# Patient Record
Sex: MALE | Race: WHITE | NOT HISPANIC OR LATINO | Employment: OTHER | URBAN - METROPOLITAN AREA
[De-identification: names, ages, dates, MRNs, and addresses within clinical notes are randomized per-mention and may not be internally consistent; named-entity substitution may affect disease eponyms.]

---

## 2022-09-07 ENCOUNTER — TRANSFERRED RECORDS (OUTPATIENT)
Dept: HEALTH INFORMATION MANAGEMENT | Facility: CLINIC | Age: 77
End: 2022-09-07

## 2022-09-08 ENCOUNTER — TRANSFERRED RECORDS (OUTPATIENT)
Dept: HEALTH INFORMATION MANAGEMENT | Facility: CLINIC | Age: 77
End: 2022-09-08

## 2022-10-24 ENCOUNTER — TRANSFERRED RECORDS (OUTPATIENT)
Dept: HEALTH INFORMATION MANAGEMENT | Facility: CLINIC | Age: 77
End: 2022-10-24

## 2022-12-05 ENCOUNTER — TRANSFERRED RECORDS (OUTPATIENT)
Dept: HEALTH INFORMATION MANAGEMENT | Facility: CLINIC | Age: 77
End: 2022-12-05

## 2022-12-17 ENCOUNTER — TRANSFERRED RECORDS (OUTPATIENT)
Dept: HEALTH INFORMATION MANAGEMENT | Facility: CLINIC | Age: 77
End: 2022-12-17

## 2022-12-20 ENCOUNTER — TRANSFERRED RECORDS (OUTPATIENT)
Dept: HEALTH INFORMATION MANAGEMENT | Facility: CLINIC | Age: 77
End: 2022-12-20

## 2022-12-21 ENCOUNTER — TRANSFERRED RECORDS (OUTPATIENT)
Dept: HEALTH INFORMATION MANAGEMENT | Facility: CLINIC | Age: 77
End: 2022-12-21

## 2023-01-10 ENCOUNTER — TRANSFERRED RECORDS (OUTPATIENT)
Dept: HEALTH INFORMATION MANAGEMENT | Facility: CLINIC | Age: 78
End: 2023-01-10
Payer: MEDICARE

## 2023-01-23 ENCOUNTER — TRANSFERRED RECORDS (OUTPATIENT)
Dept: HEALTH INFORMATION MANAGEMENT | Facility: CLINIC | Age: 78
End: 2023-01-23
Payer: MEDICARE

## 2023-01-23 ENCOUNTER — MEDICAL CORRESPONDENCE (OUTPATIENT)
Dept: HEALTH INFORMATION MANAGEMENT | Facility: CLINIC | Age: 78
End: 2023-01-23
Payer: MEDICARE

## 2023-01-23 ENCOUNTER — TELEPHONE (OUTPATIENT)
Dept: GASTROENTEROLOGY | Facility: CLINIC | Age: 78
End: 2023-01-23
Payer: MEDICARE

## 2023-01-23 ENCOUNTER — TRANSCRIBE ORDERS (OUTPATIENT)
Dept: OTHER | Age: 78
End: 2023-01-23

## 2023-01-23 DIAGNOSIS — R16.0 LIVER MASS, LEFT LOBE: Primary | ICD-10-CM

## 2023-01-23 NOTE — TELEPHONE ENCOUNTER
Health Call Center    Phone Message    May a detailed message be left on voicemail: yes     Reason for Call: Other: Felisa from [facility] was reaching out alejandro she stated that she faxed over an urgent referral for the patient last Monday and the patient's wife reached out to them and explained that there was not a referral in the system. Felisa was extremely upset because she clarified she sent it to the correct fax number and they do not understand why we have not received it. I did advise re-faxing the referral to make sure it goes to the correct fax number, but she said it was unacceptable for us to have the patient wait 48 hours for it to be uploaded and possibly longer to wait for an appointment. They are wondering if there is anything the clinic can do to help make the patient an appointment right away. Please call patient or Felisa to discuss what can be done, thank you!     Action Taken: Message routed to:  [facility] ([facility]    Travel Screening: Not Applicable

## 2023-01-24 ENCOUNTER — PRE VISIT (OUTPATIENT)
Dept: GASTROENTEROLOGY | Facility: CLINIC | Age: 78
End: 2023-01-24
Payer: MEDICARE

## 2023-01-24 ENCOUNTER — OFFICE VISIT (OUTPATIENT)
Dept: GASTROENTEROLOGY | Facility: CLINIC | Age: 78
End: 2023-01-24
Attending: STUDENT IN AN ORGANIZED HEALTH CARE EDUCATION/TRAINING PROGRAM
Payer: MEDICARE

## 2023-01-24 ENCOUNTER — PRE VISIT (OUTPATIENT)
Dept: SURGERY | Facility: CLINIC | Age: 78
End: 2023-01-24

## 2023-01-24 ENCOUNTER — LAB (OUTPATIENT)
Dept: LAB | Facility: CLINIC | Age: 78
End: 2023-01-24
Payer: MEDICARE

## 2023-01-24 ENCOUNTER — PATIENT OUTREACH (OUTPATIENT)
Dept: ONCOLOGY | Facility: CLINIC | Age: 78
End: 2023-01-24

## 2023-01-24 ENCOUNTER — TRANSCRIBE ORDERS (OUTPATIENT)
Dept: OTHER | Age: 78
End: 2023-01-24

## 2023-01-24 VITALS
WEIGHT: 188 LBS | SYSTOLIC BLOOD PRESSURE: 130 MMHG | DIASTOLIC BLOOD PRESSURE: 65 MMHG | OXYGEN SATURATION: 98 % | HEART RATE: 79 BPM

## 2023-01-24 DIAGNOSIS — C78.7 SECONDARY MALIGNANT NEOPLASM OF LIVER AND INTRAHEPATIC BILE DUCT (H): ICD-10-CM

## 2023-01-24 DIAGNOSIS — K70.31 ALCOHOLIC CIRRHOSIS OF LIVER WITH ASCITES (H): ICD-10-CM

## 2023-01-24 DIAGNOSIS — K70.31 ALCOHOLIC CIRRHOSIS OF LIVER WITH ASCITES (H): Primary | ICD-10-CM

## 2023-01-24 DIAGNOSIS — R16.0 LIVER MASS: Primary | ICD-10-CM

## 2023-01-24 LAB
AFP SERPL-MCNC: 3.2 NG/ML
ALBUMIN SERPL BCG-MCNC: 3.7 G/DL (ref 3.5–5.2)
ALP SERPL-CCNC: 138 U/L (ref 40–129)
ALT SERPL W P-5'-P-CCNC: 27 U/L (ref 10–50)
ANION GAP SERPL CALCULATED.3IONS-SCNC: 6 MMOL/L (ref 7–15)
AST SERPL W P-5'-P-CCNC: 37 U/L (ref 10–50)
BILIRUB SERPL-MCNC: 2.5 MG/DL
BUN SERPL-MCNC: 19.4 MG/DL (ref 8–23)
CALCIUM SERPL-MCNC: 9.4 MG/DL (ref 8.8–10.2)
CHLORIDE SERPL-SCNC: 104 MMOL/L (ref 98–107)
CREAT SERPL-MCNC: 0.72 MG/DL (ref 0.67–1.17)
DEPRECATED HCO3 PLAS-SCNC: 28 MMOL/L (ref 22–29)
ERYTHROCYTE [DISTWIDTH] IN BLOOD BY AUTOMATED COUNT: 18.5 % (ref 10–15)
FERRITIN SERPL-MCNC: 444 NG/ML (ref 31–409)
GFR SERPL CREATININE-BSD FRML MDRD: >90 ML/MIN/1.73M2
GLUCOSE SERPL-MCNC: 105 MG/DL (ref 70–99)
HCT VFR BLD AUTO: 28.5 % (ref 40–53)
HGB BLD-MCNC: 9.5 G/DL (ref 13.3–17.7)
INR PPP: 1.47 (ref 0.85–1.15)
MCH RBC QN AUTO: 33 PG (ref 26.5–33)
MCHC RBC AUTO-ENTMCNC: 33.3 G/DL (ref 31.5–36.5)
MCV RBC AUTO: 99 FL (ref 78–100)
PLATELET # BLD AUTO: 66 10E3/UL (ref 150–450)
POTASSIUM SERPL-SCNC: 4.3 MMOL/L (ref 3.4–5.3)
PROT SERPL-MCNC: 6.3 G/DL (ref 6.4–8.3)
RBC # BLD AUTO: 2.88 10E6/UL (ref 4.4–5.9)
SODIUM SERPL-SCNC: 138 MMOL/L (ref 136–145)
WBC # BLD AUTO: 3.1 10E3/UL (ref 4–11)

## 2023-01-24 PROCEDURE — 99205 OFFICE O/P NEW HI 60 MIN: CPT | Performed by: STUDENT IN AN ORGANIZED HEALTH CARE EDUCATION/TRAINING PROGRAM

## 2023-01-24 PROCEDURE — 82105 ALPHA-FETOPROTEIN SERUM: CPT | Performed by: STUDENT IN AN ORGANIZED HEALTH CARE EDUCATION/TRAINING PROGRAM

## 2023-01-24 PROCEDURE — 80053 COMPREHEN METABOLIC PANEL: CPT | Performed by: PATHOLOGY

## 2023-01-24 PROCEDURE — 99000 SPECIMEN HANDLING OFFICE-LAB: CPT | Performed by: PATHOLOGY

## 2023-01-24 PROCEDURE — 36415 COLL VENOUS BLD VENIPUNCTURE: CPT | Performed by: PATHOLOGY

## 2023-01-24 PROCEDURE — 85610 PROTHROMBIN TIME: CPT | Performed by: PATHOLOGY

## 2023-01-24 PROCEDURE — 85027 COMPLETE CBC AUTOMATED: CPT | Performed by: PATHOLOGY

## 2023-01-24 PROCEDURE — 86301 IMMUNOASSAY TUMOR CA 19-9: CPT | Mod: 90 | Performed by: PATHOLOGY

## 2023-01-24 PROCEDURE — G0463 HOSPITAL OUTPT CLINIC VISIT: HCPCS | Performed by: STUDENT IN AN ORGANIZED HEALTH CARE EDUCATION/TRAINING PROGRAM

## 2023-01-24 PROCEDURE — 82728 ASSAY OF FERRITIN: CPT | Performed by: STUDENT IN AN ORGANIZED HEALTH CARE EDUCATION/TRAINING PROGRAM

## 2023-01-24 RX ORDER — SPIRONOLACTONE 50 MG/1
50 TABLET, FILM COATED ORAL DAILY
COMMUNITY
Start: 2022-11-18 | End: 2024-08-30

## 2023-01-24 RX ORDER — KETOCONAZOLE 20 MG/G
CREAM TOPICAL DAILY PRN
COMMUNITY
Start: 2023-01-11

## 2023-01-24 RX ORDER — FERROUS SULFATE 325(65) MG
325 TABLET ORAL
Status: ON HOLD | COMMUNITY
Start: 2022-12-16 | End: 2024-08-30

## 2023-01-24 RX ORDER — LOSARTAN POTASSIUM 25 MG/1
1 TABLET ORAL DAILY
COMMUNITY
Start: 2022-08-15 | End: 2024-08-30

## 2023-01-24 RX ORDER — MULTIPLE VITAMINS W/ MINERALS TAB 9MG-400MCG
1 TAB ORAL DAILY
COMMUNITY
Start: 2022-12-16

## 2023-01-24 RX ORDER — FLUTICASONE PROPIONATE 50 MCG
2 SPRAY, SUSPENSION (ML) NASAL DAILY
COMMUNITY
Start: 2021-12-17

## 2023-01-24 RX ORDER — CIPROFLOXACIN 500 MG/1
1 TABLET, FILM COATED ORAL DAILY
COMMUNITY
Start: 2022-12-20 | End: 2023-08-16

## 2023-01-24 RX ORDER — TORSEMIDE 10 MG/1
10 TABLET ORAL DAILY
COMMUNITY
Start: 2023-01-09

## 2023-01-24 RX ORDER — FOLIC ACID 1 MG/1
1 TABLET ORAL DAILY
COMMUNITY
Start: 2022-03-13 | End: 2023-03-14

## 2023-01-24 RX ORDER — BUPROPION HYDROCHLORIDE 300 MG/1
300 TABLET ORAL EVERY MORNING
COMMUNITY
Start: 2022-12-14

## 2023-01-24 NOTE — LETTER
1/24/2023         RE: Oskar Wilks  [address] [place] [address]        Dear Colleague,    Thank you for referring your patient, Oskar Wilks, to the [facility] [place]. Please see a copy of my visit note below.    [facility] New Patient Visit    Date of Visit: January 24, 2023    Reason for referral: Liver mass    Subjective: Mr. Wilks is a 77-year-old man with a history of alcohol-related liver disease who presents for evaluation of a newly found liver lesion.    He has a history of cirrhosis, has been sober in the past, and drink heavily and in July 2022.  Started having issues with abdominal distention, was diagnosed with SBP September 2022.  He had been on torsemide just prior to this, since this admission was started on low-dose torsemide and spironolactone.  He is on Cipro for SBP prophylaxis.  His last paracentesis was attempted 2022, 4.7 L was removed.    December 12 bilirubin was 3.6 and INR was 1.8.  September 9 bilirubin was 5.6.  He had a paracentesis 9/8 that was consistent with portal hypertension, paracentesis also showed SBP    Fell 12/5 on the ice walking his puppy. Broke his hip and has surgery for this. Also broke two ribs. In rehab, fell again and had a L1 compression fracture. Getting OT/PT at home. About to graduate from OT because he is doing so well.  Uses walker for appointments.  Able to go up stairs without issues.  Independent with ADLs.    Was on narcotics related to surgery, had delirium related to this.  Otherwise no HE.     ROS: 14 point ROS negative except for positives noted in HPI.    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 fib not on anticoagulation  Psoriasis  Basal cell carcinoma  Nonrheumatic mild aortic stenosis  Cerebral infarction due to occlusion of middle cerebral artery    PSHx:  Right acetabulum fracture 2/2 GLF s/p ORIF on 12/9/22  BRAIN  HEMATOMA EVACUTATION   CRANIAL LESION RESECTION From trauma   MENISCECTOMY   MOUTH SURGERY      FamHx:  No family history of liver disease, liver cancer    SocHx:  Social History     Socioeconomic History     Marital status:      Spouse name: Not on file     Number of children: Not on file     Years of education: Not on file     Highest education level: Not on file   Occupational History     Not on file   Tobacco Use     Smoking status: Not on file     Smokeless tobacco: Not on file   Substance and Sexual Activity     Alcohol use: Not on file     Drug use: Not on file     Sexual activity: Not on file   Other Topics Concern     Not on file   Social History Narrative     Not on file     Social Determinants of Health     Financial Resource Strain: Not on file   Food Insecurity: Not on file   Transportation Needs: Not on file   Physical Activity: Not on file   Stress: Not on file   Social Connections: Not on file   Intimate Partner Violence: Not on file   Housing Stability: Not on file       Medications:  Current Outpatient Medications   Medication     buPROPion (WELLBUTRIN XL) 300 MG 24 hr tablet     cholecalciferol 25 MCG (1000 UT) TABS     ciprofloxacin (CIPRO) 500 MG tablet     fluticasone (FLONASE) 50 MCG/ACT nasal spray     folic acid (FOLVITE) 1 MG tablet     ketoconazole (NIZORAL) 2 % external cream     losartan (COZAAR) 25 MG tablet     multivitamin w/minerals (THERA-VIT-M) tablet     omeprazole (PRILOSEC) 20 MG DR capsule     spironolactone (ALDACTONE) 50 MG tablet     torsemide (DEMADEX) 10 MG tablet     ferrous sulfate (FEROSUL) 325 (65 Fe) MG tablet     No current facility-administered medications for this visit.     No OTCs, herbals    Allergies:  Allergies   Allergen Reactions     Penicillins      PN: LW Reaction: Itching, Pruritis       Objective:  /65 (BP Location: Right arm, Patient Position: Sitting, Cuff Size: Adult Regular)   Pulse 79   Wt 85.3 kg (188 lb)   SpO2 98%    Constitutional: pleasant [unfilled] in NAD  Eyes: non icteric  Respiratory: Normal respiratory excursion   MSK: normal range of motion of visualized extremities  Abd: Non distended  Skin: No jaundice  Psychiatric: normal mood and orientation    Labs: Reviewed in EHR     Imaging:    MRI Liver/MRCP 1/10/2023    FINDINGS:   Cirrhotic configuration of the liver. Sequelae of portal hypertension include small volume ascites, recanalized umbilical vein, periesophageal varices and splenomegaly measuring up to 21.0 cm which is similar compared to October 2022. Diffuse hepatic iron deposition. The hepatic and portal veins are patent.     Reticular areas of T2 signal hyperintensity and delayed phase enhancement indicate fibrosis. Within hepatic segment 4A is a 4.6 x 2.7 cm ovoid lesion. This is mostly intrinsically T1 hyperintense. There is arterial phase hyperenhancement (precontrast signal intensity units 496 with arterial phase units of 713). This remains T1 hyperintense relative to background liver parenchyma on the portal venous and delayed phases. No evidence of pseudocapsule. Therefore, LI-RADS 4. There is diffusion restriction. No intralesional iron or lipid. Several small liver cysts.     Cholelithiasis better seen on prior CT. Gallbladder wall thickening and/or pericholecystic fluid is nonspecific in the setting of portal hypertension. The gallbladder does not appear substantially distended. No substantial bile duct dilation. Cystic lesions within the pancreas were better demonstrated on prior dedicated MRCP; T2 images currently degraded by motion. No evidence of focal splenic lesion. Adrenals and kidneys appear unremarkable. No lymph node enlargement or aggressive osseous lesion.     Mild bilateral gynecomastia. Right hemidiaphragm elevation. Compression deformity of L1 vertebral body.     IMPRESSION:   1. Cirrhotic liver with diffuse hepatic iron deposition. Sequelae of portal hypertension include small volume  ascites, recanalized umbilical vein, periesophageal varices and splenomegaly.   2. A 4.6 x 2.7 cm LI-RADS 4 lesion within the left hepatic lobe.   3. Multiple pancreatic cystic lesions better assessed by recent prior dedicated MRCP, with follow-up guidelines for that examination.   4. Elevated right hemidiaphragm.   5. Compression deformity of L1 vertebral body redemonstrated, suboptimally assessed by this study.      CT AP 12/17/2022      IMPRESSION:   1. Mild L1 superior endplate compression fracture with minimal retropulsion is new from 9/7/2022.   2. Cirrhosis with sequelae of portal hypertension including stable splenomegaly, paraesophageal varices, extensive portosystemic collaterals, and trace ascites.   3. Stable 9 mm cyst in the pancreatic tail which was evaluated on MRCP 10/24/2022.    MRCP without contrast 10/2022    IMPRESSION:     1. Multiple subcentimeter cystic structures throughout the pancreas without pancreatic ductal dilatation or definite nodularity. 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 Recommend follow-up in one year with pancreatic protocol MRI/MRCP without and with contrast.   2. Markedly cirrhotic appearing liver. There is a 3.2 cm nodular area with T1 hyperintensity and mild T2 hyperintensity (segment 2/3). This is indeterminate without intravenous contrast. Recommend further evaluation with abdominal MRI without and with contrast.   3. Cholelithiasis.   4. Stigmata of portal hypertension.       Endoscopy:    EGD 1/23/2023    Findings:        The examined esophagus was normal.        The Z-line was regular and was found 43 cm from the        incisors.        Mild portal hypertensive gastropathy was found in the        cardia, in the gastric fundus and in the gastric body.        Localized mild inflammation characterized by        congestion (edema), erosions and erythema was found        in the gastric antrum and in the prepyloric region of         the stomach. Biopsies were taken with a cold forceps        for Helicobacter pylori testing. Verification of        patient identification for the specimen was done.        Estimated blood loss was minimal.        The exam of the stomach was otherwise normal.        The examined duodenum was normal.     Impression:            - Normal esophagus.                          - Z-line regular, 43 cm from the                           incisors.                          - Portal hypertensive gastropathy.                          - Gastritis. Biopsied.                          - Normal examined duodenum.       Independently reviewed labs and imaging.     Assessment/Plan: Mr. Wilks is a 77-year-old man with a history of alcohol-related liver disease who presents for evaluation of a newly found liver lesion.    On review of his MRI, and he has about a 4 cm LR 4 lesion with hyperenhancement and diffusion restriction but no clear washout or pseudocapsule.  MRI from October, commented on a vague ill-defined 3 cm area in segment 2-3.  Discussed we will review his imaging at tumor conference this week.    Discussed we will review his imaging and tumor conference this week.  Potential outcomes include biopsy versus reimaging versus treatment.  Briefly discussed treatment for presumed liver cancer, focusing on Y 90 treatment.  His lesion is too large to be successfully ablated.  He is not a surgical candidate given his thrombocytopenia.  His liver disease currently is well compensated but he is a child Naqvi B cirrhotic.  MELD is 18.  He has a good functional status, despite having a recent hip fracture.    CP B(8) assuming no HE, + Ascites    MELD Na 18    Orders Placed This Encounter   Procedures     CT Chest w/o Contrast     CBC with platelets     Comprehensive metabolic panel (BMP + Alb, Alk Phos, ALT, AST, Total. Bili, TP)     INR     AFP tumor marker     Cancer antigen 19-9     Ferritin     RTC 3-4 months.    Laura Neves MD  MS  Hepatology/Liver Transplant  [facility]    Approximately 30 minutes was spent for the visit with 30 minutes of non face-to-face time were spent in review of the patient's medical record on the day of the visit. This included review of previous: clinic visits, hospital records, lab results, imaging studies, and documentation.  The findings from this review are summarized in the above note.          Again, thank you for allowing me to participate in the care of your patient.      Sincerely,    Laura Neves MD

## 2023-01-24 NOTE — TELEPHONE ENCOUNTER
Imaging Received  January 24, 2023 4:07 PM ABT   Action: Images from  received and resolved to PACS.     RECORDS STATUS - ALL OTHER DIAGNOSIS      RECORDS RECEIVED FROM: [employer]   DATE RECEIVED:    NOTES STATUS DETAILS   OFFICE NOTE from referring provider [facility] Dr. Colton Ruvalcaba   OFFICE NOTE from medical oncologist [facility] 09/29/22: Dr. Bird Patel   OFFICE NOTE from other specialist     DISCHARGE SUMMARY from hospital [facility] 09/07/22, 08/13/22: [facility]   DISCHARGE REPORT from the ER [facility] 12/17/22: [facility]   OPERATIVE REPORT [facility] 01/23/23: EGD   MEDICATION LIST [facility]    LABS     PATHOLOGY REPORTS     ANYTHING RELATED TO DIAGNOSIS - Most recent 01/19/23   IMAGING (NEED IMAGES & REPORT)     CT SCANS PACS 12/17/22,: CT Abd Pel  12/05/22: CT Pel    HP:  09/07/22: CT Abd Pel   MRI PACS 01/10/23: MR Abd  10/24/22: MR MRCP   XRAYS PACS 12/21/22, 12/05/22: XR Pelvis   ULTRASOUND PACS 09/07/22: US Abd    HP:  06/02/22-04/27/21: US Abd   PET

## 2023-01-24 NOTE — TELEPHONE ENCOUNTER
DIAGNOSIS: new 4 cm liver mass. Pt has a history of cirrhosis    Appt Date: 01.24.2023   NOTES STATUS DETAILS   OFFICE NOTE from referring provider Care Everywhere / Received 01.12.2023, 01.23.2023  Kinney, Timothy P, MD [facility]   OFFICE NOTES from other specialists Care Everywhere 01.09.2023 Chika Bateman, YOSELYN BELL P    09.06.2022 Viet López MD   HP    DISCHARGE SUMMARY from hospital     MEDICATION LIST Care Everywhere    LIVER BIOSPY (IF APPLICABLE)      PATHOLOGY REPORTS      IMAGING     ENDOSCOPY (IF AVAILABLE)     COLONOSCOPY (IF AVAILABLE)     ULTRASOUND LIVER Care Everywhere 09.08.2022US Paracentesis    09.07.2022 US Abd RUQ   CT OF ABDOMEN Care Everywhere 12.05.2022 CT Abd Pelvis   MRI OF LIVER Care Everywhere 01.10.2023 MR Abd W/WO IV Cont      10.24.2022 MR MRCP    FIBROSCAN, US ELASTOGRAPHY, FIBROSIS SCAN, MR ELASTOGRAPHY     LABS     HEPATIC PANEL (LIVER PANEL) Care Everywhere 09.10.2022   BASIC METABOLIC PANEL Care Everywhere 12.14.2022   COMPLETE METABOLIC PANEL Care Everywhere 01.19.2023   COMPLETE BLOOD COUNT (CBC) Care Everywhere 01.19.2023   INTERNATIONAL NORMALIZED RATIO (INR) Care Everywhere 01.09.2023   HEPATITIS C ANTIBODY     HEPATITIS C VIRAL LOAD/PCR     HEPATITIS C GENOTYPE     HEPATITIS B SURFACE ANTIGEN     HEPATITIS B SURFACE ANTIBODY     HEPATITIS B DNA QUANT LEVEL     HEPATITIS B CORE ANTIBODY

## 2023-01-24 NOTE — PROGRESS NOTES
New Patient Oncology Nurse Navigator Note     Referring provider: Dr Ruvalcaba, [facility]    Referring Clinic/Organization: [facility] / [facility]      Referred to: Surgical Oncology -  Hepatobiliary / GI Cancers    Requested provider (if applicable): Dr. Guy or Augie    Referral Received: 01/24/23       Evaluation for : liver mass, possible HCC     Clinical History (per Nurse review of records provided):      1/23/2023 EGD     Impression:    - Normal esophagus.                          - Z-line regular, 43 cm from the                           incisors.                          - Portal hypertensive gastropathy.                          - Gastritis. Biopsied.                          - Normal examined duodenum.       1/10/23 MR Abd    IMPRESSION:  1. Cirrhotic liver with diffuse hepatic iron deposition. Sequelae of portal hypertension include small volume ascites, recanalized umbilical vein, periesophageal varices and splenomegaly.  2. A 4.6 x 2.7 cm LI-RADS 4 lesion within the left hepatic lobe.  3. Multiple pancreatic cystic lesions better assessed by recent prior dedicated MRCP, with follow-up guidelines for that examination.  4. Elevated right hemidiaphragm.  5. Compression deformity of L1 vertebral body redemonstrated, suboptimally assessed by this study.       12/17/2022 CT Abd/Pelvis    IMPRESSION:   1. Mild L1 superior endplate compression fracture with minimal retropulsion is new from 9/7/2022.   2. Cirrhosis with sequelae of portal hypertension including stable splenomegaly, paraesophageal varices, extensive portosystemic collaterals, and trace ascites.   3. Stable 9 mm cyst in the pancreatic tail which was evaluated on MRCP 10/24/2022.      1/24/2023 N GI visit with Dr Neves, plan to get CT chest 1/26 and discuss at Tumor Board for next steps. May focus on Y90 as lesion is too large for ablation and platelets too low for surgery option.        Clinical Assessment /  Barriers to Care (Per Nurse):    Will proceed to offer next available Surg Onc consult in the coming weeks as discussion continues about care options.       Records Location: [facility] Everywhere     Records Needed:     Outside imaging    Additional testing needed prior to consult:     HAN Drummond, RN, BSN, OCN  Oncology New Patient Nurse Navigator   [facility] [place] [facility]  [phone]

## 2023-01-24 NOTE — NURSING NOTE
Chief Complaint   Patient presents with     Consult     New liver mass by MRI     Blood pressure 130/65, pulse 79, weight 85.3 kg (188 lb), SpO2 98 %.    Kain Boss on 1/24/2023 at 7:58 AM

## 2023-01-25 LAB — CANCER AG19-9 SERPL IA-ACNC: 41 U/ML

## 2023-01-26 ENCOUNTER — ANCILLARY PROCEDURE (OUTPATIENT)
Dept: CT IMAGING | Facility: CLINIC | Age: 78
End: 2023-01-26
Attending: STUDENT IN AN ORGANIZED HEALTH CARE EDUCATION/TRAINING PROGRAM
Payer: MEDICARE

## 2023-01-26 DIAGNOSIS — C78.7 SECONDARY MALIGNANT NEOPLASM OF LIVER AND INTRAHEPATIC BILE DUCT (H): ICD-10-CM

## 2023-01-26 DIAGNOSIS — K70.31 ALCOHOLIC CIRRHOSIS OF LIVER WITH ASCITES (H): ICD-10-CM

## 2023-01-26 PROCEDURE — G1010 CDSM STANSON: HCPCS

## 2023-01-26 PROCEDURE — 71250 CT THORAX DX C-: CPT | Mod: MG

## 2023-01-27 ENCOUNTER — TELEPHONE (OUTPATIENT)
Dept: GASTROENTEROLOGY | Facility: CLINIC | Age: 78
End: 2023-01-27
Payer: MEDICARE

## 2023-01-27 DIAGNOSIS — R16.0 LIVER MASS: ICD-10-CM

## 2023-01-27 DIAGNOSIS — R16.0 LIVER MASS: Primary | ICD-10-CM

## 2023-01-27 NOTE — TELEPHONE ENCOUNTER
Referral and path order entered.    Apolonia COLON LPN  Hepatology Clinic    ---- Message from Laura Neves MD sent at 1/27/2023  2:07 PM CST -----  Regarding: Referral for liver bx of mass  Hey -    This is a patient I presented in tumor conference today - recommended a bx of a liver mass. I talked to the patient, they are aware of the referral. FYI wife is very anxious about getting this done urgently    [facility] - can you place an order for a IR liver bx of a mass?     Laura

## 2023-01-27 NOTE — CONSULTS
Outpatient IR Biopsy Referral    Patient is a 78 y/o male with a PMH of ETOH abuse, cirrhosis, ascites, A fib not on anticoagulation, BCC, HTN, new liver lesion. IR has been asked to biopsy a left liver mass. Discussed at Liver Tumor Board 1/27/23.         CT 1/26/23  UPPER ABDOMEN: Cirrhotic morphology of the liver. Previously described focal liver lesion better seen on recent MRI. Cholelithiasis without evidence of acute cholecystitis. Splenomegaly. Trace upper abdominal ascites    [facility] 1/10/23   IMPRESSION:  1. Cirrhotic liver with diffuse hepatic iron deposition. Sequelae of portal hypertension include small volume ascites, recanalized umbilical vein, periesophageal varices and splenomegaly.  2. A 4.6 x 2.7 cm LI-RADS 4 lesion within the left hepatic lobe.  3. Multiple pancreatic cystic lesions better assessed by recent prior dedicated MRCP, with follow-up guidelines for that examination.  4. Elevated right hemidiaphragm.  5. Compression deformity of L1 vertebral body redemonstrated, suboptimally assessed by this study.       .     Case and imaging CT 1/26/23 and MRI 1/10/23 was reviewed with Dr. Cage from IR and CT with US in the room biopsy of Left liver lobe is approved. Series 2 Image 63.    Procedure order, surgical pathology order placed.    If requesting team would like samples sent for anything else please enter them prior to scheduled procedure.    Primary team Dr. Neves made aware of IR recommendations via epic messaging.    NARESH Hall CNP  Interventional Radiology   IR on-call pager: [phone]

## 2023-01-30 NOTE — TELEPHONE ENCOUNTER
Patient was contacted by hepatology and scheduled for clinic appt as necessary.  Closing encounter

## 2023-01-31 ENCOUNTER — TELEPHONE (OUTPATIENT)
Dept: ONCOLOGY | Facility: CLINIC | Age: 78
End: 2023-01-31
Payer: MEDICARE

## 2023-01-31 ENCOUNTER — TELEPHONE (OUTPATIENT)
Dept: INTERVENTIONAL RADIOLOGY/VASCULAR | Facility: CLINIC | Age: 78
End: 2023-01-31
Payer: MEDICARE

## 2023-01-31 NOTE — TELEPHONE ENCOUNTER
Wife, Ericka, returned call. Instructions for patient's liver biopsy reviewed and acknowledged. No questions or concerns.    Lashay Coy RN  Interventional Radiology  [phone]

## 2023-01-31 NOTE — TELEPHONE ENCOUNTER
The patient wife called and said they are going to wait to schedule because the pt doctor said he might not be a surgery candidate. They  Will callback when they ready to schedule.

## 2023-01-31 NOTE — TELEPHONE ENCOUNTER
Detailed Voicemail message left with request for patient to contact Interventional Radiology Department and acknowledge understanding of MYCHART instructions that were sent in anticipation of procedure scheduled 2/2/23.      IR contact number provided in [facility].    Tia ANG  Interventional Radiology RN   [phone]

## 2023-01-31 NOTE — TELEPHONE ENCOUNTER
INTERVENTIONAL RADIOLOGY INSTRUCTIONS     You are scheduled for an upcoming procedure in the   Interventional Radiology Department at [facility].     Date: Thursday February 2      Procedure: Liver biopsy     Address: [facility]                 [address] [place] [address]         There is  parking for patients with limited mobility located at front entrance of [facility].    [facility] Patient/Visitor Parking Ramp   is located on the corner of [address]:   [address] [place] [address].                                                                               [place] [phone]                                                                                                         Check into the [facility] Waiting room at: 10:00 am    Do not eat any food after: 02:00 am  You may drink clear liquids until 08:00 am then nothing to drink until after your procedure.  Clear liquids are: water, apple juice, black coffee (no cream, sugar or milk), Gatorade, jello     Two visitors may accompany you to your procedure.    You will need to have someone available to drive you home.    We recommend that you have someone stay with you 1-2 hours after you get home.    Please take all of your medications as prescribed, unless you are   contacted by a nurse from our department to hold them.    If you have any questions, please call the Interventional Radiology team at [phone].       Thank you!    Tia ANG  Interventional Radiology Intake Nurse Coordinator  [phone]

## 2023-02-02 ENCOUNTER — APPOINTMENT (OUTPATIENT)
Dept: MEDSURG UNIT | Facility: CLINIC | Age: 78
End: 2023-02-02
Attending: STUDENT IN AN ORGANIZED HEALTH CARE EDUCATION/TRAINING PROGRAM
Payer: MEDICARE

## 2023-02-02 ENCOUNTER — HOSPITAL ENCOUNTER (OUTPATIENT)
Facility: CLINIC | Age: 78
Discharge: HOME | End: 2023-02-02
Attending: STUDENT IN AN ORGANIZED HEALTH CARE EDUCATION/TRAINING PROGRAM | Admitting: STUDENT IN AN ORGANIZED HEALTH CARE EDUCATION/TRAINING PROGRAM
Payer: MEDICARE

## 2023-02-02 ENCOUNTER — APPOINTMENT (OUTPATIENT)
Dept: INTERVENTIONAL RADIOLOGY/VASCULAR | Facility: CLINIC | Age: 78
End: 2023-02-02
Attending: STUDENT IN AN ORGANIZED HEALTH CARE EDUCATION/TRAINING PROGRAM
Payer: MEDICARE

## 2023-02-02 VITALS
HEART RATE: 73 BPM | TEMPERATURE: 98.2 F | BODY MASS INDEX: 25.75 KG/M2 | HEIGHT: 72 IN | DIASTOLIC BLOOD PRESSURE: 54 MMHG | WEIGHT: 190.1 LBS | RESPIRATION RATE: 16 BRPM | SYSTOLIC BLOOD PRESSURE: 120 MMHG | OXYGEN SATURATION: 100 %

## 2023-02-02 DIAGNOSIS — R16.0 LIVER MASS: ICD-10-CM

## 2023-02-02 LAB
ERYTHROCYTE [DISTWIDTH] IN BLOOD BY AUTOMATED COUNT: 18.8 % (ref 10–15)
HCT VFR BLD AUTO: 27.1 % (ref 40–53)
HGB BLD-MCNC: 8.6 G/DL (ref 13.3–17.7)
INR PPP: 1.52 (ref 0.85–1.15)
MCH RBC QN AUTO: 31.9 PG (ref 26.5–33)
MCHC RBC AUTO-ENTMCNC: 31.7 G/DL (ref 31.5–36.5)
MCV RBC AUTO: 100 FL (ref 78–100)
PLATELET # BLD AUTO: 53 10E3/UL (ref 150–450)
RBC # BLD AUTO: 2.7 10E6/UL (ref 4.4–5.9)
WBC # BLD AUTO: 2.1 10E3/UL (ref 4–11)

## 2023-02-02 PROCEDURE — 272N000505 HC NEEDLE CR5

## 2023-02-02 PROCEDURE — 88307 TISSUE EXAM BY PATHOLOGIST: CPT | Mod: 26 | Performed by: STUDENT IN AN ORGANIZED HEALTH CARE EDUCATION/TRAINING PROGRAM

## 2023-02-02 PROCEDURE — 250N000009 HC RX 250

## 2023-02-02 PROCEDURE — 85610 PROTHROMBIN TIME: CPT

## 2023-02-02 PROCEDURE — 88307 TISSUE EXAM BY PATHOLOGIST: CPT | Mod: TC | Performed by: STUDENT IN AN ORGANIZED HEALTH CARE EDUCATION/TRAINING PROGRAM

## 2023-02-02 PROCEDURE — 99152 MOD SED SAME PHYS/QHP 5/>YRS: CPT

## 2023-02-02 PROCEDURE — 272N000653 HC COIL/EMBOLIC DEVICE CR8

## 2023-02-02 PROCEDURE — 47000 NEEDLE BIOPSY OF LIVER PERQ: CPT | Performed by: RADIOLOGY

## 2023-02-02 PROCEDURE — 36415 COLL VENOUS BLD VENIPUNCTURE: CPT

## 2023-02-02 PROCEDURE — 85014 HEMATOCRIT: CPT | Performed by: NURSE PRACTITIONER

## 2023-02-02 PROCEDURE — 999N000133 HC STATISTIC PP CARE STAGE 2

## 2023-02-02 PROCEDURE — 258N000003 HC RX IP 258 OP 636: Performed by: NURSE PRACTITIONER

## 2023-02-02 PROCEDURE — 250N000011 HC RX IP 250 OP 636

## 2023-02-02 PROCEDURE — 76942 ECHO GUIDE FOR BIOPSY: CPT | Mod: 26 | Performed by: RADIOLOGY

## 2023-02-02 PROCEDURE — 99152 MOD SED SAME PHYS/QHP 5/>YRS: CPT | Performed by: RADIOLOGY

## 2023-02-02 PROCEDURE — 36415 COLL VENOUS BLD VENIPUNCTURE: CPT | Performed by: NURSE PRACTITIONER

## 2023-02-02 RX ORDER — NALOXONE HYDROCHLORIDE 0.4 MG/ML
0.2 INJECTION, SOLUTION INTRAMUSCULAR; INTRAVENOUS; SUBCUTANEOUS
Status: DISCONTINUED | OUTPATIENT
Start: 2023-02-02 | End: 2023-02-02 | Stop reason: HOSPADM

## 2023-02-02 RX ORDER — LIDOCAINE 40 MG/G
CREAM TOPICAL
Status: DISCONTINUED | OUTPATIENT
Start: 2023-02-02 | End: 2023-02-02 | Stop reason: HOSPADM

## 2023-02-02 RX ORDER — SODIUM CHLORIDE 9 MG/ML
INJECTION, SOLUTION INTRAVENOUS CONTINUOUS
Status: DISCONTINUED | OUTPATIENT
Start: 2023-02-02 | End: 2023-02-02 | Stop reason: HOSPADM

## 2023-02-02 RX ORDER — FLUMAZENIL 0.1 MG/ML
0.2 INJECTION, SOLUTION INTRAVENOUS
Status: DISCONTINUED | OUTPATIENT
Start: 2023-02-02 | End: 2023-02-02 | Stop reason: HOSPADM

## 2023-02-02 RX ORDER — FENTANYL CITRATE 50 UG/ML
25-50 INJECTION, SOLUTION INTRAMUSCULAR; INTRAVENOUS EVERY 5 MIN PRN
Status: DISCONTINUED | OUTPATIENT
Start: 2023-02-02 | End: 2023-02-02 | Stop reason: HOSPADM

## 2023-02-02 RX ORDER — NALOXONE HYDROCHLORIDE 0.4 MG/ML
0.4 INJECTION, SOLUTION INTRAMUSCULAR; INTRAVENOUS; SUBCUTANEOUS
Status: DISCONTINUED | OUTPATIENT
Start: 2023-02-02 | End: 2023-02-02 | Stop reason: HOSPADM

## 2023-02-02 RX ORDER — METOPROLOL TARTRATE 25 MG/1
25 TABLET, FILM COATED ORAL DAILY
Status: ON HOLD | COMMUNITY
End: 2024-08-30

## 2023-02-02 RX ADMIN — MIDAZOLAM HYDROCHLORIDE 1 MG: 1 INJECTION, SOLUTION INTRAMUSCULAR; INTRAVENOUS at 12:57

## 2023-02-02 RX ADMIN — FENTANYL CITRATE 50 MCG: 50 INJECTION, SOLUTION INTRAMUSCULAR; INTRAVENOUS at 12:57

## 2023-02-02 RX ADMIN — LIDOCAINE HYDROCHLORIDE 4 ML: 10 INJECTION, SOLUTION EPIDURAL; INFILTRATION; INTRACAUDAL; PERINEURAL at 13:04

## 2023-02-02 RX ADMIN — SODIUM CHLORIDE: 9 INJECTION, SOLUTION INTRAVENOUS at 10:28

## 2023-02-02 ASSESSMENT — ACTIVITIES OF DAILY LIVING (ADL)
ADLS_ACUITY_SCORE: 35

## 2023-02-02 NOTE — PROCEDURES
[facility]    Procedure: IR Procedure Note    Date/Time: 2/2/2023 1:12 PM  Performed by: Sergio Cazares MD  Authorized by: Sergio Cazares MD       UNIVERSAL PROTOCOL   Site Marked: NA  Prior Images Obtained and Reviewed:  Yes  Required items: Required blood products, implants, devices and special equipment available    Patient identity confirmed:  Verbally with patient, arm band, provided demographic data and hospital-assigned identification number  Patient was reevaluated immediately before administering moderate or deep sedation or anesthesia  Confirmation Checklist:  Patient's identity using two indicators, relevant allergies, procedure was appropriate and matched the consent or emergent situation and correct equipment/implants were available  Time out: Immediately prior to the procedure a time out was called    Universal Protocol: the Joint Commission Universal Protocol was followed    Preparation: Patient was prepped and draped in usual sterile fashion       ANESTHESIA    Anesthesia: Local infiltration  Local Anesthetic:  Lidocaine 1% without epinephrine      SEDATION  Patient Sedated: Yes    Vital signs: Vital signs monitored during sedation    See dictated procedure note for full details.  Findings: Biopsy of the left hepatic lobe lesion, 2 cores obtained through a 18 gauge biopsy needle.    Specimens: none    Complications: None    Condition: Stable      PROCEDURE    Patient Tolerance:  Patient tolerated the procedure well with no immediate complications  Length of time physician/provider present for 1:1 monitoring during sedation: 15

## 2023-02-02 NOTE — PROGRESS NOTES
Pt has tolerated PO. Pt ambulated with steady gait in [place] using his walker, pt denies dizziness/lightheadedness. Pt voided x1, clear/yellow. Discharge instructions reviewed with pt, pt verbalizes understanding. PIV discontinued.   1540 Pt transported to [facility] services via wheelchair. Pt's spouse present and reports she will provide ride home.

## 2023-02-02 NOTE — PRE-PROCEDURE
GENERAL PRE-PROCEDURE:   Procedure:  IR Liver Biopsy  Date/Time:  2/2/2023 12:48 PM    Written consent obtained?: Yes    Risks and benefits: Risks, benefits and alternatives were discussed    Consent given by:  Patient  Patient states understanding of procedure being performed: Yes    Patient's understanding of procedure matches consent: Yes    Procedure consent matches procedure scheduled: Yes    Expected level of sedation:  Moderate  Appropriately NPO:  Yes  ASA Class:  3  Mallampati  :  Grade 2- soft palate, base of uvula, tonsillar pillars, and portion of posterior pharyngeal wall visible  Lungs:  Lungs clear with good breath sounds bilaterally  Heart:  Normal heart sounds and rate  History & Physical reviewed:  History and physical reviewed and no updates needed  Statement of review:  I have reviewed the lab findings, diagnostic data, medications, and the plan for sedation

## 2023-02-02 NOTE — PROGRESS NOTES
Pt back from IR s/p liver biopsy.  VSS.  Pt alert and oriented x4.  Pt denies any pain.  Mid upper abd drsg F/D/I.

## 2023-02-02 NOTE — IR NOTE
Patient Name: Oskar Wilks  Medical Record Number: [id number]  Today's Date: 2/2/2023    Procedure: Liver Biopsy  Proceduralist: Dr. Howard and Dr. Cazares   Pathology present: NA     Procedure Start: 1256  Procedure end: 1311  Sedation medications administered: Midazolam 1 mg, Fentanyl 50 mcg       Report given to: 2A RN  : JESSICA    Other Notes: Pt arrived to IR room CT-2 from . Consent reviewed. Pt denies any questions or concerns regarding procedure. Pt positioned supine and monitored per protocol. Pt tolerated procedure without any noted complications. Pt transferred back to .  2 cores collected and sent to Surgical Pathology. Gel foam cores placed to biopsy site.  Procedure site CDI; IV 3000 dressing placed. Bedrest for 2 hours.

## 2023-02-02 NOTE — DISCHARGE INSTRUCTIONS
[facility]    Interventional Radiology  Patient Instructions Following Biopsy    AFTER YOU GO HOME  If you were given sedation DO NOT drive or operate machinery at home or at work for at least 24 hours  DO relax and take it easy for 48 hours, no strenuous activity for 24 hours  DO drink plenty of fluids  DO resume your regular diet, unless otherwise instructed by your Primary Physician  Keep the dressing dry and in place for 24 hours.  DO NOT SMOKE FOR AT LEAST 24 HOURS, if you have been given any medications that were to help you relax or sedate you during your procedure  DO NOT drink alcoholic beverages the day of your procedure  DO NOT do any strenuous exercise or lifting (> 10 lbs) for at least 7 days following your procedure  DO NOT take a shower for at least 12 hours following your procedure. No bath/pool for 5 days.  Remove dressing after shower the next day. Replace with Band aid for 2 days.  Never leave a wet dressing in place.  DO NOT make any important or legal decisions for 24 hours following your procedure  There should be minimum drainage from the biopsy site    CALL THE PHYSICIAN IF:  You start bleeding from the procedure site.  If you do start to bleed from that site, lie down flat and hold pressure on the site for a minimum of 10 minutes.  Your physician will tell you if you need to return to the hospital  You develop nausea or vomiting  You have excessive swelling, redness, or tenderness at the site  You have drainage that looks like it is infected.  You experience severe pain  You develop hives or a rash or unexplained itching  You develop shortness of breath  You develop a temperature of 101 degrees F or greater  You develop chest pain or cough up blood, lightheadedness or fainting      [facility] INTERVENTIONAL RADIOLOGY DEPARTMENT  Procedure Physician: Dr Cazares                                       Date of procedure: February 2, 2023  Telephone Numbers: [phone]       Monday-Friday 7:30 am to 4:00 pm  [phone] After 4:00 pm Monday-Friday, Weekends & Holidays.   Ask for the Interventional Radiologist on call.  Someone is on call 24 hrs/day  [facility] toll free number: [phone] Monday-Friday 8:00 am to 4:30 pm  [facility] Emergency Dept: [phone]

## 2023-02-02 NOTE — PROGRESS NOTES
Pt arrives to 2a, with spouse, for liver biopsy. H&P is up to date. Consent needs to be signed. CBC in process.

## 2023-02-03 LAB
PATH REPORT.COMMENTS IMP SPEC: ABNORMAL
PATH REPORT.COMMENTS IMP SPEC: YES
PATH REPORT.FINAL DX SPEC: ABNORMAL
PATH REPORT.GROSS SPEC: ABNORMAL
PATH REPORT.MICROSCOPIC SPEC OTHER STN: ABNORMAL
PATH REPORT.RELEVANT HX SPEC: ABNORMAL
PHOTO IMAGE: ABNORMAL

## 2023-02-06 DIAGNOSIS — R16.0 LIVER MASS: Primary | ICD-10-CM

## 2023-02-06 DIAGNOSIS — C22.0 HEPATOCELLULAR CARCINOMA (H): ICD-10-CM

## 2023-02-09 ENCOUNTER — TELEPHONE (OUTPATIENT)
Dept: RADIOLOGY | Facility: CLINIC | Age: 78
End: 2023-02-09
Payer: MEDICARE

## 2023-02-09 ENCOUNTER — TELEPHONE (OUTPATIENT)
Dept: INTERVENTIONAL RADIOLOGY/VASCULAR | Facility: CLINIC | Age: 78
End: 2023-02-09
Payer: MEDICARE

## 2023-02-09 PROBLEM — R79.1 ELEVATED INR: Status: ACTIVE | Noted: 2022-04-03

## 2023-02-09 PROBLEM — S32.010A CLOSED COMPRESSION FRACTURE OF BODY OF L1 VERTEBRA (H): Status: ACTIVE | Noted: 2022-12-22

## 2023-02-09 PROBLEM — K70.30 ESOPHAGEAL VARICES IN ALCOHOLIC CIRRHOSIS (H): Status: ACTIVE | Noted: 2022-12-16

## 2023-02-09 PROBLEM — E53.8 FOLATE DEFICIENCY: Status: ACTIVE | Noted: 2022-04-03

## 2023-02-09 PROBLEM — Z86.79 HISTORY OF ATRIAL FIBRILLATION: Status: ACTIVE | Noted: 2022-08-13

## 2023-02-09 PROBLEM — S22.41XA CLOSED FRACTURE OF MULTIPLE RIBS OF RIGHT SIDE: Status: ACTIVE | Noted: 2022-12-05

## 2023-02-09 PROBLEM — K31.89 PORTAL HYPERTENSIVE GASTROPATHY (H): Status: ACTIVE | Noted: 2022-12-16

## 2023-02-09 PROBLEM — E88.09 HYPOALBUMINEMIA: Status: ACTIVE | Noted: 2022-04-03

## 2023-02-09 PROBLEM — S32.401A: Status: ACTIVE | Noted: 2022-12-05

## 2023-02-09 PROBLEM — M80.00XA AGE-RELATED OSTEOPOROSIS WITH CURRENT PATHOLOGICAL FRACTURE: Status: ACTIVE | Noted: 2022-12-12

## 2023-02-09 PROBLEM — Z85.828 HISTORY OF BASAL CELL CARCINOMA: Status: ACTIVE | Noted: 2021-10-14

## 2023-02-09 PROBLEM — I85.10 ESOPHAGEAL VARICES IN ALCOHOLIC CIRRHOSIS (H): Status: ACTIVE | Noted: 2022-12-16

## 2023-02-09 PROBLEM — K76.6 PORTAL HYPERTENSIVE GASTROPATHY (H): Status: ACTIVE | Noted: 2022-12-16

## 2023-02-09 NOTE — TELEPHONE ENCOUNTER
Referring providers name, location, phone number and fax: Laura Neves MD in  HEPATOLOGY    Reason for visit: Liver mass  Hepatocellular carcinoma   CP B cirhrosis, MELD 15, unifocal HCC, referring to discuss IR treatment for HCC    Does patient have a referral: Yes    Referral to specific provider? NA    Medical records or notes if possible: [facility], please call pt's wife to schedule Pat [phone].  Thanks

## 2023-02-09 NOTE — TELEPHONE ENCOUNTER
Called and spoke to Ericka and Parag regarding IR referral. Message sent to clinic coordinators to assist with scheduling.     Dorina NASSAR RN, BSN   Interventional Radiology [facility]   [phone]

## 2023-02-09 NOTE — TELEPHONE ENCOUNTER
Called and spoke with Parag and his spouse regarding the referral to IR. Offered clinic visit this Friday 2/10 which they accepted. Writer will message clinic coordinators to assist with scheduling appointment. Provided Parag and his wife with my direct office line should they have any questions.     Dorina NASSAR RN, BSN   Interventional Radiology [facility]   [phone]

## 2023-02-10 ENCOUNTER — TELEPHONE (OUTPATIENT)
Dept: RADIOLOGY | Facility: CLINIC | Age: 78
End: 2023-02-10

## 2023-02-10 ENCOUNTER — LAB (OUTPATIENT)
Dept: LAB | Facility: CLINIC | Age: 78
End: 2023-02-10
Payer: MEDICARE

## 2023-02-10 ENCOUNTER — ONCOLOGY VISIT (OUTPATIENT)
Dept: RADIOLOGY | Facility: CLINIC | Age: 78
End: 2023-02-10
Attending: RADIOLOGY
Payer: MEDICARE

## 2023-02-10 VITALS
TEMPERATURE: 98.7 F | DIASTOLIC BLOOD PRESSURE: 74 MMHG | HEART RATE: 85 BPM | WEIGHT: 200.5 LBS | RESPIRATION RATE: 16 BRPM | HEIGHT: 72 IN | OXYGEN SATURATION: 100 % | BODY MASS INDEX: 27.16 KG/M2 | SYSTOLIC BLOOD PRESSURE: 131 MMHG

## 2023-02-10 DIAGNOSIS — R16.0 LIVER MASS: ICD-10-CM

## 2023-02-10 DIAGNOSIS — C22.0 HEPATOCELLULAR CARCINOMA (H): ICD-10-CM

## 2023-02-10 LAB
ALBUMIN SERPL BCG-MCNC: 3.6 G/DL (ref 3.5–5.2)
ALP SERPL-CCNC: 139 U/L (ref 40–129)
ALT SERPL W P-5'-P-CCNC: 21 U/L (ref 10–50)
AST SERPL W P-5'-P-CCNC: 35 U/L (ref 10–50)
BILIRUB DIRECT SERPL-MCNC: 0.66 MG/DL (ref 0–0.3)
BILIRUB SERPL-MCNC: 1.6 MG/DL
PROT SERPL-MCNC: 6.1 G/DL (ref 6.4–8.3)

## 2023-02-10 PROCEDURE — G0463 HOSPITAL OUTPT CLINIC VISIT: HCPCS

## 2023-02-10 PROCEDURE — 99205 OFFICE O/P NEW HI 60 MIN: CPT | Mod: GC | Performed by: RADIOLOGY

## 2023-02-10 PROCEDURE — 80076 HEPATIC FUNCTION PANEL: CPT | Performed by: PATHOLOGY

## 2023-02-10 PROCEDURE — 36415 COLL VENOUS BLD VENIPUNCTURE: CPT | Performed by: PATHOLOGY

## 2023-02-10 ASSESSMENT — PAIN SCALES - GENERAL: PAINLEVEL: NO PAIN (0)

## 2023-02-10 NOTE — PROGRESS NOTES
Interventional Radiology Clinic Visit    Date of this visit: 2/10/2023    Oskar Wilks is referred by Dr. Laura Jean for treatment recommendations. The patient requires evaluation for diagnosis of hepatocellular carcinoma (HCC).    Primary Physician: Viet López         History Of Present Illness:    Oskar Wilks is a 77 year old male who presents with diagnosis of unresectable biopsy-proven hepatocellular carcinoma on a background of alcoholic cirrhosis.    History from hepatology notes is as follows: Known history of cirrhosis.  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  Diagnosed with SBP in September 2022 and is now on ciprofloxacin for SBP prophylaxis. Was recently found to have a new 4.6 cm liver lesion in the left hepatic lobe that was a LIRADS 4 based on MRI features.  He underwent percutaneous biopsy of this lesion on 2/2/2023 that showed moderately differentiated hepatocellular carcinoma.  He has been referred for consideration of liver directed therapy.    Had fall on ice in early December while walking his dog and sustained right hip fracture and rib fractures, requiring ORIF of his hip, then fell again in rehab and sustained an L1 compression fracture in December.  Had some delirium related to the postoperative narcotics.    Today the patient states he is now doing much better after recovering from his recent fall.  Has some residual back pain from the lumbar compression fracture but can lie flat for long periods of time. Denies nausea vomiting or diarrhea. No significant constipation. No SOB, CP, or abdominal pain.  Bilateral lower extremity edema controlled with compression socks.    Denies history of hepatic encephalopathy or GI bleeding. Paracentesis history: once in September of '22, no ascites since.    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  It was a short relapse during a 7 - 8 year history of sobriety.    Review of Systems:    Negative ROS except stated above.    [facility] Medical[facility]  History:    [facility]  Alcoholic cirrhosis  Spontaneous bacterial peritonitis  Hypotension  Atrial fibrillation s/p ablation, not on AC.  Right hip fracture (s/p pinning) and rib fractures from fall December 2022  L1 compression fracture  Stroke  Cerebral hematoma evacuation    Current Medications:    Current Outpatient Medications   Medication Sig Dispense Refill     buPROPion (WELLBUTRIN XL) 300 MG 24 hr tablet Take 300 mg by mouth       cholecalciferol 25 MCG (1000 UT) TABS Take 1 tablet by mouth daily       ciprofloxacin (CIPRO) 500 MG tablet Take 1 tablet by mouth daily       folic acid (FOLVITE) 1 MG tablet Take 1 tablet by mouth daily       losartan (COZAAR) 25 MG tablet Take 1 tablet by mouth daily       metoprolol tartrate (LOPRESSOR) 25 MG tablet Take 25 mg by mouth 2 times daily       omeprazole (PRILOSEC) 20 MG DR capsule TAKE 1 CAPSULE(20 MG) BY MOUTH DAILY 1 HOUR BEFORE A MEAL       spironolactone (ALDACTONE) 50 MG tablet Take 50 mg by mouth       torsemide (DEMADEX) 10 MG tablet Take 10 mg by mouth daily       traMADol (ULTRAM) 25 mg TABS half-tab Take 25 mg by mouth every 6 hours as needed for moderate to severe pain       Acetaminophen (TYLENOL PO) Take 650 mg by mouth (Patient not taking: Reported on 2/10/2023)       ferrous sulfate (FEROSUL) 325 (65 Fe) MG tablet Take 325 mg by mouth (Patient not taking: Reported on 1/24/2023)       fluticasone (FLONASE) 50 MCG/ACT nasal spray Spray 2 sprays into both nostrils daily (Patient not taking: Reported on 2/10/2023)       ketoconazole (NIZORAL) 2 % external cream APPLY TOPICALLY TO RASH TWICE DAILY AS NEEDED (Patient not taking: Reported on 2/10/2023)       multivitamin w/minerals (THERA-VIT-M) tablet Take 1 tablet by mouth         Allergies:    Penicillins and Adhesive tape    Family History:    No family history on file.    Social History:    Recovering from his fall, now at goal with physical therapy and will be  "completing PT. Now fully functional at home, able to last about 30 minutes on treadmill.  Bathes and dresses himself.    Social History     Socioeconomic History     Marital status:    Tobacco Use     Smoking status: Former     Years: 8.00     Types: Cigarettes     Quit date: 1985     Years since quittin.1     Smokeless tobacco: Never   Vaping Use     Vaping Use: Never used   Substance and Sexual Activity     Alcohol use: Not Currently     Comment: sober since 2022     Drug use: Never       Physical Exam:    /74 (BP Location: Right arm, Patient Position: Chair, Cuff Size: Adult Large)   Pulse 85   Temp 98.7  F (37.1  C) (Oral)   Resp 16   Ht 1.829 m (6' 0.01\")   Wt 90.9 kg (200 lb 8 oz)   SpO2 100%   BMI 27.19 kg/m      CONSTITUTIONAL: healthy, alert and no distress. Present in the exam room with wife and daughter.  PSYCHIATRIC: mentation appears normal and affect normal.  NEURO: Normal movements and speech.  EYES: No jaundice or pallor.  SKIN: No jaundice.   RESP: No audible cough or wheeze. Unlabored respirations.      Laboratory Studies:    Lab Results   Component Value Date    HGB 8.6 2023     Lab Results   Component Value Date    PLT 53 2023     Lab Results   Component Value Date    WBC 2.1 2023       Lab Results   Component Value Date    INR 1.52 2023       Lab Results   Component Value Date    PROTTOTAL 6.3 2023      Lab Results   Component Value Date    ALBUMIN 3.7 2023     Lab Results   Component Value Date    BILITOTAL 2.5 2023     Lab Results   Component Value Date    ALKPHOS 138 2023     Lab Results   Component Value Date    AST 37 2023     Lab Results   Component Value Date    ALT 27 2023       Lab Results   Component Value Date    CR 0.72 2023     Lab Results   Component Value Date    BUN 19.4 2023       AFP = 3.2 on 2023   CA 19-9 = 41 on 2023    Imaging:     I personally reviewed and " interpreted:    MRI from 1/10/23: Lobulated enhancing mass in segment 2/3 measuring 4.9 cm in greatest axial plane on 1/10, previously 4.5 cm in same orientation on 10/22. This is a biopsy proven HCC although contains extracellular fat signal. This is nearby the confluence of the left portal vein and large recanalized umbilical vein at the falciform, too central for safe percutaneous ablation.    ASSESSMENT:      Oskar Wilks is a 77 year old male with growing segment 2/3 HCC (biopsy proven) and is candidate for locoregional therapy.    Child-Naqvi score: B8  ECOG performance status: 1    We discussed that I would recommend transarterial chemoembolization for this lesion.  Given his elevated bilirubin at 2.5, radioembolization is not recommended at this time, though ideally it would be preferred due to better treatment response and since his bilirubin has been decreasing we will check his LFTs again.  Given the size of the lesion and central location in the left hepatic lobe, percutaneous ablation is also not recommended.    I showed the patient the imaging and discussed the findings. I discussed with them that the goal of the procedure is disease control with a survival benefit rather than a cure given the nature of the disease, but that sometimes lesions will be cured in this manner.      I explained that the chemoembolization is performed under conscious sedation. We discussed what will happen before, during and after the procedure; what to expect in the post procedure recovery period; and what the follow-up will be. We discussed post-embolization side effects which consists of varying degrees of pain, nausea/vomiting, malaise, fever, and elevated white blood cell counts for up to 2 weeks following the procedure. We discussed the risks of the procedure which include, but are not limited to, vascular injury causing bleeding or occlusion of blood vessels, groin hematoma, infection, liver failure, and non-target  embolization. I explained that sometimes more than one treatment session is needed to gain control of the tumors, particularly with larger tumors or multifocal tumors. The patient also understands that new tumors may develop elsewhere given the nature of the disease. Many patients go home the next day, but occasionally circumstances are such that a longer admission may be required. All of the patient's questions were answered and the patient agreed to proceed.     In case he becomes a candidate for radioembolization in the future, we also explained the radioembolization procedure  - that it is a two step procedure on two different days that involves an angiogram and nuclear medicine study on each day, and that it requires insurance pre-approval. I explained that the first procedure involves mapping the arteries to the liver and embolizing any sidebranches that place the patient at risk of non-target radiation injury, then checking for shunting to the lungs. The second procedure involves delivering the radiation beads intra-arterially and imaging the liver afterwards to assess the pattern of radiation distribution.     I explained that both procedures are performed under conscious sedation. We discussed what will happen before, during and after the procedure; what to expect in the post procedure recovery period; and what the follow-up will be. We discussed post-radioembolization side effects which consists of varying degrees of pain, nausea, and lethargy. We discussed the risks of the procedure which include, but are not limited to, vascular injury causing bleeding or occlusion of blood vessels, groin hematoma, infection, liver failure, non-target radiation injury to structures such as gallbladder/bowel/pancreas/lung.  We also discussed that the treatment sometimes need more than one treatment session to gain control of the tumor, particularly for large or multifocal tumors, that may involve other modalities  including ablation.  I also explained that new tumors may develop elsewhere given the nature of the disease. Most patients go home the same day, but occasionally circumstances are such that a longer admission may be required.     PLAN:    - We will schedule the patient for the chemoembolization procedure.  - We will check the LFTs and if they have significantly improved, we will treat with radioembolization instead.    UPDATE:    Patient had new labs drawn today after the clinic visit:  Total bilirubin 1.6  Total protein 6.1  Albumin 3.6  ALT 21  AST 35    He is therefore a candidate for radioembolization and we will proceed with this instead and will discuss with the patient and his family.      Signed,  Colton Moreira DO, MS                PGY-6 Interventional Radiology   [facility]     Co-signed,  Kera Pillai M.D.    Interventional Radiology  Department of Radiology  [facility]      CC  Patient Care Team:  Viet López MD as PCP - General (Family Medicine)  YOLY LEDEZMA, Dr Kera Pillai, was present with the fellow during the clinic visit, including the history and exam. I discussed the case with the fellow and agree with the findings as documented in the assessment and plan.       60 minutes spent on the date of the encounter doing chart review, history and exam, imaging review, documentation and further activities per the note.

## 2023-02-10 NOTE — TELEPHONE ENCOUNTER
Called and left a VM for Oskar regarding his lab work from today and to discuss treatment plan. Provided patient with direct office line.     Dorina NASSAR RN, BSN   Interventional Radiology [facility]   [phone]

## 2023-02-10 NOTE — NURSING NOTE
"Oncology Rooming Note    February 10, 2023 10:59 AM   Oskar Wilks is a 77 year old male who presents for:    Chief Complaint   Patient presents with     Oncology Clinic Visit     [facility]     Initial Vitals: /74 (BP Location: Right arm, Patient Position: Chair, Cuff Size: Adult Large)   Pulse 85   Temp 98.7  F (37.1  C) (Oral)   Resp 16   Ht 1.829 m (6' 0.01\")   Wt 90.9 kg (200 lb 8 oz)   SpO2 100%   BMI 27.19 kg/m   Estimated body mass index is 27.19 kg/m  as calculated from the following:    Height as of this encounter: 1.829 m (6' 0.01\").    Weight as of this encounter: 90.9 kg (200 lb 8 oz). Body surface area is 2.15 meters squared.  No Pain (0) Comment: Data Unavailable   No LMP for male patient.  Allergies reviewed: Yes  Medications reviewed: Yes    Medications: Medication refills not needed today.  Pharmacy name entered into [employer]: [id number] [place] - [id number] AT [facility] [address]            "

## 2023-02-10 NOTE — LETTER
2/10/2023         RE: Oskar Wikls  [address] [place] [address]        Dear Colleague,    Thank you for referring your patient, Oskar Wilks, to the [facility]. Please see a copy of my visit note below.          Interventional Radiology Clinic Visit    Date of this visit: 2/10/2023    Oskar Wilks is referred by Dr. Laura Jean for treatment recommendations. The patient requires evaluation for diagnosis of hepatocellular carcinoma (HCC).    Primary Physician: Viet López         History Of Present Illness:    Oskar Wilks is a 77 year old male who presents with diagnosis of unresectable biopsy-proven hepatocellular carcinoma on a background of alcoholic cirrhosis.    History from hepatology notes is as follows: Known history of cirrhosis.  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  Diagnosed with SBP in September 2022 and is now on ciprofloxacin for SBP prophylaxis. Was recently found to have a new 4.6 cm liver lesion in the left hepatic lobe that was a LIRADS 4 based on MRI features.  He underwent percutaneous biopsy of this lesion on 2/2/2023 that showed moderately differentiated hepatocellular carcinoma.  He has been referred for consideration of liver directed therapy.    Had fall on ice in early December while walking his dog and sustained right hip fracture and rib fractures, requiring ORIF of his hip, then fell again in rehab and sustained an L1 compression fracture in December.  Had some delirium related to the postoperative narcotics.    Today the patient states he is now doing much better after recovering from his recent fall.  Has some residual back pain from the lumbar compression fracture but can lie flat for long periods of time. Denies nausea vomiting or diarrhea. No significant constipation. No SOB, CP, or abdominal pain.  Bilateral lower extremity edema controlled with compression socks.    Denies history of hepatic encephalopathy or GI bleeding.  Paracentesis history: once in September of '22, no ascites since.    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  It was a short relapse during a 7 - 8 year history of sobriety.    Review of Systems:    Negative ROS except stated above.    Past Medical[facility] History:    [facility]  [facility]atocellular carcinoma  Alcoholic cirrhosis  Spontaneous bacterial peritonitis  Hypotension  Atrial fibrillation s/p ablation, not on AC.  Right hip fracture (s/p pinning) and rib fractures from fall December 2022  L1 compression fracture  Stroke  Cerebral hematoma evacuation    Current Medications:    Current Outpatient Medications   Medication Sig Dispense Refill     buPROPion (WELLBUTRIN XL) 300 MG 24 hr tablet Take 300 mg by mouth       cholecalciferol 25 MCG (1000 UT) TABS Take 1 tablet by mouth daily       ciprofloxacin (CIPRO) 500 MG tablet Take 1 tablet by mouth daily       folic acid (FOLVITE) 1 MG tablet Take 1 tablet by mouth daily       losartan (COZAAR) 25 MG tablet Take 1 tablet by mouth daily       metoprolol tartrate (LOPRESSOR) 25 MG tablet Take 25 mg by mouth 2 times daily       omeprazole (PRILOSEC) 20 MG DR capsule TAKE 1 CAPSULE(20 MG) BY MOUTH DAILY 1 HOUR BEFORE A MEAL       spironolactone (ALDACTONE) 50 MG tablet Take 50 mg by mouth       torsemide (DEMADEX) 10 MG tablet Take 10 mg by mouth daily       traMADol (ULTRAM) 25 mg TABS half-tab Take 25 mg by mouth every 6 hours as needed for moderate to severe pain       Acetaminophen (TYLENOL PO) Take 650 mg by mouth (Patient not taking: Reported on 2/10/2023)       ferrous sulfate (FEROSUL) 325 (65 Fe) MG tablet Take 325 mg by mouth (Patient not taking: Reported on 1/24/2023)       fluticasone (FLONASE) 50 MCG/ACT nasal spray Spray 2 sprays into both nostrils daily (Patient not taking: Reported on 2/10/2023)       ketoconazole (NIZORAL) 2 % external cream APPLY TOPICALLY TO RASH TWICE DAILY AS NEEDED (Patient not taking: Reported on 2/10/2023)       multivitamin  "w/minerals (THERA-VIT-M) tablet Take 1 tablet by mouth         Allergies:    Penicillins and Adhesive tape    Family History:    No family history on file.    Social History:    Recovering from his fall, now at goal with physical therapy and will be completing PT. Now fully functional at home, able to last about 30 minutes on treadmill.  Bathes and dresses himself.    Social History     Socioeconomic History     Marital status:    Tobacco Use     Smoking status: Former     Years: 8.00     Types: Cigarettes     Quit date: 1985     Years since quittin.1     Smokeless tobacco: Never   Vaping Use     Vaping Use: Never used   Substance and Sexual Activity     Alcohol use: Not Currently     Comment: sober since 2022     Drug use: Never       Physical Exam:    /74 (BP Location: Right arm, Patient Position: Chair, Cuff Size: Adult Large)   Pulse 85   Temp 98.7  F (37.1  C) (Oral)   Resp 16   Ht 1.829 m (6' 0.01\")   Wt 90.9 kg (200 lb 8 oz)   SpO2 100%   BMI 27.19 kg/m      CONSTITUTIONAL: healthy, alert and no distress. Present in the exam room with wife and daughter.  PSYCHIATRIC: mentation appears normal and affect normal.  NEURO: Normal movements and speech.  EYES: No jaundice or pallor.  SKIN: No jaundice.   RESP: No audible cough or wheeze. Unlabored respirations.      Laboratory Studies:    Lab Results   Component Value Date    HGB 8.6 2023     Lab Results   Component Value Date    PLT 53 2023     Lab Results   Component Value Date    WBC 2.1 2023       Lab Results   Component Value Date    INR 1.52 2023       Lab Results   Component Value Date    PROTTOTAL 6.3 2023      Lab Results   Component Value Date    ALBUMIN 3.7 2023     Lab Results   Component Value Date    BILITOTAL 2.5 2023     Lab Results   Component Value Date    ALKPHOS 138 2023     Lab Results   Component Value Date    AST 37 2023     Lab Results   Component Value " Date    ALT 27 01/24/2023       Lab Results   Component Value Date    CR 0.72 01/24/2023     Lab Results   Component Value Date    BUN 19.4 01/24/2023       AFP = 3.2 on 2/2/2023   CA 19-9 = 41 on 2/2/2023    Imaging:     I personally reviewed and interpreted:    MRI from 1/10/23: Lobulated enhancing mass in segment 2/3 measuring 4.9 cm in greatest axial plane on 1/10, previously 4.5 cm in same orientation on 10/22. This is a biopsy proven HCC although contains extracellular fat signal. This is nearby the confluence of the left portal vein and large recanalized umbilical vein at the falciform, too central for safe percutaneous ablation.    ASSESSMENT:      Oskar Wilks is a 77 year old male with growing segment 2/3 HCC (biopsy proven) and is candidate for locoregional therapy.    Child-Naqvi score: B8  ECOG performance status: 1    We discussed that I would recommend transarterial chemoembolization for this lesion.  Given his elevated bilirubin at 2.5, radioembolization is not recommended at this time, though ideally it would be preferred due to better treatment response and since his bilirubin has been decreasing we will check his LFTs again.  Given the size of the lesion and central location in the left hepatic lobe, percutaneous ablation is also not recommended.    I showed the patient the imaging and discussed the findings. I discussed with them that the goal of the procedure is disease control with a survival benefit rather than a cure given the nature of the disease, but that sometimes lesions will be cured in this manner.      I explained that the chemoembolization is performed under conscious sedation. We discussed what will happen before, during and after the procedure; what to expect in the post procedure recovery period; and what the follow-up will be. We discussed post-embolization side effects which consists of varying degrees of pain, nausea/vomiting, malaise, fever, and elevated white blood cell  counts for up to 2 weeks following the procedure. We discussed the risks of the procedure which include, but are not limited to, vascular injury causing bleeding or occlusion of blood vessels, groin hematoma, infection, liver failure, and non-target embolization. I explained that sometimes more than one treatment session is needed to gain control of the tumors, particularly with larger tumors or multifocal tumors. The patient also understands that new tumors may develop elsewhere given the nature of the disease. Many patients go home the next day, but occasionally circumstances are such that a longer admission may be required. All of the patient's questions were answered and the patient agreed to proceed.     In case he becomes a candidate for radioembolization in the future, we also explained the radioembolization procedure  - that it is a two step procedure on two different days that involves an angiogram and nuclear medicine study on each day, and that it requires insurance pre-approval. I explained that the first procedure involves mapping the arteries to the liver and embolizing any sidebranches that place the patient at risk of non-target radiation injury, then checking for shunting to the lungs. The second procedure involves delivering the radiation beads intra-arterially and imaging the liver afterwards to assess the pattern of radiation distribution.     I explained that both procedures are performed under conscious sedation. We discussed what will happen before, during and after the procedure; what to expect in the post procedure recovery period; and what the follow-up will be. We discussed post-radioembolization side effects which consists of varying degrees of pain, nausea, and lethargy. We discussed the risks of the procedure which include, but are not limited to, vascular injury causing bleeding or occlusion of blood vessels, groin hematoma, infection, liver failure, non-target radiation injury to  structures such as gallbladder/bowel/pancreas/lung.  We also discussed that the treatment sometimes need more than one treatment session to gain control of the tumor, particularly for large or multifocal tumors, that may involve other modalities including ablation.  I also explained that new tumors may develop elsewhere given the nature of the disease. Most patients go home the same day, but occasionally circumstances are such that a longer admission may be required.     PLAN:    - We will schedule the patient for the chemoembolization procedure.  - We will check the LFTs and if they have significantly improved, we will treat with radioembolization instead.    UPDATE:    Patient had new labs drawn today after the clinic visit:  Total bilirubin 1.6  Total protein 6.1  Albumin 3.6  ALT 21  AST 35    He is therefore a candidate for radioembolization and we will proceed with this instead and will discuss with the patient and his family.      Signed,  Colton Moreira DO, MS                PGY-6 Interventional Radiology   [facility]     Co-signed,  Kera Pillai M.D.    Interventional Radiology  Department of Radiology  [facility]      CC  Patient Care Team:  Viet López MD as PCP - General (Family Medicine)  YOLY LEDEZMA, Dr Kera Pillai, was present with the fellow during the clinic visit, including the history and exam. I discussed the case with the fellow and agree with the findings as documented in the assessment and plan.       60 minutes spent on the date of the encounter doing chart review, history and exam, imaging review, documentation and further activities per the note.

## 2023-02-13 ENCOUNTER — TELEPHONE (OUTPATIENT)
Dept: INTERVENTIONAL RADIOLOGY/VASCULAR | Facility: CLINIC | Age: 78
End: 2023-02-13
Payer: MEDICARE

## 2023-02-13 NOTE — TELEPHONE ENCOUNTER
[facility] Call Center    Phone Message    May a detailed message be left on voicemail: no     Reason for Call: Other: Pt's wife Pat states Pt Oskar was seen on 2/10/23 and was told someone would be calling to schedule Y90 mapping and therapy.      Writer does not see orders in Epic.     Please call Pat to discuss/schedule. Thank you.     Action Taken: Message routed to:  [facility] ([facility] IR    Travel Screening: Not Applicable

## 2023-02-14 NOTE — TELEPHONE ENCOUNTER
Called and left a VM for Ericka and Parag regarding scheduling of his y90 procedure. Provided them with my direct office line.     Dorina NASSAR RN, BSN   Interventional Radiology [facility]   [phone]

## 2023-03-13 RX ORDER — HEPARIN SODIUM 200 [USP'U]/100ML
1 INJECTION, SOLUTION INTRAVENOUS CONTINUOUS PRN
Status: CANCELLED | OUTPATIENT
Start: 2023-03-13

## 2023-03-14 ENCOUNTER — APPOINTMENT (OUTPATIENT)
Dept: MEDSURG UNIT | Facility: CLINIC | Age: 78
End: 2023-03-14
Attending: RADIOLOGY
Payer: MEDICARE

## 2023-03-14 ENCOUNTER — HOSPITAL ENCOUNTER (OUTPATIENT)
Dept: NUCLEAR MEDICINE | Facility: CLINIC | Age: 78
Setting detail: NUCLEAR MEDICINE
Discharge: HOME | End: 2023-03-14
Attending: RADIOLOGY | Admitting: RADIOLOGY
Payer: MEDICARE

## 2023-03-14 ENCOUNTER — APPOINTMENT (OUTPATIENT)
Dept: INTERVENTIONAL RADIOLOGY/VASCULAR | Facility: CLINIC | Age: 78
End: 2023-03-14
Attending: RADIOLOGY
Payer: MEDICARE

## 2023-03-14 ENCOUNTER — HOSPITAL ENCOUNTER (OUTPATIENT)
Facility: CLINIC | Age: 78
Discharge: HOME | End: 2023-03-14
Attending: RADIOLOGY | Admitting: RADIOLOGY
Payer: MEDICARE

## 2023-03-14 VITALS
OXYGEN SATURATION: 99 % | HEIGHT: 72 IN | HEART RATE: 68 BPM | WEIGHT: 192 LBS | BODY MASS INDEX: 26.01 KG/M2 | RESPIRATION RATE: 18 BRPM | SYSTOLIC BLOOD PRESSURE: 121 MMHG | DIASTOLIC BLOOD PRESSURE: 74 MMHG | TEMPERATURE: 97.6 F

## 2023-03-14 DIAGNOSIS — R16.0 LIVER MASS: ICD-10-CM

## 2023-03-14 DIAGNOSIS — C22.0 HEPATOCELLULAR CARCINOMA (H): ICD-10-CM

## 2023-03-14 LAB
ALBUMIN SERPL BCG-MCNC: 3.5 G/DL (ref 3.5–5.2)
ALP SERPL-CCNC: 109 U/L (ref 40–129)
ALT SERPL W P-5'-P-CCNC: 23 U/L (ref 10–50)
AST SERPL W P-5'-P-CCNC: 36 U/L (ref 10–50)
BILIRUB DIRECT SERPL-MCNC: 0.59 MG/DL (ref 0–0.3)
BILIRUB SERPL-MCNC: 1.7 MG/DL
DACRYOCYTES BLD QL SMEAR: SLIGHT
FRAGMENTS BLD QL SMEAR: SLIGHT
INR PPP: 1.49 (ref 0.85–1.15)
PLAT MORPH BLD: ABNORMAL
PLATELET # BLD AUTO: 66 10E3/UL (ref 150–450)
POLYCHROMASIA BLD QL SMEAR: SLIGHT
PROT SERPL-MCNC: 5.6 G/DL (ref 6.4–8.3)
RBC MORPH BLD: ABNORMAL

## 2023-03-14 PROCEDURE — 250N000011 HC RX IP 250 OP 636: Performed by: STUDENT IN AN ORGANIZED HEALTH CARE EDUCATION/TRAINING PROGRAM

## 2023-03-14 PROCEDURE — G1010 CDSM STANSON: HCPCS

## 2023-03-14 PROCEDURE — C1769 GUIDE WIRE: HCPCS

## 2023-03-14 PROCEDURE — 78830 RP LOCLZJ TUM SPECT W/CT 1: CPT | Mod: 26

## 2023-03-14 PROCEDURE — 36247 INS CATH ABD/L-EXT ART 3RD: CPT | Mod: GC | Performed by: RADIOLOGY

## 2023-03-14 PROCEDURE — 255N000002 HC RX 255 OP 636: Performed by: RADIOLOGY

## 2023-03-14 PROCEDURE — 272N000143 HC KIT CR3

## 2023-03-14 PROCEDURE — 74175 CTA ABDOMEN W/CONTRAST: CPT

## 2023-03-14 PROCEDURE — 85610 PROTHROMBIN TIME: CPT | Performed by: RADIOLOGY

## 2023-03-14 PROCEDURE — 999N000133 HC STATISTIC PP CARE STAGE 2

## 2023-03-14 PROCEDURE — 250N000009 HC RX 250: Performed by: STUDENT IN AN ORGANIZED HEALTH CARE EDUCATION/TRAINING PROGRAM

## 2023-03-14 PROCEDURE — 343N000001 HC RX 343: Performed by: RADIOLOGY

## 2023-03-14 PROCEDURE — 75726 ARTERY X-RAYS ABDOMEN: CPT | Mod: 26 | Performed by: RADIOLOGY

## 2023-03-14 PROCEDURE — 75774 ARTERY X-RAY EACH VESSEL: CPT | Mod: 26 | Performed by: RADIOLOGY

## 2023-03-14 PROCEDURE — A9540 TC99M MAA: HCPCS | Performed by: RADIOLOGY

## 2023-03-14 PROCEDURE — 80076 HEPATIC FUNCTION PANEL: CPT | Performed by: STUDENT IN AN ORGANIZED HEALTH CARE EDUCATION/TRAINING PROGRAM

## 2023-03-14 PROCEDURE — 77300 RADIATION THERAPY DOSE PLAN: CPT | Mod: 26 | Performed by: RADIOLOGY

## 2023-03-14 PROCEDURE — 36247 INS CATH ABD/L-EXT ART 3RD: CPT

## 2023-03-14 PROCEDURE — 258N000003 HC RX IP 258 OP 636: Performed by: RADIOLOGY

## 2023-03-14 PROCEDURE — C1887 CATHETER, GUIDING: HCPCS

## 2023-03-14 PROCEDURE — 77263 THER RADIOLOGY TX PLNG CPLX: CPT | Performed by: RADIOLOGY

## 2023-03-14 PROCEDURE — 85049 AUTOMATED PLATELET COUNT: CPT | Performed by: RADIOLOGY

## 2023-03-14 PROCEDURE — 999N000142 HC STATISTIC PROCEDURE PREP ONLY

## 2023-03-14 PROCEDURE — 272N000566 HC SHEATH CR3

## 2023-03-14 PROCEDURE — 272N000504 HC NEEDLE CR4

## 2023-03-14 PROCEDURE — 75726 ARTERY X-RAYS ABDOMEN: CPT

## 2023-03-14 PROCEDURE — 76937 US GUIDE VASCULAR ACCESS: CPT | Mod: 26 | Performed by: RADIOLOGY

## 2023-03-14 PROCEDURE — 75774 ARTERY X-RAY EACH VESSEL: CPT

## 2023-03-14 PROCEDURE — 76380 CAT SCAN FOLLOW-UP STUDY: CPT | Mod: 26 | Performed by: RADIOLOGY

## 2023-03-14 PROCEDURE — 36415 COLL VENOUS BLD VENIPUNCTURE: CPT | Performed by: STUDENT IN AN ORGANIZED HEALTH CARE EDUCATION/TRAINING PROGRAM

## 2023-03-14 RX ORDER — ONDANSETRON 2 MG/ML
4 INJECTION INTRAMUSCULAR; INTRAVENOUS EVERY 6 HOURS PRN
Status: CANCELLED | OUTPATIENT
Start: 2023-03-14

## 2023-03-14 RX ORDER — NALOXONE HYDROCHLORIDE 0.4 MG/ML
0.4 INJECTION, SOLUTION INTRAMUSCULAR; INTRAVENOUS; SUBCUTANEOUS
Status: DISCONTINUED | OUTPATIENT
Start: 2023-03-14 | End: 2023-03-14 | Stop reason: HOSPADM

## 2023-03-14 RX ORDER — PROCHLORPERAZINE MALEATE 5 MG
5 TABLET ORAL EVERY 6 HOURS PRN
Status: CANCELLED | OUTPATIENT
Start: 2023-03-14

## 2023-03-14 RX ORDER — SODIUM CHLORIDE 9 MG/ML
INJECTION, SOLUTION INTRAVENOUS CONTINUOUS
Status: CANCELLED | OUTPATIENT
Start: 2023-03-14

## 2023-03-14 RX ORDER — NALOXONE HYDROCHLORIDE 0.4 MG/ML
0.2 INJECTION, SOLUTION INTRAMUSCULAR; INTRAVENOUS; SUBCUTANEOUS
Status: DISCONTINUED | OUTPATIENT
Start: 2023-03-14 | End: 2023-03-14 | Stop reason: HOSPADM

## 2023-03-14 RX ORDER — DOCUSATE SODIUM 100 MG/1
100 CAPSULE, LIQUID FILLED ORAL 2 TIMES DAILY
Status: CANCELLED | OUTPATIENT
Start: 2023-03-14

## 2023-03-14 RX ORDER — IODIXANOL 320 MG/ML
100 INJECTION, SOLUTION INTRAVASCULAR ONCE
Status: COMPLETED | OUTPATIENT
Start: 2023-03-14 | End: 2023-03-14

## 2023-03-14 RX ORDER — FENTANYL CITRATE 50 UG/ML
25-50 INJECTION, SOLUTION INTRAMUSCULAR; INTRAVENOUS EVERY 5 MIN PRN
Status: DISCONTINUED | OUTPATIENT
Start: 2023-03-14 | End: 2023-03-14 | Stop reason: HOSPADM

## 2023-03-14 RX ORDER — LIDOCAINE 40 MG/G
CREAM TOPICAL
Status: DISCONTINUED | OUTPATIENT
Start: 2023-03-14 | End: 2023-03-14 | Stop reason: HOSPADM

## 2023-03-14 RX ORDER — FLUMAZENIL 0.1 MG/ML
0.2 INJECTION, SOLUTION INTRAVENOUS
Status: DISCONTINUED | OUTPATIENT
Start: 2023-03-14 | End: 2023-03-14 | Stop reason: HOSPADM

## 2023-03-14 RX ORDER — HEPARIN SODIUM 200 [USP'U]/100ML
1 INJECTION, SOLUTION INTRAVENOUS CONTINUOUS PRN
Status: DISCONTINUED | OUTPATIENT
Start: 2023-03-14 | End: 2023-03-14 | Stop reason: HOSPADM

## 2023-03-14 RX ORDER — ONDANSETRON 4 MG/1
4 TABLET, ORALLY DISINTEGRATING ORAL EVERY 6 HOURS PRN
Status: CANCELLED | OUTPATIENT
Start: 2023-03-14

## 2023-03-14 RX ORDER — SODIUM CHLORIDE 9 MG/ML
INJECTION, SOLUTION INTRAVENOUS CONTINUOUS
Status: DISCONTINUED | OUTPATIENT
Start: 2023-03-14 | End: 2023-03-14 | Stop reason: HOSPADM

## 2023-03-14 RX ORDER — PROCHLORPERAZINE 25 MG
12.5 SUPPOSITORY, RECTAL RECTAL EVERY 12 HOURS PRN
Status: CANCELLED | OUTPATIENT
Start: 2023-03-14

## 2023-03-14 RX ADMIN — MIDAZOLAM HYDROCHLORIDE 0.5 MG: 1 INJECTION, SOLUTION INTRAMUSCULAR; INTRAVENOUS at 13:55

## 2023-03-14 RX ADMIN — MIDAZOLAM HYDROCHLORIDE 1 MG: 1 INJECTION, SOLUTION INTRAMUSCULAR; INTRAVENOUS at 13:12

## 2023-03-14 RX ADMIN — FENTANYL CITRATE 50 MCG: 50 INJECTION INTRAMUSCULAR; INTRAVENOUS at 13:14

## 2023-03-14 RX ADMIN — LIDOCAINE HYDROCHLORIDE 5 ML: 10 INJECTION, SOLUTION EPIDURAL; INFILTRATION; INTRACAUDAL; PERINEURAL at 13:58

## 2023-03-14 RX ADMIN — HEPARIN SODIUM 1 BAG: 200 INJECTION, SOLUTION INTRAVENOUS at 13:57

## 2023-03-14 RX ADMIN — FENTANYL CITRATE 50 MCG: 50 INJECTION INTRAMUSCULAR; INTRAVENOUS at 14:34

## 2023-03-14 RX ADMIN — MIDAZOLAM HYDROCHLORIDE 1 MG: 1 INJECTION, SOLUTION INTRAMUSCULAR; INTRAVENOUS at 13:19

## 2023-03-14 RX ADMIN — SODIUM CHLORIDE: 9 INJECTION, SOLUTION INTRAVENOUS at 10:37

## 2023-03-14 RX ADMIN — IODIXANOL 142 ML: 320 INJECTION, SOLUTION INTRAVASCULAR at 14:49

## 2023-03-14 RX ADMIN — FENTANYL CITRATE 50 MCG: 50 INJECTION INTRAMUSCULAR; INTRAVENOUS at 13:29

## 2023-03-14 RX ADMIN — FENTANYL CITRATE 50 MCG: 50 INJECTION INTRAMUSCULAR; INTRAVENOUS at 14:21

## 2023-03-14 RX ADMIN — MIDAZOLAM HYDROCHLORIDE 0.5 MG: 1 INJECTION, SOLUTION INTRAMUSCULAR; INTRAVENOUS at 14:12

## 2023-03-14 RX ADMIN — KIT FOR THE PREPARATION OF TECHNETIUM TC 99M ALBUMIN AGGREGATED 4 MILLICURIE: 2.5 INJECTION, POWDER, FOR SOLUTION INTRAVENOUS at 13:32

## 2023-03-14 ASSESSMENT — ACTIVITIES OF DAILY LIVING (ADL)
ADLS_ACUITY_SCORE: 35

## 2023-03-14 NOTE — PRE-PROCEDURE
GENERAL PRE-PROCEDURE:   Procedure:  Y90 mapping  Date/Time:  3/14/2023 10:07 AM    Written consent obtained?: Yes    Risks and benefits: Risks, benefits and alternatives were discussed    Consent given by:  Patient  Patient states understanding of procedure being performed: Yes    Patient's understanding of procedure matches consent: Yes    Procedure consent matches procedure scheduled: Yes    Expected level of sedation:  Moderate  Appropriately NPO:  Yes  ASA Class:  3  Mallampati  :  Grade 2- soft palate, base of uvula, tonsillar pillars, and portion of posterior pharyngeal wall visible  Lungs:  Lungs clear with good breath sounds bilaterally  Heart:  Normal heart sounds and rate  History & Physical reviewed:  History and physical reviewed and no updates needed  Statement of review:  I have reviewed the lab findings, diagnostic data, medications, and the plan for sedation

## 2023-03-14 NOTE — IR NOTE
Patient Name: Oskar Wilks  Medical Record Number: [id number]  Today's Date: 3/14/2023    Procedure: Y-90 mapping  Proceduralist: Harsh Pillai and Alma Rosa     Procedure Start: 1320  Procedure end: 1435  Sedation medications administered: versed 3 Mg., fentanyl 200 Mcg.     Report given to: 2A RN    Other Notes: Pt arrived to IR room 1 from . Consent reviewed. Pt denies any questions or concerns regarding procedure. Pt positioned supine  and monitored per protocol. Pt tolerated procedure without any noted complications. Pt transferred back to .  ProGlide closure device to RFA puncture, 2 hours bedrest until 1735.

## 2023-03-14 NOTE — DISCHARGE INSTRUCTIONS
[facility]   Interventional Radiology  Discharge Instructions Post Angiography for Insertion of   Radioactive Microspheres to the Liver    AFTER YOU GO HOME          Relax and take it easy for 24 hours.       Drink plenty of fluids.       Resume your regular diet, unless otherwise instructed by your Primary Physician.       DO NOT smoke for at least 24 hours, if you were given any sedation.       DO NOT drink alcoholic beverages the day of your procedure.       DO NOT drive or operate machinery at home or at work for 24 hours.          DO NOT make any important or legal decisions for 24 hours following your procedure.       DO NOT take a shower for at least 12 hours following your procedure. No tub bath, hot tubs, or swimming for 5 days        Care of groin site  It is normal to have a small bruise or lump at the site.  For the first 2 days, when you cough, sneeze or move your bowels, hold your hand over the puncture site and press gently.  Do NOT lift more than 10 pounds or do any strenuous exercise for at least 3 to 5 days.  Do not use lotion or powder near the puncture site for 3 days.  If you start bleeding from the site in your groin: lie down flat and press firmly on the site. Call your doctor as soon as you can.   Call 911 right away if you have: Heavy bleeding, bleeding that does not stop.        CALL THE PHYSICIAN IF:       - You start bleeding from the procedure site. A small lump or bruise is common at the puncture site. Your physician will tell you if you need to return to the hospital.      - You develop numbness, coolness or a change in color of the leg that was punctured.      - You experience increased pain or redness at the puncture site.      - You develop hives or a rash or unexplained itching.      - You develop a temperature of 101 degrees F or greater.    Additional Information:         Follow the Discharge Instructions for the Liver Brachytherapy; Contrast Instructions and  Instructions for the [employer]arm[employer].     Closure Device:  Perclose            [facility] INTERVENTIONAL RADIOLOGY DEPARTMENT         Procedure Physician: Harsh Pillai and Alma Rosa                             Date of Procedure:March 14, 2023       Telephone Numbers:   [phone]      Monday-Friday 7:30 am to 4:00 pm                                        [phone]     After 4:00 pm Monday-Friday, Weekend and Holidays. Ask for the Interventional Radiologist on call. Someone is on call 24 hrs/day.

## 2023-03-14 NOTE — PROGRESS NOTES
Returned from IR s/p Y-90 mapping.  VSS.  Denies pain.  Right groin site covered with a gauze and Tegaderm, CDI.  Resting in bed.  Wife at bedside.  Dr. Pillai updated family and patient at bedside.

## 2023-03-14 NOTE — PROCEDURES
[facility]    Procedure: Y90 Mapping    Date/Time: 3/14/2023 2:40 PM  Performed by: Raymundo St  Authorized by: Raymundo St Fellow Physician: Alma Rosa  Other(s) attending procedure: Rosas      UNIVERSAL PROTOCOL   Site Marked: NA  Prior Images Obtained and Reviewed:  Yes  Required items: Required blood products, implants, devices and special equipment available    Patient identity confirmed:  Verbally with patient, arm band, provided demographic data and hospital-assigned identification number  Patient was reevaluated immediately before administering moderate or deep sedation or anesthesia  Confirmation Checklist:  Patient's identity using two indicators, relevant allergies, procedure was appropriate and matched the consent or emergent situation and correct equipment/implants were available  Time out: Immediately prior to the procedure a time out was called    Universal Protocol: the Joint Commission Universal Protocol was followed    Preparation: Patient was prepped and draped in usual sterile fashion       ANESTHESIA    Anesthesia: Local infiltration  Local Anesthetic:  Lidocaine 1% without epinephrine      SEDATION  Patient Sedated: Yes    Sedation Type:  Moderate (conscious) sedation  Sedation:  Fentanyl and midazolam  Vital signs: Vital signs monitored during sedation    See dictated procedure note for full details.  Findings: HCC in segment 2/3 primarily. Additional new satellite nodularity in the left hepatic lobe noted.     Specimens: none    Complications: None    Condition: Stable    Plan: Bedrest 3 hours. Follow up for delivery 3/16.      PROCEDURE  Describe Procedure: Successful Y90 mapping.   Patient Tolerance:  Patient tolerated the procedure well with no immediate complications  Length of time physician/provider present for 1:1 monitoring during sedation: 60

## 2023-03-14 NOTE — PROGRESS NOTES
Patient Name: Oskar Wilks  Medical Record Number: [id number]  Today's Date: 3/14/2023    Procedure: Y90 mapping.  Proceduralist: MD Tom.  Pathology present: n/a    Procedure Start: ***  Procedure end: ***  Sedation medications administered: Fentanyl:*** Versed:***     Report given to: ***  : n/a    Other Notes: Pt arrived to IR room 1 from . Consent reviewed. Pt denies any questions or concerns regarding procedure. Pt positioned supine and monitored per protocol. Pt tolerated procedure without any noted complications. Pt transferred back to .    Mary Jo Fuchs RN

## 2023-03-14 NOTE — PROGRESS NOTES
Pt tolerated recovery without complication. Nuclear med study completed. Discharge instructions reviewed, copy given to pt. Pt tolerated ambulation and oral intake. Voided. Right groin remains c/d/i. No pain. PIV dc'd. Pt discharged home accompanied by wife.

## 2023-03-16 ENCOUNTER — HOSPITAL ENCOUNTER (OUTPATIENT)
Facility: CLINIC | Age: 78
Discharge: HOME | End: 2023-03-16
Attending: RADIOLOGY | Admitting: RADIOLOGY
Payer: MEDICARE

## 2023-03-16 ENCOUNTER — HOSPITAL ENCOUNTER (OUTPATIENT)
Dept: NUCLEAR MEDICINE | Facility: CLINIC | Age: 78
Setting detail: NUCLEAR MEDICINE
Discharge: HOME | End: 2023-03-16
Attending: RADIOLOGY | Admitting: RADIOLOGY
Payer: MEDICARE

## 2023-03-16 ENCOUNTER — APPOINTMENT (OUTPATIENT)
Dept: INTERVENTIONAL RADIOLOGY/VASCULAR | Facility: CLINIC | Age: 78
End: 2023-03-16
Attending: RADIOLOGY
Payer: MEDICARE

## 2023-03-16 ENCOUNTER — APPOINTMENT (OUTPATIENT)
Dept: MEDSURG UNIT | Facility: CLINIC | Age: 78
End: 2023-03-16
Attending: RADIOLOGY
Payer: MEDICARE

## 2023-03-16 VITALS
HEART RATE: 73 BPM | OXYGEN SATURATION: 98 % | TEMPERATURE: 97.7 F | RESPIRATION RATE: 11 BRPM | SYSTOLIC BLOOD PRESSURE: 115 MMHG | DIASTOLIC BLOOD PRESSURE: 60 MMHG

## 2023-03-16 DIAGNOSIS — C22.0 HEPATOCELLULAR CARCINOMA (H): ICD-10-CM

## 2023-03-16 DIAGNOSIS — R16.0 LIVER MASS: ICD-10-CM

## 2023-03-16 PROCEDURE — 75726 ARTERY X-RAYS ABDOMEN: CPT | Mod: XU

## 2023-03-16 PROCEDURE — 99152 MOD SED SAME PHYS/QHP 5/>YRS: CPT

## 2023-03-16 PROCEDURE — 272N000504 HC NEEDLE CR4

## 2023-03-16 PROCEDURE — C1887 CATHETER, GUIDING: HCPCS

## 2023-03-16 PROCEDURE — G1010 CDSM STANSON: HCPCS | Mod: GC | Performed by: RADIOLOGY

## 2023-03-16 PROCEDURE — C9113 INJ PANTOPRAZOLE SODIUM, VIA: HCPCS | Performed by: RADIOLOGY

## 2023-03-16 PROCEDURE — 76937 US GUIDE VASCULAR ACCESS: CPT | Mod: 26 | Performed by: RADIOLOGY

## 2023-03-16 PROCEDURE — 250N000009 HC RX 250: Performed by: STUDENT IN AN ORGANIZED HEALTH CARE EDUCATION/TRAINING PROGRAM

## 2023-03-16 PROCEDURE — 999N000134 HC STATISTIC PP CARE STAGE 3

## 2023-03-16 PROCEDURE — 36247 INS CATH ABD/L-EXT ART 3RD: CPT

## 2023-03-16 PROCEDURE — 37243 VASC EMBOLIZE/OCCLUDE ORGAN: CPT | Mod: GC | Performed by: RADIOLOGY

## 2023-03-16 PROCEDURE — 250N000011 HC RX IP 250 OP 636: Performed by: RADIOLOGY

## 2023-03-16 PROCEDURE — 76937 US GUIDE VASCULAR ACCESS: CPT

## 2023-03-16 PROCEDURE — 278N000001 HC RX 278: Performed by: RADIOLOGY

## 2023-03-16 PROCEDURE — C1769 GUIDE WIRE: HCPCS

## 2023-03-16 PROCEDURE — 272N000566 HC SHEATH CR3

## 2023-03-16 PROCEDURE — C1760 CLOSURE DEV, VASC: HCPCS

## 2023-03-16 PROCEDURE — C2616 BRACHYTX, NON-STR,YTTRIUM-90: HCPCS | Performed by: RADIOLOGY

## 2023-03-16 PROCEDURE — 78800 RP LOCLZJ TUM 1 AREA 1 D IMG: CPT | Mod: 26 | Performed by: RADIOLOGY

## 2023-03-16 PROCEDURE — 272N000143 HC KIT CR3

## 2023-03-16 PROCEDURE — 250N000011 HC RX IP 250 OP 636: Performed by: STUDENT IN AN ORGANIZED HEALTH CARE EDUCATION/TRAINING PROGRAM

## 2023-03-16 PROCEDURE — G1010 CDSM STANSON: HCPCS

## 2023-03-16 PROCEDURE — 258N000003 HC RX IP 258 OP 636: Performed by: RADIOLOGY

## 2023-03-16 PROCEDURE — 36247 INS CATH ABD/L-EXT ART 3RD: CPT | Mod: GC | Performed by: RADIOLOGY

## 2023-03-16 PROCEDURE — 78800 RP LOCLZJ TUM 1 AREA 1 D IMG: CPT | Mod: MG

## 2023-03-16 PROCEDURE — 255N000002 HC RX 255 OP 636: Performed by: RADIOLOGY

## 2023-03-16 PROCEDURE — 999N000142 HC STATISTIC PROCEDURE PREP ONLY

## 2023-03-16 PROCEDURE — 37243 VASC EMBOLIZE/OCCLUDE ORGAN: CPT

## 2023-03-16 PROCEDURE — 79445 NUCLEAR RX INTRA-ARTERIAL: CPT | Mod: 26 | Performed by: RADIOLOGY

## 2023-03-16 RX ORDER — NALOXONE HYDROCHLORIDE 0.4 MG/ML
0.4 INJECTION, SOLUTION INTRAMUSCULAR; INTRAVENOUS; SUBCUTANEOUS
Status: DISCONTINUED | OUTPATIENT
Start: 2023-03-16 | End: 2023-03-16 | Stop reason: HOSPADM

## 2023-03-16 RX ORDER — ONDANSETRON 4 MG/1
4-8 TABLET, FILM COATED ORAL EVERY 6 HOURS PRN
Qty: 40 TABLET | Refills: 0 | Status: ON HOLD | OUTPATIENT
Start: 2023-03-16 | End: 2024-08-30

## 2023-03-16 RX ORDER — FENTANYL CITRATE 50 UG/ML
25-50 INJECTION, SOLUTION INTRAMUSCULAR; INTRAVENOUS EVERY 5 MIN PRN
Status: DISCONTINUED | OUTPATIENT
Start: 2023-03-16 | End: 2023-03-16 | Stop reason: HOSPADM

## 2023-03-16 RX ORDER — NALOXONE HYDROCHLORIDE 0.4 MG/ML
0.2 INJECTION, SOLUTION INTRAMUSCULAR; INTRAVENOUS; SUBCUTANEOUS
Status: DISCONTINUED | OUTPATIENT
Start: 2023-03-16 | End: 2023-03-16 | Stop reason: HOSPADM

## 2023-03-16 RX ORDER — SODIUM CHLORIDE 9 MG/ML
INJECTION, SOLUTION INTRAVENOUS CONTINUOUS
Status: DISCONTINUED | OUTPATIENT
Start: 2023-03-16 | End: 2023-03-16 | Stop reason: HOSPADM

## 2023-03-16 RX ORDER — IODIXANOL 320 MG/ML
100 INJECTION, SOLUTION INTRAVASCULAR ONCE
Status: COMPLETED | OUTPATIENT
Start: 2023-03-16 | End: 2023-03-16

## 2023-03-16 RX ORDER — HYDROMORPHONE HYDROCHLORIDE 2 MG/1
2 TABLET ORAL EVERY 4 HOURS PRN
Status: DISCONTINUED | OUTPATIENT
Start: 2023-03-16 | End: 2023-03-16 | Stop reason: HOSPADM

## 2023-03-16 RX ORDER — HYDROMORPHONE HYDROCHLORIDE 4 MG/1
4 TABLET ORAL EVERY 4 HOURS PRN
Status: DISCONTINUED | OUTPATIENT
Start: 2023-03-16 | End: 2023-03-16 | Stop reason: HOSPADM

## 2023-03-16 RX ORDER — FLUMAZENIL 0.1 MG/ML
0.2 INJECTION, SOLUTION INTRAVENOUS
Status: DISCONTINUED | OUTPATIENT
Start: 2023-03-16 | End: 2023-03-16 | Stop reason: HOSPADM

## 2023-03-16 RX ORDER — METHYLPREDNISOLONE 4 MG
TABLET, DOSE PACK ORAL
Qty: 21 TABLET | Refills: 0 | Status: ON HOLD | OUTPATIENT
Start: 2023-03-16 | End: 2024-08-30

## 2023-03-16 RX ORDER — ACETAMINOPHEN 325 MG/1
650 TABLET ORAL EVERY 4 HOURS PRN
Status: DISCONTINUED | OUTPATIENT
Start: 2023-03-16 | End: 2023-03-16 | Stop reason: HOSPADM

## 2023-03-16 RX ORDER — HEPARIN SODIUM 200 [USP'U]/100ML
1 INJECTION, SOLUTION INTRAVENOUS CONTINUOUS PRN
Status: DISCONTINUED | OUTPATIENT
Start: 2023-03-16 | End: 2023-03-16 | Stop reason: HOSPADM

## 2023-03-16 RX ORDER — ONDANSETRON 2 MG/ML
4 INJECTION INTRAMUSCULAR; INTRAVENOUS ONCE
Status: COMPLETED | OUTPATIENT
Start: 2023-03-16 | End: 2023-03-16

## 2023-03-16 RX ORDER — OXYCODONE HYDROCHLORIDE 5 MG/1
5-10 TABLET ORAL EVERY 6 HOURS PRN
Qty: 6 TABLET | Refills: 0 | Status: ON HOLD | OUTPATIENT
Start: 2023-03-16 | End: 2024-08-30

## 2023-03-16 RX ORDER — LIDOCAINE 40 MG/G
CREAM TOPICAL
Status: DISCONTINUED | OUTPATIENT
Start: 2023-03-16 | End: 2023-03-16 | Stop reason: HOSPADM

## 2023-03-16 RX ORDER — CLINDAMYCIN PHOSPHATE 900 MG/50ML
900 INJECTION, SOLUTION INTRAVENOUS
Status: COMPLETED | OUTPATIENT
Start: 2023-03-16 | End: 2023-03-16

## 2023-03-16 RX ADMIN — FENTANYL CITRATE 50 MCG: 50 INJECTION, SOLUTION INTRAMUSCULAR; INTRAVENOUS at 09:04

## 2023-03-16 RX ADMIN — SODIUM CHLORIDE: 9 INJECTION, SOLUTION INTRAVENOUS at 07:11

## 2023-03-16 RX ADMIN — LIDOCAINE HYDROCHLORIDE 5 ML: 10 INJECTION, SOLUTION EPIDURAL; INFILTRATION; INTRACAUDAL; PERINEURAL at 08:50

## 2023-03-16 RX ADMIN — ONDANSETRON 4 MG: 2 INJECTION INTRAMUSCULAR; INTRAVENOUS at 08:28

## 2023-03-16 RX ADMIN — Medication 17.4 MILLICURIE: at 10:18

## 2023-03-16 RX ADMIN — MIDAZOLAM 1 MG: 1 INJECTION INTRAMUSCULAR; INTRAVENOUS at 08:50

## 2023-03-16 RX ADMIN — MIDAZOLAM 1 MG: 1 INJECTION INTRAMUSCULAR; INTRAVENOUS at 09:04

## 2023-03-16 RX ADMIN — FENTANYL CITRATE 50 MCG: 50 INJECTION, SOLUTION INTRAMUSCULAR; INTRAVENOUS at 08:50

## 2023-03-16 RX ADMIN — CLINDAMYCIN PHOSPHATE 900 MG: 900 INJECTION, SOLUTION INTRAVENOUS at 07:30

## 2023-03-16 RX ADMIN — IODIXANOL 15 ML: 320 INJECTION, SOLUTION INTRAVASCULAR at 09:38

## 2023-03-16 RX ADMIN — HYDROCORTISONE SODIUM SUCCINATE 100 MG: 100 INJECTION, POWDER, FOR SOLUTION INTRAMUSCULAR; INTRAVENOUS at 07:08

## 2023-03-16 RX ADMIN — PANTOPRAZOLE SODIUM 40 MG: 40 INJECTION, POWDER, LYOPHILIZED, FOR SOLUTION INTRAVENOUS at 07:22

## 2023-03-16 ASSESSMENT — ACTIVITIES OF DAILY LIVING (ADL)
ADLS_ACUITY_SCORE: 35

## 2023-03-16 NOTE — PRE-PROCEDURE
GENERAL PRE-PROCEDURE:   Procedure:  Y90 delivery  Date/Time:  3/16/2023 7:56 AM    Written consent obtained?: Yes    Risks and benefits: Risks, benefits and alternatives were discussed    Consent given by:  Patient  Patient states understanding of procedure being performed: Yes    Patient's understanding of procedure matches consent: Yes    Procedure consent matches procedure scheduled: Yes    Expected level of sedation:  Moderate  Appropriately NPO:  Yes  ASA Class:  3  Mallampati  :  Grade 2- soft palate, base of uvula, tonsillar pillars, and portion of posterior pharyngeal wall visible  Lungs:  Lungs clear with good breath sounds bilaterally  Heart:  Normal heart sounds and rate  History & Physical reviewed:  History and physical reviewed and no updates needed  Statement of review:  I have reviewed the lab findings, diagnostic data, medications, and the plan for sedation

## 2023-03-16 NOTE — PROCEDURES
[facility]    Procedure: IR Procedure Note    Date/Time: 3/16/2023 9:39 AM  Performed by: Zac Forte MD  Authorized by: Zac Forte MD   IR Fellow Physician: Dr. St  Radiology Resident Physician: Dr. Forte        UNIVERSAL PROTOCOL   Site Marked: NA  Prior Images Obtained and Reviewed:  Yes  Required items: Required blood products, implants, devices and special equipment available    Patient identity confirmed:  Verbally with patient, arm band, provided demographic data and hospital-assigned identification number  Patient was reevaluated immediately before administering moderate or deep sedation or anesthesia  Confirmation Checklist:  Patient's identity using two indicators, relevant allergies, procedure was appropriate and matched the consent or emergent situation and correct equipment/implants were available  Time out: Immediately prior to the procedure a time out was called    Universal Protocol: the Joint Commission Universal Protocol was followed    Preparation: Patient was prepped and draped in usual sterile fashion       ANESTHESIA    Anesthesia: Local infiltration  Local Anesthetic:  Lidocaine 1% without epinephrine      SEDATION  Patient Sedated: Yes    Sedation Type:  Moderate (conscious) sedation  Sedation:  Fentanyl and midazolam  Vital signs: Vital signs monitored during sedation    See dictated procedure note for full details.  Findings: Avidly enhancing left lobe lesion    Specimens: none    Complications: None    Condition: Stable    Plan: Bedrest X2 hours      PROCEDURE  Describe Procedure: Successful left hepatic lesion chemoembolization.   Patient Tolerance:  Patient tolerated the procedure well with no immediate complications  Length of time physician/provider present for 1:1 monitoring during sedation: 30

## 2023-03-16 NOTE — PROGRESS NOTES
S/p Y90 Delivery. Pt alert and oriented. Right groin site intact- bruised from Mapping. Denies pain/nausea. Bedrest x 2 hours. Eating and drinking. Wife at bedside helping.

## 2023-03-16 NOTE — PROGRESS NOTES
discharge criteria met. Ambulated, voided and ate without difficulty. Reviewed discharge instructions and new medications. Pt and wife verbalized understanding of discharge instructions and signed papers. PIV removed. Pushed to discharge pharm/front door in wheelchair.

## 2023-03-16 NOTE — DISCHARGE INSTRUCTIONS
[facility]   Interventional Radiology  Discharge Instructions Post Angiography for Insertion of   Radioactive Microspheres to the Liver    AFTER YOU GO HOME          Relax and take it easy for 24 hours.       Drink plenty of fluids.       Resume your regular diet, unless otherwise instructed by your Primary Physician.       DO NOT smoke for at least 24 hours, if you were given any sedation.       DO NOT drink alcoholic beverages the day of your procedure.       DO NOT drive or operate machinery at home or at work for 24 hours.          DO NOT make any important or legal decisions for 24 hours following your procedure.       DO NOT take a shower for at least 12 hours following your procedure. No tub bath, hot tubs, or swimming for 5 days        Care of groin site  It is normal to have a small bruise or lump at the site.  For the first 2 days, when you cough, sneeze or move your bowels, hold your hand over the puncture site and press gently.  Do NOT lift more than 10 pounds or do any strenuous exercise for at least 3 to 5 days.  Do not use lotion or powder near the puncture site for 3 days.  If you start bleeding from the site in your groin: lie down flat and press firmly on the site. Call your doctor as soon as you can.   Call 911 right away if you have: Heavy bleeding, bleeding that does not stop.      CALL THE PHYSICIAN IF:       - You start bleeding from the procedure site. A small lump or bruise is common at the puncture site. Your physician will tell you if you need to return to the hospital.      - You develop numbness, coolness or a change in color of the leg that was punctured.      - You experience increased pain or redness at the puncture site.      - You develop hives or a rash or unexplained itching.      - You develop a temperature of 101 degrees F or greater.    Additional Information:         Follow the Discharge Instructions for the Liver Brachytherapy; Contrast Instructions and  Instructions for the Radiopharmaceuticals.     Closure Device: Yes            [facility] INTERVENTIONAL RADIOLOGY DEPARTMENT         Procedure Physician:  Date of Procedure:March 16, 2023       Telephone Numbers:   [phone]      Monday-Friday 7:30 am to 4:00 pm                                        [phone]     After 4:00 pm Monday-Friday, Weekend and Holidays. Ask for the Interventional Radiologist on call. Someone is on call 24 hrs/day.

## 2023-03-16 NOTE — PROGRESS NOTES
2A prep for Y 90 Delivery. Pt alert and oriented. Appropriately NPO. VSS. Denies pain. Right groin from Mapping, bruised. Left 20G PIV placed and infusing. Gave pre meds. Groin clipped. Pedal pulses marked.

## 2023-03-20 ENCOUNTER — TELEPHONE (OUTPATIENT)
Dept: RADIOLOGY | Facility: CLINIC | Age: 78
End: 2023-03-20
Payer: MEDICARE

## 2023-03-20 DIAGNOSIS — C22.0 HEPATOCELLULAR CARCINOMA (H): Primary | ICD-10-CM

## 2023-03-20 NOTE — TELEPHONE ENCOUNTER
Called and spoke to Oskar and his wife Ericka regarding his radioembolization procedure. Ericka reports Osakr has had some nausea, abdominal pain and fatigue. Pain has been controlled with the use of Ibuprofen and oxycodone. Oskar does reports a few episode of nausea that were controlled with Zofran. Groin access site remains clean dry and intact, Oskar does report some bruising at the access site, but denies swelling. Discussed plan for follow up, including MRI, lab work and one month follow up with Dr. Pillai.     Dorina NASSAR RN, BSN   Interventional Radiology [facility]   [phone]

## 2023-04-17 ENCOUNTER — LAB (OUTPATIENT)
Dept: LAB | Facility: CLINIC | Age: 78
End: 2023-04-17
Attending: RADIOLOGY
Payer: MEDICARE

## 2023-04-17 ENCOUNTER — HOSPITAL ENCOUNTER (OUTPATIENT)
Dept: MRI IMAGING | Facility: CLINIC | Age: 78
Discharge: HOME | End: 2023-04-17
Attending: RADIOLOGY
Payer: MEDICARE

## 2023-04-17 DIAGNOSIS — C22.0 HEPATOCELLULAR CARCINOMA (H): ICD-10-CM

## 2023-04-17 LAB
AFP SERPL-MCNC: 4.4 NG/ML
ALBUMIN SERPL BCG-MCNC: 3.8 G/DL (ref 3.5–5.2)
ALP SERPL-CCNC: 113 U/L (ref 40–129)
ALT SERPL W P-5'-P-CCNC: 32 U/L (ref 10–50)
ANION GAP SERPL CALCULATED.3IONS-SCNC: 8 MMOL/L (ref 7–15)
AST SERPL W P-5'-P-CCNC: 41 U/L (ref 10–50)
BILIRUB DIRECT SERPL-MCNC: 0.53 MG/DL (ref 0–0.3)
BILIRUB SERPL-MCNC: 1.9 MG/DL
BUN SERPL-MCNC: 18.4 MG/DL (ref 8–23)
CALCIUM SERPL-MCNC: 9.1 MG/DL (ref 8.8–10.2)
CHLORIDE SERPL-SCNC: 106 MMOL/L (ref 98–107)
CREAT SERPL-MCNC: 0.8 MG/DL (ref 0.67–1.17)
DEPRECATED HCO3 PLAS-SCNC: 26 MMOL/L (ref 22–29)
ERYTHROCYTE [DISTWIDTH] IN BLOOD BY AUTOMATED COUNT: 21.1 % (ref 10–15)
GFR SERPL CREATININE-BSD FRML MDRD: >90 ML/MIN/1.73M2
GLUCOSE SERPL-MCNC: 87 MG/DL (ref 70–99)
HCT VFR BLD AUTO: 28.7 % (ref 40–53)
HGB BLD-MCNC: 9.4 G/DL (ref 13.3–17.7)
INR PPP: 1.55 (ref 0.85–1.15)
MCH RBC QN AUTO: 31.1 PG (ref 26.5–33)
MCHC RBC AUTO-ENTMCNC: 32.8 G/DL (ref 31.5–36.5)
MCV RBC AUTO: 95 FL (ref 78–100)
PLATELET # BLD AUTO: 68 10E3/UL (ref 150–450)
POTASSIUM SERPL-SCNC: 4.3 MMOL/L (ref 3.4–5.3)
PROT SERPL-MCNC: 5.7 G/DL (ref 6.4–8.3)
RBC # BLD AUTO: 3.02 10E6/UL (ref 4.4–5.9)
SODIUM SERPL-SCNC: 140 MMOL/L (ref 136–145)
WBC # BLD AUTO: 2.8 10E3/UL (ref 4–11)

## 2023-04-17 PROCEDURE — 82105 ALPHA-FETOPROTEIN SERUM: CPT

## 2023-04-17 PROCEDURE — 80053 COMPREHEN METABOLIC PANEL: CPT

## 2023-04-17 PROCEDURE — 85027 COMPLETE CBC AUTOMATED: CPT

## 2023-04-17 PROCEDURE — 82310 ASSAY OF CALCIUM: CPT

## 2023-04-17 PROCEDURE — 255N000002 HC RX 255 OP 636: Performed by: RADIOLOGY

## 2023-04-17 PROCEDURE — 36415 COLL VENOUS BLD VENIPUNCTURE: CPT

## 2023-04-17 PROCEDURE — 82248 BILIRUBIN DIRECT: CPT

## 2023-04-17 PROCEDURE — 85610 PROTHROMBIN TIME: CPT

## 2023-04-17 PROCEDURE — G1010 CDSM STANSON: HCPCS

## 2023-04-17 PROCEDURE — A9585 GADOBUTROL INJECTION: HCPCS | Performed by: RADIOLOGY

## 2023-04-17 RX ORDER — GADOBUTROL 604.72 MG/ML
9 INJECTION INTRAVENOUS ONCE
Status: COMPLETED | OUTPATIENT
Start: 2023-04-17 | End: 2023-04-17

## 2023-04-17 RX ADMIN — GADOBUTROL 9 ML: 604.72 INJECTION INTRAVENOUS at 14:17

## 2023-04-20 NOTE — PROGRESS NOTES
Interventional Radiology Clinic Visit    Date of this visit: 4/21/2023    Oskar Wilks returns for follow up post radioembolization of hepatocellular carcinoma (HCC).    Primary Physician: Viet López        History Of Present Illness:    Oskar Wilks is a 77 year old male who presents with unresectable biopsy-proven hepatocellular carcinoma on a background of alcoholic cirrhosis. Was originally found to have a new 4.6 cm liver lesion in the left hepatic lobe that was a LIRADS 4 based on MRI features. Underwent percutaneous biopsy on 2/2/2023 that showed moderately differentiated hepatocellular carcinoma.  On 3/16/2023 we performed radioembolization of the mass.  He presents for his first post-treatment follow-up today.    Today he attended the visit with his wife.  He says his recovery went well, had some nausea and fatigue for a few days, back to baseline now. No fevers. No abdominal pain. No jaundice.    Review of Systems:    As above    Past Medical/Surgical History:    Past Medical History:   Diagnosis Date     Cancer (H)     liver     Cirrhosis of liver (H)      Hip fracture (H)      Past Surgical History:   Procedure Laterality Date     IR LIVER BIOPSY PERCUTANEOUS  02/02/2023     IR SIRT (SELECTIVE INTERNAL RADIO THERAPY)  4/14/2023     IR VISCERAL ANGIOGRAM  3/14/2023     IR VISCERAL EMBOLIZATION  3/16/2023     ORIF HIP FRACTURE         Current Medications:    Current Outpatient Medications   Medication Sig Dispense Refill     Acetaminophen (TYLENOL PO) Take 650 mg by mouth (Patient not taking: Reported on 2/10/2023)       buPROPion (WELLBUTRIN XL) 300 MG 24 hr tablet Take 300 mg by mouth every morning       cholecalciferol 25 MCG (1000 UT) TABS Take 1 tablet by mouth daily       ciprofloxacin (CIPRO) 500 MG tablet Take 1 tablet by mouth daily       ferrous sulfate (FEROSUL) 325 (65 Fe) MG tablet Take 325 mg by mouth       fluticasone (FLONASE) 50 MCG/ACT nasal spray Spray 2 sprays into  both nostrils daily       ketoconazole (NIZORAL) 2 % external cream APPLY TOPICALLY TO RASH TWICE DAILY AS NEEDED (Patient not taking: Reported on 2/10/2023)       losartan (COZAAR) 25 MG tablet Take 1 tablet by mouth daily       methylPREDNISolone (MEDROL DOSEPAK) 4 MG tablet therapy pack Take as directed on package. 21 tablet 0     metoprolol tartrate (LOPRESSOR) 25 MG tablet Take 25 mg by mouth daily       multivitamin w/minerals (THERA-VIT-M) tablet Take 1 tablet by mouth daily       omeprazole (PRILOSEC) 20 MG DR capsule TAKE 1 CAPSULE(20 MG) BY MOUTH DAILY 1 HOUR BEFORE A MEAL       ondansetron (ZOFRAN) 4 MG tablet Take 1-2 tablets (4-8 mg) by mouth every 6 hours as needed for nausea (vomiting) 40 tablet 0     oxyCODONE (ROXICODONE) 5 MG tablet Take 1-2 tablets (5-10 mg) by mouth every 6 hours as needed for moderate to severe pain 6 tablet 0     spironolactone (ALDACTONE) 50 MG tablet Take 50 mg by mouth daily       torsemide (DEMADEX) 10 MG tablet Take 10 mg by mouth daily         Allergies:    Penicillins and Adhesive tape    Family History:    No family history on file.    Social History:    Social History     Socioeconomic History     Marital status:    Tobacco Use     Smoking status: Former     Years: 8.00     Types: Cigarettes     Quit date: 1985     Years since quittin.3     Smokeless tobacco: Never   Vaping Use     Vaping status: Never Used   Substance and Sexual Activity     Alcohol use: Not Currently     Comment: sober since 2022     Drug use: Never       Physical Exam:    CONSTITUTIONAL: healthy, alert and no distress.  PSYCHIATRIC: mentation appears normal and affect normal.  NEURO: Normal movements and speech.  EYES: No jaundice or pallor.  SKIN: No jaundice.   RESP: No audible cough or wheeze.      Laboratory Studies:    Lab Results   Component Value Date    HGB 9.4 2023     Lab Results   Component Value Date    PLT 68 2023     Lab Results   Component Value Date     WBC 2.8 04/17/2023       Lab Results   Component Value Date    INR 1.55 04/17/2023     Lab Results   Component Value Date    PROTTOTAL 5.7 04/17/2023      Lab Results   Component Value Date    ALBUMIN 3.8 04/17/2023     Lab Results   Component Value Date    BILITOTAL 1.9 04/17/2023     Lab Results   Component Value Date    ALKPHOS 113 04/17/2023     Lab Results   Component Value Date    AST 41 04/17/2023     Lab Results   Component Value Date    ALT 32 04/17/2023       Lab Results   Component Value Date    CR 0.80 04/17/2023     Lab Results   Component Value Date    BUN 18.4 04/17/2023       AFP 4.4 on 4/17/23      Imaging:     I personally reviewed and interpreted the MRI Abdomen 4/17/2023.  It shows no change in size or enhancement pattern of tumor in left hepatic lobe.  No new liver lesions.    ASSESSMENT/PLAN:      Oskar Wilks is a 77 year old male with alcoholic cirrhosis and a solitary hepatocellular carcinoma lesion in the left hepatic lobe and is now 1 month post radioembolization of the mass.  Follow-up imaging shows no significant change, which is not uncommon at this early stage, and the imaging changes can evolve very slowly over the many months.  I discussed these findings with Oskar and his wife. We discussed that his liver function is stable so if the tumor response is inadequate he should be amenable to re-treatment in the future with further radioembolization, or chemoembolization which we had previously discussed. We will continue to follow with imaging. I will see him in 2 months with another MRI and clinic visit, which will be 3 months from the treatment.  They expressed understanding and agreed with this plan.            It was a pleasure seeing the patient.     Signed,    Kera Pillai M.D.    Interventional Radiology  Department of Radiology  [facility]      CC  Patient Care Team:  Viet López MD as PCP - General (Family Medicine)  Laura Neves  MD as Assigned Gastroenterology Provider  SELF, REFERRED           30 minutes spent by me on the date of the encounter doing chart review, history and exam, imaging review, documentation and further activities per the note      Video-Visit Details     Type of service:  Video Visit     Video Start and End Time:8:04 - 8:21am    Originating Location (pt. Location): Home     Distant Location (provider location):  [facility] [place]      Platform used for Video Visit: [employer]

## 2023-04-21 ENCOUNTER — VIRTUAL VISIT (OUTPATIENT)
Dept: RADIOLOGY | Facility: CLINIC | Age: 78
End: 2023-04-21
Attending: RADIOLOGY
Payer: MEDICARE

## 2023-04-21 DIAGNOSIS — C22.0 HEPATOCELLULAR CARCINOMA (H): Primary | ICD-10-CM

## 2023-04-21 PROCEDURE — 99214 OFFICE O/P EST MOD 30 MIN: CPT | Mod: VID | Performed by: RADIOLOGY

## 2023-04-21 NOTE — NURSING NOTE
Is the patient currently in the state of [place]? YES    Visit mode:VIDEO    If the visit is dropped, the patient can be reconnected by: VIDEO VISIT: Text to cell phone: [phone]    Will anyone else be joining the visit? Yes, spouse      How would you like to obtain your AVS? MyChart    Are changes needed to the allergy or medication list? NO  Patient verified medications and allergies are correct via eCheck-in. Patient confirms no changes at this time and/or since last reviewed by clinic staff.    Reason for visit: Oncology Video Visit Return (HCC, f/u)  Oskar Wilks 77 year old male presents today for f/u on HCC and review labs/MRI results.  Sherley Schmidt, Virtual Facilitator

## 2023-04-21 NOTE — LETTER
4/21/2023         RE: Oskar Wilks  [address] [place] [address]        Dear Colleague,    Thank you for referring your patient, Oskar Wilks, to the [facility]. Please see a copy of my visit note below.          Interventional Radiology Clinic Visit    Date of this visit: 4/21/2023    Oskar Wilks returns for follow up post radioembolization of hepatocellular carcinoma (HCC).    Primary Physician: Viet López        History Of Present Illness:    Oskar Wilks is a 77 year old male who presents with unresectable biopsy-proven hepatocellular carcinoma on a background of alcoholic cirrhosis. Was originally found to have a new 4.6 cm liver lesion in the left hepatic lobe that was a LIRADS 4 based on MRI features. Underwent percutaneous biopsy on 2/2/2023 that showed moderately differentiated hepatocellular carcinoma.  On 3/16/2023 we performed radioembolization of the mass.  He presents for his first post-treatment follow-up today.    Today he attended the visit with his wife.  He says his recovery went well, had some nausea and fatigue for a few days, back to baseline now. No fevers. No abdominal pain. No jaundice.    Review of Systems:    As above    Past Medical/Surgical History:    Past Medical History:   Diagnosis Date    Cancer (H)     liver    Cirrhosis of liver (H)     Hip fracture (H)      Past Surgical History:   Procedure Laterality Date    IR LIVER BIOPSY PERCUTANEOUS  02/02/2023    IR SIRT (SELECTIVE INTERNAL RADIO THERAPY)  4/14/2023    IR VISCERAL ANGIOGRAM  3/14/2023    IR VISCERAL EMBOLIZATION  3/16/2023    ORIF HIP FRACTURE         Current Medications:    Current Outpatient Medications   Medication Sig Dispense Refill    Acetaminophen (TYLENOL PO) Take 650 mg by mouth (Patient not taking: Reported on 2/10/2023)      buPROPion (WELLBUTRIN XL) 300 MG 24 hr tablet Take 300 mg by mouth every morning      cholecalciferol 25 MCG (1000 UT)  TABS Take 1 tablet by mouth daily      ciprofloxacin (CIPRO) 500 MG tablet Take 1 tablet by mouth daily      ferrous sulfate (FEROSUL) 325 (65 Fe) MG tablet Take 325 mg by mouth      fluticasone (FLONASE) 50 MCG/ACT nasal spray Spray 2 sprays into both nostrils daily      ketoconazole (NIZORAL) 2 % external cream APPLY TOPICALLY TO RASH TWICE DAILY AS NEEDED (Patient not taking: Reported on 2/10/2023)      losartan (COZAAR) 25 MG tablet Take 1 tablet by mouth daily      methylPREDNISolone (MEDROL DOSEPAK) 4 MG tablet therapy pack Take as directed on package. 21 tablet 0    metoprolol tartrate (LOPRESSOR) 25 MG tablet Take 25 mg by mouth daily      multivitamin w/minerals (THERA-VIT-M) tablet Take 1 tablet by mouth daily      omeprazole (PRILOSEC) 20 MG DR capsule TAKE 1 CAPSULE(20 MG) BY MOUTH DAILY 1 HOUR BEFORE A MEAL      ondansetron (ZOFRAN) 4 MG tablet Take 1-2 tablets (4-8 mg) by mouth every 6 hours as needed for nausea (vomiting) 40 tablet 0    oxyCODONE (ROXICODONE) 5 MG tablet Take 1-2 tablets (5-10 mg) by mouth every 6 hours as needed for moderate to severe pain 6 tablet 0    spironolactone (ALDACTONE) 50 MG tablet Take 50 mg by mouth daily      torsemide (DEMADEX) 10 MG tablet Take 10 mg by mouth daily         Allergies:    Penicillins and Adhesive tape    Family History:    No family history on file.    Social History:    Social History     Socioeconomic History    Marital status:    Tobacco Use    Smoking status: Former     Years: 8.00     Types: Cigarettes     Quit date: 1985     Years since quittin.3    Smokeless tobacco: Never   Vaping Use    Vaping status: Never Used   Substance and Sexual Activity    Alcohol use: Not Currently     Comment: sober since 2022    Drug use: Never       Physical Exam:    CONSTITUTIONAL: healthy, alert and no distress.  PSYCHIATRIC: mentation appears normal and affect normal.  NEURO: Normal movements and speech.  EYES: No jaundice or pallor.  SKIN: No  jaundice.   RESP: No audible cough or wheeze.      Laboratory Studies:    Lab Results   Component Value Date    HGB 9.4 04/17/2023     Lab Results   Component Value Date    PLT 68 04/17/2023     Lab Results   Component Value Date    WBC 2.8 04/17/2023       Lab Results   Component Value Date    INR 1.55 04/17/2023     Lab Results   Component Value Date    PROTTOTAL 5.7 04/17/2023      Lab Results   Component Value Date    ALBUMIN 3.8 04/17/2023     Lab Results   Component Value Date    BILITOTAL 1.9 04/17/2023     Lab Results   Component Value Date    ALKPHOS 113 04/17/2023     Lab Results   Component Value Date    AST 41 04/17/2023     Lab Results   Component Value Date    ALT 32 04/17/2023       Lab Results   Component Value Date    CR 0.80 04/17/2023     Lab Results   Component Value Date    BUN 18.4 04/17/2023       AFP 4.4 on 4/17/23      Imaging:     I personally reviewed and interpreted the MRI Abdomen 4/17/2023.  It shows no change in size or enhancement pattern of tumor in left hepatic lobe.  No new liver lesions.    ASSESSMENT/PLAN:      Oskar Wilks is a 77 year old male with alcoholic cirrhosis and a solitary hepatocellular carcinoma lesion in the left hepatic lobe and is now 1 month post radioembolization of the mass.  Follow-up imaging shows no significant change, which is not uncommon at this early stage, and the imaging changes can evolve very slowly over the many months.  I discussed these findings with Oskar and his wife. We discussed that his liver function is stable so if the tumor response is inadequate he should be amenable to re-treatment in the future with further radioembolization, or chemoembolization which we had previously discussed. We will continue to follow with imaging. I will see him in 2 months with another MRI and clinic visit, which will be 3 months from the treatment.  They expressed understanding and agreed with this plan.      It was a pleasure seeing the patient.      Kera Roth M.D.    Interventional Radiology  Department of Radiology  [facility]      CC  Patient Care Team:  Viet López MD as PCP - General (Family Medicine)  Laura eNves MD as Assigned Gastroenterology Provider       30 minutes spent by me on the date of the encounter doing chart review, history and exam, imaging review, documentation and further activities per the note      Video-Visit Details     Type of service:  Video Visit     Video Start and End Time:8:04 - 8:21am  Originating Location (pt. Location): Home   Distant Location (provider location):  [facility] [place]    Platform used for Video Visit: [employer]

## 2023-04-25 ENCOUNTER — OFFICE VISIT (OUTPATIENT)
Dept: GASTROENTEROLOGY | Facility: CLINIC | Age: 78
End: 2023-04-25
Attending: STUDENT IN AN ORGANIZED HEALTH CARE EDUCATION/TRAINING PROGRAM
Payer: MEDICARE

## 2023-04-25 VITALS
SYSTOLIC BLOOD PRESSURE: 128 MMHG | WEIGHT: 200.8 LBS | BODY MASS INDEX: 27.2 KG/M2 | DIASTOLIC BLOOD PRESSURE: 53 MMHG | HEIGHT: 72 IN | HEART RATE: 75 BPM

## 2023-04-25 DIAGNOSIS — R16.0 LIVER MASS: ICD-10-CM

## 2023-04-25 DIAGNOSIS — K21.00 GASTROESOPHAGEAL REFLUX DISEASE WITH ESOPHAGITIS WITHOUT HEMORRHAGE: ICD-10-CM

## 2023-04-25 DIAGNOSIS — C22.0 HEPATOCELLULAR CARCINOMA (H): Primary | ICD-10-CM

## 2023-04-25 DIAGNOSIS — D49.0 IPMN (INTRADUCTAL PAPILLARY MUCINOUS NEOPLASM): ICD-10-CM

## 2023-04-25 PROCEDURE — 99214 OFFICE O/P EST MOD 30 MIN: CPT | Performed by: STUDENT IN AN ORGANIZED HEALTH CARE EDUCATION/TRAINING PROGRAM

## 2023-04-25 PROCEDURE — G0463 HOSPITAL OUTPT CLINIC VISIT: HCPCS | Performed by: STUDENT IN AN ORGANIZED HEALTH CARE EDUCATION/TRAINING PROGRAM

## 2023-04-25 RX ORDER — METOPROLOL SUCCINATE 25 MG/1
1 TABLET, EXTENDED RELEASE ORAL
COMMUNITY
Start: 2023-02-12

## 2023-04-25 RX ORDER — TRAMADOL HYDROCHLORIDE 50 MG/1
TABLET ORAL
Status: ON HOLD | COMMUNITY
Start: 2023-03-27 | End: 2024-08-30

## 2023-04-25 RX ORDER — OMEPRAZOLE 40 MG/1
40 CAPSULE, DELAYED RELEASE ORAL DAILY
Qty: 90 CAPSULE | Refills: 3 | Status: SHIPPED | OUTPATIENT
Start: 2023-04-25 | End: 2024-04-19

## 2023-04-25 RX ORDER — AMMONIUM LACTATE 12 G/100G
CREAM TOPICAL DAILY PRN
COMMUNITY
Start: 2023-03-29

## 2023-04-25 RX ORDER — FOLIC ACID 1 MG/1
1 TABLET ORAL DAILY
COMMUNITY

## 2023-04-25 ASSESSMENT — PAIN SCALES - GENERAL: PAINLEVEL: NO PAIN (0)

## 2023-04-25 NOTE — PROGRESS NOTES
[facility] New Patient Visit    Date of Visit: 4/25/2023    Reason for referral: Alcohol related liver disease, HCC    Subjective: Mr. Wilks is a 77-year-old man with a history of alcohol-related liver disease who presents for evaluation of a newly found liver lesion.    He has a history of cirrhosis, has been sober in the past, and drink heavily and in July 2022.  Started having issues with abdominal distention, was diagnosed with SBP September 2022.  He had been on torsemide just prior to this, since this admission was started on low-dose torsemide and spironolactone.  He is on Cipro for SBP prophylaxis.  His last paracentesis was attempted 2022, 4.7 L was removed.    December 12 bilirubin was 3.6 and INR was 1.8.  September 9 bilirubin was 5.6.  He had a paracentesis 9/8 that was consistent with portal hypertension, paracentesis also showed SBP    Fell 12/5 on the ice walking his puppy. Broke his hip and has surgery for this. Also broke two ribs. In rehab, fell again and had a L1 compression fracture. Getting OT/PT at home. About to graduate from OT because he is doing so well.  Uses walker for appointments.  Able to go up stairs without issues.  Independent with ADLs.    Was on narcotics related to surgery, had delirium related to this.  Otherwise no HE.     Interval Events:   - Liver BX of 4.6 cm LR 4 lesion came back with HCC. 3/16 underwent TARE of the mass. Follow up imaging has not shown a change. At the time of the mapping there was concern for an additional new nodularity in the left hepatic lobe. MRI 4/17 showed his Y90 treated lesion - unchanged. IR discussed with the patient and his family that they will get repeat imaging in 2 months and would retreat as needed at that time. MRI did show show potential satellite nodularity seen on Y90 mapping  - Abd US 4/20 without ascites, taking spironolactone 50 mg daily and torsemide 10 mg daily.  Doing well with this.  - Otherwise  feeling well, good functional status    ROS: 14 point ROS negative except for positives noted in HPI.    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 fib not on anticoagulation  Psoriasis  Basal cell carcinoma  Nonrheumatic mild aortic stenosis  Cerebral infarction due to occlusion of middle cerebral artery    PSHx:  Right acetabulum fracture 2/2 GLF s/p ORIF on 22  BRAIN HEMATOMA EVACUTATION   CRANIAL LESION RESECTION From trauma   MENISCECTOMY   MOUTH SURGERY      FamHx:  No family history of liver disease, liver cancer    SocHx:  Social History     Socioeconomic History     Marital status:      Spouse name: Not on file     Number of children: Not on file     Years of education: Not on file     Highest education level: Not on file   Occupational History     Not on file   Tobacco Use     Smoking status: Former     Years: 8.00     Types: Cigarettes     Quit date: 1985     Years since quittin.3     Smokeless tobacco: Never   Vaping Use     Vaping status: Never Used   Substance and Sexual Activity     Alcohol use: Not Currently     Comment: sober since 2022     Drug use: Never     Sexual activity: Not on file   Other Topics Concern     Not on file   Social History Narrative     Not on file     Social Determinants of Health     Financial Resource Strain: Not on file   Food Insecurity: Not on file   Transportation Needs: Not on file   Physical Activity: Not on file   Stress: Not on file   Social Connections: Not on file   Intimate Partner Violence: Not on file   Housing Stability: Not on file       Medications:  Current Outpatient Medications   Medication     ammonium lactate (AMLACTIN) 12 % external cream     buPROPion (WELLBUTRIN XL) 300 MG 24 hr tablet     cholecalciferol 25 MCG (1000 UT) TABS     ciprofloxacin (CIPRO) 500 MG tablet     fluticasone (FLONASE) 50 MCG/ACT nasal spray     folic acid (FOLVITE) 1 MG tablet     ketoconazole (NIZORAL) 2 %  external cream     losartan (COZAAR) 25 MG tablet     metoprolol succinate ER (TOPROL XL) 25 MG 24 hr tablet     multivitamin w/minerals (THERA-VIT-M) tablet     omeprazole (PRILOSEC) 20 MG DR capsule     omeprazole (PRILOSEC) 40 MG DR capsule     spironolactone (ALDACTONE) 50 MG tablet     torsemide (DEMADEX) 10 MG tablet     traMADol (ULTRAM) 50 MG tablet     Acetaminophen (TYLENOL PO)     ferrous sulfate (FEROSUL) 325 (65 Fe) MG tablet     methylPREDNISolone (MEDROL DOSEPAK) 4 MG tablet therapy pack     metoprolol tartrate (LOPRESSOR) 25 MG tablet     ondansetron (ZOFRAN) 4 MG tablet     oxyCODONE (ROXICODONE) 5 MG tablet     No current facility-administered medications for this visit.     No OTCs, herbals    Allergies:  Allergies   Allergen Reactions     Penicillins      PN: LW Reaction: Itching, Pruritis     Adhesive Tape Rash       Objective:  /53   Pulse 75   Ht 1.829 m (6')   Wt 91.1 kg (200 lb 12.8 oz)   BMI 27.23 kg/m    Constitutional: pleasant man in NAD  Eyes: non icteric  Respiratory: Normal respiratory excursion   MSK: normal range of motion of visualized extremities  Abd: Non distended  Skin: No jaundice  Psychiatric: normal mood and orientation    Labs: Reviewed in EHR     Imaging: Reviewed in EHR    Endoscopy:    EGD 1/23/2023    Findings:        The examined esophagus was normal.        The Z-line was regular and was found 43 cm from the        incisors.        Mild portal hypertensive gastropathy was found in the        cardia, in the gastric fundus and in the gastric body.        Localized mild inflammation characterized by        congestion (edema), erosions and erythema was found        in the gastric antrum and in the prepyloric region of        the stomach. Biopsies were taken with a cold forceps        for Helicobacter pylori testing. Verification of        patient identification for the specimen was done.        Estimated blood loss was minimal.        The exam of the stomach was  otherwise normal.        The examined duodenum was normal.     Impression:            - Normal esophagus.                          - Z-line regular, 43 cm from the                           incisors.                          - Portal hypertensive gastropathy.                          - Gastritis. Biopsied.                          - Normal examined duodenum.       Independently reviewed labs and imaging.     Assessment/Plan: Mr. Wilks is a 77-year-old man with a history of alcohol-related liver disease who presents for follow-up of unifocal HCC.  He was treated with Y90 March 2023.    MELD-Na score: 14 at 4/17/2023 12:29 PM  MELD score: 14 at 4/17/2023 12:29 PM  Calculated from:  Serum Creatinine: 0.80 mg/dL (Using min of 1 mg/dL) at 4/17/2023 12:29 PM  Serum Sodium: 140 mmol/L (Using max of 137 mmol/L) at 4/17/2023 12:29 PM  Total Bilirubin: 1.9 mg/dL at 4/17/2023 12:29 PM  INR(ratio): 1.55 at 4/17/2023 12:29 PM  Age: 77 years    CP A 5-6    He has done well after treatment, meld has improved and he is not having issues with ascites.  Also doing well with his sobriety.  Given he has no ascites on MRI and ultrasound, recommend stopping ciprofloxacin given the risk of this in an elderly patient and also the risk of status.  Would continue his diuretics at the current dose, can consider decreasing the future, but is largely for lower extremity edema.    Ordered repeat MRI due June 2023.  Discussed need for repeat MRIs every 3 months to monitor for HCC recurrence.    Having ongoing issues with anemia, ferritin elevated in the past, told him he is at risk for iron deficiency anemia from his portal hypertensive gastropathy, we will recheck his ferritin in the future and if his low to consider restarting iron.  Discussed surveillance colonoscopy, he reportedly has a remote history of polyps, last colonoscopy 2010 was normal.  Could consider repeat colonoscopy 5 years and also for polyp surveillance.  He will think more  about this.    Having more issues with acid reflux, can increase his omeprazole to 40 mg daily.      Found to have less than 1 cm pancreatic cyst October 2022, will obtain a MRCP with his MRI liver that he gets in the fall to follow this up.    Orders Placed This Encounter   Procedures     MR Liver wo & w Contrast     Iron and iron binding capacity     Ferritin     RTC 3 months with labs    Laura Neves MD MS  Hepatology/Liver Transplant  [facility]

## 2023-04-25 NOTE — NURSING NOTE
Chief Complaint   Patient presents with     RECHECK     Follow up with HCC     /53   Pulse 75   Ht 1.829 m (6')   Wt 91.1 kg (200 lb 12.8 oz)   BMI 27.23 kg/m    Elsie Miles CMA on 4/25/2023 at 10:36 AM

## 2023-04-25 NOTE — LETTER
4/25/2023         RE: Oskar Wilks  [address] [place] [address]        Dear Colleague,    Thank you for referring your patient, Oskar Wilks, to the [facility] [place]. Please see a copy of my visit note below.    [facility] New Patient Visit    Date of Visit: 4/25/2023    Reason for referral: Alcohol related liver disease, HCC    Subjective: Mr. Wilks is a 77-year-old man with a history of alcohol-related liver disease who presents for evaluation of a newly found liver lesion.    He has a history of cirrhosis, has been sober in the past, and drink heavily and in July 2022.  Started having issues with abdominal distention, was diagnosed with SBP September 2022.  He had been on torsemide just prior to this, since this admission was started on low-dose torsemide and spironolactone.  He is on Cipro for SBP prophylaxis.  His last paracentesis was attempted 2022, 4.7 L was removed.    December 12 bilirubin was 3.6 and INR was 1.8.  September 9 bilirubin was 5.6.  He had a paracentesis 9/8 that was consistent with portal hypertension, paracentesis also showed SBP    Fell 12/5 on the ice walking his puppy. Broke his hip and has surgery for this. Also broke two ribs. In rehab, fell again and had a L1 compression fracture. Getting OT/PT at home. About to graduate from OT because he is doing so well.  Uses walker for appointments.  Able to go up stairs without issues.  Independent with ADLs.    Was on narcotics related to surgery, had delirium related to this.  Otherwise no HE.     Interval Events:   - Liver BX of 4.6 cm LR 4 lesion came back with HCC. 3/16 underwent TARE of the mass. Follow up imaging has not shown a change. At the time of the mapping there was concern for an additional new nodularity in the left hepatic lobe. MRI 4/17 showed his Y90 treated lesion - unchanged. IR discussed with the patient and his family that they will get  repeat imaging in 2 months and would retreat as needed at that time. MRI did show show potential satellite nodularity seen on Y90 mapping  - Abd US  without ascites, taking spironolactone 50 mg daily and torsemide 10 mg daily.  Doing well with this.  - Otherwise feeling well, good functional status    ROS: 14 point ROS negative except for positives noted in HPI.    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 fib not on anticoagulation  Psoriasis  Basal cell carcinoma  Nonrheumatic mild aortic stenosis  Cerebral infarction due to occlusion of middle cerebral artery    PSHx:  Right acetabulum fracture / GLF s/p ORIF on 22  BRAIN HEMATOMA EVACUTATION   CRANIAL LESION RESECTION From trauma   MENISCECTOMY   MOUTH SURGERY      FamHx:  No family history of liver disease, liver cancer    SocHx:  Social History     Socioeconomic History    Marital status:      Spouse name: Not on file    Number of children: Not on file    Years of education: Not on file    Highest education level: Not on file   Occupational History    Not on file   Tobacco Use    Smoking status: Former     Years: 8.00     Types: Cigarettes     Quit date: 1985     Years since quittin.3    Smokeless tobacco: Never   Vaping Use    Vaping status: Never Used   Substance and Sexual Activity    Alcohol use: Not Currently     Comment: sober since 2022    Drug use: Never    Sexual activity: Not on file   Other Topics Concern    Not on file   Social History Narrative    Not on file     Social Determinants of Health     Financial Resource Strain: Not on file   Food Insecurity: Not on file   Transportation Needs: Not on file   Physical Activity: Not on file   Stress: Not on file   Social Connections: Not on file   Intimate Partner Violence: Not on file   Housing Stability: Not on file       Medications:  Current Outpatient Medications   Medication    ammonium lactate (AMLACTIN) 12 % external cream     buPROPion (WELLBUTRIN XL) 300 MG 24 hr tablet    cholecalciferol 25 MCG (1000 UT) TABS    ciprofloxacin (CIPRO) 500 MG tablet    fluticasone (FLONASE) 50 MCG/ACT nasal spray    folic acid (FOLVITE) 1 MG tablet    ketoconazole (NIZORAL) 2 % external cream    losartan (COZAAR) 25 MG tablet    metoprolol succinate ER (TOPROL XL) 25 MG 24 hr tablet    multivitamin w/minerals (THERA-VIT-M) tablet    omeprazole (PRILOSEC) 20 MG DR capsule    omeprazole (PRILOSEC) 40 MG DR capsule    spironolactone (ALDACTONE) 50 MG tablet    torsemide (DEMADEX) 10 MG tablet    traMADol (ULTRAM) 50 MG tablet    Acetaminophen (TYLENOL PO)    ferrous sulfate (FEROSUL) 325 (65 Fe) MG tablet    methylPREDNISolone (MEDROL DOSEPAK) 4 MG tablet therapy pack    metoprolol tartrate (LOPRESSOR) 25 MG tablet    ondansetron (ZOFRAN) 4 MG tablet    oxyCODONE (ROXICODONE) 5 MG tablet     No current facility-administered medications for this visit.     No OTCs, herbals    Allergies:  Allergies   Allergen Reactions    Penicillins      PN: LW Reaction: Itching, Pruritis    Adhesive Tape Rash       Objective:  /53   Pulse 75   Ht 1.829 m (6')   Wt 91.1 kg (200 lb 12.8 oz)   BMI 27.23 kg/m    Constitutional: pleasant man in NAD  Eyes: non icteric  Respiratory: Normal respiratory excursion   MSK: normal range of motion of visualized extremities  Abd: Non distended  Skin: No jaundice  Psychiatric: normal mood and orientation    Labs: Reviewed in EHR     Imaging: Reviewed in EHR    Endoscopy:    EGD 1/23/2023    Findings:        The examined esophagus was normal.        The Z-line was regular and was found 43 cm from the        incisors.        Mild portal hypertensive gastropathy was found in the        cardia, in the gastric fundus and in the gastric body.        Localized mild inflammation characterized by        congestion (edema), erosions and erythema was found        in the gastric antrum and in the prepyloric region of        the stomach.  Biopsies were taken with a cold forceps        for Helicobacter pylori testing. Verification of        patient identification for the specimen was done.        Estimated blood loss was minimal.        The exam of the stomach was otherwise normal.        The examined duodenum was normal.     Impression:            - Normal esophagus.                          - Z-line regular, 43 cm from the                           incisors.                          - Portal hypertensive gastropathy.                          - Gastritis. Biopsied.                          - Normal examined duodenum.       Independently reviewed labs and imaging.     Assessment/Plan: Mr. Wilks is a 77-year-old man with a history of alcohol-related liver disease who presents for follow-up of unifocal HCC.  He was treated with Y90 March 2023.    MELD-Na score: 14 at 4/17/2023 12:29 PM  MELD score: 14 at 4/17/2023 12:29 PM  Calculated from:  Serum Creatinine: 0.80 mg/dL (Using min of 1 mg/dL) at 4/17/2023 12:29 PM  Serum Sodium: 140 mmol/L (Using max of 137 mmol/L) at 4/17/2023 12:29 PM  Total Bilirubin: 1.9 mg/dL at 4/17/2023 12:29 PM  INR(ratio): 1.55 at 4/17/2023 12:29 PM  Age: 77 years    CP A 5-6    He has done well after treatment, meld has improved and he is not having issues with ascites.  Also doing well with his sobriety.  Given he has no ascites on MRI and ultrasound, recommend stopping ciprofloxacin given the risk of this in an elderly patient and also the risk of status.  Would continue his diuretics at the current dose, can consider decreasing the future, but is largely for lower extremity edema.    Ordered repeat MRI due June 2023.  Discussed need for repeat MRIs every 3 months to monitor for HCC recurrence.    Having ongoing issues with anemia, ferritin elevated in the past, told him he is at risk for iron deficiency anemia from his portal hypertensive gastropathy, we will recheck his ferritin in the future and if his low to consider  restarting iron.  Discussed surveillance colonoscopy, he reportedly has a remote history of polyps, last colonoscopy 2010 was normal.  Could consider repeat colonoscopy 5 years and also for polyp surveillance.  He will think more about this.    Having more issues with acid reflux, can increase his omeprazole to 40 mg daily.      Found to have less than 1 cm pancreatic cyst October 2022, will obtain a MRCP with his MRI liver that he gets in the fall to follow this up.    Orders Placed This Encounter   Procedures    MR Liver wo & w Contrast    Iron and iron binding capacity    Ferritin     RTC 3 months with labs    Laura Neves MD MS  Hepatology/Liver Transplant  [facility]

## 2023-06-23 ENCOUNTER — LAB (OUTPATIENT)
Dept: LAB | Facility: CLINIC | Age: 78
End: 2023-06-23
Attending: RADIOLOGY
Payer: MEDICARE

## 2023-06-23 ENCOUNTER — HOSPITAL ENCOUNTER (OUTPATIENT)
Dept: MRI IMAGING | Facility: CLINIC | Age: 78
Discharge: HOME | End: 2023-06-23
Attending: RADIOLOGY
Payer: MEDICARE

## 2023-06-23 DIAGNOSIS — C22.0 HEPATOCELLULAR CARCINOMA (H): ICD-10-CM

## 2023-06-23 LAB
AFP SERPL-MCNC: 3.9 NG/ML
ALBUMIN SERPL BCG-MCNC: 3.7 G/DL (ref 3.5–5.2)
ALP SERPL-CCNC: 110 U/L (ref 40–129)
ALT SERPL W P-5'-P-CCNC: 29 U/L (ref 0–70)
ANION GAP SERPL CALCULATED.3IONS-SCNC: 9 MMOL/L (ref 7–15)
AST SERPL W P-5'-P-CCNC: 31 U/L (ref 0–45)
BILIRUB DIRECT SERPL-MCNC: 0.61 MG/DL (ref 0–0.3)
BILIRUB SERPL-MCNC: 2.1 MG/DL
BUN SERPL-MCNC: 16.9 MG/DL (ref 8–23)
CALCIUM SERPL-MCNC: 9.1 MG/DL (ref 8.8–10.2)
CHLORIDE SERPL-SCNC: 104 MMOL/L (ref 98–107)
CREAT SERPL-MCNC: 0.79 MG/DL (ref 0.67–1.17)
DEPRECATED HCO3 PLAS-SCNC: 25 MMOL/L (ref 22–29)
ERYTHROCYTE [DISTWIDTH] IN BLOOD BY AUTOMATED COUNT: 20.8 % (ref 10–15)
GFR SERPL CREATININE-BSD FRML MDRD: >90 ML/MIN/1.73M2
GLUCOSE SERPL-MCNC: 99 MG/DL (ref 70–99)
HCT VFR BLD AUTO: 30.1 % (ref 40–53)
HGB BLD-MCNC: 10.1 G/DL (ref 13.3–17.7)
INR PPP: 1.5 (ref 0.85–1.15)
MCH RBC QN AUTO: 31.8 PG (ref 26.5–33)
MCHC RBC AUTO-ENTMCNC: 33.6 G/DL (ref 31.5–36.5)
MCV RBC AUTO: 95 FL (ref 78–100)
PLATELET # BLD AUTO: 95 10E3/UL (ref 150–450)
POTASSIUM SERPL-SCNC: 5 MMOL/L (ref 3.4–5.3)
PROT SERPL-MCNC: 6.1 G/DL (ref 6.4–8.3)
RBC # BLD AUTO: 3.18 10E6/UL (ref 4.4–5.9)
SODIUM SERPL-SCNC: 138 MMOL/L (ref 136–145)
WBC # BLD AUTO: 3.9 10E3/UL (ref 4–11)

## 2023-06-23 PROCEDURE — 85027 COMPLETE CBC AUTOMATED: CPT

## 2023-06-23 PROCEDURE — 85610 PROTHROMBIN TIME: CPT

## 2023-06-23 PROCEDURE — G1010 CDSM STANSON: HCPCS

## 2023-06-23 PROCEDURE — A9585 GADOBUTROL INJECTION: HCPCS | Performed by: RADIOLOGY

## 2023-06-23 PROCEDURE — 255N000002 HC RX 255 OP 636: Performed by: RADIOLOGY

## 2023-06-23 PROCEDURE — 36415 COLL VENOUS BLD VENIPUNCTURE: CPT

## 2023-06-23 PROCEDURE — 82248 BILIRUBIN DIRECT: CPT

## 2023-06-23 PROCEDURE — 82105 ALPHA-FETOPROTEIN SERUM: CPT

## 2023-06-23 PROCEDURE — 74183 MRI ABD W/O CNTR FLWD CNTR: CPT | Mod: MG

## 2023-06-23 RX ORDER — GADOBUTROL 604.72 MG/ML
10 INJECTION INTRAVENOUS ONCE
Status: COMPLETED | OUTPATIENT
Start: 2023-06-23 | End: 2023-06-23

## 2023-06-23 RX ADMIN — GADOBUTROL 10 ML: 604.72 INJECTION INTRAVENOUS at 10:16

## 2023-06-29 NOTE — PROGRESS NOTES
Interventional Radiology Clinic Visit    Date of this visit: 6/30/2023    Oskar Wilks returns for follow up post radioembolization of hepatocellular carcinoma (HCC).    Primary Physician: Viet López        History Of Present Illness:    Oskar Wilks is a 77 year old male who presents with unresectable biopsy-proven hepatocellular carcinoma on a background of alcoholic cirrhosis. Was originally found to have a new 4.6 cm liver lesion in the left hepatic lobe that was a LIRADS 4 based on MRI features. Underwent percutaneous biopsy on 2/2/2023 that showed moderately differentiated hepatocellular carcinoma.  On 3/16/2023 we performed radioembolization of the mass. When I saw him for his initial post radioembolization follow-up on 4/21/2023 he had recovered well.  Imaging showed minimal change in the lesion which is not uncommon in the early stages after treatment and he presents for further follow-up today.    Today says he has been feeling well, without any concerns. No abdominal pain or swelling. No jaundice.    Review of Systems:    As above    Past Medical/Surgical History:    Past Medical History:   Diagnosis Date     Cancer (H)     liver     Cirrhosis of liver (H)      Hip fracture (H)      Past Surgical History:   Procedure Laterality Date     IR LIVER BIOPSY PERCUTANEOUS  02/02/2023     IR SIRT (SELECTIVE INTERNAL RADIO THERAPY)  4/14/2023     IR VISCERAL ANGIOGRAM  3/14/2023     IR VISCERAL EMBOLIZATION  3/16/2023     ORIF HIP FRACTURE         Current Medications:    Current Outpatient Medications   Medication Sig Dispense Refill     Acetaminophen (TYLENOL PO) Take 650 mg by mouth (Patient not taking: Reported on 2/10/2023)       ammonium lactate (AMLACTIN) 12 % external cream APPLY TOPICALLY TO THE AFFECTED AREA DAILY       buPROPion (WELLBUTRIN XL) 300 MG 24 hr tablet Take 300 mg by mouth every morning       cholecalciferol 25 MCG (1000 UT) TABS Take 1 tablet by mouth daily        ciprofloxacin (CIPRO) 500 MG tablet Take 1 tablet by mouth daily       ferrous sulfate (FEROSUL) 325 (65 Fe) MG tablet Take 325 mg by mouth       fluticasone (FLONASE) 50 MCG/ACT nasal spray Spray 2 sprays into both nostrils daily       folic acid (FOLVITE) 1 MG tablet Take 1 mg by mouth daily       ketoconazole (NIZORAL) 2 % external cream        losartan (COZAAR) 25 MG tablet Take 1 tablet by mouth daily       methylPREDNISolone (MEDROL DOSEPAK) 4 MG tablet therapy pack Take as directed on package. 21 tablet 0     metoprolol succinate ER (TOPROL XL) 25 MG 24 hr tablet Take 1 tablet by mouth daily at 2 pm       metoprolol tartrate (LOPRESSOR) 25 MG tablet Take 25 mg by mouth daily       multivitamin w/minerals (THERA-VIT-M) tablet Take 1 tablet by mouth daily       omeprazole (PRILOSEC) 20 MG DR capsule TAKE 1 CAPSULE(20 MG) BY MOUTH DAILY 1 HOUR BEFORE A MEAL       omeprazole (PRILOSEC) 40 MG DR capsule Take 1 capsule (40 mg) by mouth daily for 360 days 90 capsule 3     ondansetron (ZOFRAN) 4 MG tablet Take 1-2 tablets (4-8 mg) by mouth every 6 hours as needed for nausea (vomiting) 40 tablet 0     oxyCODONE (ROXICODONE) 5 MG tablet Take 1-2 tablets (5-10 mg) by mouth every 6 hours as needed for moderate to severe pain 6 tablet 0     spironolactone (ALDACTONE) 50 MG tablet Take 50 mg by mouth daily       torsemide (DEMADEX) 10 MG tablet Take 10 mg by mouth daily       traMADol (ULTRAM) 50 MG tablet TAKE 1/2 TO 1 TABLET BY MOUTH EVERY 6 HOURS AS NEEDED FOR PAIN. 1/2 TABLET FOR PAIN RATED 4 TO 7 AND 1 TABLET FOR PAIN RATED 8 TO 10         Allergies:    Penicillins and Adhesive tape    Family History:    No family history on file.    Social History:    Social History     Socioeconomic History     Marital status:    Tobacco Use     Smoking status: Former     Years: 8.00     Types: Cigarettes     Quit date: 1985     Years since quittin.5     Smokeless tobacco: Never   Vaping Use     Vaping Use: Never  used   Substance and Sexual Activity     Alcohol use: Not Currently     Comment: sober since july 2022     Drug use: Never       Physical Exam:    CONSTITUTIONAL: healthy, alert and no distress.  PSYCHIATRIC: mentation appears normal and affect normal.  NEURO: Normal movements and speech.  EYES: No jaundice or pallor.  SKIN: No jaundice.   RESP: No audible cough or wheeze.      Laboratory Studies:    Lab Results   Component Value Date    HGB 10.1 06/23/2023     Lab Results   Component Value Date    PLT 95 06/23/2023     Lab Results   Component Value Date    WBC 3.9 06/23/2023       Lab Results   Component Value Date    INR 1.50 06/23/2023       Lab Results   Component Value Date    PROTTOTAL 6.1 06/23/2023      Lab Results   Component Value Date    ALBUMIN 3.7 06/23/2023     Lab Results   Component Value Date    BILITOTAL 2.1 06/23/2023     Lab Results   Component Value Date    ALKPHOS 110 06/23/2023     Lab Results   Component Value Date    AST 31 06/23/2023     Lab Results   Component Value Date    ALT 29 06/23/2023       Lab Results   Component Value Date    CR 0.79 06/23/2023     Lab Results   Component Value Date    BUN 16.9 06/23/2023       AFP 3.9 on 6/23/23    Imaging:     I personally reviewed and interpreted the MRI from 6/28/2023.  It shows the lesion is now significantly decreased in size.  The tumor is best seen on the T2 and TrueFISP sequences where it can be distinguished from background posttreatment enhancement/changes.  The tumor now measures 2.5 x 2.2 cm, previously 4.9 x 3.3 cm.  No new liver lesions are seen.    ASSESSMENT/PLAN:      Oskar Wilks is a 77 year old male with alcoholic cirrhosis and a solitary hepatocellular carcinoma in segment 2/3 -he is now 3 months post radioembolization of the lesion and the lesion has significantly decreased in size.  We will continue to follow with another MRI in 3 months with new staging CT chest and labs.  I will see him in clinic at that time.  He  expressed understanding and agreed with this plan.        It was a pleasure seeing the patient.     Signed,    Kera Pillai M.D.    Interventional Radiology  Department of Radiology  [facility]      CC  Patient Care Team:  Viet López MD as PCP - General (Family Medicine)  Laura Neves MD as Assigned Gastroenterology Provider  SELF, REFERRED         20 minutes spent by me on the date of the encounter doing chart review, history and exam, imaging review, documentation and further activities per the note.      Video-Visit Details     Type of service:  Video Visit     Video Start and End Time:9:08 - 9:16am    Originating Location (pt. Location): Home     Distant Location (provider location):  [facility] [place]      Platform used for Video Visit: Imnish

## 2023-06-30 ENCOUNTER — VIRTUAL VISIT (OUTPATIENT)
Dept: RADIOLOGY | Facility: CLINIC | Age: 78
End: 2023-06-30
Attending: RADIOLOGY
Payer: MEDICARE

## 2023-06-30 VITALS — BODY MASS INDEX: 25.73 KG/M2 | HEIGHT: 72 IN | WEIGHT: 190 LBS

## 2023-06-30 DIAGNOSIS — C22.0 HEPATOCELLULAR CARCINOMA (H): Primary | ICD-10-CM

## 2023-06-30 PROCEDURE — 99213 OFFICE O/P EST LOW 20 MIN: CPT | Mod: 95 | Performed by: RADIOLOGY

## 2023-06-30 NOTE — NURSING NOTE
Is the patient currently in the state of [place]? YES    Visit mode:VIDEO    If the visit is dropped, the patient can be reconnected by: VIDEO VISIT: Send to e-mail at: [email or web address]    Will anyone else be joining the visit? YES: How would they like to receive their invitation? Text to cell phone: [phone]      How would you like to obtain your AVS? MyChart    Are changes needed to the allergy or medication list? YES: Please add 25mg Vitamin D3 - one daily    Desire CASPER    Reason for visit: RECHECK (Video Visit)

## 2023-06-30 NOTE — LETTER
6/30/2023         RE: Oskar Wilks  [address] [place] [address]        Dear Colleague,    Thank you for referring your patient, Oskar Wilks, to the [facility]. Please see a copy of my visit note below.          Interventional Radiology Clinic Visit    Date of this visit: 6/30/2023    Oskar Wilks returns for follow up post radioembolization of hepatocellular carcinoma (HCC).    Primary Physician: Viet López        History Of Present Illness:    Oskar Wilks is a 77 year old male who presents with unresectable biopsy-proven hepatocellular carcinoma on a background of alcoholic cirrhosis. Was originally found to have a new 4.6 cm liver lesion in the left hepatic lobe that was a LIRADS 4 based on MRI features. Underwent percutaneous biopsy on 2/2/2023 that showed moderately differentiated hepatocellular carcinoma.  On 3/16/2023 we performed radioembolization of the mass. When I saw him for his initial post radioembolization follow-up on 4/21/2023 he had recovered well.  Imaging showed minimal change in the lesion which is not uncommon in the early stages after treatment and he presents for further follow-up today.    Today says he has been feeling well, without any concerns. No abdominal pain or swelling. No jaundice.    Review of Systems:    As above    Past Medical/Surgical History:    Past Medical History:   Diagnosis Date    Cancer (H)     liver    Cirrhosis of liver (H)     Hip fracture (H)      Past Surgical History:   Procedure Laterality Date    IR LIVER BIOPSY PERCUTANEOUS  02/02/2023    IR SIRT (SELECTIVE INTERNAL RADIO THERAPY)  4/14/2023    IR VISCERAL ANGIOGRAM  3/14/2023    IR VISCERAL EMBOLIZATION  3/16/2023    ORIF HIP FRACTURE         Current Medications:    Current Outpatient Medications   Medication Sig Dispense Refill    Acetaminophen (TYLENOL PO) Take 650 mg by mouth (Patient not taking: Reported on 2/10/2023)      ammonium  lactate (AMLACTIN) 12 % external cream APPLY TOPICALLY TO THE AFFECTED AREA DAILY      buPROPion (WELLBUTRIN XL) 300 MG 24 hr tablet Take 300 mg by mouth every morning      cholecalciferol 25 MCG (1000 UT) TABS Take 1 tablet by mouth daily      ciprofloxacin (CIPRO) 500 MG tablet Take 1 tablet by mouth daily      ferrous sulfate (FEROSUL) 325 (65 Fe) MG tablet Take 325 mg by mouth      fluticasone (FLONASE) 50 MCG/ACT nasal spray Spray 2 sprays into both nostrils daily      folic acid (FOLVITE) 1 MG tablet Take 1 mg by mouth daily      ketoconazole (NIZORAL) 2 % external cream       losartan (COZAAR) 25 MG tablet Take 1 tablet by mouth daily      methylPREDNISolone (MEDROL DOSEPAK) 4 MG tablet therapy pack Take as directed on package. 21 tablet 0    metoprolol succinate ER (TOPROL XL) 25 MG 24 hr tablet Take 1 tablet by mouth daily at 2 pm      metoprolol tartrate (LOPRESSOR) 25 MG tablet Take 25 mg by mouth daily      multivitamin w/minerals (THERA-VIT-M) tablet Take 1 tablet by mouth daily      omeprazole (PRILOSEC) 20 MG DR capsule TAKE 1 CAPSULE(20 MG) BY MOUTH DAILY 1 HOUR BEFORE A MEAL      omeprazole (PRILOSEC) 40 MG DR capsule Take 1 capsule (40 mg) by mouth daily for 360 days 90 capsule 3    ondansetron (ZOFRAN) 4 MG tablet Take 1-2 tablets (4-8 mg) by mouth every 6 hours as needed for nausea (vomiting) 40 tablet 0    oxyCODONE (ROXICODONE) 5 MG tablet Take 1-2 tablets (5-10 mg) by mouth every 6 hours as needed for moderate to severe pain 6 tablet 0    spironolactone (ALDACTONE) 50 MG tablet Take 50 mg by mouth daily      torsemide (DEMADEX) 10 MG tablet Take 10 mg by mouth daily      traMADol (ULTRAM) 50 MG tablet TAKE 1/2 TO 1 TABLET BY MOUTH EVERY 6 HOURS AS NEEDED FOR PAIN. 1/2 TABLET FOR PAIN RATED 4 TO 7 AND 1 TABLET FOR PAIN RATED 8 TO 10         Allergies:    Penicillins and Adhesive tape    Family History:    No family history on file.    Social History:    Social History     Socioeconomic History     Marital status:    Tobacco Use    Smoking status: Former     Years: 8.00     Types: Cigarettes     Quit date: 1985     Years since quittin.5    Smokeless tobacco: Never   Vaping Use    Vaping Use: Never used   Substance and Sexual Activity    Alcohol use: Not Currently     Comment: sober since 2022    Drug use: Never       Physical Exam:    CONSTITUTIONAL: healthy, alert and no distress.  PSYCHIATRIC: mentation appears normal and affect normal.  NEURO: Normal movements and speech.  EYES: No jaundice or pallor.  SKIN: No jaundice.   RESP: No audible cough or wheeze.      Laboratory Studies:    Lab Results   Component Value Date    HGB 10.1 2023     Lab Results   Component Value Date    PLT 95 2023     Lab Results   Component Value Date    WBC 3.9 2023       Lab Results   Component Value Date    INR 1.50 2023       Lab Results   Component Value Date    PROTTOTAL 6.1 2023      Lab Results   Component Value Date    ALBUMIN 3.7 2023     Lab Results   Component Value Date    BILITOTAL 2.1 2023     Lab Results   Component Value Date    ALKPHOS 110 2023     Lab Results   Component Value Date    AST 31 2023     Lab Results   Component Value Date    ALT 29 2023       Lab Results   Component Value Date    CR 0.79 2023     Lab Results   Component Value Date    BUN 16.9 2023       AFP 3.9 on 23    Imaging:     I personally reviewed and interpreted the MRI from 2023.  It shows the lesion is now significantly decreased in size.  The tumor is best seen on the T2 and TrueFISP sequences where it can be distinguished from background posttreatment enhancement/changes.  The tumor now measures 2.5 x 2.2 cm, previously 4.9 x 3.3 cm.  No new liver lesions are seen.    ASSESSMENT/PLAN:      Oskar Wilks is a 77 year old male with alcoholic cirrhosis and a solitary hepatocellular carcinoma in segment 2/3 -he is now 3 months post  radioembolization of the lesion and the lesion has significantly decreased in size.  We will continue to follow with another MRI in 3 months with new staging CT chest and labs.  I will see him in clinic at that time.  He expressed understanding and agreed with this plan.        It was a pleasure seeing the patient.     Signed,    Kera Pillai M.D.    Interventional Radiology  Department of Radiology  [facility]      CC  Patient Care Team:  Viet López MD as PCP - General (Family Medicine)  Laura Neves MD as Assigned Gastroenterology Provider  SELF, REFERRED         20 minutes spent by me on the date of the encounter doing chart review, history and exam, imaging review, documentation and further activities per the note.      Video-Visit Details     Type of service:  Video Visit     Video Start and End Time:9:08 - 9:16am    Originating Location (pt. Location): Home     Distant Location (provider location):  [facility] [place]      Platform used for Video Visit: [employer]

## 2023-07-02 ENCOUNTER — HEALTH MAINTENANCE LETTER (OUTPATIENT)
Age: 78
End: 2023-07-02

## 2023-07-31 DIAGNOSIS — C22.0 HEPATOCELLULAR CARCINOMA (H): Primary | ICD-10-CM

## 2023-08-16 ENCOUNTER — LAB (OUTPATIENT)
Dept: LAB | Facility: CLINIC | Age: 78
End: 2023-08-16
Attending: STUDENT IN AN ORGANIZED HEALTH CARE EDUCATION/TRAINING PROGRAM
Payer: MEDICARE

## 2023-08-16 ENCOUNTER — OFFICE VISIT (OUTPATIENT)
Dept: GASTROENTEROLOGY | Facility: CLINIC | Age: 78
End: 2023-08-16
Attending: STUDENT IN AN ORGANIZED HEALTH CARE EDUCATION/TRAINING PROGRAM
Payer: MEDICARE

## 2023-08-16 VITALS
OXYGEN SATURATION: 98 % | HEART RATE: 66 BPM | BODY MASS INDEX: 27.59 KG/M2 | SYSTOLIC BLOOD PRESSURE: 126 MMHG | DIASTOLIC BLOOD PRESSURE: 67 MMHG | WEIGHT: 203.4 LBS

## 2023-08-16 DIAGNOSIS — D64.9 ANEMIA, UNSPECIFIED TYPE: ICD-10-CM

## 2023-08-16 DIAGNOSIS — D50.9 IRON DEFICIENCY ANEMIA, UNSPECIFIED IRON DEFICIENCY ANEMIA TYPE: Primary | ICD-10-CM

## 2023-08-16 DIAGNOSIS — C22.0 HEPATOCELLULAR CARCINOMA (H): ICD-10-CM

## 2023-08-16 DIAGNOSIS — D50.9 IRON DEFICIENCY ANEMIA, UNSPECIFIED IRON DEFICIENCY ANEMIA TYPE: ICD-10-CM

## 2023-08-16 LAB
AFP SERPL-MCNC: 3 NG/ML
ALBUMIN SERPL BCG-MCNC: 3.7 G/DL (ref 3.5–5.2)
ALP SERPL-CCNC: 91 U/L (ref 40–129)
ALT SERPL W P-5'-P-CCNC: 22 U/L (ref 0–70)
ANION GAP SERPL CALCULATED.3IONS-SCNC: 6 MMOL/L (ref 7–15)
AST SERPL W P-5'-P-CCNC: 32 U/L (ref 0–45)
BILIRUB DIRECT SERPL-MCNC: 0.62 MG/DL (ref 0–0.3)
BILIRUB SERPL-MCNC: 2 MG/DL
BUN SERPL-MCNC: 22.6 MG/DL (ref 8–23)
CALCIUM SERPL-MCNC: 9 MG/DL (ref 8.8–10.2)
CHLORIDE SERPL-SCNC: 105 MMOL/L (ref 98–107)
CREAT SERPL-MCNC: 0.87 MG/DL (ref 0.67–1.17)
DEPRECATED HCO3 PLAS-SCNC: 26 MMOL/L (ref 22–29)
ERYTHROCYTE [DISTWIDTH] IN BLOOD BY AUTOMATED COUNT: 20.9 % (ref 10–15)
FERRITIN SERPL-MCNC: 363 NG/ML (ref 31–409)
GFR SERPL CREATININE-BSD FRML MDRD: 89 ML/MIN/1.73M2
GLUCOSE SERPL-MCNC: 104 MG/DL (ref 70–99)
HCT VFR BLD AUTO: 26.7 % (ref 40–53)
HGB BLD-MCNC: 8.8 G/DL (ref 13.3–17.7)
INR PPP: 1.8 (ref 0.85–1.15)
MCH RBC QN AUTO: 31.1 PG (ref 26.5–33)
MCHC RBC AUTO-ENTMCNC: 33 G/DL (ref 31.5–36.5)
MCV RBC AUTO: 94 FL (ref 78–100)
PLATELET # BLD AUTO: 67 10E3/UL (ref 150–450)
POTASSIUM SERPL-SCNC: 4.5 MMOL/L (ref 3.4–5.3)
PROT SERPL-MCNC: 5.6 G/DL (ref 6.4–8.3)
RBC # BLD AUTO: 2.83 10E6/UL (ref 4.4–5.9)
SODIUM SERPL-SCNC: 137 MMOL/L (ref 136–145)
WBC # BLD AUTO: 3.2 10E3/UL (ref 4–11)

## 2023-08-16 PROCEDURE — 99214 OFFICE O/P EST MOD 30 MIN: CPT | Performed by: STUDENT IN AN ORGANIZED HEALTH CARE EDUCATION/TRAINING PROGRAM

## 2023-08-16 PROCEDURE — 80053 COMPREHEN METABOLIC PANEL: CPT | Performed by: PATHOLOGY

## 2023-08-16 PROCEDURE — 82105 ALPHA-FETOPROTEIN SERUM: CPT | Performed by: STUDENT IN AN ORGANIZED HEALTH CARE EDUCATION/TRAINING PROGRAM

## 2023-08-16 PROCEDURE — 85610 PROTHROMBIN TIME: CPT | Performed by: PATHOLOGY

## 2023-08-16 PROCEDURE — 82607 VITAMIN B-12: CPT | Performed by: STUDENT IN AN ORGANIZED HEALTH CARE EDUCATION/TRAINING PROGRAM

## 2023-08-16 PROCEDURE — 82728 ASSAY OF FERRITIN: CPT | Performed by: PATHOLOGY

## 2023-08-16 PROCEDURE — 99000 SPECIMEN HANDLING OFFICE-LAB: CPT | Performed by: PATHOLOGY

## 2023-08-16 PROCEDURE — G0463 HOSPITAL OUTPT CLINIC VISIT: HCPCS | Performed by: STUDENT IN AN ORGANIZED HEALTH CARE EDUCATION/TRAINING PROGRAM

## 2023-08-16 PROCEDURE — 85027 COMPLETE CBC AUTOMATED: CPT | Performed by: PATHOLOGY

## 2023-08-16 PROCEDURE — 36415 COLL VENOUS BLD VENIPUNCTURE: CPT | Performed by: PATHOLOGY

## 2023-08-16 PROCEDURE — 82248 BILIRUBIN DIRECT: CPT | Performed by: PATHOLOGY

## 2023-08-16 ASSESSMENT — PAIN SCALES - GENERAL: PAINLEVEL: NO PAIN (0)

## 2023-08-16 NOTE — LETTER
8/16/2023         RE: Oskar iWlks  [address] [place] [address]        Dear Colleague,    Thank you for referring your patient, Oskar Wilks, to the [facility] [place]. Please see a copy of my visit note below.    [facility] Return Patient Visit    Date of Visit: 8/16/2023    Reason for referral: Alcohol related liver disease, HCC    Subjective: Mr. Wilks is a 77-year-old man with a history of alcohol-related liver disease who presents for follow up of bx proven HCC.     Initial History:     He has a history of cirrhosis, has been sober in the past, and drink heavily and in July 2022.  Started having issues with abdominal distention, was diagnosed with SBP September 2022.  He had been on torsemide just prior to this, since this admission was started on low-dose torsemide and spironolactone.  He is on Cipro for SBP prophylaxis.  His last paracentesis was 2022, 4.7 L was removed.    December 12 bilirubin was 3.6 and INR was 1.8.  September 9 bilirubin was 5.6.  He had a paracentesis 9/8 that was consistent with portal hypertension, paracentesis also showed SBP    Fell 12/5 on the ice walking his puppy. Broke his hip and has surgery for this. Also broke two ribs. In rehab, fell again and had a L1 compression fracture. Getting OT/PT at home. About to graduate from OT because he is doing so well.  Uses walker for appointments.  Able to go up stairs without issues.  Independent with ADLs.    Was on narcotics related to surgery, had delirium related to this.  Otherwise no HE.     Found to have a 4.6 cm liver lesion, read as an LR 4 lesion.  Underwent a biopsy of this that came back consistent with HCC    3/16 underwent TARE of the mass. Follow up imaging has not shown a change. At the time of the mapping there was concern for an additional new nodularity in the left hepatic lobe.     MRI 4/17 showed his Y90 treated lesion - unchanged but  follow-up MRI in  showed reduction in size of the HCC, now 3.2 cm.  It did have some enhancing features.  Followed up with IR who are pleased with the reduction size and recommended to follow-up MRI in the fall.    Interval Events:   -He is doing well.  Taking low-dose diuretics (spironolactone 50 mg daily and torsemide 10 mg daily.).  Stopped his diuretics after his last visit due to improvement in ascites to only trace seen on MRI.  Golfed 9 holes yesterday.  Has good energy.  No episodes with confusion.  No bleeding that he sees.  Hemoglobin is slightly lower.  Not taking iron pills.  Doing well with sobriety.  Having issues with sleeping.     ROS: 14 point ROS negative except for positives noted in HPI.    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 fib not on anticoagulation  Psoriasis  Basal cell carcinoma  Nonrheumatic mild aortic stenosis    PSHx:  Right acetabulum fracture 2/ GLF s/p ORIF on 22  BRAIN HEMATOMA EVACUTATION   CRANIAL LESION RESECTION From trauma   MENISCECTOMY   MOUTH SURGERY      FamHx:  No family history of liver disease, liver cancer    SocHx:  Social History     Socioeconomic History     Marital status:      Spouse name: Not on file     Number of children: Not on file     Years of education: Not on file     Highest education level: Not on file   Occupational History     Not on file   Tobacco Use     Smoking status: Former     Years: 8.00     Types: Cigarettes     Quit date: 1985     Years since quittin.6     Smokeless tobacco: Never   Vaping Use     Vaping Use: Never used   Substance and Sexual Activity     Alcohol use: Not Currently     Comment: sober since 2022     Drug use: Never     Sexual activity: Not on file   Other Topics Concern     Not on file   Social History Narrative     Not on file     Social Determinants of Health     Financial Resource Strain: Not on file   Food Insecurity: Not on file   Transportation  Needs: Not on file   Physical Activity: Not on file   Stress: Not on file   Social Connections: Not on file   Intimate Partner Violence: Not on file   Housing Stability: Not on file       Medications:  Current Outpatient Medications   Medication     ammonium lactate (AMLACTIN) 12 % external cream     buPROPion (WELLBUTRIN XL) 300 MG 24 hr tablet     cholecalciferol 25 MCG (1000 UT) TABS     fluticasone (FLONASE) 50 MCG/ACT nasal spray     folic acid (FOLVITE) 1 MG tablet     ketoconazole (NIZORAL) 2 % external cream     metoprolol succinate ER (TOPROL XL) 25 MG 24 hr tablet     multivitamin w/minerals (THERA-VIT-M) tablet     omeprazole (PRILOSEC) 40 MG DR capsule     spironolactone (ALDACTONE) 50 MG tablet     torsemide (DEMADEX) 10 MG tablet     ferrous sulfate (FEROSUL) 325 (65 Fe) MG tablet     losartan (COZAAR) 25 MG tablet     methylPREDNISolone (MEDROL DOSEPAK) 4 MG tablet therapy pack     metoprolol tartrate (LOPRESSOR) 25 MG tablet     omeprazole (PRILOSEC) 20 MG DR capsule     ondansetron (ZOFRAN) 4 MG tablet     oxyCODONE (ROXICODONE) 5 MG tablet     traMADol (ULTRAM) 50 MG tablet     No current facility-administered medications for this visit.     No OTCs, herbals    Allergies:  Allergies   Allergen Reactions     Penicillins      PN: LW Reaction: Itching, Pruritis     Adhesive Tape Rash       Objective:  /67   Pulse 66   Wt 92.3 kg (203 lb 6.4 oz)   SpO2 98%   BMI 27.59 kg/m    Constitutional: pleasant man in NAD  Eyes: non icteric  Respiratory: Normal respiratory excursion   MSK: normal range of motion of visualized extremities  Abd: Non distended  Ext: 1+ edema  Skin: No jaundice  Psychiatric: normal mood and orientation    Labs: Reviewed in EHR     Imaging: Reviewed in EHR    Endoscopy:    EGD 1/23/2023    Findings:        The examined esophagus was normal.        The Z-line was regular and was found 43 cm from the        incisors.        Mild portal hypertensive gastropathy was found in the         cardia, in the gastric fundus and in the gastric body.        Localized mild inflammation characterized by        congestion (edema), erosions and erythema was found        in the gastric antrum and in the prepyloric region of        the stomach. Biopsies were taken with a cold forceps        for Helicobacter pylori testing. Verification of        patient identification for the specimen was done.        Estimated blood loss was minimal.        The exam of the stomach was otherwise normal.        The examined duodenum was normal.     Impression:            - Normal esophagus.                          - Z-line regular, 43 cm from the                           incisors.                          - Portal hypertensive gastropathy.                          - Gastritis. Biopsied.                          - Normal examined duodenum.       Independently reviewed labs and imaging.     Assessment/Plan: Mr. Wilks is a 77-year-old man with a history of alcohol-related liver disease who presents for follow-up of unifocal HCC.  He was treated with Y90 March 2023, follow-up MRIs have shown improvement in his treated HCC.    MELD 3.0: 15 at 8/16/2023  2:06 PM  MELD-Na: 16 at 8/16/2023  2:06 PM  Calculated from:  Serum Creatinine: 0.87 mg/dL (Using min of 1 mg/dL) at 8/16/2023  2:06 PM  Serum Sodium: 137 mmol/L at 8/16/2023  2:06 PM  Total Bilirubin: 2.0 mg/dL at 8/16/2023  2:06 PM  Serum Albumin: 3.7 g/dL (Using max of 3.5 g/dL) at 8/16/2023  2:06 PM  INR(ratio): 1.80 at 8/16/2023  2:06 PM  Age at listing (hypothetical): 77 years  Sex: Male at 8/16/2023  2:06 PM      He has done well after treatment, MELD is stable, no issues with ascites.      Has his follow-up MRI scheduled for end of September.  We will add on a CT chest to be done at that time.    Having ongoing issues with anemia, ferritin elevated in the past, today is normal.  He has seen hematology before who felt it was less likely had a primary bone marrow  process.  We will add on B12 testing.  Discussed colonoscopy, he is not sure about if he wants to get a repeat    Found to have less than 1 cm pancreatic cyst October 2022, will follow this up on his upcoming MRI    Orders Placed This Encounter   Procedures     CT Chest w/o Contrast     Ferritin     Vitamin B12     RTC 4 months with labs    Laura Neves MD MS  Hepatology/Liver Transplant  [facility]      Again, thank you for allowing me to participate in the care of your patient.        Sincerely,        Laura Neves MD

## 2023-08-16 NOTE — PROGRESS NOTES
[facility] Liver Clinic Return Patient Visit    Date of Visit: 8/16/2023    Reason for referral: Alcohol related liver disease, HCC    Subjective: Mr. Wilks is a 77-year-old man with a history of alcohol-related liver disease who presents for follow up of bx proven HCC.     Initial History:     He has a history of cirrhosis, has been sober in the past, and drink heavily and in July 2022.  Started having issues with abdominal distention, was diagnosed with SBP September 2022.  He had been on torsemide just prior to this, since this admission was started on low-dose torsemide and spironolactone.  He is on Cipro for SBP prophylaxis.  His last paracentesis was 2022, 4.7 L was removed.    December 12 bilirubin was 3.6 and INR was 1.8.  September 9 bilirubin was 5.6.  He had a paracentesis 9/8 that was consistent with portal hypertension, paracentesis also showed SBP    Fell 12/5 on the ice walking his puppy. Broke his hip and has surgery for this. Also broke two ribs. In rehab, fell again and had a L1 compression fracture. Getting OT/PT at home. About to graduate from OT because he is doing so well.  Uses walker for appointments.  Able to go up stairs without issues.  Independent with ADLs.    Was on narcotics related to surgery, had delirium related to this.  Otherwise no HE.     Found to have a 4.6 cm liver lesion, read as an LR 4 lesion.  Underwent a biopsy of this that came back consistent with HCC    3/16 underwent TARE of the mass. Follow up imaging has not shown a change. At the time of the mapping there was concern for an additional new nodularity in the left hepatic lobe.     MRI 4/17 showed his Y90 treated lesion - unchanged but follow-up MRI in June showed reduction in size of the HCC, now 3.2 cm.  It did have some enhancing features.  Followed up with IR who are pleased with the reduction size and recommended to follow-up MRI in the fall.    Interval Events:   -He is doing well.  Taking  low-dose diuretics (spironolactone 50 mg daily and torsemide 10 mg daily.).  Stopped his diuretics after his last visit due to improvement in ascites to only trace seen on MRI.  Golfed 9 holes yesterday.  Has good energy.  No episodes with confusion.  No bleeding that he sees.  Hemoglobin is slightly lower.  Not taking iron pills.  Doing well with sobriety.  Having issues with sleeping.     ROS: 14 point ROS negative except for positives noted in HPI.    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 fib not on anticoagulation  Psoriasis  Basal cell carcinoma  Nonrheumatic mild aortic stenosis    PSHx:  Right acetabulum fracture  GLF s/p ORIF on 22  BRAIN HEMATOMA EVACUTATION   CRANIAL LESION RESECTION From trauma   MENISCECTOMY   MOUTH SURGERY      FamHx:  No family history of liver disease, liver cancer    SocHx:  Social History     Socioeconomic History    Marital status:      Spouse name: Not on file    Number of children: Not on file    Years of education: Not on file    Highest education level: Not on file   Occupational History    Not on file   Tobacco Use    Smoking status: Former     Years: 8.00     Types: Cigarettes     Quit date: 1985     Years since quittin.6    Smokeless tobacco: Never   Vaping Use    Vaping Use: Never used   Substance and Sexual Activity    Alcohol use: Not Currently     Comment: sober since 2022    Drug use: Never    Sexual activity: Not on file   Other Topics Concern    Not on file   Social History Narrative    Not on file     Social Determinants of Health     Financial Resource Strain: Not on file   Food Insecurity: Not on file   Transportation Needs: Not on file   Physical Activity: Not on file   Stress: Not on file   Social Connections: Not on file   Intimate Partner Violence: Not on file   Housing Stability: Not on file       Medications:  Current Outpatient Medications   Medication    ammonium lactate (AMLACTIN) 12  % external cream    buPROPion (WELLBUTRIN XL) 300 MG 24 hr tablet    cholecalciferol 25 MCG (1000 UT) TABS    fluticasone (FLONASE) 50 MCG/ACT nasal spray    folic acid (FOLVITE) 1 MG tablet    ketoconazole (NIZORAL) 2 % external cream    metoprolol succinate ER (TOPROL XL) 25 MG 24 hr tablet    multivitamin w/minerals (THERA-VIT-M) tablet    omeprazole (PRILOSEC) 40 MG DR capsule    spironolactone (ALDACTONE) 50 MG tablet    torsemide (DEMADEX) 10 MG tablet    ferrous sulfate (FEROSUL) 325 (65 Fe) MG tablet    losartan (COZAAR) 25 MG tablet    methylPREDNISolone (MEDROL DOSEPAK) 4 MG tablet therapy pack    metoprolol tartrate (LOPRESSOR) 25 MG tablet    omeprazole (PRILOSEC) 20 MG DR capsule    ondansetron (ZOFRAN) 4 MG tablet    oxyCODONE (ROXICODONE) 5 MG tablet    traMADol (ULTRAM) 50 MG tablet     No current facility-administered medications for this visit.     No OTCs, herbals    Allergies:  Allergies   Allergen Reactions    Penicillins      PN: LW Reaction: Itching, Pruritis    Adhesive Tape Rash       Objective:  /67   Pulse 66   Wt 92.3 kg (203 lb 6.4 oz)   SpO2 98%   BMI 27.59 kg/m    Constitutional: pleasant man in NAD  Eyes: non icteric  Respiratory: Normal respiratory excursion   MSK: normal range of motion of visualized extremities  Abd: Non distended  Ext: 1+ edema  Skin: No jaundice  Psychiatric: normal mood and orientation    Labs: Reviewed in EHR     Imaging: Reviewed in EHR    Endoscopy:    EGD 1/23/2023    Findings:        The examined esophagus was normal.        The Z-line was regular and was found 43 cm from the        incisors.        Mild portal hypertensive gastropathy was found in the        cardia, in the gastric fundus and in the gastric body.        Localized mild inflammation characterized by        congestion (edema), erosions and erythema was found        in the gastric antrum and in the prepyloric region of        the stomach. Biopsies were taken with a cold forceps         for Helicobacter pylori testing. Verification of        patient identification for the specimen was done.        Estimated blood loss was minimal.        The exam of the stomach was otherwise normal.        The examined duodenum was normal.     Impression:            - Normal esophagus.                          - Z-line regular, 43 cm from the                           incisors.                          - Portal hypertensive gastropathy.                          - Gastritis. Biopsied.                          - Normal examined duodenum.       Independently reviewed labs and imaging.     Assessment/Plan: Mr. Wilks is a 77-year-old man with a history of alcohol-related liver disease who presents for follow-up of unifocal HCC.  He was treated with Y90 March 2023, follow-up MRIs have shown improvement in his treated HCC.    MELD 3.0: 15 at 8/16/2023  2:06 PM  MELD-Na: 16 at 8/16/2023  2:06 PM  Calculated from:  Serum Creatinine: 0.87 mg/dL (Using min of 1 mg/dL) at 8/16/2023  2:06 PM  Serum Sodium: 137 mmol/L at 8/16/2023  2:06 PM  Total Bilirubin: 2.0 mg/dL at 8/16/2023  2:06 PM  Serum Albumin: 3.7 g/dL (Using max of 3.5 g/dL) at 8/16/2023  2:06 PM  INR(ratio): 1.80 at 8/16/2023  2:06 PM  Age at listing (hypothetical): 77 years  Sex: Male at 8/16/2023  2:06 PM      He has done well after treatment, MELD is stable, no issues with ascites.      Has his follow-up MRI scheduled for end of September.  We will add on a CT chest to be done at that time.    Having ongoing issues with anemia, ferritin elevated in the past, today is normal.  He has seen hematology before who felt it was less likely had a primary bone marrow process.  We will add on B12 testing.  Discussed colonoscopy, he is not sure about if he wants to get a repeat    Found to have less than 1 cm pancreatic cyst October 2022, will follow this up on his upcoming MRI    Orders Placed This Encounter   Procedures    CT Chest w/o Contrast    Ferritin    Vitamin  B12     RTC 4 months with labs    Laura Neves MD MS  Hepatology/Liver Transplant  [facility]

## 2023-08-17 LAB — VIT B12 SERPL-MCNC: 667 PG/ML (ref 232–1245)

## 2023-08-21 ENCOUNTER — HOSPITAL ENCOUNTER (OUTPATIENT)
Dept: CT IMAGING | Facility: CLINIC | Age: 78
Discharge: HOME | End: 2023-08-21
Attending: STUDENT IN AN ORGANIZED HEALTH CARE EDUCATION/TRAINING PROGRAM | Admitting: STUDENT IN AN ORGANIZED HEALTH CARE EDUCATION/TRAINING PROGRAM
Payer: MEDICARE

## 2023-08-21 DIAGNOSIS — C22.0 HEPATOCELLULAR CARCINOMA (H): ICD-10-CM

## 2023-08-21 PROCEDURE — G1010 CDSM STANSON: HCPCS

## 2023-08-21 PROCEDURE — 71250 CT THORAX DX C-: CPT | Mod: MG

## 2023-08-29 ENCOUNTER — TELEPHONE (OUTPATIENT)
Dept: GASTROENTEROLOGY | Facility: CLINIC | Age: 78
End: 2023-08-29
Payer: MEDICARE

## 2023-08-29 ENCOUNTER — HOSPITAL ENCOUNTER (OUTPATIENT)
Facility: CLINIC | Age: 78
End: 2023-08-29
Attending: SURGERY | Admitting: SURGERY
Payer: MEDICARE

## 2023-08-29 NOTE — TELEPHONE ENCOUNTER
"Endoscopy Scheduling Screen    Have you had a positive Covid test in the last 14 days?  No    Are you active on MyChart?   Yes    What insurance is in the chart?  Other:  Medicare    Ordering/Referring Provider:   YOLY LEDEZMA        (If ordering provider performs procedure, schedule with ordering provider unless otherwise instructed. )    BMI: Estimated body mass index is 27.59 kg/m  as calculated from the following:    Height as of 6/30/23: 1.829 m (6').    Weight as of 8/16/23: 92.3 kg (203 lb 6.4 oz).     Sedation Ordered  MAC/deep sedation.   BMI<= 45 45 < BMI <= 48 48 < BMI < = 50  BMI > 50   No Restrictions No [facility]  No [facility]  [facility] with exceptions Hospital Only OR Only       Do you have a history of malignant hyperthermia or adverse reaction to anesthesia?  No    (Females) Are you currently pregnant?   No     Have you been diagnosed or told you have pulmonary hypertension?   No    Do you have an LVAD?  No    Have you been told you have moderate to severe sleep apnea?  No    Have you been told you have COPD, asthma, or any other lung disease?  No    Do you have any heart conditions?  No     Have you ever had or are you awaiting a heart or lung transplant?   No    Have you had a stroke or transient ischemic attack (TIA aka \"mini stroke\" in the last 6 months?   No    Have you been diagnosed with or been told you have cirrhosis of the liver?   Frandy (RN Review required for scheduling unless scheduling in Hospital.)    Are you currently on dialysis?   No    Do you need assistance transferring?   No    BMI: Estimated body mass index is 27.59 kg/m  as calculated from the following:    Height as of 6/30/23: 1.829 m (6').    Weight as of 8/16/23: 92.3 kg (203 lb 6.4 oz).     Is patients BMI > 40 and scheduling location [facility]?  No    Do you take the medication Phentermine, Ozempic or Wegovy?  No    Do you take the medication Naltrexone?  No    Do you take blood thinners?  No      Prep   Are you currently on " dialysis or do you have chronic kidney disease?  No    Do you have a diagnosis of diabetes?  No    Do you have a diagnosis of cystic fibrosis (CF)?  No    On a regular basis do you go 3 -5 days between bowel movements?  No    BMI > 40?  No    Preferred Pharmacy:    [id number] - [place] - [address] AT [facility] [address]  [address] [place] [address]  Phone: [phone] Fax: [phone]      Final Scheduling Details   Colonoscopy prep sent?  MiraLAX (No Mag Citrate)    Procedure scheduled  Colonoscopy    Surgeon:       Date of procedure:  10/17     Schedule PAC:   Yes (Schedule PAC evaluation.)--patient will schedule pre-op    Location  [facility]    Sedation   MAC/Deep Sedation    Patient Reminders:   You will receive a call from a Nurse to review instructions and health history.  This assessment must be completed prior to your procedure.  Failure to complete the Nurse assessment may result in the procedure being cancelled.      On the day of your procedure, please designate an adult(s) who can drive you home stay with you for the next 24 hours. The medicines used in the exam will make you sleepy. You will not be able to drive.      You cannot take public transportation, ride share services, or non-medical taxi service without a responsible caregiver.  Medical transport services are allowed with the requirement that a responsible caregiver will receive you at your destination.  We require that drivers and caregivers are confirmed prior to your procedure.

## 2023-09-06 ENCOUNTER — TELEPHONE (OUTPATIENT)
Dept: GASTROENTEROLOGY | Facility: CLINIC | Age: 78
End: 2023-09-06
Payer: MEDICARE

## 2023-09-20 ENCOUNTER — LAB (OUTPATIENT)
Dept: LAB | Facility: CLINIC | Age: 78
End: 2023-09-20
Attending: RADIOLOGY
Payer: MEDICARE

## 2023-09-20 ENCOUNTER — HOSPITAL ENCOUNTER (OUTPATIENT)
Dept: MRI IMAGING | Facility: CLINIC | Age: 78
Discharge: HOME | End: 2023-09-20
Attending: RADIOLOGY
Payer: MEDICARE

## 2023-09-20 DIAGNOSIS — C22.0 HEPATOCELLULAR CARCINOMA (H): ICD-10-CM

## 2023-09-20 LAB
AFP SERPL-MCNC: 3.1 NG/ML
ALBUMIN SERPL BCG-MCNC: 3.7 G/DL (ref 3.5–5.2)
ALP SERPL-CCNC: 94 U/L (ref 40–129)
ALT SERPL W P-5'-P-CCNC: 25 U/L (ref 0–70)
ANION GAP SERPL CALCULATED.3IONS-SCNC: 9 MMOL/L (ref 7–15)
AST SERPL W P-5'-P-CCNC: 29 U/L (ref 0–45)
BILIRUB DIRECT SERPL-MCNC: 0.57 MG/DL (ref 0–0.3)
BILIRUB SERPL-MCNC: 1.8 MG/DL
BUN SERPL-MCNC: 21 MG/DL (ref 8–23)
CALCIUM SERPL-MCNC: 9 MG/DL (ref 8.8–10.2)
CHLORIDE SERPL-SCNC: 104 MMOL/L (ref 98–107)
CREAT SERPL-MCNC: 0.81 MG/DL (ref 0.67–1.17)
DEPRECATED HCO3 PLAS-SCNC: 24 MMOL/L (ref 22–29)
EGFRCR SERPLBLD CKD-EPI 2021: >90 ML/MIN/1.73M2
ERYTHROCYTE [DISTWIDTH] IN BLOOD BY AUTOMATED COUNT: 21.2 % (ref 10–15)
GLUCOSE SERPL-MCNC: 79 MG/DL (ref 70–99)
HCT VFR BLD AUTO: 28.5 % (ref 40–53)
HGB BLD-MCNC: 9.3 G/DL (ref 13.3–17.7)
INR PPP: 1.57 (ref 0.85–1.15)
MCH RBC QN AUTO: 30.8 PG (ref 26.5–33)
MCHC RBC AUTO-ENTMCNC: 32.6 G/DL (ref 31.5–36.5)
MCV RBC AUTO: 94 FL (ref 78–100)
PLATELET # BLD AUTO: 71 10E3/UL (ref 150–450)
POTASSIUM SERPL-SCNC: 4.3 MMOL/L (ref 3.4–5.3)
PROT SERPL-MCNC: 6.1 G/DL (ref 6.4–8.3)
RBC # BLD AUTO: 3.02 10E6/UL (ref 4.4–5.9)
SODIUM SERPL-SCNC: 137 MMOL/L (ref 136–145)
WBC # BLD AUTO: 3.3 10E3/UL (ref 4–11)

## 2023-09-20 PROCEDURE — 85610 PROTHROMBIN TIME: CPT

## 2023-09-20 PROCEDURE — 80053 COMPREHEN METABOLIC PANEL: CPT

## 2023-09-20 PROCEDURE — 82248 BILIRUBIN DIRECT: CPT

## 2023-09-20 PROCEDURE — 74183 MRI ABD W/O CNTR FLWD CNTR: CPT | Mod: MG

## 2023-09-20 PROCEDURE — 36415 COLL VENOUS BLD VENIPUNCTURE: CPT

## 2023-09-20 PROCEDURE — A9585 GADOBUTROL INJECTION: HCPCS | Performed by: RADIOLOGY

## 2023-09-20 PROCEDURE — 255N000002 HC RX 255 OP 636: Performed by: RADIOLOGY

## 2023-09-20 PROCEDURE — 85014 HEMATOCRIT: CPT

## 2023-09-20 PROCEDURE — 82105 ALPHA-FETOPROTEIN SERUM: CPT

## 2023-09-20 RX ORDER — GADOBUTROL 604.72 MG/ML
9 INJECTION INTRAVENOUS ONCE
Status: COMPLETED | OUTPATIENT
Start: 2023-09-20 | End: 2023-09-20

## 2023-09-20 RX ADMIN — GADOBUTROL 9 ML: 604.72 INJECTION INTRAVENOUS at 11:02

## 2023-09-20 NOTE — PROGRESS NOTES
Interventional Radiology Clinic Visit    Date of this visit: 9/22/2023    Oskar Wilks returns for follow up post radioembolization of hepatocellular carcinoma (HCC).    Primary Physician: Viet López        History Of Present Illness:    Oskar Wilks is a 77 year old male who presents with unresectable biopsy-proven hepatocellular carcinoma on a background of alcoholic cirrhosis. Was originally found to have a new 4.6 cm liver lesion in the left hepatic lobe that was a LIRADS 4 based on MRI features. Underwent percutaneous biopsy on 2/2/2023 that showed moderately differentiated hepatocellular carcinoma.  On 3/16/2023 we performed radioembolization of the mass. He made a good recovery and serial imaging had shown slow but progressive decrease in size and enhancement of the tumor.  He presents for further follow-up today.    Today feels well. No concerns. Regaining strength. Walking on treadmill daily for about an hour.    Review of Systems:    As above    Past Medical/Surgical History:    Past Medical History:   Diagnosis Date    Cancer (H)     liver    Cirrhosis of liver (H)     Hip fracture (H)      Past Surgical History:   Procedure Laterality Date    IR LIVER BIOPSY PERCUTANEOUS  02/02/2023    IR SIRT (SELECTIVE INTERNAL RADIO THERAPY)  4/14/2023    IR VISCERAL ANGIOGRAM  3/14/2023    IR VISCERAL EMBOLIZATION  3/16/2023    ORIF HIP FRACTURE         Current Medications:    Current Outpatient Medications   Medication Sig Dispense Refill    ammonium lactate (AMLACTIN) 12 % external cream APPLY TOPICALLY TO THE AFFECTED AREA DAILY      buPROPion (WELLBUTRIN XL) 300 MG 24 hr tablet Take 300 mg by mouth every morning      cholecalciferol 25 MCG (1000 UT) TABS Take 1 tablet by mouth daily      ferrous sulfate (FEROSUL) 325 (65 Fe) MG tablet Take 325 mg by mouth      fluticasone (FLONASE) 50 MCG/ACT nasal spray Spray 2 sprays into both nostrils daily      folic acid (FOLVITE) 1 MG tablet Take 1 mg by  mouth daily      ketoconazole (NIZORAL) 2 % external cream       losartan (COZAAR) 25 MG tablet Take 1 tablet by mouth daily      methylPREDNISolone (MEDROL DOSEPAK) 4 MG tablet therapy pack Take as directed on package. 21 tablet 0    metoprolol succinate ER (TOPROL XL) 25 MG 24 hr tablet Take 1 tablet by mouth daily at 2 pm      metoprolol tartrate (LOPRESSOR) 25 MG tablet Take 25 mg by mouth daily      multivitamin w/minerals (THERA-VIT-M) tablet Take 1 tablet by mouth daily      omeprazole (PRILOSEC) 20 MG DR capsule TAKE 1 CAPSULE(20 MG) BY MOUTH DAILY 1 HOUR BEFORE A MEAL      omeprazole (PRILOSEC) 40 MG DR capsule Take 1 capsule (40 mg) by mouth daily for 360 days 90 capsule 3    ondansetron (ZOFRAN) 4 MG tablet Take 1-2 tablets (4-8 mg) by mouth every 6 hours as needed for nausea (vomiting) 40 tablet 0    oxyCODONE (ROXICODONE) 5 MG tablet Take 1-2 tablets (5-10 mg) by mouth every 6 hours as needed for moderate to severe pain 6 tablet 0    spironolactone (ALDACTONE) 50 MG tablet Take 50 mg by mouth daily      torsemide (DEMADEX) 10 MG tablet Take 10 mg by mouth daily      traMADol (ULTRAM) 50 MG tablet TAKE 1/2 TO 1 TABLET BY MOUTH EVERY 6 HOURS AS NEEDED FOR PAIN. 1/2 TABLET FOR PAIN RATED 4 TO 7 AND 1 TABLET FOR PAIN RATED 8 TO 10         Allergies:    Penicillins and Adhesive tape    Family History:    No family history on file.    Social History:    Social History     Socioeconomic History    Marital status:    Tobacco Use    Smoking status: Former     Years: 8.00     Types: Cigarettes     Quit date: 1985     Years since quittin.7    Smokeless tobacco: Never   Vaping Use    Vaping Use: Never used   Substance and Sexual Activity    Alcohol use: Not Currently     Comment: sober since 2022    Drug use: Never       Physical Exam:    CONSTITUTIONAL: healthy, alert and no distress. Looks very healthy today.  PSYCHIATRIC: mentation appears normal and affect normal.  NEURO: Normal movements  and speech.  EYES: No jaundice or pallor.  SKIN: No jaundice.   RESP: No audible cough or wheeze.    Laboratory Studies:    Lab Results   Component Value Date    HGB 9.3 09/20/2023     Lab Results   Component Value Date    PLT 71 09/20/2023     Lab Results   Component Value Date    WBC 3.3 09/20/2023       Lab Results   Component Value Date    INR 1.57 09/20/2023       Lab Results   Component Value Date    PROTTOTAL 6.1 09/20/2023      Lab Results   Component Value Date    ALBUMIN 3.7 09/20/2023     Lab Results   Component Value Date    BILITOTAL 1.8 09/20/2023     Lab Results   Component Value Date    ALKPHOS 94 09/20/2023     Lab Results   Component Value Date    AST 29 09/20/2023     Lab Results   Component Value Date    ALT 25 09/20/2023       Lab Results   Component Value Date    CR 0.81 09/20/2023     Lab Results   Component Value Date    BUN 21.0 09/20/2023     AFP:  9/20/2023: 3.1  8/16/2023: 3.0  6/23/2023: 3.9  4/17/2023: 4.4  1/24/2023: 3.2    Imaging:     I personally reviewed and interpreted the MRI abdomen from 9/20/2023.  It shows the treated lesion in the left hepatic lobe no longer demonstrates enhancement.  The non-viable remnant is faintly seen.  No new liver lesions are identified.    ASSESSMENT/PLAN:      Oskar Wilks is a 77 year old male with a solitary hepatocellular carcinoma lesion in the left hepatic lobe who is now 6 months post radioembolization and is doing well clinically.  Most recent MRI shows the lesion appears well treated and no longer enhances; the non-viable remnant is difficult to visualize now.  No new lesions are seen. Findings are compatible with a very good treatment response. We discussed his imaging. They had questions regarding portal hypertension which we discussed as well.    We discussed my recommendation that he continue to be followed by hepatology from here. If he developes a new tumor or local recurrence, he can be referred back to me for consideration of  further liver directed therapy. He agreed with this plan.          It was a pleasure seeing the patient.     Kera Roth M.D.    Interventional Radiology  Department of Radiology  [facility]  Patient Care Team:  Viet López MD as PCP - General (Family Medicine)  Laura Neves MD as Assigned Gastroenterology Provider  KERA MANDUJANO           25 minutes spent by me on the date of the encounter doing chart review, history and exam, imaging review, documentation and further activities per the note.      Video-Visit Details     Type of service:  Video Visit     Video Start and End Time: 10:05 - 10:20 AM    Originating Location (pt. Location): Home     Distant Location (provider location):  [facility] VASCULAR [facility]      Platform used for Video Visit: [employer]

## 2023-09-22 ENCOUNTER — VIRTUAL VISIT (OUTPATIENT)
Dept: RADIOLOGY | Facility: CLINIC | Age: 78
End: 2023-09-22
Attending: RADIOLOGY
Payer: MEDICARE

## 2023-09-22 DIAGNOSIS — C22.0 HEPATOCELLULAR CARCINOMA (H): Primary | ICD-10-CM

## 2023-09-22 DIAGNOSIS — D49.0 IPMN (INTRADUCTAL PAPILLARY MUCINOUS NEOPLASM): ICD-10-CM

## 2023-09-22 PROCEDURE — 99213 OFFICE O/P EST LOW 20 MIN: CPT | Mod: 95 | Performed by: RADIOLOGY

## 2023-09-22 NOTE — LETTER
9/22/2023         RE: Oskar Wilks  [address] [place] [address]        Dear Colleague,    Thank you for referring your patient, Oskar Wilks, to the [facility]. Please see a copy of my visit note below.        Interventional Radiology Clinic Visit    Date of this visit: 9/22/2023    Oskar Wilks returns for follow up post radioembolization of hepatocellular carcinoma (HCC).    Primary Physician: Viet López        History Of Present Illness:    Oskar Wilks is a 77 year old male who presents with unresectable biopsy-proven hepatocellular carcinoma on a background of alcoholic cirrhosis. Was originally found to have a new 4.6 cm liver lesion in the left hepatic lobe that was a LIRADS 4 based on MRI features. Underwent percutaneous biopsy on 2/2/2023 that showed moderately differentiated hepatocellular carcinoma.  On 3/16/2023 we performed radioembolization of the mass. He made a good recovery and serial imaging had shown slow but progressive decrease in size and enhancement of the tumor.  He presents for further follow-up today.    Today feels well. No concerns. Regaining strength. Walking on treadmill daily for about an hour.    Review of Systems:    As above    Past Medical/Surgical History:    Past Medical History:   Diagnosis Date    Cancer (H)     liver    Cirrhosis of liver (H)     Hip fracture (H)      Past Surgical History:   Procedure Laterality Date    IR LIVER BIOPSY PERCUTANEOUS  02/02/2023    IR SIRT (SELECTIVE INTERNAL RADIO THERAPY)  4/14/2023    IR VISCERAL ANGIOGRAM  3/14/2023    IR VISCERAL EMBOLIZATION  3/16/2023    ORIF HIP FRACTURE         Current Medications:    Current Outpatient Medications   Medication Sig Dispense Refill    ammonium lactate (AMLACTIN) 12 % external cream APPLY TOPICALLY TO THE AFFECTED AREA DAILY      buPROPion (WELLBUTRIN XL) 300 MG 24 hr tablet Take 300 mg by mouth every morning      cholecalciferol 25  MCG (1000 UT) TABS Take 1 tablet by mouth daily      ferrous sulfate (FEROSUL) 325 (65 Fe) MG tablet Take 325 mg by mouth      fluticasone (FLONASE) 50 MCG/ACT nasal spray Spray 2 sprays into both nostrils daily      folic acid (FOLVITE) 1 MG tablet Take 1 mg by mouth daily      ketoconazole (NIZORAL) 2 % external cream       losartan (COZAAR) 25 MG tablet Take 1 tablet by mouth daily      methylPREDNISolone (MEDROL DOSEPAK) 4 MG tablet therapy pack Take as directed on package. 21 tablet 0    metoprolol succinate ER (TOPROL XL) 25 MG 24 hr tablet Take 1 tablet by mouth daily at 2 pm      metoprolol tartrate (LOPRESSOR) 25 MG tablet Take 25 mg by mouth daily      multivitamin w/minerals (THERA-VIT-M) tablet Take 1 tablet by mouth daily      omeprazole (PRILOSEC) 20 MG DR capsule TAKE 1 CAPSULE(20 MG) BY MOUTH DAILY 1 HOUR BEFORE A MEAL      omeprazole (PRILOSEC) 40 MG DR capsule Take 1 capsule (40 mg) by mouth daily for 360 days 90 capsule 3    ondansetron (ZOFRAN) 4 MG tablet Take 1-2 tablets (4-8 mg) by mouth every 6 hours as needed for nausea (vomiting) 40 tablet 0    oxyCODONE (ROXICODONE) 5 MG tablet Take 1-2 tablets (5-10 mg) by mouth every 6 hours as needed for moderate to severe pain 6 tablet 0    spironolactone (ALDACTONE) 50 MG tablet Take 50 mg by mouth daily      torsemide (DEMADEX) 10 MG tablet Take 10 mg by mouth daily      traMADol (ULTRAM) 50 MG tablet TAKE 1/2 TO 1 TABLET BY MOUTH EVERY 6 HOURS AS NEEDED FOR PAIN. 1/2 TABLET FOR PAIN RATED 4 TO 7 AND 1 TABLET FOR PAIN RATED 8 TO 10         Allergies:    Penicillins and Adhesive tape    Family History:    No family history on file.    Social History:    Social History     Socioeconomic History    Marital status:    Tobacco Use    Smoking status: Former     Years: 8.00     Types: Cigarettes     Quit date: 1985     Years since quittin.7    Smokeless tobacco: Never   Vaping Use    Vaping Use: Never used   Substance and Sexual Activity     Alcohol use: Not Currently     Comment: sober since july 2022    Drug use: Never       Physical Exam:    CONSTITUTIONAL: healthy, alert and no distress. Looks very healthy today.  PSYCHIATRIC: mentation appears normal and affect normal.  NEURO: Normal movements and speech.  EYES: No jaundice or pallor.  SKIN: No jaundice.   RESP: No audible cough or wheeze.    Laboratory Studies:    Lab Results   Component Value Date    HGB 9.3 09/20/2023     Lab Results   Component Value Date    PLT 71 09/20/2023     Lab Results   Component Value Date    WBC 3.3 09/20/2023       Lab Results   Component Value Date    INR 1.57 09/20/2023       Lab Results   Component Value Date    PROTTOTAL 6.1 09/20/2023      Lab Results   Component Value Date    ALBUMIN 3.7 09/20/2023     Lab Results   Component Value Date    BILITOTAL 1.8 09/20/2023     Lab Results   Component Value Date    ALKPHOS 94 09/20/2023     Lab Results   Component Value Date    AST 29 09/20/2023     Lab Results   Component Value Date    ALT 25 09/20/2023       Lab Results   Component Value Date    CR 0.81 09/20/2023     Lab Results   Component Value Date    BUN 21.0 09/20/2023     AFP:  9/20/2023: 3.1  8/16/2023: 3.0  6/23/2023: 3.9  4/17/2023: 4.4  1/24/2023: 3.2    Imaging:     I personally reviewed and interpreted the MRI abdomen from 9/20/2023.  It shows the treated lesion in the left hepatic lobe no longer demonstrates enhancement.  The non-viable remnant is faintly seen.  No new liver lesions are identified.    ASSESSMENT/PLAN:      Oskar Wilks is a 77 year old male with a solitary hepatocellular carcinoma lesion in the left hepatic lobe who is now 6 months post radioembolization and is doing well clinically.  Most recent MRI shows the lesion appears well treated and no longer enhances; the non-viable remnant is difficult to visualize now.  No new lesions are seen. Findings are compatible with a very good treatment response. We discussed his imaging. They had  questions regarding portal hypertension which we discussed as well.    We discussed my recommendation that he continue to be followed by hepatology from here. If he developes a new tumor or local recurrence, he can be referred back to me for consideration of further liver directed therapy. He agreed with this plan.          It was a pleasure seeing the patient.     Kera Roth M.D.    Interventional Radiology  Department of Radiology  [facility]  Patient Care Team:  Viet López MD as PCP - General (Family Medicine)  Laura Neves MD as Assigned Gastroenterology Provider  KERA MANDUJANO           25 minutes spent by me on the date of the encounter doing chart review, history and exam, imaging review, documentation and further activities per the note.      Video-Visit Details     Type of service:  Video Visit     Video Start and End Time: 10:05 - 10:20 AM    Originating Location (pt. Location): Home     Distant Location (provider location):  [facility] [place]      Platform used for Video Visit: [employer]

## 2023-09-22 NOTE — NURSING NOTE
Is the patient currently in the state of [place]? YES    Visit mode:VIDEO    If the visit is dropped, the patient can be reconnected by: VIDEO VISIT: Text to cell phone:   Telephone Information:   Mobile [phone]       Will anyone else be joining the visit? NO  (If patient encounters technical issues they should call [phone])    How would you like to obtain your AVS? MyChart    Are changes needed to the allergy or medication list? No    Reason for visit: RECHECK (Follow up - mri and lab review)    Jethro CURRAN

## 2023-09-22 NOTE — PATIENT INSTRUCTIONS
You were seen today in the IR Clinic by Dr Kera Pillai for follow up regarding HCC.    Plan:    - We will reach out to Dr Neves and her team and recommend a repeat MRI prior to her visit with you in December. Unless there are concerns on this MRI, we will have you return to our clinic as needed.     Please call or send a MyChart with any questions or concerns.    Karin MCWILLIAMS LPN/ Dorina OLSEN, RNCC  [phone]

## 2023-09-25 ENCOUNTER — TELEPHONE (OUTPATIENT)
Dept: GASTROENTEROLOGY | Facility: CLINIC | Age: 78
End: 2023-09-25
Payer: MEDICARE

## 2023-12-01 ENCOUNTER — HOSPITAL ENCOUNTER (OUTPATIENT)
Dept: MRI IMAGING | Facility: CLINIC | Age: 78
Discharge: HOME | End: 2023-12-01
Attending: STUDENT IN AN ORGANIZED HEALTH CARE EDUCATION/TRAINING PROGRAM | Admitting: STUDENT IN AN ORGANIZED HEALTH CARE EDUCATION/TRAINING PROGRAM
Payer: MEDICARE

## 2023-12-01 DIAGNOSIS — D49.0 IPMN (INTRADUCTAL PAPILLARY MUCINOUS NEOPLASM): ICD-10-CM

## 2023-12-01 DIAGNOSIS — C22.0 HEPATOCELLULAR CARCINOMA (H): ICD-10-CM

## 2023-12-01 DIAGNOSIS — C22.0 HEPATOCELLULAR CARCINOMA (H): Primary | ICD-10-CM

## 2023-12-01 PROCEDURE — G1010 CDSM STANSON: HCPCS

## 2023-12-01 PROCEDURE — A9585 GADOBUTROL INJECTION: HCPCS | Performed by: STUDENT IN AN ORGANIZED HEALTH CARE EDUCATION/TRAINING PROGRAM

## 2023-12-01 PROCEDURE — 74183 MRI ABD W/O CNTR FLWD CNTR: CPT | Mod: MG

## 2023-12-01 PROCEDURE — 255N000002 HC RX 255 OP 636: Performed by: STUDENT IN AN ORGANIZED HEALTH CARE EDUCATION/TRAINING PROGRAM

## 2023-12-01 RX ORDER — GADOBUTROL 604.72 MG/ML
9 INJECTION INTRAVENOUS ONCE
Status: COMPLETED | OUTPATIENT
Start: 2023-12-01 | End: 2023-12-01

## 2023-12-01 RX ADMIN — GADOBUTROL 9 ML: 604.72 INJECTION INTRAVENOUS at 09:05

## 2023-12-05 ENCOUNTER — TELEPHONE (OUTPATIENT)
Dept: GASTROENTEROLOGY | Facility: CLINIC | Age: 78
End: 2023-12-05

## 2023-12-05 ENCOUNTER — OFFICE VISIT (OUTPATIENT)
Dept: GASTROENTEROLOGY | Facility: CLINIC | Age: 78
End: 2023-12-05
Attending: STUDENT IN AN ORGANIZED HEALTH CARE EDUCATION/TRAINING PROGRAM
Payer: MEDICARE

## 2023-12-05 VITALS
SYSTOLIC BLOOD PRESSURE: 125 MMHG | HEIGHT: 71 IN | HEART RATE: 66 BPM | DIASTOLIC BLOOD PRESSURE: 69 MMHG | BODY MASS INDEX: 27.72 KG/M2 | WEIGHT: 198 LBS

## 2023-12-05 DIAGNOSIS — D64.9 ANEMIA, UNSPECIFIED TYPE: Primary | ICD-10-CM

## 2023-12-05 DIAGNOSIS — K70.31 ALCOHOLIC CIRRHOSIS OF LIVER WITH ASCITES (H): ICD-10-CM

## 2023-12-05 PROCEDURE — G0463 HOSPITAL OUTPT CLINIC VISIT: HCPCS | Performed by: STUDENT IN AN ORGANIZED HEALTH CARE EDUCATION/TRAINING PROGRAM

## 2023-12-05 PROCEDURE — 99214 OFFICE O/P EST MOD 30 MIN: CPT | Performed by: STUDENT IN AN ORGANIZED HEALTH CARE EDUCATION/TRAINING PROGRAM

## 2023-12-05 ASSESSMENT — PAIN SCALES - GENERAL: PAINLEVEL: NO PAIN (0)

## 2023-12-05 NOTE — NURSING NOTE
"Chief Complaint   Patient presents with    Consult     Establish care with cirrhosis     /69   Pulse 66   Ht 1.803 m (5' 11\")   Wt 89.8 kg (198 lb)   BMI 27.62 kg/m    Elsie Hemphill, Lead CMA  12/5/2023 10:04 AM    "

## 2023-12-05 NOTE — PROGRESS NOTES
[facility] Liver Clinic Return Patient Visit    Date of Visit: December 5, 2023    Reason for referral: Alcohol related liver disease, HCC    Subjective: Mr. Wilks is a 78-year-old man with a history of alcohol-related liver disease who presents for follow up of bx proven HCC.     Initial History:     He has a history of cirrhosis, has been sober in the past, and drink heavily and in July 2022.  Started having issues with abdominal distention, was diagnosed with SBP September 2022.  He had been on torsemide just prior to this, since this admission was started on low-dose torsemide and spironolactone.  He is on Cipro for SBP prophylaxis.  His last paracentesis was 2022, 4.7 L was removed.    December 12 bilirubin was 3.6 and INR was 1.8.  September 9 bilirubin was 5.6.  He had a paracentesis 9/8 that was consistent with portal hypertension, paracentesis also showed SBP    Fell 12/5 on the ice walking his puppy. Broke his hip and has surgery for this. Also broke two ribs. In rehab, fell again and had a L1 compression fracture. Getting OT/PT at home. About to graduate from OT because he is doing so well.  Uses walker for appointments.  Able to go up stairs without issues.  Independent with ADLs.    Was on narcotics related to surgery, had delirium related to this.  Otherwise no HE.     Found to have a 4.6 cm liver lesion, read as an LR 4 lesion.  Underwent a biopsy of this that came back consistent with HCC    3/16 underwent TARE of the mass. Follow up imaging has not shown a change. At the time of the mapping there was concern for an additional new nodularity in the left hepatic lobe.     MRI 4/17 showed his Y90 treated lesion - unchanged but follow-up MRI in June showed reduction in size of the HCC, now 3.2 cm.  It did have some enhancing features.  Followed up with IR who are pleased with the reduction size and recommended to follow-up MRI in the fall.    Interval Events:   -He is doing well.  Taking  low-dose diuretics (spironolactone 50 mg daily and torsemide 10 mg daily.).  Will miss diuretics rarely and the swelling is ok with this. Will mainly be leg swelling no abdominal swelling.  Doing well with sobriety.    -MRI liver 2023 with cirrhosis, treated HCC non viable, pancreatic cysts stable. CT Chest 2023 without evidence of mets    ROS: 14 point ROS negative except for positives noted in HPI.    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 fib not on anticoagulation  Psoriasis  Basal cell carcinoma  Nonrheumatic mild aortic stenosis    PSHx:  Right acetabulum fracture  GLF s/p ORIF on 22  BRAIN HEMATOMA EVACUTATION   CRANIAL LESION RESECTION From trauma   MENISCECTOMY   MOUTH SURGERY      FamHx:  No family history of liver disease, liver cancer    SocHx:  Social History     Socioeconomic History    Marital status:      Spouse name: Not on file    Number of children: Not on file    Years of education: Not on file    Highest education level: Not on file   Occupational History    Not on file   Tobacco Use    Smoking status: Former     Years: 8     Types: Cigarettes     Quit date: 1985     Years since quittin.9    Smokeless tobacco: Never   Vaping Use    Vaping Use: Never used   Substance and Sexual Activity    Alcohol use: Not Currently     Comment: sober since 2022    Drug use: Never    Sexual activity: Not on file   Other Topics Concern    Not on file   Social History Narrative    Not on file     Social Determinants of Health     Financial Resource Strain: Not on file   Food Insecurity: Not on file   Transportation Needs: Not on file   Physical Activity: Not on file   Stress: Not on file   Social Connections: Not on file   Interpersonal Safety: Not on file   Housing Stability: Not on file       Medications:  Current Outpatient Medications   Medication    ammonium lactate (AMLACTIN) 12 % external cream    buPROPion (WELLBUTRIN XL) 300 MG 24  hr tablet    cholecalciferol 25 MCG (1000 UT) TABS    ferrous sulfate (FEROSUL) 325 (65 Fe) MG tablet    fluticasone (FLONASE) 50 MCG/ACT nasal spray    folic acid (FOLVITE) 1 MG tablet    ketoconazole (NIZORAL) 2 % external cream    losartan (COZAAR) 25 MG tablet    methylPREDNISolone (MEDROL DOSEPAK) 4 MG tablet therapy pack    metoprolol succinate ER (TOPROL XL) 25 MG 24 hr tablet    metoprolol tartrate (LOPRESSOR) 25 MG tablet    multivitamin w/minerals (THERA-VIT-M) tablet    omeprazole (PRILOSEC) 20 MG DR capsule    omeprazole (PRILOSEC) 40 MG DR capsule    ondansetron (ZOFRAN) 4 MG tablet    oxyCODONE (ROXICODONE) 5 MG tablet    spironolactone (ALDACTONE) 50 MG tablet    torsemide (DEMADEX) 10 MG tablet    traMADol (ULTRAM) 50 MG tablet     No current facility-administered medications for this visit.     No OTCs, herbals    Allergies:  Allergies   Allergen Reactions    Penicillins      PN: LW Reaction: Itching, Pruritis    Adhesive Tape Rash       Objective:  There were no vitals taken for this visit.  Constitutional: pleasant man in NAD  Eyes: non icteric  Respiratory: Normal respiratory excursion   MSK: normal range of motion of visualized extremities  Abd: Non distended  Ext: 1+ edema  Skin: No jaundice  Psychiatric: normal mood and orientation    Labs: Reviewed in EHR     Imaging: Reviewed in EHR    Endoscopy:    EGD 1/23/2023    Findings:        The examined esophagus was normal.        The Z-line was regular and was found 43 cm from the        incisors.        Mild portal hypertensive gastropathy was found in the        cardia, in the gastric fundus and in the gastric body.        Localized mild inflammation characterized by        congestion (edema), erosions and erythema was found        in the gastric antrum and in the prepyloric region of        the stomach. Biopsies were taken with a cold forceps        for Helicobacter pylori testing. Verification of        patient identification for the specimen  was done.        Estimated blood loss was minimal.        The exam of the stomach was otherwise normal.        The examined duodenum was normal.     Impression:            - Normal esophagus.                          - Z-line regular, 43 cm from the                           incisors.                          - Portal hypertensive gastropathy.                          - Gastritis. Biopsied.                          - Normal examined duodenum.       Independently reviewed labs and imaging.     Assessment/Plan: Mr. Wilks is a 78-year-old man with a history of alcohol-related liver disease who presents for follow-up of unifocal HCC.  He was treated with Y90 March 2023, follow-up MRIs have shown tumor is non viable.     MELD 3.0: 13 at 9/20/2023 11:14 AM  MELD-Na: 14 at 9/20/2023 11:14 AM  Calculated from:  Serum Creatinine: 0.81 mg/dL (Using min of 1 mg/dL) at 9/20/2023 10:20 AM  Serum Sodium: 137 mmol/L at 9/20/2023 10:20 AM  Total Bilirubin: 1.8 mg/dL at 9/20/2023 10:20 AM  Serum Albumin: 3.7 g/dL (Using max of 3.5 g/dL) at 9/20/2023 10:20 AM  INR(ratio): 1.57 at 9/20/2023 11:14 AM  Age at listing (hypothetical): 77 years  Sex: Male at 9/20/2023 11:14 AM      Repeat MRI 4 months ~ 3/2024 - going to [facility] January and February    He has done well after treatment, MELD is stable, no issues with ascites.      Having ongoing issues with anemia, ferritin elevated in the past, today is normal.  He has seen hematology before who felt it was less likely had a primary bone marrow process.  We will add on B12 testing.  Discussed colonoscopy will reschedule. He canceled as he did not feel well after his COVID vaccine. Will also order EGD    Found to have less than 1 cm pancreatic cyst October 2022, will follow this up on his upcoming MRI    No orders of the defined types were placed in this encounter.    RTC 4 months with labs    Laura Neves MD MS  Hepatology/Liver Transplant  [facility]

## 2023-12-05 NOTE — TELEPHONE ENCOUNTER
Labs ordered by Dr. Neves faxed to  in [place].    Apolonia COLON LPN  Hepatology Clinic     ----- Message -----  From: Laura Neves MD  Sent: 12/5/2023  10:54 AM CST  To: [facility] Hepatology Adult [facility] -   Can you send orders for meld labs and AFP to health [facility]?    Thank you

## 2023-12-05 NOTE — LETTER
12/5/2023         RE: Oskar Wilks  [address] [place] [address]        Dear Colleague,    Thank you for referring your patient, Oskar Wilks, to the [facility]PATOLOG[facility] [place]. Please see a copy of my visit note below.    [facility] Return Patient Visit    Date of Visit: December 5, 2023    Reason for referral: Alcohol related liver disease, HCC    Subjective: Mr. Wilks is a 78-year-old man with a history of alcohol-related liver disease who presents for follow up of bx proven HCC.     Initial History:     He has a history of cirrhosis, has been sober in the past, and drink heavily and in July 2022.  Started having issues with abdominal distention, was diagnosed with SBP September 2022.  He had been on torsemide just prior to this, since this admission was started on low-dose torsemide and spironolactone.  He is on Cipro for SBP prophylaxis.  His last paracentesis was 2022, 4.7 L was removed.    December 12 bilirubin was 3.6 and INR was 1.8.  September 9 bilirubin was 5.6.  He had a paracentesis 9/8 that was consistent with portal hypertension, paracentesis also showed SBP    Fell 12/5 on the ice walking his puppy. Broke his hip and has surgery for this. Also broke two ribs. In rehab, fell again and had a L1 compression fracture. Getting OT/PT at home. About to graduate from OT because he is doing so well.  Uses walker for appointments.  Able to go up stairs without issues.  Independent with ADLs.    Was on narcotics related to surgery, had delirium related to this.  Otherwise no HE.     Found to have a 4.6 cm liver lesion, read as an LR 4 lesion.  Underwent a biopsy of this that came back consistent with HCC    3/16 underwent TARE of the mass. Follow up imaging has not shown a change. At the time of the mapping there was concern for an additional new nodularity in the left hepatic lobe.     MRI 4/17 showed his Y90 treated lesion -  unchanged but follow-up MRI in  showed reduction in size of the HCC, now 3.2 cm.  It did have some enhancing features.  Followed up with IR who are pleased with the reduction size and recommended to follow-up MRI in the fall.    Interval Events:   -He is doing well.  Taking low-dose diuretics (spironolactone 50 mg daily and torsemide 10 mg daily.).  Will miss diuretics rarely and the swelling is ok with this. Will mainly be leg swelling no abdominal swelling.  Doing well with sobriety.    -MRI liver 2023 with cirrhosis, treated HCC non viable, pancreatic cysts stable. CT Chest 2023 without evidence of mets    ROS: 14 point ROS negative except for positives noted in HPI.    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 fib not on anticoagulation  Psoriasis  Basal cell carcinoma  Nonrheumatic mild aortic stenosis    PSHx:  Right acetabulum fracture 2/ GLF s/p ORIF on 22  BRAIN HEMATOMA EVACUTATION   CRANIAL LESION RESECTION From trauma   MENISCECTOMY   MOUTH SURGERY      FamHx:  No family history of liver disease, liver cancer    SocHx:  Social History     Socioeconomic History     Marital status:      Spouse name: Not on file     Number of children: Not on file     Years of education: Not on file     Highest education level: Not on file   Occupational History     Not on file   Tobacco Use     Smoking status: Former     Years: 8     Types: Cigarettes     Quit date: 1985     Years since quittin.9     Smokeless tobacco: Never   Vaping Use     Vaping Use: Never used   Substance and Sexual Activity     Alcohol use: Not Currently     Comment: sober since 2022     Drug use: Never     Sexual activity: Not on file   Other Topics Concern     Not on file   Social History Narrative     Not on file     Social Determinants of Health     Financial Resource Strain: Not on file   Food Insecurity: Not on file   Transportation Needs: Not on file   Physical  Activity: Not on file   Stress: Not on file   Social Connections: Not on file   Interpersonal Safety: Not on file   Housing Stability: Not on file       Medications:  Current Outpatient Medications   Medication     ammonium lactate (AMLACTIN) 12 % external cream     buPROPion (WELLBUTRIN XL) 300 MG 24 hr tablet     cholecalciferol 25 MCG (1000 UT) TABS     ferrous sulfate (FEROSUL) 325 (65 Fe) MG tablet     fluticasone (FLONASE) 50 MCG/ACT nasal spray     folic acid (FOLVITE) 1 MG tablet     ketoconazole (NIZORAL) 2 % external cream     losartan (COZAAR) 25 MG tablet     methylPREDNISolone (MEDROL DOSEPAK) 4 MG tablet therapy pack     metoprolol succinate ER (TOPROL XL) 25 MG 24 hr tablet     metoprolol tartrate (LOPRESSOR) 25 MG tablet     multivitamin w/minerals (THERA-VIT-M) tablet     omeprazole (PRILOSEC) 20 MG DR capsule     omeprazole (PRILOSEC) 40 MG DR capsule     ondansetron (ZOFRAN) 4 MG tablet     oxyCODONE (ROXICODONE) 5 MG tablet     spironolactone (ALDACTONE) 50 MG tablet     torsemide (DEMADEX) 10 MG tablet     traMADol (ULTRAM) 50 MG tablet     No current facility-administered medications for this visit.     No OTCs, herbals    Allergies:  Allergies   Allergen Reactions     Penicillins      PN: LW Reaction: Itching, Pruritis     Adhesive Tape Rash       Objective:  There were no vitals taken for this visit.  Constitutional: pleasant man in NAD  Eyes: non icteric  Respiratory: Normal respiratory excursion   MSK: normal range of motion of visualized extremities  Abd: Non distended  Ext: 1+ edema  Skin: No jaundice  Psychiatric: normal mood and orientation    Labs: Reviewed in EHR     Imaging: Reviewed in EHR    Endoscopy:    EGD 1/23/2023    Findings:        The examined esophagus was normal.        The Z-line was regular and was found 43 cm from the        incisors.        Mild portal hypertensive gastropathy was found in the        cardia, in the gastric fundus and in the gastric body.         Localized mild inflammation characterized by        congestion (edema), erosions and erythema was found        in the gastric antrum and in the prepyloric region of        the stomach. Biopsies were taken with a cold forceps        for Helicobacter pylori testing. Verification of        patient identification for the specimen was done.        Estimated blood loss was minimal.        The exam of the stomach was otherwise normal.        The examined duodenum was normal.     Impression:            - Normal esophagus.                          - Z-line regular, 43 cm from the                           incisors.                          - Portal hypertensive gastropathy.                          - Gastritis. Biopsied.                          - Normal examined duodenum.       Independently reviewed labs and imaging.     Assessment/Plan: Mr. Wilks is a 78-year-old man with a history of alcohol-related liver disease who presents for follow-up of unifocal HCC.  He was treated with Y90 March 2023, follow-up MRIs have shown tumor is non viable.     MELD 3.0: 13 at 9/20/2023 11:14 AM  MELD-Na: 14 at 9/20/2023 11:14 AM  Calculated from:  Serum Creatinine: 0.81 mg/dL (Using min of 1 mg/dL) at 9/20/2023 10:20 AM  Serum Sodium: 137 mmol/L at 9/20/2023 10:20 AM  Total Bilirubin: 1.8 mg/dL at 9/20/2023 10:20 AM  Serum Albumin: 3.7 g/dL (Using max of 3.5 g/dL) at 9/20/2023 10:20 AM  INR(ratio): 1.57 at 9/20/2023 11:14 AM  Age at listing (hypothetical): 77 years  Sex: Male at 9/20/2023 11:14 AM      Repeat MRI 4 months ~ 3/2024 - going to [facility] January and February    He has done well after treatment, MELD is stable, no issues with ascites.      Having ongoing issues with anemia, ferritin elevated in the past, today is normal.  He has seen hematology before who felt it was less likely had a primary bone marrow process.  We will add on B12 testing.  Discussed colonoscopy will reschedule. He canceled as he did not feel well after his  COVID vaccine. Will also order EGD    Found to have less than 1 cm pancreatic cyst October 2022, will follow this up on his upcoming MRI    No orders of the defined types were placed in this encounter.    RTC 4 months with labs    Laura Neves MD MS  Hepatology/Liver Transplant  [facility]      Again, thank you for allowing me to participate in the care of your patient.        Sincerely,        Laura Neves MD

## 2023-12-05 NOTE — PATIENT INSTRUCTIONS
It was a pleasure taking care of you today.  I've included a brief summary of our discussion and care plan from today's visit below.  Please review this information with your primary care provider.  ______________________________________________________________________     My recommendations are summarized as follows:     - stop taking your spironolactone, let me know if the swelling get worse  - reschedule MRI to March 2024     Return to Hepatology (Liver) Clinic in 6 months to review your progress.    ______________________________________________________________________     How do I schedule labs, imaging studies, or procedures that were ordered in clinic today?      Labs: To schedule lab appointment at the [facility], use my chart or call [phone]. If you have a [place] lab closer to home where you are regularly seen you can give them a call.     Procedures: If a colonoscopy or upper endoscopy was ordered today, our endoscopy team will call you to schedule this. If you have not heard from our endoscopy team within a week, please call [phone] to schedule.      Imaging Studies: If you were scheduled for a CT scan, X-ray, MRI, ultrasound, or other imaging study, please call [phone] to have this scheduled.      Referral: If a referral to another specialty was ordered, expect a phone call or follow instructions above. If you have not heard from anyone regarding your referral in a week, please call our clinic to check the status.      Who do I call with any questions after my visit?  Please be in touch if there are any further questions that arise following today's visit.  There are multiple ways to contact your hepatology (liver) care team.       During business hours, you may reach a Hepatology nurse at [phone]    To schedule or reschedule an appointment, please call [phone]     You can always send a secure message through [employer].  [employer] messages are answered by your  nurse or doctor typically within 24 hours.  Please allow extra time on weekends and holidays.       How will I get the results of any tests ordered?    You will receive all of your results.  If you have signed up for [employer], any tests ordered at your visit will be available to you after your provider reviews them.  If you are not signed up for MyChart, you will receive a letter with the results. Typically this takes 1-2 weeks.  If there are urgent results that require a change in your care plan, your provider or nurse will call you to discuss the next steps.       What is [employer]?  [employer] is a secure way for you to access all of your healthcare records from the [facility].  It is a web based computer program, so you can sign on to it from any location.  It also allows you to send secure messages to your care team.  I recommend signing up for [employer] access if you have not already done so and are comfortable with using a computer.       How to I schedule a follow-up visit?  If you did not schedule a follow-up visit today, please call [phone] to schedule a follow-up office visit.       Sincerely,     Laura Neves MD  Hepatology  Division of Gastroenterology, Hepatology & Nutrition  [facility]

## 2023-12-11 ENCOUNTER — TELEPHONE (OUTPATIENT)
Dept: GASTROENTEROLOGY | Facility: CLINIC | Age: 78
End: 2023-12-11
Payer: MEDICARE

## 2023-12-19 ENCOUNTER — TELEPHONE (OUTPATIENT)
Dept: GASTROENTEROLOGY | Facility: CLINIC | Age: 78
End: 2023-12-19
Payer: MEDICARE

## 2023-12-19 NOTE — TELEPHONE ENCOUNTER
"Endoscopy Scheduling Screen    Have you had a positive Covid test in the last 14 days?  No    Are you active on MyChart?   Yes    What insurance is in the chart?  Other:  MEDICARE    Ordering/Referring Provider:   YOLY LEDEZMA        (If ordering provider performs procedure, schedule with ordering provider unless otherwise instructed. )    BMI: Estimated body mass index is 27.62 kg/m  as calculated from the following:    Height as of 12/5/23: 1.803 m (5' 11\").    Weight as of 12/5/23: 89.8 kg (198 lb).     Sedation Ordered  MAC/deep sedation.   BMI<= 45 45 < BMI <= 48 48 < BMI < = 50  BMI > 50   No Restrictions No [facility] ASC  No [facility]  [facility] with exceptions Hospital Only OR Only       Are you taking any prescription medications for pain 3 or more times per week?   NO - No RN review required.    Do you have a history of malignant hyperthermia or adverse reaction to anesthesia?  No     Have you been diagnosed or told you have pulmonary hypertension?   No    Do you have an LVAD?  No    Have you been told you have moderate to severe sleep apnea?  No    Have you been told you have COPD, asthma, or any other lung disease?  No    Do you have any heart conditions?  No     Have you ever had an organ transplant?   No    Have you ever had or are you awaiting a heart or lung transplant?   No    Have you had a stroke or transient ischemic attack (TIA aka \"mini stroke\" in the last 6 months?   No    Have you been diagnosed with or been told you have cirrhosis of the liver?   Yes (RN Review required for scheduling unless scheduling in Hospital.)    Are you currently on dialysis?   No    Do you need assistance transferring?   No    BMI: Estimated body mass index is 27.62 kg/m  as calculated from the following:    Height as of 12/5/23: 1.803 m (5' 11\").    Weight as of 12/5/23: 89.8 kg (198 lb).     Is patients BMI > 40 and scheduling location [facility]?  No    Do you take an injectable medication for weight loss or diabetes (excluding " insulin)?  No    Do you take the medication Naltrexone?  No    Do you take blood thinners?  No       Prep   Are you currently on dialysis or do you have chronic kidney disease?  No    Do you have a diagnosis of diabetes?  No    Do you have a diagnosis of cystic fibrosis (CF)?  No    On a regular basis do you go 3 -5 days between bowel movements?  No    BMI > 40?  No    Preferred Pharmacy:    [id number] - [place], [place] - [address] AT [facility] [address] [address] [facility] [address]  [address] [place] [address]  Phone: [phone] Fax: [phone]      Final Scheduling Details   Colonoscopy prep sent?  Standard MiraLAX    Procedure scheduled  Colonoscopy / Upper endoscopy (EGD)    Surgeon:  ERYN     Date of procedure:  04/25/2024     Pre-OP / PAC:   No - Not required for this site.    Location  [facility] - Per exclusion criteria.    Sedation   MAC/Deep Sedation - Per order.      Patient Reminders:   You will receive a call from a Nurse to review instructions and health history.  This assessment must be completed prior to your procedure.  Failure to complete the Nurse assessment may result in the procedure being cancelled.      On the day of your procedure, please designate an adult(s) who can drive you home stay with you for the next 24 hours. The medicines used in the exam will make you sleepy. You will not be able to drive.      You cannot take public transportation, ride share services, or non-medical taxi service without a responsible caregiver.  Medical transport services are allowed with the requirement that a responsible caregiver will receive you at your destination.  We require that drivers and caregivers are confirmed prior to your procedure.

## 2024-03-19 ENCOUNTER — LAB (OUTPATIENT)
Dept: LAB | Facility: CLINIC | Age: 79
End: 2024-03-19
Payer: MEDICARE

## 2024-03-19 ENCOUNTER — HOSPITAL ENCOUNTER (OUTPATIENT)
Dept: MRI IMAGING | Facility: CLINIC | Age: 79
Discharge: HOME | End: 2024-03-19
Attending: STUDENT IN AN ORGANIZED HEALTH CARE EDUCATION/TRAINING PROGRAM | Admitting: STUDENT IN AN ORGANIZED HEALTH CARE EDUCATION/TRAINING PROGRAM
Payer: MEDICARE

## 2024-03-19 DIAGNOSIS — K70.31 ALCOHOLIC CIRRHOSIS OF LIVER WITH ASCITES (H): ICD-10-CM

## 2024-03-19 DIAGNOSIS — C22.0 HEPATOCELLULAR CARCINOMA (H): ICD-10-CM

## 2024-03-19 DIAGNOSIS — D64.9 ANEMIA, UNSPECIFIED TYPE: ICD-10-CM

## 2024-03-19 DIAGNOSIS — R16.0 LIVER MASS: ICD-10-CM

## 2024-03-19 LAB
AFP SERPL-MCNC: 4.1 NG/ML
ALBUMIN SERPL BCG-MCNC: 3.9 G/DL (ref 3.5–5.2)
ALP SERPL-CCNC: 110 U/L (ref 40–150)
ALT SERPL W P-5'-P-CCNC: 27 U/L (ref 0–70)
ANION GAP SERPL CALCULATED.3IONS-SCNC: 9 MMOL/L (ref 7–15)
AST SERPL W P-5'-P-CCNC: 40 U/L (ref 0–45)
BILIRUB SERPL-MCNC: 2.2 MG/DL
BUN SERPL-MCNC: 13.6 MG/DL (ref 8–23)
CALCIUM SERPL-MCNC: 9.1 MG/DL (ref 8.8–10.2)
CHLORIDE SERPL-SCNC: 104 MMOL/L (ref 98–107)
CREAT SERPL-MCNC: 0.77 MG/DL (ref 0.67–1.17)
DEPRECATED HCO3 PLAS-SCNC: 25 MMOL/L (ref 22–29)
EGFRCR SERPLBLD CKD-EPI 2021: >90 ML/MIN/1.73M2
ERYTHROCYTE [DISTWIDTH] IN BLOOD BY AUTOMATED COUNT: 19.2 % (ref 10–15)
FERRITIN SERPL-MCNC: 286 NG/ML (ref 31–409)
GLUCOSE SERPL-MCNC: 108 MG/DL (ref 70–99)
HCT VFR BLD AUTO: 33.9 % (ref 40–53)
HGB BLD-MCNC: 11.3 G/DL (ref 13.3–17.7)
INR PPP: 1.52 (ref 0.85–1.15)
IRON BINDING CAPACITY (ROCHE): 192 UG/DL (ref 240–430)
IRON SATN MFR SERPL: 88 % (ref 15–46)
IRON SERPL-MCNC: 169 UG/DL (ref 61–157)
MCH RBC QN AUTO: 30.2 PG (ref 26.5–33)
MCHC RBC AUTO-ENTMCNC: 33.3 G/DL (ref 31.5–36.5)
MCV RBC AUTO: 91 FL (ref 78–100)
PLATELET # BLD AUTO: 95 10E3/UL (ref 150–450)
POTASSIUM SERPL-SCNC: 4.8 MMOL/L (ref 3.4–5.3)
PROT SERPL-MCNC: 6.3 G/DL (ref 6.4–8.3)
RBC # BLD AUTO: 3.74 10E6/UL (ref 4.4–5.9)
SODIUM SERPL-SCNC: 138 MMOL/L (ref 135–145)
WBC # BLD AUTO: 5.4 10E3/UL (ref 4–11)

## 2024-03-19 PROCEDURE — 83540 ASSAY OF IRON: CPT

## 2024-03-19 PROCEDURE — 74183 MRI ABD W/O CNTR FLWD CNTR: CPT | Mod: MG

## 2024-03-19 PROCEDURE — 80053 COMPREHEN METABOLIC PANEL: CPT

## 2024-03-19 PROCEDURE — 85610 PROTHROMBIN TIME: CPT

## 2024-03-19 PROCEDURE — 83550 IRON BINDING TEST: CPT

## 2024-03-19 PROCEDURE — 255N000002 HC RX 255 OP 636: Performed by: STUDENT IN AN ORGANIZED HEALTH CARE EDUCATION/TRAINING PROGRAM

## 2024-03-19 PROCEDURE — A9585 GADOBUTROL INJECTION: HCPCS | Performed by: STUDENT IN AN ORGANIZED HEALTH CARE EDUCATION/TRAINING PROGRAM

## 2024-03-19 PROCEDURE — 82728 ASSAY OF FERRITIN: CPT

## 2024-03-19 PROCEDURE — 36415 COLL VENOUS BLD VENIPUNCTURE: CPT

## 2024-03-19 PROCEDURE — 82105 ALPHA-FETOPROTEIN SERUM: CPT

## 2024-03-19 PROCEDURE — 85027 COMPLETE CBC AUTOMATED: CPT

## 2024-03-19 RX ORDER — GADOBUTROL 604.72 MG/ML
9 INJECTION INTRAVENOUS ONCE
Status: COMPLETED | OUTPATIENT
Start: 2024-03-19 | End: 2024-03-19

## 2024-03-19 RX ADMIN — GADOBUTROL 9 ML: 604.72 INJECTION INTRAVENOUS at 14:38

## 2024-04-10 ENCOUNTER — TELEPHONE (OUTPATIENT)
Dept: GASTROENTEROLOGY | Facility: CLINIC | Age: 79
End: 2024-04-10
Payer: MEDICARE

## 2024-04-10 NOTE — TELEPHONE ENCOUNTER
Pre visit planning completed.      Procedure details:    Patient scheduled for Colonoscopy/Upper endoscopy (EGD) on 4.25.24.     Arrival time: 0845. Procedure time 1015    Facility location: [facility]; [address] [place] [address], 3rd Floor, [place] [address]. Check in location: Main entrance at registration desk.    Sedation type: MAC    Pre op exam needed? N/A    Indication for procedure:   Anemia, unspecified type [D64.9]  - Primary      Alcoholic cirrhosis of liver with ascites (H) [K70.31]            Chart review:     Electronic implanted devices? No    Recent diagnosis of diverticulitis within the last 6 weeks? No    Diabetic? No      Medication review:    Anticoagulants? No    NSAIDS? No NSAID medications per patient's medication list.  RN will verify with pre-assessment call.    Other medication HOLDING recommendations:  N/A      Prep for procedure:     Bowel prep recommendation: Standard Miralax  Due to: standard bowel prep.    Prep instructions sent via [employer]         Felisa Van RN  Endoscopy Procedure Pre Assessment RN  [phone] option 4

## 2024-04-11 NOTE — TELEPHONE ENCOUNTER
Pre assessment completed for upcoming procedure.   (Please see previous telephone encounter notes for complete details)    Patient  returned call.       Procedure details:    Arrival time and facility location reviewed.    Pre op exam needed? N/A    Designated  policy reviewed. Instructed to have someone stay 24  hours post procedure.       Medication review:    Medications reviewed. Please see supporting documentation below. Holding recommendations discussed (if applicable).       Prep for procedure:     Procedure prep instructions reviewed.        Any additional information needed:  N/A      Patient  verbalized understanding and had no questions or concerns at this time.      Sole Gale RN  Endoscopy Procedure Pre Assessment RN  [phone] option 4

## 2024-04-11 NOTE — TELEPHONE ENCOUNTER
Attempted to contact patient in order to complete pre assessment questions.     No answer. Left message to return call to [phone] option 4    Callback required communication sent via [email or web address].        Felisa Van RN  Endoscopy Procedure Pre Assessment RN

## 2024-04-24 ENCOUNTER — ANESTHESIA EVENT (OUTPATIENT)
Dept: GASTROENTEROLOGY | Facility: CLINIC | Age: 79
End: 2024-04-24
Payer: MEDICARE

## 2024-04-25 ENCOUNTER — ANESTHESIA (OUTPATIENT)
Dept: GASTROENTEROLOGY | Facility: CLINIC | Age: 79
End: 2024-04-25
Payer: MEDICARE

## 2024-04-25 ENCOUNTER — HOSPITAL ENCOUNTER (OUTPATIENT)
Facility: CLINIC | Age: 79
Discharge: HOME | End: 2024-04-25
Attending: INTERNAL MEDICINE | Admitting: INTERNAL MEDICINE
Payer: MEDICARE

## 2024-04-25 VITALS
OXYGEN SATURATION: 100 % | TEMPERATURE: 98.3 F | DIASTOLIC BLOOD PRESSURE: 61 MMHG | HEART RATE: 66 BPM | SYSTOLIC BLOOD PRESSURE: 115 MMHG

## 2024-04-25 LAB
COLONOSCOPY: NORMAL
UPPER GI ENDOSCOPY: NORMAL

## 2024-04-25 PROCEDURE — 43235 EGD DIAGNOSTIC BRUSH WASH: CPT | Performed by: INTERNAL MEDICINE

## 2024-04-25 PROCEDURE — 258N000003 HC RX IP 258 OP 636: Performed by: NURSE ANESTHETIST, CERTIFIED REGISTERED

## 2024-04-25 PROCEDURE — 88305 TISSUE EXAM BY PATHOLOGIST: CPT | Mod: TC | Performed by: INTERNAL MEDICINE

## 2024-04-25 PROCEDURE — 45382 COLONOSCOPY W/CONTROL BLEED: CPT | Mod: XU | Performed by: INTERNAL MEDICINE

## 2024-04-25 PROCEDURE — 99100 ANES PT EXTEME AGE<1 YR&>70: CPT | Performed by: NURSE ANESTHETIST, CERTIFIED REGISTERED

## 2024-04-25 PROCEDURE — 88305 TISSUE EXAM BY PATHOLOGIST: CPT | Mod: 26 | Performed by: PATHOLOGY

## 2024-04-25 PROCEDURE — 99100 ANES PT EXTEME AGE<1 YR&>70: CPT | Performed by: ANESTHESIOLOGY

## 2024-04-25 PROCEDURE — 370N000017 HC ANESTHESIA TECHNICAL FEE, PER MIN: Performed by: INTERNAL MEDICINE

## 2024-04-25 PROCEDURE — 45385 COLONOSCOPY W/LESION REMOVAL: CPT

## 2024-04-25 PROCEDURE — 258N000003 HC RX IP 258 OP 636

## 2024-04-25 PROCEDURE — 250N000011 HC RX IP 250 OP 636

## 2024-04-25 PROCEDURE — 250N000009 HC RX 250: Performed by: NURSE ANESTHETIST, CERTIFIED REGISTERED

## 2024-04-25 PROCEDURE — 45382 COLONOSCOPY W/CONTROL BLEED: CPT | Performed by: NURSE ANESTHETIST, CERTIFIED REGISTERED

## 2024-04-25 PROCEDURE — 250N000011 HC RX IP 250 OP 636: Performed by: NURSE ANESTHETIST, CERTIFIED REGISTERED

## 2024-04-25 PROCEDURE — 45382 COLONOSCOPY W/CONTROL BLEED: CPT | Performed by: ANESTHESIOLOGY

## 2024-04-25 RX ORDER — NALOXONE HYDROCHLORIDE 0.4 MG/ML
0.4 INJECTION, SOLUTION INTRAMUSCULAR; INTRAVENOUS; SUBCUTANEOUS
Status: DISCONTINUED | OUTPATIENT
Start: 2024-04-25 | End: 2024-04-25 | Stop reason: HOSPADM

## 2024-04-25 RX ORDER — LIDOCAINE 40 MG/G
CREAM TOPICAL
Status: DISCONTINUED | OUTPATIENT
Start: 2024-04-25 | End: 2024-04-25 | Stop reason: HOSPADM

## 2024-04-25 RX ORDER — ONDANSETRON 4 MG/1
4 TABLET, ORALLY DISINTEGRATING ORAL EVERY 6 HOURS PRN
Status: DISCONTINUED | OUTPATIENT
Start: 2024-04-25 | End: 2024-04-25 | Stop reason: HOSPADM

## 2024-04-25 RX ORDER — NALOXONE HYDROCHLORIDE 0.4 MG/ML
0.2 INJECTION, SOLUTION INTRAMUSCULAR; INTRAVENOUS; SUBCUTANEOUS
Status: DISCONTINUED | OUTPATIENT
Start: 2024-04-25 | End: 2024-04-25 | Stop reason: HOSPADM

## 2024-04-25 RX ORDER — FLUMAZENIL 0.1 MG/ML
0.2 INJECTION, SOLUTION INTRAVENOUS
Status: DISCONTINUED | OUTPATIENT
Start: 2024-04-25 | End: 2024-04-25 | Stop reason: HOSPADM

## 2024-04-25 RX ORDER — ONDANSETRON 2 MG/ML
4 INJECTION INTRAMUSCULAR; INTRAVENOUS
Status: DISCONTINUED | OUTPATIENT
Start: 2024-04-25 | End: 2024-04-25 | Stop reason: HOSPADM

## 2024-04-25 RX ORDER — SODIUM CHLORIDE, SODIUM LACTATE, POTASSIUM CHLORIDE, CALCIUM CHLORIDE 600; 310; 30; 20 MG/100ML; MG/100ML; MG/100ML; MG/100ML
INJECTION, SOLUTION INTRAVENOUS CONTINUOUS PRN
Status: DISCONTINUED | OUTPATIENT
Start: 2024-04-25 | End: 2024-04-25

## 2024-04-25 RX ORDER — PROPOFOL 10 MG/ML
INJECTION, EMULSION INTRAVENOUS CONTINUOUS PRN
Status: DISCONTINUED | OUTPATIENT
Start: 2024-04-25 | End: 2024-04-25

## 2024-04-25 RX ORDER — PROPOFOL 10 MG/ML
INJECTION, EMULSION INTRAVENOUS PRN
Status: DISCONTINUED | OUTPATIENT
Start: 2024-04-25 | End: 2024-04-25

## 2024-04-25 RX ORDER — ONDANSETRON 2 MG/ML
4 INJECTION INTRAMUSCULAR; INTRAVENOUS EVERY 6 HOURS PRN
Status: DISCONTINUED | OUTPATIENT
Start: 2024-04-25 | End: 2024-04-25 | Stop reason: HOSPADM

## 2024-04-25 RX ORDER — PROCHLORPERAZINE MALEATE 5 MG
5 TABLET ORAL EVERY 6 HOURS PRN
Status: DISCONTINUED | OUTPATIENT
Start: 2024-04-25 | End: 2024-04-25 | Stop reason: HOSPADM

## 2024-04-25 RX ORDER — LIDOCAINE HYDROCHLORIDE 20 MG/ML
INJECTION, SOLUTION INFILTRATION; PERINEURAL PRN
Status: DISCONTINUED | OUTPATIENT
Start: 2024-04-25 | End: 2024-04-25

## 2024-04-25 RX ADMIN — SODIUM CHLORIDE, POTASSIUM CHLORIDE, SODIUM LACTATE AND CALCIUM CHLORIDE: 600; 310; 30; 20 INJECTION, SOLUTION INTRAVENOUS at 10:38

## 2024-04-25 RX ADMIN — PROPOFOL 30 MG: 10 INJECTION, EMULSION INTRAVENOUS at 10:42

## 2024-04-25 RX ADMIN — PHENYLEPHRINE HYDROCHLORIDE 100 MCG: 10 INJECTION INTRAVENOUS at 11:15

## 2024-04-25 RX ADMIN — PHENYLEPHRINE HYDROCHLORIDE 100 MCG: 10 INJECTION INTRAVENOUS at 11:04

## 2024-04-25 RX ADMIN — PROPOFOL 20 MG: 10 INJECTION, EMULSION INTRAVENOUS at 10:44

## 2024-04-25 RX ADMIN — LIDOCAINE HYDROCHLORIDE 60 MG: 20 INJECTION, SOLUTION INFILTRATION; PERINEURAL at 10:38

## 2024-04-25 RX ADMIN — TOPICAL ANESTHETIC 1 SPRAY: 200 SPRAY DENTAL; PERIODONTAL at 10:33

## 2024-04-25 RX ADMIN — PROPOFOL 150 MCG/KG/MIN: 10 INJECTION, EMULSION INTRAVENOUS at 10:39

## 2024-04-25 ASSESSMENT — ACTIVITIES OF DAILY LIVING (ADL)
ADLS_ACUITY_SCORE: 35
ADLS_ACUITY_SCORE: 33
ADLS_ACUITY_SCORE: 35

## 2024-04-25 NOTE — H&P
Oskar Wilks  [id number]  male  78 year old      Reason for procedure/surgery: anemia, cirrhosis    Patient Active Problem List   Diagnosis    Acetabulum fracture, right (H)    Age-related osteoporosis with current pathological fracture    Alcohol abuse, in remission    Alcoholic cirrhosis of liver without ascites (H)    Esophageal varices in alcoholic cirrhosis (H)    Asymptomatic bilateral carotid artery stenosis    Cerebral infarction due to occlusion of middle cerebral artery (H)    Closed compression fracture of body of L1 vertebra (H)    Closed fracture of multiple ribs of right side    Elevated INR    Essential hypertension    Folate deficiency    Portal hypertensive gastropathy (H)    History of atrial fibrillation    History of basal cell carcinoma    Hypoalbuminemia    Impotence of organic origin    HCC (hepatocellular carcinoma) (H)       Past Surgical History:    Past Surgical History:   Procedure Laterality Date    IR LIVER BIOPSY PERCUTANEOUS  2023    IR PARACENTESIS  2022    IR SIRT (SELECTIVE INTERNAL RADIO THERAPY)  2023    IR VISCERAL ANGIOGRAM  3/14/2023    IR VISCERAL EMBOLIZATION  3/16/2023    ORIF HIP FRACTURE         Past Medical History:   Past Medical History:   Diagnosis Date    Cancer (H)     liver    Cirrhosis of liver (H)     Hip fracture (H)        Social History:   Social History     Tobacco Use    Smoking status: Former     Current packs/day: 0.00     Types: Cigarettes     Start date: 1977     Quit date: 1985     Years since quittin.3    Smokeless tobacco: Never   Substance Use Topics    Alcohol use: Not Currently     Comment: sober since 2022       Family History: History reviewed. No pertinent family history.    Allergies:   Allergies   Allergen Reactions    Penicillins      PN: LW Reaction: Itching, Pruritis    Adhesive Tape Rash       Active Medications:   No current outpatient medications on file.       Systemic Review:   CONSTITUTIONAL:  NEGATIVE for fever, chills, change in weight  ENT/MOUTH: NEGATIVE for ear, mouth and throat problems  RESP: NEGATIVE for significant cough or SOB  CV: NEGATIVE for chest pain, palpitations or peripheral edema    Physical Examination:   Vital Signs: /60   Pulse 66   Temp 98.3  F (36.8  C)   SpO2 99%   GENERAL: healthy, alert and no distress  NECK: no adenopathy, no asymmetry, masses, or scars  RESP: lungs clear to auscultation - no rales, rhonchi or wheezes  CV: regular rate and rhythm, normal S1 S2, no S3 or S4, no murmur, click or rub, no peripheral edema and peripheral pulses strong  ABDOMEN: soft, nontender, no hepatosplenomegaly, no masses and bowel sounds normal  MS: no gross musculoskeletal defects noted, no edema    Plan: Appropriate to proceed as scheduled.      Moses Lozoya MD  4/25/2024    PCP:  Viet López

## 2024-04-25 NOTE — ANESTHESIA CARE TRANSFER NOTE
Patient: Oskar Wilks    Procedure: Procedure(s):  COLONOSCOPY, WITH HEMORRHAGE CONTROL - APC  Esophagoscopy, gastroscopy, duodenoscopy (EGD), combined       Diagnosis: Anemia, unspecified type [D64.9]  Alcoholic cirrhosis of liver with ascites (H) [K70.31]  Diagnosis Additional Information: No value filed.    Anesthesia Type:   MAC     Note:    Oropharynx: oropharynx clear of all foreign objects and spontaneously breathing  Level of Consciousness: awake  Oxygen Supplementation: room air    Independent Airway: airway patency satisfactory and stable  Dentition: dentition unchanged  Vital Signs Stable: post-procedure vital signs reviewed and stable  Report to RN Given: handoff report given  Patient transferred to: Phase II    Handoff Report: Identifed the Patient, Identified the Reponsible Provider, Reviewed the pertinent medical history, Discussed the surgical course, Reviewed Intra-OP anesthesia mangement and issues during anesthesia, Set expectations for post-procedure period and Allowed opportunity for questions and acknowledgement of understanding      Vitals:  Vitals Value Taken Time   /55 04/25/24 1206   Temp     Pulse     Resp     SpO2 100 % 04/25/24 1207   Vitals shown include unfiled device data.    Electronically Signed By: NARESH Ball CRNA  April 25, 2024  12:08 PM

## 2024-04-25 NOTE — ANESTHESIA POSTPROCEDURE EVALUATION
Patient: Oskar Wilks    Procedure: Procedure(s):  COLONOSCOPY, WITH HEMORRHAGE CONTROL - APC  Esophagoscopy, gastroscopy, duodenoscopy (EGD), combined       Anesthesia Type:  MAC    Note:  Disposition: Outpatient   Postop Pain Control: Uneventful            Sign Out: Well controlled pain   PONV: No   Neuro/Psych: Uneventful            Sign Out: Acceptable/Baseline neuro status   Airway/Respiratory: Uneventful            Sign Out: Acceptable/Baseline resp. status   CV/Hemodynamics: Uneventful            Sign Out: Acceptable CV status; No obvious hypovolemia; No obvious fluid overload   Other NRE: NONE   DID A NON-ROUTINE EVENT OCCUR? No           Last vitals:  Vitals Value Taken Time   /61 04/25/24 1235   Temp     Pulse     Resp     SpO2 100 % 04/25/24 1235       Electronically Signed By: ANGEL VILLARREAL MD  April 25, 2024  1:17 PM

## 2024-04-25 NOTE — ANESTHESIA PREPROCEDURE EVALUATION
Anesthesia Pre-Procedure Evaluation    Patient: Oskar Wilks   MRN: [id number] : 1945        Procedure : Procedure(s):  Colonoscopy  Esophagoscopy, gastroscopy, duodenoscopy (EGD), combined          Past Medical History:   Diagnosis Date    Cancer (H)     liver    Cirrhosis of liver (H)     Hip fracture (H)       Past Surgical History:   Procedure Laterality Date    IR LIVER BIOPSY PERCUTANEOUS  2023    IR PARACENTESIS  2022    IR SIRT (SELECTIVE INTERNAL RADIO THERAPY)  2023    IR VISCERAL ANGIOGRAM  3/14/2023    IR VISCERAL EMBOLIZATION  3/16/2023    ORIF HIP FRACTURE        Allergies   Allergen Reactions    Penicillins      PN: LW Reaction: Itching, Pruritis    Adhesive Tape Rash      Social History     Tobacco Use    Smoking status: Former     Current packs/day: 0.00     Types: Cigarettes     Start date: 1977     Quit date: 1985     Years since quittin.3    Smokeless tobacco: Never   Substance Use Topics    Alcohol use: Not Currently     Comment: sober since 2022      Wt Readings from Last 1 Encounters:   23 89.8 kg (198 lb)        Anesthesia Evaluation            ROS/MED HX  ENT/Pulmonary:       Neurologic:       Cardiovascular:     (+)  hypertension- -   -  - -                                      METS/Exercise Tolerance:     Hematologic:       Musculoskeletal:       GI/Hepatic:     (+)             liver disease,       Renal/Genitourinary:       Endo:       Psychiatric/Substance Use:       Infectious Disease:       Malignancy:       Other:            Physical Exam    Airway        Mallampati: II       Respiratory Devices and Support         Dental       (+) Modest Abnormalities - crowns, retainers, 1 or 2 missing teeth      Cardiovascular          Rhythm and rate: regular and normal     Pulmonary                   OUTSIDE LABS:  CBC:   Lab Results   Component Value Date    WBC 5.4 2024    WBC 3.3 (L) 2023    HGB 11.3 (L) 2024    HGB 9.3 (L)  "09/20/2023    HCT 33.9 (L) 03/19/2024    HCT 28.5 (L) 09/20/2023    PLT 95 (L) 03/19/2024    PLT 71 (L) 09/20/2023     BMP:   Lab Results   Component Value Date     03/19/2024     09/20/2023    POTASSIUM 4.8 03/19/2024    POTASSIUM 4.3 09/20/2023    CHLORIDE 104 03/19/2024    CHLORIDE 104 09/20/2023    CO2 25 03/19/2024    CO2 24 09/20/2023    BUN 13.6 03/19/2024    BUN 21.0 09/20/2023    CR 0.77 03/19/2024    CR 0.81 09/20/2023     (H) 03/19/2024    GLC 79 09/20/2023     COAGS:   Lab Results   Component Value Date    INR 1.52 (H) 03/19/2024     POC: No results found for: \"BGM\", \"HCG\", \"HCGS\"  HEPATIC:   Lab Results   Component Value Date    ALBUMIN 3.9 03/19/2024    PROTTOTAL 6.3 (L) 03/19/2024    ALT 27 03/19/2024    AST 40 03/19/2024    ALKPHOS 110 03/19/2024    BILITOTAL 2.2 (H) 03/19/2024     OTHER:   Lab Results   Component Value Date    ALIA 9.1 03/19/2024       Anesthesia Plan    ASA Status:  3       Anesthesia Type: MAC.              Consents    Anesthesia Plan(s) and associated risks, benefits, and realistic alternatives discussed. Questions answered and patient/representative(s) expressed understanding.     - Discussed:     - Discussed with:  Patient            Postoperative Care    Pain management: Multi-modal analgesia.        Comments:               ANGEL VILLARREAL MD    I have reviewed the pertinent notes and labs in the chart from the past 30 days and (re)examined the patient.  Any updates or changes from those notes are reflected in this note.                  "

## 2024-04-25 NOTE — OR NURSING
EGD with no interventions, colonoscopy with polypectomy X 3 and APC performed under MAC, patient tolerated well. Transferred to  and report given to recovery RN.

## 2024-04-25 NOTE — LETTER
April 26, 2024      Parag Barrosond  [address] [facility]  [place] [facility] [address]        Dear ,    The pathology results returned from the polyps removed at your recent colonoscopy.    The polyps were pre-cancerous but showed no active evidence of cancer.      As we discussed, due to your age, I do not recommend a follow-up colonoscopy for colon cancer screening.      Sincerely,               Moses Lozoya MD   [facility], [place], ENDOSCOPY  [address] [place] [id number]  Phone: [phone]

## 2024-04-26 LAB
PATH REPORT.COMMENTS IMP SPEC: NORMAL
PATH REPORT.COMMENTS IMP SPEC: NORMAL
PATH REPORT.FINAL DX SPEC: NORMAL
PATH REPORT.GROSS SPEC: NORMAL
PATH REPORT.MICROSCOPIC SPEC OTHER STN: NORMAL
PATH REPORT.RELEVANT HX SPEC: NORMAL
PHOTO IMAGE: NORMAL

## 2024-05-01 ENCOUNTER — OFFICE VISIT (OUTPATIENT)
Dept: GASTROENTEROLOGY | Facility: CLINIC | Age: 79
End: 2024-05-01
Attending: STUDENT IN AN ORGANIZED HEALTH CARE EDUCATION/TRAINING PROGRAM
Payer: MEDICARE

## 2024-05-01 VITALS
WEIGHT: 203.2 LBS | HEART RATE: 70 BPM | SYSTOLIC BLOOD PRESSURE: 135 MMHG | OXYGEN SATURATION: 100 % | TEMPERATURE: 98.5 F | BODY MASS INDEX: 28.34 KG/M2 | DIASTOLIC BLOOD PRESSURE: 71 MMHG

## 2024-05-01 DIAGNOSIS — C22.0 HEPATOCELLULAR CARCINOMA (H): Primary | ICD-10-CM

## 2024-05-01 DIAGNOSIS — K70.30 ALCOHOLIC CIRRHOSIS OF LIVER WITHOUT ASCITES (H): ICD-10-CM

## 2024-05-01 PROCEDURE — 99214 OFFICE O/P EST MOD 30 MIN: CPT | Performed by: STUDENT IN AN ORGANIZED HEALTH CARE EDUCATION/TRAINING PROGRAM

## 2024-05-01 PROCEDURE — G0463 HOSPITAL OUTPT CLINIC VISIT: HCPCS | Performed by: STUDENT IN AN ORGANIZED HEALTH CARE EDUCATION/TRAINING PROGRAM

## 2024-05-01 ASSESSMENT — PAIN SCALES - GENERAL: PAINLEVEL: NO PAIN (0)

## 2024-05-01 NOTE — NURSING NOTE
Chief Complaint   Patient presents with    RECHECK     5 mo f/u       /71   Pulse 70   Temp 98.5  F (36.9  C) (Oral)   Wt 92.2 kg (203 lb 3.2 oz)   SpO2 100%   BMI 28.34 kg/m      Rufino Maldonado on 5/1/2024 at 12:22 PM

## 2024-05-01 NOTE — PROGRESS NOTES
[facility] Return Patient Visit    Date of Visit: May 1, 2024    Reason for referral: Alcohol related liver disease, HCC    Subjective: Mr. Wilks is a 78-year-old man with a history of alcohol-related liver disease who presents for follow up of unifocal HCC.     Initial History:     He has a history of cirrhosis, has been sober in the past, and drink heavily and in July 2022.  Started having issues with abdominal distention, was diagnosed with SBP September 2022.  He had been on torsemide just prior to this, since this admission was started on low-dose torsemide and spironolactone.  He is on Cipro for SBP prophylaxis.  His last paracentesis was 2022, 4.7 L was removed.    December 12 bilirubin was 3.6 and INR was 1.8.  September 9 bilirubin was 5.6.  He had a paracentesis 9/8 that was consistent with portal hypertension, paracentesis also showed SBP    Fell 12/5/2022 on the ice walking his puppy. Broke his hip and has surgery for this. Also broke two ribs. In rehab, fell again and had a L1 compression fracture. Getting OT/PT at home. About to graduate from OT because he is doing so well.  Uses walker for appointments.  Able to go up stairs without issues.  Independent with ADLs.    Was on narcotics related to surgery, had delirium related to this.  Otherwise no HE.     Found to have a 4.6 cm liver lesion, read as an LR 4 lesion.  Underwent a biopsy of this that came back consistent with HCC    3/16/2023 underwent TARE of the mass. Follow up imaging has not shown a change. At the time of the mapping there was concern for an additional new nodularity in the left hepatic lobe.     MRI 4/17/2023 showed his Y90 treated lesion - unchanged but follow-up MRI in June showed reduction in size of the HCC, now 3.2 cm.  It did have some enhancing features.     Interval Events:   -Had a recent EGD for anemia showing PHG. Colonoscopy showing 3 1-2 mm polyps - tubular adenomas. Colon AVMs s/p APC. Also  showed congested mucosa from portal HTN colopathy.  -MRI liver 3/2024 showing mild iron deposition. Treated HCC non viable. Bile duct around treatment area with mild stricture. Showed trace ascites.      ROS: 14 point ROS negative except for positives noted in HPI.    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 fib not on anticoagulation  Psoriasis  Basal cell carcinoma  Nonrheumatic mild aortic stenosis    PSHx:  Right acetabulum fracture 2/ GLF s/p ORIF on 22  BRAIN HEMATOMA EVACUTATION   CRANIAL LESION RESECTION From trauma   MENISCECTOMY   MOUTH SURGERY      FamHx:  No family history of liver disease, liver cancer    SocHx:  Social History     Socioeconomic History    Marital status:      Spouse name: Not on file    Number of children: Not on file    Years of education: Not on file    Highest education level: Not on file   Occupational History    Not on file   Tobacco Use    Smoking status: Former     Current packs/day: 0.00     Types: Cigarettes     Start date: 1977     Quit date: 1985     Years since quittin.3    Smokeless tobacco: Never   Vaping Use    Vaping status: Never Used   Substance and Sexual Activity    Alcohol use: Not Currently     Comment: sober since 2022    Drug use: Never    Sexual activity: Not on file   Other Topics Concern    Not on file   Social History Narrative    Not on file     Social Determinants of Health     Financial Resource Strain: Not on file   Food Insecurity: Not on file   Transportation Needs: Not on file   Physical Activity: Not on file   Stress: Not on file   Social Connections: Not on file   Interpersonal Safety: Not on file   Housing Stability: Not on file       Medications:  Current Outpatient Medications   Medication Sig Dispense Refill    ammonium lactate (AMLACTIN) 12 % external cream APPLY TOPICALLY TO THE AFFECTED AREA DAILY      buPROPion (WELLBUTRIN XL) 300 MG 24 hr tablet Take 300 mg by mouth  every morning      cholecalciferol 25 MCG (1000 UT) TABS Take 1 tablet by mouth daily      fluticasone (FLONASE) 50 MCG/ACT nasal spray Spray 2 sprays into both nostrils daily      folic acid (FOLVITE) 1 MG tablet Take 1 mg by mouth daily      losartan (COZAAR) 25 MG tablet Take 1 tablet by mouth daily      metoprolol succinate ER (TOPROL XL) 25 MG 24 hr tablet Take 1 tablet by mouth daily at 2 pm      multivitamin w/minerals (THERA-VIT-M) tablet Take 1 tablet by mouth daily      omeprazole (PRILOSEC) 20 MG DR capsule TAKE 1 CAPSULE(20 MG) BY MOUTH DAILY 1 HOUR BEFORE A MEAL      spironolactone (ALDACTONE) 50 MG tablet Take 50 mg by mouth daily      torsemide (DEMADEX) 10 MG tablet Take 10 mg by mouth daily      ferrous sulfate (FEROSUL) 325 (65 Fe) MG tablet Take 325 mg by mouth      ketoconazole (NIZORAL) 2 % external cream       methylPREDNISolone (MEDROL DOSEPAK) 4 MG tablet therapy pack Take as directed on package. 21 tablet 0    metoprolol tartrate (LOPRESSOR) 25 MG tablet Take 25 mg by mouth daily      ondansetron (ZOFRAN) 4 MG tablet Take 1-2 tablets (4-8 mg) by mouth every 6 hours as needed for nausea (vomiting) 40 tablet 0    oxyCODONE (ROXICODONE) 5 MG tablet Take 1-2 tablets (5-10 mg) by mouth every 6 hours as needed for moderate to severe pain 6 tablet 0    traMADol (ULTRAM) 50 MG tablet TAKE 1/2 TO 1 TABLET BY MOUTH EVERY 6 HOURS AS NEEDED FOR PAIN. 1/2 TABLET FOR PAIN RATED 4 TO 7 AND 1 TABLET FOR PAIN RATED 8 TO 10       No current facility-administered medications for this visit.     No OTCs, herbals    Allergies:  Allergies   Allergen Reactions    Penicillins      PN: LW Reaction: Itching, Pruritis    Adhesive Tape Rash       Objective:  /71   Pulse 70   Temp 98.5  F (36.9  C) (Oral)   Wt 92.2 kg (203 lb 3.2 oz)   SpO2 100%   BMI 28.34 kg/m    Constitutional: pleasant man in NAD  Eyes: non icteric  Respiratory: Normal respiratory excursion   MSK: normal range of motion of visualized  extremities  Abd: Non distended  Ext: 1+ edema  Skin: No jaundice  Psychiatric: normal mood and orientation    Labs: Reviewed in EHR     Imaging: Reviewed in EHR    Endoscopy:    EGD 1/23/2023    Findings:        The examined esophagus was normal.        The Z-line was regular and was found 43 cm from the        incisors.        Mild portal hypertensive gastropathy was found in the        cardia, in the gastric fundus and in the gastric body.        Localized mild inflammation characterized by        congestion (edema), erosions and erythema was found        in the gastric antrum and in the prepyloric region of        the stomach. Biopsies were taken with a cold forceps        for Helicobacter pylori testing. Verification of        patient identification for the specimen was done.        Estimated blood loss was minimal.        The exam of the stomach was otherwise normal.        The examined duodenum was normal.     Impression:            - Normal esophagus.                          - Z-line regular, 43 cm from the                           incisors.                          - Portal hypertensive gastropathy.                          - Gastritis. Biopsied.                          - Normal examined duodenum.       Independently reviewed labs and imaging.     Assessment/Plan: Mr. Wilks is a 78-year-old man with a history of alcohol-related liver disease who presents for follow-up of unifocal HCC.  He was treated with Y90 March 2023, follow-up MRIs have shown tumor is non viable.     MELD 3.0: 13 at 3/19/2024 11:12 AM  MELD-Na: 14 at 3/19/2024 11:12 AM  Calculated from:  Serum Creatinine: 0.77 mg/dL (Using min of 1 mg/dL) at 3/19/2024 11:12 AM  Serum Sodium: 138 mmol/L (Using max of 137 mmol/L) at 3/19/2024 11:12 AM  Total Bilirubin: 2.2 mg/dL at 3/19/2024 11:12 AM  Serum Albumin: 3.9 g/dL (Using max of 3.5 g/dL) at 3/19/2024 11:12 AM  INR(ratio): 1.52 at 3/19/2024 11:12 AM  Age at listing (hypothetical): 78  years  Sex: Male at 3/19/2024 11:12 AM      He has done well after treatment, MELD is stable, no issues with ascites.      Repeat MRI ~ 6/2024 with CT chest around that time. Repeat labs every 3 months.  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  He had a recent EGD and colonoscopy which showed portal hypertensive gastropathy and colopathy and also AVMs.  Will monitor his ferritin to make sure he is not becoming iron overloaded.  His hemoglobin is actually better.    Orders Placed This Encounter   Procedures    MR Liver wo & w Contrast    CT Chest w/o Contrast    CBC with platelets    Comprehensive metabolic panel    INR    AFP tumor marker    Ferritin     RTC 6 months with labs    Laura Neves MD MS  Hepatology/Liver Transplant  [facility]

## 2024-05-01 NOTE — LETTER
5/1/2024         RE: Oskar Wilks  [address] [place] [address]        Dear Colleague,    Thank you for referring your patient, Oskar Wilks, to the [facility] [place]. Please see a copy of my visit note below.    [facility] Return Patient Visit    Date of Visit: May 1, 2024    Reason for referral: Alcohol related liver disease, HCC    Subjective: Mr. Wilks is a 78-year-old man with a history of alcohol-related liver disease who presents for follow up of unifocal HCC.     Initial History:     He has a history of cirrhosis, has been sober in the past, and drink heavily and in July 2022.  Started having issues with abdominal distention, was diagnosed with SBP September 2022.  He had been on torsemide just prior to this, since this admission was started on low-dose torsemide and spironolactone.  He is on Cipro for SBP prophylaxis.  His last paracentesis was 2022, 4.7 L was removed.    December 12 bilirubin was 3.6 and INR was 1.8.  September 9 bilirubin was 5.6.  He had a paracentesis 9/8 that was consistent with portal hypertension, paracentesis also showed SBP    Fell 12/5/2022 on the ice walking his puppy. Broke his hip and has surgery for this. Also broke two ribs. In rehab, fell again and had a L1 compression fracture. Getting OT/PT at home. About to graduate from OT because he is doing so well.  Uses walker for appointments.  Able to go up stairs without issues.  Independent with ADLs.    Was on narcotics related to surgery, had delirium related to this.  Otherwise no HE.     Found to have a 4.6 cm liver lesion, read as an LR 4 lesion.  Underwent a biopsy of this that came back consistent with HCC    3/16/2023 underwent TARE of the mass. Follow up imaging has not shown a change. At the time of the mapping there was concern for an additional new nodularity in the left hepatic lobe.     MRI 4/17/2023 showed his Y90 treated lesion -  unchanged but follow-up MRI in  showed reduction in size of the HCC, now 3.2 cm.  It did have some enhancing features.     Interval Events:   -Had a recent EGD for anemia showing PHG. Colonoscopy showing 3 1-2 mm polyps - tubular adenomas. Colon AVMs s/p APC. Also showed congested mucosa from portal HTN colopathy.  -MRI liver 3/2024 showing mild iron deposition. Treated HCC non viable. Bile duct around treatment area with mild stricture. Showed trace ascites.      ROS: 14 point ROS negative except for positives noted in HPI.    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 fib not on anticoagulation  Psoriasis  Basal cell carcinoma  Nonrheumatic mild aortic stenosis    PSHx:  Right acetabulum fracture 2/ GLF s/p ORIF on 22  BRAIN HEMATOMA EVACUTATION   CRANIAL LESION RESECTION From trauma   MENISCECTOMY   MOUTH SURGERY      FamHx:  No family history of liver disease, liver cancer    SocHx:  Social History     Socioeconomic History     Marital status:      Spouse name: Not on file     Number of children: Not on file     Years of education: Not on file     Highest education level: Not on file   Occupational History     Not on file   Tobacco Use     Smoking status: Former     Current packs/day: 0.00     Types: Cigarettes     Start date: 1977     Quit date: 1985     Years since quittin.3     Smokeless tobacco: Never   Vaping Use     Vaping status: Never Used   Substance and Sexual Activity     Alcohol use: Not Currently     Comment: sober since 2022     Drug use: Never     Sexual activity: Not on file   Other Topics Concern     Not on file   Social History Narrative     Not on file     Social Determinants of Health     Financial Resource Strain: Not on file   Food Insecurity: Not on file   Transportation Needs: Not on file   Physical Activity: Not on file   Stress: Not on file   Social Connections: Not on file   Interpersonal Safety: Not on file    Housing Stability: Not on file       Medications:  Current Outpatient Medications   Medication Sig Dispense Refill     ammonium lactate (AMLACTIN) 12 % external cream APPLY TOPICALLY TO THE AFFECTED AREA DAILY       buPROPion (WELLBUTRIN XL) 300 MG 24 hr tablet Take 300 mg by mouth every morning       cholecalciferol 25 MCG (1000 UT) TABS Take 1 tablet by mouth daily       fluticasone (FLONASE) 50 MCG/ACT nasal spray Spray 2 sprays into both nostrils daily       folic acid (FOLVITE) 1 MG tablet Take 1 mg by mouth daily       losartan (COZAAR) 25 MG tablet Take 1 tablet by mouth daily       metoprolol succinate ER (TOPROL XL) 25 MG 24 hr tablet Take 1 tablet by mouth daily at 2 pm       multivitamin w/minerals (THERA-VIT-M) tablet Take 1 tablet by mouth daily       omeprazole (PRILOSEC) 20 MG DR capsule TAKE 1 CAPSULE(20 MG) BY MOUTH DAILY 1 HOUR BEFORE A MEAL       spironolactone (ALDACTONE) 50 MG tablet Take 50 mg by mouth daily       torsemide (DEMADEX) 10 MG tablet Take 10 mg by mouth daily       ferrous sulfate (FEROSUL) 325 (65 Fe) MG tablet Take 325 mg by mouth       ketoconazole (NIZORAL) 2 % external cream        methylPREDNISolone (MEDROL DOSEPAK) 4 MG tablet therapy pack Take as directed on package. 21 tablet 0     metoprolol tartrate (LOPRESSOR) 25 MG tablet Take 25 mg by mouth daily       ondansetron (ZOFRAN) 4 MG tablet Take 1-2 tablets (4-8 mg) by mouth every 6 hours as needed for nausea (vomiting) 40 tablet 0     oxyCODONE (ROXICODONE) 5 MG tablet Take 1-2 tablets (5-10 mg) by mouth every 6 hours as needed for moderate to severe pain 6 tablet 0     traMADol (ULTRAM) 50 MG tablet TAKE 1/2 TO 1 TABLET BY MOUTH EVERY 6 HOURS AS NEEDED FOR PAIN. 1/2 TABLET FOR PAIN RATED 4 TO 7 AND 1 TABLET FOR PAIN RATED 8 TO 10       No current facility-administered medications for this visit.     No OTCs, herbals    Allergies:  Allergies   Allergen Reactions     Penicillins      PN: LW Reaction: Itching, Pruritis      Adhesive Tape Rash       Objective:  /71   Pulse 70   Temp 98.5  F (36.9  C) (Oral)   Wt 92.2 kg (203 lb 3.2 oz)   SpO2 100%   BMI 28.34 kg/m    Constitutional: pleasant man in NAD  Eyes: non icteric  Respiratory: Normal respiratory excursion   MSK: normal range of motion of visualized extremities  Abd: Non distended  Ext: 1+ edema  Skin: No jaundice  Psychiatric: normal mood and orientation    Labs: Reviewed in EHR     Imaging: Reviewed in EHR    Endoscopy:    EGD 1/23/2023    Findings:        The examined esophagus was normal.        The Z-line was regular and was found 43 cm from the        incisors.        Mild portal hypertensive gastropathy was found in the        cardia, in the gastric fundus and in the gastric body.        Localized mild inflammation characterized by        congestion (edema), erosions and erythema was found        in the gastric antrum and in the prepyloric region of        the stomach. Biopsies were taken with a cold forceps        for Helicobacter pylori testing. Verification of        patient identification for the specimen was done.        Estimated blood loss was minimal.        The exam of the stomach was otherwise normal.        The examined duodenum was normal.     Impression:            - Normal esophagus.                          - Z-line regular, 43 cm from the                           incisors.                          - Portal hypertensive gastropathy.                          - Gastritis. Biopsied.                          - Normal examined duodenum.       Independently reviewed labs and imaging.     Assessment/Plan: Mr. Wilks is a 78-year-old man with a history of alcohol-related liver disease who presents for follow-up of unifocal HCC.  He was treated with Y90 March 2023, follow-up MRIs have shown tumor is non viable.     MELD 3.0: 13 at 3/19/2024 11:12 AM  MELD-Na: 14 at 3/19/2024 11:12 AM  Calculated from:  Serum Creatinine: 0.77 mg/dL (Using min of 1 mg/dL)  at 3/19/2024 11:12 AM  Serum Sodium: 138 mmol/L (Using max of 137 mmol/L) at 3/19/2024 11:12 AM  Total Bilirubin: 2.2 mg/dL at 3/19/2024 11:12 AM  Serum Albumin: 3.9 g/dL (Using max of 3.5 g/dL) at 3/19/2024 11:12 AM  INR(ratio): 1.52 at 3/19/2024 11:12 AM  Age at listing (hypothetical): 78 years  Sex: Male at 3/19/2024 11:12 AM      He has done well after treatment, MELD is stable, no issues with ascites.      Repeat MRI ~ 6/2024 with CT chest around that time. Repeat labs every 3 months.  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  He had a recent EGD and colonoscopy which showed portal hypertensive gastropathy and colopathy and also AVMs.  Will monitor his ferritin to make sure he is not becoming iron overloaded.  His hemoglobin is actually better.    Orders Placed This Encounter   Procedures     MR Liver wo & w Contrast     CT Chest w/o Contrast     CBC with platelets     Comprehensive metabolic panel     INR     AFP tumor marker     Ferritin     RTC 6 months with labs    Laura Neves MD MS  Hepatology/Liver Transplant  [facility]      Again, thank you for allowing me to participate in the care of your patient.        Sincerely,        Laura Neves MD

## 2024-07-11 ENCOUNTER — TELEPHONE (OUTPATIENT)
Dept: GASTROENTEROLOGY | Facility: CLINIC | Age: 79
End: 2024-07-11
Payer: MEDICARE

## 2024-07-11 NOTE — TELEPHONE ENCOUNTER
Left Voicemail (1st Attempt) and Sent Mychart (1st Attempt) for the patient to call back and schedule the following:    Appointment type: Return Liver  Provider: Dr. Neves  Return date: November 1st, 2024  Specialty phone number: [phone]  Additional appointment(s) needed: Labs for f/up and CT/MRI next convenience  Additonal Notes: NA

## 2024-07-31 ENCOUNTER — HOSPITAL ENCOUNTER (OUTPATIENT)
Dept: CT IMAGING | Facility: CLINIC | Age: 79
Discharge: HOME | End: 2024-07-31
Attending: STUDENT IN AN ORGANIZED HEALTH CARE EDUCATION/TRAINING PROGRAM
Payer: MEDICARE

## 2024-07-31 ENCOUNTER — HOSPITAL ENCOUNTER (OUTPATIENT)
Dept: MRI IMAGING | Facility: CLINIC | Age: 79
Discharge: HOME | End: 2024-07-31
Attending: STUDENT IN AN ORGANIZED HEALTH CARE EDUCATION/TRAINING PROGRAM
Payer: MEDICARE

## 2024-07-31 ENCOUNTER — LAB (OUTPATIENT)
Dept: LAB | Facility: CLINIC | Age: 79
End: 2024-07-31
Attending: STUDENT IN AN ORGANIZED HEALTH CARE EDUCATION/TRAINING PROGRAM
Payer: MEDICARE

## 2024-07-31 DIAGNOSIS — C22.0 HEPATOCELLULAR CARCINOMA (H): ICD-10-CM

## 2024-07-31 LAB
ALBUMIN SERPL BCG-MCNC: 4 G/DL (ref 3.5–5.2)
ALP SERPL-CCNC: 111 U/L (ref 40–150)
ALT SERPL W P-5'-P-CCNC: 30 U/L (ref 0–70)
ANION GAP SERPL CALCULATED.3IONS-SCNC: 6 MMOL/L (ref 7–15)
AST SERPL W P-5'-P-CCNC: 37 U/L (ref 0–45)
BILIRUB SERPL-MCNC: 3.3 MG/DL
BUN SERPL-MCNC: 18.2 MG/DL (ref 8–23)
CALCIUM SERPL-MCNC: 9.2 MG/DL (ref 8.8–10.4)
CHLORIDE SERPL-SCNC: 103 MMOL/L (ref 98–107)
CREAT SERPL-MCNC: 0.76 MG/DL (ref 0.67–1.17)
EGFRCR SERPLBLD CKD-EPI 2021: >90 ML/MIN/1.73M2
ERYTHROCYTE [DISTWIDTH] IN BLOOD BY AUTOMATED COUNT: 19 % (ref 10–15)
GLUCOSE SERPL-MCNC: 99 MG/DL (ref 70–99)
HCO3 SERPL-SCNC: 30 MMOL/L (ref 22–29)
HCT VFR BLD AUTO: 32.7 % (ref 40–53)
HGB BLD-MCNC: 11.1 G/DL (ref 13.3–17.7)
INR PPP: 1.58 (ref 0.85–1.15)
MCH RBC QN AUTO: 30.9 PG (ref 26.5–33)
MCHC RBC AUTO-ENTMCNC: 33.9 G/DL (ref 31.5–36.5)
MCV RBC AUTO: 91 FL (ref 78–100)
PLATELET # BLD AUTO: 114 10E3/UL (ref 150–450)
POTASSIUM SERPL-SCNC: 4.5 MMOL/L (ref 3.4–5.3)
PROT SERPL-MCNC: 6.5 G/DL (ref 6.4–8.3)
RBC # BLD AUTO: 3.59 10E6/UL (ref 4.4–5.9)
SODIUM SERPL-SCNC: 139 MMOL/L (ref 135–145)
WBC # BLD AUTO: 7 10E3/UL (ref 4–11)

## 2024-07-31 PROCEDURE — 71250 CT THORAX DX C-: CPT | Mod: MG

## 2024-07-31 PROCEDURE — 82040 ASSAY OF SERUM ALBUMIN: CPT

## 2024-07-31 PROCEDURE — 82105 ALPHA-FETOPROTEIN SERUM: CPT

## 2024-07-31 PROCEDURE — 85610 PROTHROMBIN TIME: CPT

## 2024-07-31 PROCEDURE — 85018 HEMOGLOBIN: CPT

## 2024-07-31 PROCEDURE — 74183 MRI ABD W/O CNTR FLWD CNTR: CPT | Mod: MG

## 2024-07-31 PROCEDURE — 255N000002 HC RX 255 OP 636: Performed by: STUDENT IN AN ORGANIZED HEALTH CARE EDUCATION/TRAINING PROGRAM

## 2024-07-31 PROCEDURE — A9585 GADOBUTROL INJECTION: HCPCS | Performed by: STUDENT IN AN ORGANIZED HEALTH CARE EDUCATION/TRAINING PROGRAM

## 2024-07-31 PROCEDURE — 82728 ASSAY OF FERRITIN: CPT

## 2024-07-31 PROCEDURE — 36415 COLL VENOUS BLD VENIPUNCTURE: CPT

## 2024-07-31 RX ORDER — GADOBUTROL 604.72 MG/ML
9 INJECTION INTRAVENOUS ONCE
Status: COMPLETED | OUTPATIENT
Start: 2024-07-31 | End: 2024-07-31

## 2024-07-31 RX ADMIN — GADOBUTROL 9 ML: 604.72 INJECTION INTRAVENOUS at 16:21

## 2024-08-01 LAB
AFP SERPL-MCNC: 4.5 NG/ML
FERRITIN SERPL-MCNC: 267 NG/ML (ref 31–409)

## 2024-08-02 ENCOUNTER — DOCUMENTATION ONLY (OUTPATIENT)
Dept: TRANSPLANT | Facility: CLINIC | Age: 79
End: 2024-08-02
Payer: MEDICARE

## 2024-08-02 DIAGNOSIS — K70.30 ALCOHOLIC CIRRHOSIS OF LIVER WITHOUT ASCITES (H): Primary | ICD-10-CM

## 2024-08-02 NOTE — CONFIDENTIAL NOTE
Tumor Board Review:       8/2/2024      Imaging Discussed:     MR Abdomen      Interpretation:    New Segment 7 Lesion Arterial enhancement, Growth from 1 cm to 1.94 cm lesion since March, no washout.  Treated Lesions shows fibrosis with no enhancement  previous history of biopsy proven HCC      Recommendations:     Consult SBRT and monitor for tBili stablization for consideration of y-90 consideration.

## 2024-08-06 ENCOUNTER — LAB (OUTPATIENT)
Dept: LAB | Facility: CLINIC | Age: 79
End: 2024-08-06
Payer: MEDICARE

## 2024-08-06 DIAGNOSIS — K70.30 ALCOHOLIC CIRRHOSIS OF LIVER WITHOUT ASCITES (H): ICD-10-CM

## 2024-08-06 LAB
ALBUMIN SERPL BCG-MCNC: 3.8 G/DL (ref 3.5–5.2)
ALP SERPL-CCNC: 106 U/L (ref 40–150)
ALT SERPL W P-5'-P-CCNC: 34 U/L (ref 0–70)
AST SERPL W P-5'-P-CCNC: 46 U/L (ref 0–45)
BILIRUB DIRECT SERPL-MCNC: 0.69 MG/DL (ref 0–0.3)
BILIRUB SERPL-MCNC: 2.8 MG/DL
PROT SERPL-MCNC: 6.3 G/DL (ref 6.4–8.3)

## 2024-08-06 PROCEDURE — 36415 COLL VENOUS BLD VENIPUNCTURE: CPT

## 2024-08-06 PROCEDURE — 80076 HEPATIC FUNCTION PANEL: CPT

## 2024-08-09 ENCOUNTER — TELEPHONE (OUTPATIENT)
Dept: VASCULAR SURGERY | Facility: CLINIC | Age: 79
End: 2024-08-09
Payer: MEDICARE

## 2024-08-09 DIAGNOSIS — C22.0 HCC (HEPATOCELLULAR CARCINOMA) (H): Primary | ICD-10-CM

## 2024-08-09 NOTE — TELEPHONE ENCOUNTER
Called pt to fup on referral from Dr. Neves, to Dr Santos to discuss treatment.     Inquired on their availability for next week Friday 8/16 at 10am.     Both pt and wife agreed.   Video visit    Will also have them get labs drawn prior to this appt per Rosas's recommendation.     Pt will call [facility] and make an appt.     Laura NAYAK RN, BSN  Interventional Radiology/Vascular  Nurse Coordinator   Phone: [phone]

## 2024-08-12 ENCOUNTER — HOSPITAL ENCOUNTER (OUTPATIENT)
Facility: CLINIC | Age: 79
End: 2024-08-12
Admitting: RADIOLOGY
Payer: MEDICARE

## 2024-08-12 ENCOUNTER — TELEPHONE (OUTPATIENT)
Dept: VASCULAR SURGERY | Facility: CLINIC | Age: 79
End: 2024-08-12
Payer: MEDICARE

## 2024-08-12 DIAGNOSIS — C22.0 HCC (HEPATOCELLULAR CARCINOMA) (H): Primary | ICD-10-CM

## 2024-08-14 ENCOUNTER — LAB (OUTPATIENT)
Dept: LAB | Facility: CLINIC | Age: 79
End: 2024-08-14
Payer: MEDICARE

## 2024-08-14 DIAGNOSIS — C22.0 HCC (HEPATOCELLULAR CARCINOMA) (H): Primary | ICD-10-CM

## 2024-08-14 DIAGNOSIS — C22.0 HCC (HEPATOCELLULAR CARCINOMA) (H): ICD-10-CM

## 2024-08-14 LAB
ALBUMIN SERPL BCG-MCNC: 3.8 G/DL (ref 3.5–5.2)
ALP SERPL-CCNC: 102 U/L (ref 40–150)
ALT SERPL W P-5'-P-CCNC: 33 U/L (ref 0–70)
AST SERPL W P-5'-P-CCNC: 45 U/L (ref 0–45)
BILIRUB DIRECT SERPL-MCNC: 0.67 MG/DL (ref 0–0.3)
BILIRUB SERPL-MCNC: 2.5 MG/DL
PROT SERPL-MCNC: 6.5 G/DL (ref 6.4–8.3)

## 2024-08-14 PROCEDURE — 36415 COLL VENOUS BLD VENIPUNCTURE: CPT

## 2024-08-14 PROCEDURE — 80076 HEPATIC FUNCTION PANEL: CPT

## 2024-08-15 NOTE — PROGRESS NOTES
Interventional Radiology Clinic Visit    Date of this visit: 8/16/2024    Oskar Wilks returns for follow up for HCC.    Primary Physician: Viet López        History Of Present Illness:    Oskar Wilks is a 78 year old male who presents with unresectable biopsy-proven hepatocellular carcinoma (HCC) on a background of alcoholic cirrhosis. Was originally found to have a new 4.6 cm liver lesion in the left hepatic lobe that was a LIRADS 4 based on MRI features. Underwent percutaneous biopsy on 2/2/2023 that showed moderately differentiated HCC.  On 3/16/2023 we performed radioembolization of the mass. He made a good recovery and serial imaging has shown complete response.  The last time I saw him in clinic was 9/22/2023 and he was doing well.  However he recently developed a new lesion in the right hepatic lobe in segment 7 and after tumor board discussion he has been referred back for liver directed therapy.    Overall patient is doing well. Denies abdominal pain, nausea/vomiting, jaundice or skin itching. He did experience an URI including congestion and cough after an international trip in late July but has recovered since.       We have been following his bilirubin which increased from his baseline to 3.3 on 7/31/2024, and has been coming down since then.      Review of Systems:    As above    Past Medical/Surgical History:    Past Medical History:   Diagnosis Date    Cancer (H)     liver    Cirrhosis of liver (H)     Hip fracture (H)      Past Surgical History:   Procedure Laterality Date    ESOPHAGOSCOPY, GASTROSCOPY, DUODENOSCOPY (EGD), COMBINED N/A 4/25/2024    Procedure: Esophagoscopy, gastroscopy, duodenoscopy (EGD), combined;  Surgeon: Moses Bhatt MD;  Location: [facility]    IR LIVER BIOPSY PERCUTANEOUS  02/02/2023    IR PARACENTESIS  9/8/2022    IR SIRT (SELECTIVE INTERNAL RADIO THERAPY)  4/14/2023    IR VISCERAL ANGIOGRAM  3/14/2023    IR VISCERAL EMBOLIZATION  3/16/2023    ORIF  HIP FRACTURE         Current Medications:    Current Outpatient Medications   Medication Sig Dispense Refill    ammonium lactate (AMLACTIN) 12 % external cream APPLY TOPICALLY TO THE AFFECTED AREA DAILY      buPROPion (WELLBUTRIN XL) 300 MG 24 hr tablet Take 300 mg by mouth every morning      cholecalciferol 25 MCG (1000 UT) TABS Take 1 tablet by mouth daily      ferrous sulfate (FEROSUL) 325 (65 Fe) MG tablet Take 325 mg by mouth      fluticasone (FLONASE) 50 MCG/ACT nasal spray Spray 2 sprays into both nostrils daily      folic acid (FOLVITE) 1 MG tablet Take 1 mg by mouth daily      ketoconazole (NIZORAL) 2 % external cream       losartan (COZAAR) 25 MG tablet Take 1 tablet by mouth daily      methylPREDNISolone (MEDROL DOSEPAK) 4 MG tablet therapy pack Take as directed on package. 21 tablet 0    metoprolol succinate ER (TOPROL XL) 25 MG 24 hr tablet Take 1 tablet by mouth daily at 2 pm      metoprolol tartrate (LOPRESSOR) 25 MG tablet Take 25 mg by mouth daily      multivitamin w/minerals (THERA-VIT-M) tablet Take 1 tablet by mouth daily      omeprazole (PRILOSEC) 20 MG DR capsule TAKE 1 CAPSULE(20 MG) BY MOUTH DAILY 1 HOUR BEFORE A MEAL      ondansetron (ZOFRAN) 4 MG tablet Take 1-2 tablets (4-8 mg) by mouth every 6 hours as needed for nausea (vomiting) 40 tablet 0    oxyCODONE (ROXICODONE) 5 MG tablet Take 1-2 tablets (5-10 mg) by mouth every 6 hours as needed for moderate to severe pain 6 tablet 0    spironolactone (ALDACTONE) 50 MG tablet Take 50 mg by mouth daily      torsemide (DEMADEX) 10 MG tablet Take 10 mg by mouth daily      traMADol (ULTRAM) 50 MG tablet TAKE 1/2 TO 1 TABLET BY MOUTH EVERY 6 HOURS AS NEEDED FOR PAIN. 1/2 TABLET FOR PAIN RATED 4 TO 7 AND 1 TABLET FOR PAIN RATED 8 TO 10         Allergies:    Penicillins and Adhesive tape    Family History:    No family history on file.      Social History:    Wife Laura was present during the visit.    Social History     Socioeconomic History    Marital  status:    Tobacco Use    Smoking status: Former     Current packs/day: 0.00     Types: Cigarettes     Start date: 1977     Quit date: 1985     Years since quittin.6    Smokeless tobacco: Never   Vaping Use    Vaping status: Never Used   Substance and Sexual Activity    Alcohol use: Not Currently     Comment: sober since 2022    Drug use: Never       Physical Exam:    CONSTITUTIONAL: healthy, alert and no distress.  PSYCHIATRIC: mentation appears normal and affect normal.  NEURO: Normal movements and speech.  EYES: No jaundice or pallor.  SKIN: No jaundice.   RESP: No audible cough or wheeze.      Laboratory Studies:    Lab Results   Component Value Date    HGB 11.1 2024     Lab Results   Component Value Date     2024     Lab Results   Component Value Date    WBC 7.0 2024       Lab Results   Component Value Date    INR 1.58 2024       Lab Results   Component Value Date    PROTTOTAL 6.5 2024      Lab Results   Component Value Date    ALBUMIN 3.8 2024     Lab Results   Component Value Date    BILITOTAL 2.5 2024     Lab Results   Component Value Date    ALKPHOS 102 2024     Lab Results   Component Value Date    AST 45 2024     Lab Results   Component Value Date    ALT 33 2024       Lab Results   Component Value Date    CR 0.76 2024     Lab Results   Component Value Date    BUN 18.2 2024     AFP:  7/3/2024: 4.5  3/19/2024: 4.1  2023: 3.1  2023: 3.0  2023: 3.9  2023: 4.4  2023: 3.2      Imaging:     I personally reviewed and interpreted the his MRI from 2024.  There is a 2.0 cm arterial enhancing lesion posterosuperiorly in the subcapsular portion of in segment 7 that meets LIRADS 5 criteria based on growth.      ASSESSMENT/PLAN:      Oskar Wilks is a 78 year old male with cirrhosis s/p radioembolization of the left hepatic lobe HCC last year with complete imaging response, and now  presents with new LI-RADS 5 lesion in segment 7. Patient is doing well overall.  Initially his bilirubin at the time of discussing him at tumor board had increased from baseline to 3.3 and came down to 2.8 so we had planned to do chemoembolization, but now his bilirubin continues to decrease and is now 2.5 so it is worth considering selective radioembolization.    Today he describes that he had a cold/URI, possibly COVID, after returning from an international cruise in July, which may account for his sudden rise in bilirubin to 3.3 on 7/31/2024, which is now trending back towards his baseline of around 2.0.    We therefore discussed the following:  - My recommendation would be selective radioembolization due to the better and more durable response we typically see over chemoembolization, and he tolerated radioembolization well in the past with very good tumor response.  - We should plan on radioembolization mapping and we will recheck his bilirubin to see if it is continuing to return to his baseline.  - If it still remains too high, we will perform chemoembolization to minimize the risk of worsening of his liver function.  - If it has returned to baseline, and we can treat him selectively without too much normal surrounding parenchyma receiving radiation, we will proceed with selective radioembolization.    We discussed the chemoembolization procedure and briefly went over the radioembolization procedure again and the risks versus benefits.  They expressed understanding and agreed with the above plan.      It was a pleasure seeing the patient.     Signed,  Samara Leung MD, PGY 5    Co-signed,  Kera Pillai M.D.    Interventional Radiology  Department of Radiology  [facility]      CC  Patient Care Team:  Viet López MD as PCP - General (Family Medicine)  Laura Neves MD as Assigned Gastroenterology Provider  SELF, REFERRED      I, Dr Kera Pillai, was present with the fellow  during the clinic visit, including the history and exam. I discussed the case with the fellow and agree with the findings as documented in the assessment and plan.      45 minutes spent by me on the date of the encounter doing chart review, history and exam, imaging review, documentation and further activities per the note      Video-Visit Details     Type of service:  Video Visit     Video Start and End Time:  Joined the call at 8/16/2024, 10:42:49 am.  Left the call at 8/16/2024, 11:08:44 am.    Originating Location (pt. Location): Home     Distant Location (provider location): Offsite     Platform used for Video Visit: Natasha

## 2024-08-16 ENCOUNTER — TELEPHONE (OUTPATIENT)
Dept: VASCULAR SURGERY | Facility: CLINIC | Age: 79
End: 2024-08-16
Payer: MEDICARE

## 2024-08-16 ENCOUNTER — VIRTUAL VISIT (OUTPATIENT)
Dept: RADIOLOGY | Facility: CLINIC | Age: 79
End: 2024-08-16
Attending: RADIOLOGY
Payer: MEDICARE

## 2024-08-16 DIAGNOSIS — C22.0 HCC (HEPATOCELLULAR CARCINOMA) (H): Primary | ICD-10-CM

## 2024-08-16 DIAGNOSIS — C22.0 HEPATOCELLULAR CARCINOMA (H): Primary | ICD-10-CM

## 2024-08-16 PROCEDURE — 99215 OFFICE O/P EST HI 40 MIN: CPT | Mod: 95 | Performed by: RADIOLOGY

## 2024-08-16 NOTE — LETTER
8/16/2024      Oskar Wilks  [address]  [place] [address]      Dear Colleague,    Thank you for referring your patient, Oskar Wilks, to the [facility]. Please see a copy of my visit note below.          Interventional Radiology Clinic Visit    Date of this visit: 8/16/2024    Oskar iWlks returns for follow up for HCC.    Primary Physician: Viet López        History Of Present Illness:    Oskar Wilks is a 78 year old male who presents with unresectable biopsy-proven hepatocellular carcinoma (HCC) on a background of alcoholic cirrhosis. Was originally found to have a new 4.6 cm liver lesion in the left hepatic lobe that was a LIRADS 4 based on MRI features. Underwent percutaneous biopsy on 2/2/2023 that showed moderately differentiated HCC.  On 3/16/2023 we performed radioembolization of the mass. He made a good recovery and serial imaging has shown complete response.  The last time I saw him in clinic was 9/22/2023 and he was doing well.  However he recently developed a new lesion in the right hepatic lobe in segment 7 and after tumor board discussion he has been referred back for liver directed therapy.    Overall patient is doing well. Denies abdominal pain, nausea/vomiting, jaundice or skin itching. He did experience an URI including congestion and cough after an international trip in late July but has recovered since.       We have been following his bilirubin which increased from his baseline to 3.3 on 7/31/2024, and has been coming down since then.      Review of Systems:    As above    Past Medical/Surgical History:    Past Medical History:   Diagnosis Date     Cancer (H)     liver     Cirrhosis of liver (H)      Hip fracture (H)      Past Surgical History:   Procedure Laterality Date     ESOPHAGOSCOPY, GASTROSCOPY, DUODENOSCOPY (EGD), COMBINED N/A 4/25/2024    Procedure: Esophagoscopy, gastroscopy, duodenoscopy (EGD), combined;  Surgeon: Darell  Moses Sandoval MD;  Location:  GI     IR LIVER BIOPSY PERCUTANEOUS  02/02/2023     IR PARACENTESIS  9/8/2022     IR SIRT (SELECTIVE INTERNAL RADIO THERAPY)  4/14/2023     IR VISCERAL ANGIOGRAM  3/14/2023     IR VISCERAL EMBOLIZATION  3/16/2023     ORIF HIP FRACTURE         Current Medications:    Current Outpatient Medications   Medication Sig Dispense Refill     ammonium lactate (AMLACTIN) 12 % external cream APPLY TOPICALLY TO THE AFFECTED AREA DAILY       buPROPion (WELLBUTRIN XL) 300 MG 24 hr tablet Take 300 mg by mouth every morning       cholecalciferol 25 MCG (1000 UT) TABS Take 1 tablet by mouth daily       ferrous sulfate (FEROSUL) 325 (65 Fe) MG tablet Take 325 mg by mouth       fluticasone (FLONASE) 50 MCG/ACT nasal spray Spray 2 sprays into both nostrils daily       folic acid (FOLVITE) 1 MG tablet Take 1 mg by mouth daily       ketoconazole (NIZORAL) 2 % external cream        losartan (COZAAR) 25 MG tablet Take 1 tablet by mouth daily       methylPREDNISolone (MEDROL DOSEPAK) 4 MG tablet therapy pack Take as directed on package. 21 tablet 0     metoprolol succinate ER (TOPROL XL) 25 MG 24 hr tablet Take 1 tablet by mouth daily at 2 pm       metoprolol tartrate (LOPRESSOR) 25 MG tablet Take 25 mg by mouth daily       multivitamin w/minerals (THERA-VIT-M) tablet Take 1 tablet by mouth daily       omeprazole (PRILOSEC) 20 MG DR capsule TAKE 1 CAPSULE(20 MG) BY MOUTH DAILY 1 HOUR BEFORE A MEAL       ondansetron (ZOFRAN) 4 MG tablet Take 1-2 tablets (4-8 mg) by mouth every 6 hours as needed for nausea (vomiting) 40 tablet 0     oxyCODONE (ROXICODONE) 5 MG tablet Take 1-2 tablets (5-10 mg) by mouth every 6 hours as needed for moderate to severe pain 6 tablet 0     spironolactone (ALDACTONE) 50 MG tablet Take 50 mg by mouth daily       torsemide (DEMADEX) 10 MG tablet Take 10 mg by mouth daily       traMADol (ULTRAM) 50 MG tablet TAKE 1/2 TO 1 TABLET BY MOUTH EVERY 6 HOURS AS NEEDED FOR PAIN. 1/2 TABLET  FOR PAIN RATED 4 TO 7 AND 1 TABLET FOR PAIN RATED 8 TO 10         Allergies:    Penicillins and Adhesive tape    Family History:    No family history on file.      Social History:    Wife Laura was present during the visit.    Social History     Socioeconomic History     Marital status:    Tobacco Use     Smoking status: Former     Current packs/day: 0.00     Types: Cigarettes     Start date: 1977     Quit date: 1985     Years since quittin.6     Smokeless tobacco: Never   Vaping Use     Vaping status: Never Used   Substance and Sexual Activity     Alcohol use: Not Currently     Comment: sober since 2022     Drug use: Never       Physical Exam:    CONSTITUTIONAL: healthy, alert and no distress.  PSYCHIATRIC: mentation appears normal and affect normal.  NEURO: Normal movements and speech.  EYES: No jaundice or pallor.  SKIN: No jaundice.   RESP: No audible cough or wheeze.      Laboratory Studies:    Lab Results   Component Value Date    HGB 11.1 2024     Lab Results   Component Value Date     2024     Lab Results   Component Value Date    WBC 7.0 2024       Lab Results   Component Value Date    INR 1.58 2024       Lab Results   Component Value Date    PROTTOTAL 6.5 2024      Lab Results   Component Value Date    ALBUMIN 3.8 2024     Lab Results   Component Value Date    BILITOTAL 2.5 2024     Lab Results   Component Value Date    ALKPHOS 102 2024     Lab Results   Component Value Date    AST 45 2024     Lab Results   Component Value Date    ALT 33 2024       Lab Results   Component Value Date    CR 0.76 2024     Lab Results   Component Value Date    BUN 18.2 2024     AFP:  7/3/2024: 4.5  3/19/2024: 4.1  2023: 3.1  2023: 3.0  2023: 3.9  2023: 4.4  2023: 3.2      Imaging:     I personally reviewed and interpreted the his MRI from 2024.  There is a 2.0 cm arterial enhancing lesion  posterosuperiorly in the subcapsular portion of in segment 7 that meets LIRADS 5 criteria based on growth.      ASSESSMENT/PLAN:      Oskar Wilks is a 78 year old male with cirrhosis s/p radioembolization of the left hepatic lobe HCC last year with complete imaging response, and now presents with new LI-RADS 5 lesion in segment 7. Patient is doing well overall.  Initially his bilirubin at the time of discussing him at tumor board had increased from baseline to 3.3 and came down to 2.8 so we had planned to do chemoembolization, but now his bilirubin continues to decrease and is now 2.5 so it is worth considering selective radioembolization.    Today he describes that he had a cold/URI, possibly COVID, after returning from an international cruise in July, which may account for his sudden rise in bilirubin to 3.3 on 7/31/2024, which is now trending back towards his baseline of around 2.0.    We therefore discussed the following:  - My recommendation would be selective radioembolization due to the better and more durable response we typically see over chemoembolization, and he tolerated radioembolization well in the past with very good tumor response.  - We should plan on radioembolization mapping and we will recheck his bilirubin to see if it is continuing to return to his baseline.  - If it still remains too high, we will perform chemoembolization to minimize the risk of worsening of his liver function.  - If it has returned to baseline, and we can treat him selectively without too much normal surrounding parenchyma receiving radiation, we will proceed with selective radioembolization.    We discussed the chemoembolization procedure and briefly went over the radioembolization procedure again and the risks versus benefits.  They expressed understanding and agreed with the above plan.      It was a pleasure seeing the patient.     Signed,  Samara Leung MD, PGY 5    Co-signed,  Kera Upton  Professor  Interventional Radiology  Department of Radiology  [facility]  Patient Care Team:  Viet López MD as PCP - General (Family Medicine)  Laura Neves MD as Assigned Gastroenterology Provider  SELF, REFERRED      I, Dr Kera Pillai, was present with the fellow during the clinic visit, including the history and exam. I discussed the case with the fellow and agree with the findings as documented in the assessment and plan.      45 minutes spent by me on the date of the encounter doing chart review, history and exam, imaging review, documentation and further activities per the note      Video-Visit Details     Type of service:  Video Visit     Video Start and End Time:  Joined the call at 8/16/2024, 10:42:49 am.  Left the call at 8/16/2024, 11:08:44 am.    Originating Location (pt. Location): Home     Distant Location (provider location): Offsite     Platform used for Video Visit: Natasha       Again, thank you for allowing me to participate in the care of your patient.        Sincerely,        Kera Santos MD

## 2024-08-16 NOTE — NURSING NOTE
Current patient location: [id number]  [place] [address]    Is the patient currently in the state of [place]? YES    Visit mode:VIDEO    If the visit is dropped, the patient can be reconnected by: VIDEO VISIT: Text to cell phone:   Telephone Information:   Mobile [phone]       Will anyone else be joining the visit? NO  (If patient encounters technical issues they should call [phone]    How would you like to obtain your AVS? MyChart    Are changes needed to the allergy or medication list? Pt stated no changes to allergies and Pt stated no med changes    Are refills needed on medications prescribed by this physician? NO    Rooming Documentation:  Questionnaire(s) completed      Reason for visit: RECHECK    Karen CURRAN

## 2024-08-16 NOTE — PROGRESS NOTES
Joined the call at 8/16/2024, 10:43:06 am.  Left the call at 8/16/2024, 11:08:45 am.  You were on the call for 25 minutes 39 seconds .

## 2024-08-16 NOTE — TELEPHONE ENCOUNTER
Called and left a msg for wife.   Inquired if they had any questions re: Y90.    Informed her that our plan would be to cancel the TACE for next week Weds.     I will submit Y90 paperwork today so that we can get prior auth going     I am out next week but do have RNs covering for me.     The goal would be to schedule pt as soon as possible once we do have the approval.     Left RN line for her to return my call.     Laura NAYAK RN, BSN  Interventional Radiology/Vascular  Nurse Coordinator   Phone: [phone]

## 2024-08-26 ENCOUNTER — HOSPITAL ENCOUNTER (OUTPATIENT)
Dept: NUCLEAR MEDICINE | Facility: CLINIC | Age: 79
Setting detail: NUCLEAR MEDICINE
Discharge: HOME | End: 2024-08-26
Attending: RADIOLOGY | Admitting: RADIOLOGY
Payer: MEDICARE

## 2024-08-26 ENCOUNTER — APPOINTMENT (OUTPATIENT)
Dept: INTERVENTIONAL RADIOLOGY/VASCULAR | Facility: CLINIC | Age: 79
End: 2024-08-26
Attending: RADIOLOGY
Payer: MEDICARE

## 2024-08-26 ENCOUNTER — HOSPITAL ENCOUNTER (OUTPATIENT)
Facility: CLINIC | Age: 79
Discharge: HOME | End: 2024-08-26
Attending: RADIOLOGY | Admitting: RADIOLOGY
Payer: MEDICARE

## 2024-08-26 ENCOUNTER — APPOINTMENT (OUTPATIENT)
Dept: MEDSURG UNIT | Facility: CLINIC | Age: 79
End: 2024-08-26
Attending: RADIOLOGY
Payer: MEDICARE

## 2024-08-26 VITALS
RESPIRATION RATE: 18 BRPM | DIASTOLIC BLOOD PRESSURE: 78 MMHG | SYSTOLIC BLOOD PRESSURE: 120 MMHG | TEMPERATURE: 98.3 F | HEART RATE: 63 BPM | BODY MASS INDEX: 27.52 KG/M2 | OXYGEN SATURATION: 98 % | WEIGHT: 197.3 LBS

## 2024-08-26 DIAGNOSIS — C22.0 HCC (HEPATOCELLULAR CARCINOMA) (H): ICD-10-CM

## 2024-08-26 LAB
AFP SERPL-MCNC: 4.2 NG/ML
ALBUMIN SERPL BCG-MCNC: 3.6 G/DL (ref 3.5–5.2)
ALP SERPL-CCNC: 97 U/L (ref 40–150)
ALT SERPL W P-5'-P-CCNC: 30 U/L (ref 0–70)
AST SERPL W P-5'-P-CCNC: 46 U/L (ref 0–45)
ATRIAL RATE - MUSE: 64 BPM
BILIRUB DIRECT SERPL-MCNC: 0.69 MG/DL (ref 0–0.3)
BILIRUB SERPL-MCNC: 2.3 MG/DL
DIASTOLIC BLOOD PRESSURE - MUSE: NORMAL MMHG
ERYTHROCYTE [DISTWIDTH] IN BLOOD BY AUTOMATED COUNT: 19.2 % (ref 10–15)
HCT VFR BLD AUTO: 31.4 % (ref 40–53)
HGB BLD-MCNC: 10.5 G/DL (ref 13.3–17.7)
INR PPP: 1.66 (ref 0.85–1.15)
INTERPRETATION ECG - MUSE: NORMAL
MCH RBC QN AUTO: 30.3 PG (ref 26.5–33)
MCHC RBC AUTO-ENTMCNC: 33.4 G/DL (ref 31.5–36.5)
MCV RBC AUTO: 91 FL (ref 78–100)
P AXIS - MUSE: 60 DEGREES
PLATELET # BLD AUTO: 84 10E3/UL (ref 150–450)
PR INTERVAL - MUSE: 198 MS
PROT SERPL-MCNC: 5.8 G/DL (ref 6.4–8.3)
QRS DURATION - MUSE: 100 MS
QT - MUSE: 440 MS
QTC - MUSE: 453 MS
R AXIS - MUSE: 1 DEGREES
RBC # BLD AUTO: 3.47 10E6/UL (ref 4.4–5.9)
SYSTOLIC BLOOD PRESSURE - MUSE: NORMAL MMHG
T AXIS - MUSE: 10 DEGREES
VENTRICULAR RATE- MUSE: 64 BPM
WBC # BLD AUTO: 5 10E3/UL (ref 4–11)

## 2024-08-26 PROCEDURE — 36247 INS CATH ABD/L-EXT ART 3RD: CPT | Mod: GC | Performed by: RADIOLOGY

## 2024-08-26 PROCEDURE — C1769 GUIDE WIRE: HCPCS

## 2024-08-26 PROCEDURE — 85027 COMPLETE CBC AUTOMATED: CPT | Performed by: RADIOLOGY

## 2024-08-26 PROCEDURE — 258N000003 HC RX IP 258 OP 636: Performed by: NURSE PRACTITIONER

## 2024-08-26 PROCEDURE — 99152 MOD SED SAME PHYS/QHP 5/>YRS: CPT

## 2024-08-26 PROCEDURE — 999N000142 HC STATISTIC PROCEDURE PREP ONLY

## 2024-08-26 PROCEDURE — 75774 ARTERY X-RAY EACH VESSEL: CPT

## 2024-08-26 PROCEDURE — 250N000009 HC RX 250: Performed by: RADIOLOGY

## 2024-08-26 PROCEDURE — 80076 HEPATIC FUNCTION PANEL: CPT | Performed by: RADIOLOGY

## 2024-08-26 PROCEDURE — 272N000143 HC KIT CR3

## 2024-08-26 PROCEDURE — 82105 ALPHA-FETOPROTEIN SERUM: CPT | Performed by: RADIOLOGY

## 2024-08-26 PROCEDURE — 76937 US GUIDE VASCULAR ACCESS: CPT

## 2024-08-26 PROCEDURE — 78830 RP LOCLZJ TUM SPECT W/CT 1: CPT | Mod: MG

## 2024-08-26 PROCEDURE — 250N000011 HC RX IP 250 OP 636

## 2024-08-26 PROCEDURE — 76377 3D RENDER W/INTRP POSTPROCES: CPT | Mod: 26 | Performed by: RADIOLOGY

## 2024-08-26 PROCEDURE — C1760 CLOSURE DEV, VASC: HCPCS

## 2024-08-26 PROCEDURE — 343N000001 HC RX 343: Performed by: RADIOLOGY

## 2024-08-26 PROCEDURE — 85610 PROTHROMBIN TIME: CPT | Performed by: RADIOLOGY

## 2024-08-26 PROCEDURE — G1010 CDSM STANSON: HCPCS | Performed by: RADIOLOGY

## 2024-08-26 PROCEDURE — 272N000504 HC NEEDLE CR4

## 2024-08-26 PROCEDURE — 93005 ELECTROCARDIOGRAM TRACING: CPT

## 2024-08-26 PROCEDURE — 75726 ARTERY X-RAYS ABDOMEN: CPT | Mod: 26 | Performed by: RADIOLOGY

## 2024-08-26 PROCEDURE — A9540 TC99M MAA: HCPCS | Performed by: RADIOLOGY

## 2024-08-26 PROCEDURE — 77300 RADIATION THERAPY DOSE PLAN: CPT | Mod: 26 | Performed by: RADIOLOGY

## 2024-08-26 PROCEDURE — 78830 RP LOCLZJ TUM SPECT W/CT 1: CPT | Mod: 26 | Performed by: RADIOLOGY

## 2024-08-26 PROCEDURE — 999N000133 HC STATISTIC PP CARE STAGE 2

## 2024-08-26 PROCEDURE — C1887 CATHETER, GUIDING: HCPCS

## 2024-08-26 PROCEDURE — 36247 INS CATH ABD/L-EXT ART 3RD: CPT

## 2024-08-26 PROCEDURE — 250N000011 HC RX IP 250 OP 636: Performed by: RADIOLOGY

## 2024-08-26 PROCEDURE — 255N000002 HC RX 255 OP 636: Performed by: RADIOLOGY

## 2024-08-26 PROCEDURE — G1010 CDSM STANSON: HCPCS

## 2024-08-26 PROCEDURE — 76937 US GUIDE VASCULAR ACCESS: CPT | Mod: 26 | Performed by: RADIOLOGY

## 2024-08-26 PROCEDURE — 77263 THER RADIOLOGY TX PLNG CPLX: CPT | Mod: GC | Performed by: RADIOLOGY

## 2024-08-26 PROCEDURE — 272N000566 HC SHEATH CR3

## 2024-08-26 PROCEDURE — 36415 COLL VENOUS BLD VENIPUNCTURE: CPT | Performed by: RADIOLOGY

## 2024-08-26 RX ORDER — NALOXONE HYDROCHLORIDE 0.4 MG/ML
0.2 INJECTION, SOLUTION INTRAMUSCULAR; INTRAVENOUS; SUBCUTANEOUS
Status: DISCONTINUED | OUTPATIENT
Start: 2024-08-26 | End: 2024-08-26 | Stop reason: HOSPADM

## 2024-08-26 RX ORDER — NALOXONE HYDROCHLORIDE 0.4 MG/ML
0.4 INJECTION, SOLUTION INTRAMUSCULAR; INTRAVENOUS; SUBCUTANEOUS
Status: DISCONTINUED | OUTPATIENT
Start: 2024-08-26 | End: 2024-08-26 | Stop reason: HOSPADM

## 2024-08-26 RX ORDER — FENTANYL CITRATE 50 UG/ML
25-50 INJECTION, SOLUTION INTRAMUSCULAR; INTRAVENOUS EVERY 5 MIN PRN
Status: DISCONTINUED | OUTPATIENT
Start: 2024-08-26 | End: 2024-08-26 | Stop reason: HOSPADM

## 2024-08-26 RX ORDER — IODIXANOL 320 MG/ML
150 INJECTION, SOLUTION INTRAVASCULAR ONCE
Status: COMPLETED | OUTPATIENT
Start: 2024-08-26 | End: 2024-08-26

## 2024-08-26 RX ORDER — HEPARIN SODIUM 200 [USP'U]/100ML
1 INJECTION, SOLUTION INTRAVENOUS EVERY 5 MIN PRN
Status: DISCONTINUED | OUTPATIENT
Start: 2024-08-26 | End: 2024-08-26 | Stop reason: HOSPADM

## 2024-08-26 RX ORDER — HEPARIN SODIUM 200 [USP'U]/100ML
1 INJECTION, SOLUTION INTRAVENOUS EVERY 5 MIN PRN
Status: DISCONTINUED | OUTPATIENT
Start: 2024-08-26 | End: 2024-08-26

## 2024-08-26 RX ORDER — LIDOCAINE 40 MG/G
CREAM TOPICAL
Status: DISCONTINUED | OUTPATIENT
Start: 2024-08-26 | End: 2024-08-26 | Stop reason: HOSPADM

## 2024-08-26 RX ORDER — NITROGLYCERIN 5 MG/ML
100-500 VIAL (ML) INTRAVENOUS
Status: COMPLETED | OUTPATIENT
Start: 2024-08-26 | End: 2024-08-26

## 2024-08-26 RX ORDER — FLUMAZENIL 0.1 MG/ML
0.2 INJECTION, SOLUTION INTRAVENOUS
Status: DISCONTINUED | OUTPATIENT
Start: 2024-08-26 | End: 2024-08-26 | Stop reason: HOSPADM

## 2024-08-26 RX ORDER — SODIUM CHLORIDE 9 MG/ML
INJECTION, SOLUTION INTRAVENOUS CONTINUOUS
Status: DISCONTINUED | OUTPATIENT
Start: 2024-08-26 | End: 2024-08-26 | Stop reason: HOSPADM

## 2024-08-26 RX ADMIN — FENTANYL CITRATE 50 MCG: 50 INJECTION, SOLUTION INTRAMUSCULAR; INTRAVENOUS at 08:29

## 2024-08-26 RX ADMIN — MIDAZOLAM 0.5 MG: 1 INJECTION INTRAMUSCULAR; INTRAVENOUS at 10:42

## 2024-08-26 RX ADMIN — KIT FOR THE PREPARATION OF TECHNETIUM TC 99M ALBUMIN AGGREGATED 4 MILLICURIE: 2.5 INJECTION, POWDER, FOR SOLUTION INTRAVENOUS at 13:16

## 2024-08-26 RX ADMIN — FENTANYL CITRATE 25 MCG: 50 INJECTION, SOLUTION INTRAMUSCULAR; INTRAVENOUS at 08:50

## 2024-08-26 RX ADMIN — FENTANYL CITRATE 25 MCG: 50 INJECTION, SOLUTION INTRAMUSCULAR; INTRAVENOUS at 08:56

## 2024-08-26 RX ADMIN — MIDAZOLAM 0.5 MG: 1 INJECTION INTRAMUSCULAR; INTRAVENOUS at 09:29

## 2024-08-26 RX ADMIN — SODIUM CHLORIDE: 9 INJECTION, SOLUTION INTRAVENOUS at 10:07

## 2024-08-26 RX ADMIN — FENTANYL CITRATE 25 MCG: 50 INJECTION, SOLUTION INTRAMUSCULAR; INTRAVENOUS at 10:42

## 2024-08-26 RX ADMIN — FENTANYL CITRATE 25 MCG: 50 INJECTION, SOLUTION INTRAMUSCULAR; INTRAVENOUS at 08:35

## 2024-08-26 RX ADMIN — MIDAZOLAM 0.5 MG: 1 INJECTION INTRAMUSCULAR; INTRAVENOUS at 08:35

## 2024-08-26 RX ADMIN — MIDAZOLAM 0.5 MG: 1 INJECTION INTRAMUSCULAR; INTRAVENOUS at 09:56

## 2024-08-26 RX ADMIN — MIDAZOLAM 0.5 MG: 1 INJECTION INTRAMUSCULAR; INTRAVENOUS at 10:24

## 2024-08-26 RX ADMIN — MIDAZOLAM 0.5 MG: 1 INJECTION INTRAMUSCULAR; INTRAVENOUS at 08:57

## 2024-08-26 RX ADMIN — MIDAZOLAM 0.5 MG: 1 INJECTION INTRAMUSCULAR; INTRAVENOUS at 08:50

## 2024-08-26 RX ADMIN — HEPARIN SODIUM 3 BAG: 200 INJECTION, SOLUTION INTRAVENOUS at 09:01

## 2024-08-26 RX ADMIN — MIDAZOLAM 0.5 MG: 1 INJECTION INTRAMUSCULAR; INTRAVENOUS at 10:08

## 2024-08-26 RX ADMIN — FENTANYL CITRATE 25 MCG: 50 INJECTION, SOLUTION INTRAMUSCULAR; INTRAVENOUS at 09:56

## 2024-08-26 RX ADMIN — LIDOCAINE HYDROCHLORIDE 8 ML: 10 INJECTION, SOLUTION EPIDURAL; INFILTRATION; INTRACAUDAL; PERINEURAL at 09:01

## 2024-08-26 RX ADMIN — FENTANYL CITRATE 25 MCG: 50 INJECTION, SOLUTION INTRAMUSCULAR; INTRAVENOUS at 09:29

## 2024-08-26 RX ADMIN — SODIUM CHLORIDE: 9 INJECTION, SOLUTION INTRAVENOUS at 07:24

## 2024-08-26 RX ADMIN — IODIXANOL 120 ML: 320 INJECTION, SOLUTION INTRAVASCULAR at 10:58

## 2024-08-26 RX ADMIN — MIDAZOLAM 1 MG: 1 INJECTION INTRAMUSCULAR; INTRAVENOUS at 08:29

## 2024-08-26 RX ADMIN — FENTANYL CITRATE 25 MCG: 50 INJECTION, SOLUTION INTRAMUSCULAR; INTRAVENOUS at 10:24

## 2024-08-26 RX ADMIN — FENTANYL CITRATE 25 MCG: 50 INJECTION, SOLUTION INTRAMUSCULAR; INTRAVENOUS at 10:08

## 2024-08-26 ASSESSMENT — ACTIVITIES OF DAILY LIVING (ADL)
ADLS_ACUITY_SCORE: 35

## 2024-08-26 NOTE — PROCEDURES
[facility]    Procedure: Y90 mapping    Date/Time: 8/26/2024 11:10 AM    Performed by: Samara Leung MD  Authorized by: Kera Santos MD  IR Fellow Physician:    Pre Procedure Diagnosis: HCC  Post Procedure Diagnosis: HCC    UNIVERSAL PROTOCOL   Site Marked: NA  Prior Images Obtained and Reviewed:  Yes  Required items: Required blood products, implants, devices and special equipment available    Patient identity confirmed:  Verbally with patient, arm band, provided demographic data and hospital-assigned identification number  Patient was reevaluated immediately before administering moderate or deep sedation or anesthesia  Confirmation Checklist:  Patient's identity using two indicators, relevant allergies, procedure was appropriate and matched the consent or emergent situation and correct equipment/implants were available  Time out: Immediately prior to the procedure a time out was called    Universal Protocol: the Joint Commission Universal Protocol was followed    Preparation: Patient was prepped and draped in usual sterile fashion       ANESTHESIA    Anesthesia:  Local infiltration  Local Anesthetic:  Lidocaine 1% without epinephrine      SEDATION  Patient Sedated: Yes    Sedation Type:  Moderate (conscious) sedation  Vital signs: Vital signs monitored during sedation    See dictated procedure note for full details.  Findings: A branch of right hepatic artery supplies the tumor in segment 7.    Specimens: none    Procedural Complications: None    Condition: Stable    Plan: Bed rest for 2 hours with right leg straight.       PROCEDURE    Patient Tolerance:  Patient tolerated the procedure well with no immediate complications  Length of time physician/provider present for 1:1 monitoring during sedation:  128-141 min

## 2024-08-26 NOTE — PROGRESS NOTES
Prep complete for Y90 Mapping. Awaiting lab results. Patients wife Pat at bedside will transport pt home post procedure.

## 2024-08-26 NOTE — PROGRESS NOTES
Pt arrived to unit 2A s/p Y90 mapping. Right groin site WNL. CMS intact. VSS, pt denies pain and is tolerating PO intake. Bedrest until 1300. Will continue to monitor.

## 2024-08-26 NOTE — IR NOTE
Patient Name: Oskar Wilks  Medical Record Number: [id number]  Today's Date: 8/26/2024    Procedure: Radioembolization mapping  Proceduralist: Dr. Madhu Velez  Pathology present: N/A    Procedure Start: 0849  Procedure end: 1100  Sedation medications administered: Midazolam 5 mg, Fentanyl 250 mcg     Report given to: TREVOR Hopkins 2A  : N/A    Other Notes: Pt arrived to IR room 1 from . Consent reviewed. Pt denies any questions or concerns regarding procedure. Pt positioned supine and monitored per protocol. Right femoral artery access.    5 Fr arterial sheath removed at 1052   6 Fr AngioSeal deployed at 1053   Groin site appearance: CDI  Pedal pulse: +3 DP, doppler PT   FLAT BEDREST FOR 2 HOURS     Pt tolerated procedure without any noted complications. Pt transferred back to .

## 2024-08-26 NOTE — PRE-PROCEDURE
GENERAL PRE-PROCEDURE:   Procedure:  Y90 mapping or TACE    Written consent obtained?: Yes    Risks and benefits: Risks, benefits and alternatives were discussed    Consent given by:  Patient  Patient states understanding of procedure being performed: Yes    Patient's understanding of procedure matches consent: Yes    Procedure consent matches procedure scheduled: Yes    Expected level of sedation:  Moderate  Appropriately NPO:  Yes  ASA Class:  2  Mallampati  :  Grade 1- soft palate, uvula, tonsillar pillars, and posterior pharyngeal wall visible  Lungs:  Lungs clear with good breath sounds bilaterally  Heart:  Systolic murmur  History & Physical reviewed:  History and physical reviewed and no updates needed  Statement of review:  I have reviewed the lab findings, diagnostic data, medications, and the plan for sedation

## 2024-08-26 NOTE — DISCHARGE INSTRUCTIONS
[facility]   Interventional Radiology  Discharge Instructions Post Angiography for Insertion of   Radioactive Microspheres to the Liver    AFTER YOU GO HOME          Relax and take it easy for 24 hours.       Drink plenty of fluids.       Resume your regular diet, unless otherwise instructed by your Primary Physician.       DO NOT smoke for at least 24 hours, if you were given any sedation.       DO NOT drink alcoholic beverages the day of your procedure.       DO NOT drive or operate machinery at home or at work for 24 hours.          DO NOT make any important or legal decisions for 24 hours following your procedure.       DO NOT take a shower for at least 12 hours following your procedure. No tub bath, hot tubs, or swimming for 5 days        Care of groin site  It is normal to have a small bruise or lump at the site.  For the first 2 days, when you cough, sneeze or move your bowels, hold your hand over the puncture site and press gently.  Do NOT lift more than 10 pounds or do any strenuous exercise for at least 3 to 5 days.  Do not use lotion or powder near the puncture site for 3 days.  If you start bleeding from the site in your groin: lie down flat and press firmly on the site. Call your doctor as soon as you can.   Call 911 right away if you have: Heavy bleeding, bleeding that does not stop.      CALL THE PHYSICIAN IF:       - You start bleeding from the procedure site. A small lump or bruise is common at the puncture site. Your physician will tell you if you need to return to the hospital.      - You develop numbness, coolness or a change in color of the leg that was punctured.      - You experience increased pain or redness at the puncture site.      - You develop hives or a rash or unexplained itching.      - You develop a temperature of 101 degrees F or greater.    Additional Information:         Follow the Discharge Instructions for the Liver Brachytherapy; Contrast Instructions and  Instructions for the [employer].     Closure Device: Angioseal             [facility] INTERVENTIONAL RADIOLOGY DEPARTMENT         Procedure Physician: Dr. Madhu Velez                               Date of Procedure: August 26, 2024       Telephone Numbers:   [phone]      Monday-Friday 7:30 am to 4:00 pm                                        [phone]     After 4:00 pm Monday-Friday, Weekend and Holidays. Ask for the Interventional Radiologist on call. Someone is on call 24 hrs/day.

## 2024-08-27 ENCOUNTER — TELEPHONE (OUTPATIENT)
Dept: VASCULAR SURGERY | Facility: CLINIC | Age: 79
End: 2024-08-27
Payer: MEDICARE

## 2024-08-27 ENCOUNTER — TELEPHONE (OUTPATIENT)
Dept: RADIOLOGY | Facility: CLINIC | Age: 79
End: 2024-08-27
Payer: MEDICARE

## 2024-08-27 ENCOUNTER — DOCUMENTATION ONLY (OUTPATIENT)
Dept: VASCULAR SURGERY | Facility: CLINIC | Age: 79
End: 2024-08-27
Payer: MEDICARE

## 2024-08-27 NOTE — TELEPHONE ENCOUNTER
[facility] Call Center    Phone Message    May a detailed message be left on voicemail: yes     Reason for Call: Other: Patients wife Pat returning call to Laura ARRIAZA.   Secure message was sent before TE.     Please call back anytime today. Pat will have the ringer on. Thanks.      Action Taken: te sent     Travel Screening: Not Applicable     Date of Service:

## 2024-08-27 NOTE — TELEPHONE ENCOUNTER
"Returning wife's phone call.     Pt's wife states that pt has some lower leg edema, did not take diuretics  the day of, and does have some slight swelling. It is manageable at this time.     Puncture site, looks good and intact, no active oozing.     Asked if they had any questions in which wife states that they do not.     Did well post treatment last time. I did go over a few instructions b/c treatment is on Friday 8/30 and will then go into the weekend. Wanted to make sure that we discuss any issues or answer any questions prior to.     Will inquire with Dr. Santos if she would like pt to hold any diuretics, however, then we may need to place a catheter since wife states that if pt does take diuretics then he does tend to \"go\" constantly. However if pt does hold diuretics, then he does have lower leg edema. Wife did want me to relay that it is very mild edema but would like to see what Dr. Santos thinks.     Will pass on the information and then get back to them.     Laura NAYAK RN, BSN  Interventional Radiology/Vascular  Nurse Coordinator   Phone: [phone]                "

## 2024-08-27 NOTE — PROGRESS NOTES
SIRSPHERES:     Good afternoon,     Thank you for submitting a referral for patient [id number]. This patient has Medicare as their   primary insurance. Medicare does not allow for pre-determination or authorization prior to   treatment. This patient s secondary plan is with Federal BCBS and benefits are summarized below.     Effective Date: 01/01/2024  Plan Maximums and Deductibles    Health Benefit Plan Coverage - 30  Information / Details Individual Family  Out Of Pocket  Preferred  Plan / Product: BASIC    $6,500 / Calendar Year(s)    -$190.11 Year to Date    $6,309.89 Remaining    $13,000 / Calendar Year(s)    -$219.99 Year to Date    $12,780.01 Remaining    Benefit Information    Medicare may cover 80% of the Medicare eligible charges for (outpatient hospital or OBL) , based   on claims review, The patient s supplement plan benefits are summarized above and may cover the   remaining balance after Medicare claims are paid. As a proactive measure we recommend that   your physician provide clinical documentation in the patients  medical and operative report to   support Y90.  Your Medicare contractor has not published a local coverage determination (LCD) or medical   coverage policy for Yttrium-90.    If there is a denial of services, I will assist you with the appeal process. If you have any additional   questions or concerns, please do not hesitate to contact me.     Thank you,

## 2024-08-27 NOTE — TELEPHONE ENCOUNTER
Called and left a  for Pat -wife.     Asked that she return my call to [facility] on next Y90 procedure and treatment and to see if I can help answer any questions.     Left RN line for them to call back.     Laura NAYAK RN, BSN  Interventional Radiology/Vascular  Nurse Coordinator   Phone: [phone]

## 2024-08-28 ENCOUNTER — MYC MEDICAL ADVICE (OUTPATIENT)
Dept: VASCULAR SURGERY | Facility: CLINIC | Age: 79
End: 2024-08-28
Payer: MEDICARE

## 2024-08-29 ENCOUNTER — LAB (OUTPATIENT)
Dept: LAB | Facility: CLINIC | Age: 79
End: 2024-08-29
Payer: MEDICARE

## 2024-08-29 DIAGNOSIS — C22.0 HCC (HEPATOCELLULAR CARCINOMA) (H): Primary | ICD-10-CM

## 2024-08-29 DIAGNOSIS — C22.0 HCC (HEPATOCELLULAR CARCINOMA) (H): ICD-10-CM

## 2024-08-29 LAB
ALBUMIN SERPL BCG-MCNC: 3.7 G/DL (ref 3.5–5.2)
ALP SERPL-CCNC: 98 U/L (ref 40–150)
ALT SERPL W P-5'-P-CCNC: 30 U/L (ref 0–70)
AST SERPL W P-5'-P-CCNC: 44 U/L (ref 0–45)
BILIRUB DIRECT SERPL-MCNC: 0.78 MG/DL (ref 0–0.3)
BILIRUB SERPL-MCNC: 2.5 MG/DL
PROT SERPL-MCNC: 6 G/DL (ref 6.4–8.3)

## 2024-08-29 PROCEDURE — 36415 COLL VENOUS BLD VENIPUNCTURE: CPT

## 2024-08-29 PROCEDURE — 80076 HEPATIC FUNCTION PANEL: CPT

## 2024-08-30 ENCOUNTER — APPOINTMENT (OUTPATIENT)
Dept: INTERVENTIONAL RADIOLOGY/VASCULAR | Facility: CLINIC | Age: 79
End: 2024-08-30
Attending: RADIOLOGY
Payer: MEDICARE

## 2024-08-30 ENCOUNTER — HOSPITAL ENCOUNTER (OUTPATIENT)
Facility: CLINIC | Age: 79
Discharge: HOME | End: 2024-08-30
Attending: RADIOLOGY | Admitting: RADIOLOGY
Payer: MEDICARE

## 2024-08-30 ENCOUNTER — APPOINTMENT (OUTPATIENT)
Dept: MEDSURG UNIT | Facility: CLINIC | Age: 79
End: 2024-08-30
Attending: RADIOLOGY
Payer: MEDICARE

## 2024-08-30 ENCOUNTER — HOSPITAL ENCOUNTER (OUTPATIENT)
Dept: NUCLEAR MEDICINE | Facility: CLINIC | Age: 79
Setting detail: NUCLEAR MEDICINE
Discharge: HOME | End: 2024-08-30
Attending: RADIOLOGY | Admitting: RADIOLOGY
Payer: MEDICARE

## 2024-08-30 VITALS
DIASTOLIC BLOOD PRESSURE: 52 MMHG | BODY MASS INDEX: 27.89 KG/M2 | TEMPERATURE: 98.1 F | WEIGHT: 200 LBS | HEART RATE: 66 BPM | OXYGEN SATURATION: 95 % | SYSTOLIC BLOOD PRESSURE: 114 MMHG | RESPIRATION RATE: 18 BRPM

## 2024-08-30 DIAGNOSIS — C22.0 HCC (HEPATOCELLULAR CARCINOMA) (H): ICD-10-CM

## 2024-08-30 PROCEDURE — 78800 RP LOCLZJ TUM 1 AREA 1 D IMG: CPT | Mod: 26 | Performed by: RADIOLOGY

## 2024-08-30 PROCEDURE — 37243 VASC EMBOLIZE/OCCLUDE ORGAN: CPT | Mod: GC | Performed by: RADIOLOGY

## 2024-08-30 PROCEDURE — C1769 GUIDE WIRE: HCPCS

## 2024-08-30 PROCEDURE — 99152 MOD SED SAME PHYS/QHP 5/>YRS: CPT

## 2024-08-30 PROCEDURE — 258N000003 HC RX IP 258 OP 636: Performed by: RADIOLOGY

## 2024-08-30 PROCEDURE — 76377 3D RENDER W/INTRP POSTPROCES: CPT | Mod: 26 | Performed by: RADIOLOGY

## 2024-08-30 PROCEDURE — 250N000009 HC RX 250: Performed by: RADIOLOGY

## 2024-08-30 PROCEDURE — 79445 NUCLEAR RX INTRA-ARTERIAL: CPT

## 2024-08-30 PROCEDURE — 278N000001 HC RX 278: Performed by: RADIOLOGY

## 2024-08-30 PROCEDURE — 272N000566 HC SHEATH CR3

## 2024-08-30 PROCEDURE — 76937 US GUIDE VASCULAR ACCESS: CPT | Mod: 26 | Performed by: RADIOLOGY

## 2024-08-30 PROCEDURE — 36247 INS CATH ABD/L-EXT ART 3RD: CPT | Mod: GC | Performed by: RADIOLOGY

## 2024-08-30 PROCEDURE — 999N000133 HC STATISTIC PP CARE STAGE 2

## 2024-08-30 PROCEDURE — C1887 CATHETER, GUIDING: HCPCS

## 2024-08-30 PROCEDURE — C1760 CLOSURE DEV, VASC: HCPCS

## 2024-08-30 PROCEDURE — 79445 NUCLEAR RX INTRA-ARTERIAL: CPT | Mod: 26 | Performed by: RADIOLOGY

## 2024-08-30 PROCEDURE — C2616 BRACHYTX, NON-STR,YTTRIUM-90: HCPCS | Performed by: RADIOLOGY

## 2024-08-30 PROCEDURE — 37243 VASC EMBOLIZE/OCCLUDE ORGAN: CPT

## 2024-08-30 PROCEDURE — 76380 CAT SCAN FOLLOW-UP STUDY: CPT

## 2024-08-30 PROCEDURE — 272N000504 HC NEEDLE CR4

## 2024-08-30 PROCEDURE — G1010 CDSM STANSON: HCPCS | Performed by: RADIOLOGY

## 2024-08-30 PROCEDURE — 250N000011 HC RX IP 250 OP 636: Performed by: RADIOLOGY

## 2024-08-30 PROCEDURE — 999N000142 HC STATISTIC PROCEDURE PREP ONLY

## 2024-08-30 PROCEDURE — 36247 INS CATH ABD/L-EXT ART 3RD: CPT

## 2024-08-30 PROCEDURE — 272N000143 HC KIT CR3

## 2024-08-30 RX ORDER — METHYLPREDNISOLONE 4 MG
TABLET, DOSE PACK ORAL
Qty: 21 TABLET | Refills: 0 | Status: SHIPPED | OUTPATIENT
Start: 2024-08-31

## 2024-08-30 RX ORDER — NALOXONE HYDROCHLORIDE 0.4 MG/ML
0.2 INJECTION, SOLUTION INTRAMUSCULAR; INTRAVENOUS; SUBCUTANEOUS
Status: DISCONTINUED | OUTPATIENT
Start: 2024-08-30 | End: 2024-08-30 | Stop reason: HOSPADM

## 2024-08-30 RX ORDER — SODIUM CHLORIDE 9 MG/ML
INJECTION, SOLUTION INTRAVENOUS CONTINUOUS
Status: DISCONTINUED | OUTPATIENT
Start: 2024-08-30 | End: 2024-08-30 | Stop reason: HOSPADM

## 2024-08-30 RX ORDER — FLUMAZENIL 0.1 MG/ML
0.2 INJECTION, SOLUTION INTRAVENOUS
Status: DISCONTINUED | OUTPATIENT
Start: 2024-08-30 | End: 2024-08-30 | Stop reason: HOSPADM

## 2024-08-30 RX ORDER — ONDANSETRON 4 MG/1
4-8 TABLET, FILM COATED ORAL EVERY 6 HOURS PRN
Qty: 40 TABLET | Refills: 0 | Status: SHIPPED | OUTPATIENT
Start: 2024-08-30

## 2024-08-30 RX ORDER — ACETAMINOPHEN 325 MG/1
650 TABLET ORAL EVERY 4 HOURS PRN
Status: DISCONTINUED | OUTPATIENT
Start: 2024-08-30 | End: 2024-08-30 | Stop reason: HOSPADM

## 2024-08-30 RX ORDER — PANTOPRAZOLE SODIUM 40 MG/1
40 TABLET, DELAYED RELEASE ORAL DAILY
Qty: 30 TABLET | Refills: 0 | Status: SHIPPED | OUTPATIENT
Start: 2024-08-30

## 2024-08-30 RX ORDER — NALOXONE HYDROCHLORIDE 0.4 MG/ML
0.4 INJECTION, SOLUTION INTRAMUSCULAR; INTRAVENOUS; SUBCUTANEOUS
Status: DISCONTINUED | OUTPATIENT
Start: 2024-08-30 | End: 2024-08-30 | Stop reason: HOSPADM

## 2024-08-30 RX ORDER — CLINDAMYCIN PHOSPHATE 900 MG/50ML
900 INJECTION, SOLUTION INTRAVENOUS
Status: COMPLETED | OUTPATIENT
Start: 2024-08-30 | End: 2024-08-30

## 2024-08-30 RX ORDER — OXYCODONE HYDROCHLORIDE 5 MG/1
5-10 TABLET ORAL EVERY 6 HOURS PRN
Qty: 40 TABLET | Refills: 0 | Status: SHIPPED | OUTPATIENT
Start: 2024-08-30

## 2024-08-30 RX ORDER — ONDANSETRON 2 MG/ML
4 INJECTION INTRAMUSCULAR; INTRAVENOUS ONCE
Status: COMPLETED | OUTPATIENT
Start: 2024-08-30 | End: 2024-08-30

## 2024-08-30 RX ORDER — HYDROMORPHONE HYDROCHLORIDE 2 MG/1
2 TABLET ORAL EVERY 4 HOURS PRN
Status: DISCONTINUED | OUTPATIENT
Start: 2024-08-30 | End: 2024-08-30 | Stop reason: HOSPADM

## 2024-08-30 RX ORDER — IODIXANOL 320 MG/ML
150 INJECTION, SOLUTION INTRAVASCULAR ONCE
Status: DISCONTINUED | OUTPATIENT
Start: 2024-08-30 | End: 2024-08-30 | Stop reason: HOSPADM

## 2024-08-30 RX ORDER — HYDROMORPHONE HYDROCHLORIDE 2 MG/1
4 TABLET ORAL EVERY 4 HOURS PRN
Status: DISCONTINUED | OUTPATIENT
Start: 2024-08-30 | End: 2024-08-30 | Stop reason: HOSPADM

## 2024-08-30 RX ORDER — FENTANYL CITRATE 50 UG/ML
25-50 INJECTION, SOLUTION INTRAMUSCULAR; INTRAVENOUS EVERY 5 MIN PRN
Status: DISCONTINUED | OUTPATIENT
Start: 2024-08-30 | End: 2024-08-30 | Stop reason: HOSPADM

## 2024-08-30 RX ORDER — LIDOCAINE 40 MG/G
CREAM TOPICAL
Status: DISCONTINUED | OUTPATIENT
Start: 2024-08-30 | End: 2024-08-30 | Stop reason: HOSPADM

## 2024-08-30 RX ADMIN — SODIUM CHLORIDE: 0.9 INJECTION, SOLUTION INTRAVENOUS at 12:11

## 2024-08-30 RX ADMIN — Medication 13.67 MILLICURIE: at 18:50

## 2024-08-30 RX ADMIN — LIDOCAINE HYDROCHLORIDE 5 ML: 10 INJECTION, SOLUTION EPIDURAL; INFILTRATION; INTRACAUDAL; PERINEURAL at 16:11

## 2024-08-30 RX ADMIN — MIDAZOLAM 4 MG: 1 INJECTION INTRAMUSCULAR; INTRAVENOUS at 16:15

## 2024-08-30 RX ADMIN — FENTANYL CITRATE 200 MCG: 50 INJECTION, SOLUTION INTRAMUSCULAR; INTRAVENOUS at 16:15

## 2024-08-30 RX ADMIN — ONDANSETRON 4 MG: 2 INJECTION INTRAMUSCULAR; INTRAVENOUS at 16:11

## 2024-08-30 RX ADMIN — CLINDAMYCIN IN 5 PERCENT DEXTROSE 900 MG: 18 INJECTION, SOLUTION INTRAVENOUS at 12:10

## 2024-08-30 ASSESSMENT — ACTIVITIES OF DAILY LIVING (ADL)
ADLS_ACUITY_SCORE: 35
ADLS_ACUITY_SCORE: 33
ADLS_ACUITY_SCORE: 35

## 2024-08-30 NOTE — PROGRESS NOTES
Bedrest completed, he ambulated, voided, and tolerated a regular diet. He is alert and oriented x 4 and able to make needs known. Parag and his wife both verbalized understanding of discharge instructions, all questions answered. Right groin site soft and flat to palpation with no bleeding or hematoma, bruising from Monday's procedure present. Wife has all belongings including prescription medication. Nuc med scan completed. Patient assisted to main entrance via wheelchair for discharge home with wife.

## 2024-08-30 NOTE — PROGRESS NOTES
Patient Name: Oskar Wilks  Medical Record Number: [id number]  Today's Date: 8/30/2024    Procedure: Radioembolization of hepatocellular carcinoma  Proceduralist: Dr. Santos and Dr. Leung  Pathology present: n/a    Procedure Start: 1500  Procedure end: 1615  Sedation medications administered: 200 mcg fentanyl and 4 mg versed     Report given to:  RN  : n/a    Other Notes: Pt arrived to IR room 4 from . Consent reviewed. Pt denies any questions or concerns regarding procedure. Pt positioned supine and monitored per protocol. Pt tolerated procedure without any noted complications. Pt transferred back to .

## 2024-08-30 NOTE — PROGRESS NOTES
Patient prepped for Y-90 delivery. VSS. Hepatic panel from 8/29, per IR no need to draw hepatic panel ordered for today. INR 1.66 from 8-26.  Consent signed, radiation education and forms completed by MD. Pedal pulses marked, 2+ pitting edema noted on ankles.  PIV in left upper arm, fluids and IV abx infusing. Groin clipped, right groin site from delivery procedure on 8-26 still has open puncture site and ecchymotic, covered with clean prima pore, soft and flat. Wife Pat with patient.

## 2024-08-30 NOTE — DISCHARGE INSTRUCTIONS
[facility]   Interventional Radiology  Discharge Instructions Post Angiography for Insertion of   Radioactive Microspheres to the Liver    AFTER YOU GO HOME          Relax and take it easy for 24 hours.       Drink plenty of fluids.       Resume your regular diet, unless otherwise instructed by your Primary Physician.       DO NOT smoke for at least 24 hours, if you were given any sedation.       DO NOT drink alcoholic beverages the day of your procedure.       DO NOT drive or operate machinery at home or at work for 24 hours.          DO NOT make any important or legal decisions for 24 hours following your procedure.       DO NOT take a shower for at least 12 hours following your procedure. No tub bath, hot tubs, or swimming for 5 days        Care of groin site  It is normal to have a small bruise or lump at the site.  For the first 2 days, when you cough, sneeze or move your bowels, hold your hand over the puncture site and press gently.  Do NOT lift more than 10 pounds or do any strenuous exercise for at least 3 to 5 days.  Do not use lotion or powder near the puncture site for 3 days.  If you start bleeding from the site in your groin: lie down flat and press firmly on the site. Call your doctor as soon as you can.   Call 911 right away if you have: Heavy bleeding, bleeding that does not stop.      CALL THE PHYSICIAN IF:      - You start bleeding from the procedure site. A small lump or bruise is common at the puncture site. Your physician will tell you if you need to return to the hospital.      - You develop numbness, coolness or a change in color of the leg that was punctured.      - You experience increased pain or redness at the puncture site.      - You develop hives or a rash or unexplained itching.      - You develop a temperature of 101 degrees F or greater.    Additional Information:         Follow the Discharge Instructions for the Liver Brachytherapy; Contrast Instructions and  Instructions for the [employer]iopharmaceuticals.             [facility] INTERVENTIONAL RADIOLOGY DEPARTMENT         Procedure Physician:  Dr. Santos and Dr. Leung  Date of Procedure:   August 30, 2024       Telephone Numbers:   [phone]      Monday-Friday 7:30 am to 4:00 pm                                        [phone]     After 4:00 pm Monday-Friday, Weekend and Holidays. Ask for the Interventional Radiologist on call. Someone is on call 24 hrs/day.

## 2024-08-30 NOTE — PRE-PROCEDURE
GENERAL PRE-PROCEDURE:   Procedure:  Y90 delivery  Date/Time:  8/30/2024 12:22 PM    Written consent obtained?: Yes    Risks and benefits: Risks, benefits and alternatives were discussed    Consent given by:  Patient  Patient states understanding of procedure being performed: Yes    Patient's understanding of procedure matches consent: Yes    Procedure consent matches procedure scheduled: Yes    Expected level of sedation:  Moderate  Appropriately NPO:  Yes  ASA Class:  2  Mallampati  :  Grade 2- soft palate, base of uvula, tonsillar pillars, and portion of posterior pharyngeal wall visible  Lungs:  Lungs clear with good breath sounds bilaterally  Heart:  Normal heart sounds and rate  History & Physical reviewed:  History and physical reviewed and no updates needed  Statement of review:  I have reviewed the lab findings, diagnostic data, medications, and the plan for sedation

## 2024-08-30 NOTE — PROCEDURES
[facility]    Procedure: IR Procedure Note    Date/Time: 8/30/2024 4:52 PM    Performed by: Samara Leung MD  Authorized by: Kera Santos MD  IR Fellow Physician:    Pre Procedure Diagnosis: Y90 Delivery  Post Procedure Diagnosis: Y90 Delivery    UNIVERSAL PROTOCOL   Site Marked: NA  Prior Images Obtained and Reviewed:  Yes  Required items: Required blood products, implants, devices and special equipment available    Patient identity confirmed:  Verbally with patient, arm band, provided demographic data and hospital-assigned identification number  Patient was reevaluated immediately before administering moderate or deep sedation or anesthesia  Confirmation Checklist:  Patient's identity using two indicators, relevant allergies, procedure was appropriate and matched the consent or emergent situation and correct equipment/implants were available  Time out: Immediately prior to the procedure a time out was called    Universal Protocol: the Joint Commission Universal Protocol was followed    Preparation: Patient was prepped and draped in usual sterile fashion       ANESTHESIA    Anesthesia:  Local infiltration  Local Anesthetic:  Lidocaine 1% without epinephrine      SEDATION  Patient Sedated: Yes    Sedation Type:  Moderate (conscious) sedation  Vital signs: Vital signs monitored during sedation    See dictated procedure note for full details.  Findings: Uneventful Y90 delivery to segment 7.    Specimens: none    Procedural Complications: None    Condition: Stable    Plan: Bed rest for 2 hours.      PROCEDURE    Patient Tolerance:  Patient tolerated the procedure well with no immediate complications  Length of time physician/provider present for 1:1 monitoring during sedation:  68-82 min

## 2024-08-30 NOTE — PROGRESS NOTES
Patient arrived to room via stretcher with Lucien MURRAY s/p Y-90 delivery. VSS. Denies/report pain. Pt alert and oriented x4. R groin site CDI. CMS+. 2hrs bedrest.

## 2024-09-03 ENCOUNTER — TELEPHONE (OUTPATIENT)
Dept: VASCULAR SURGERY | Facility: CLINIC | Age: 79
End: 2024-09-03
Payer: MEDICARE

## 2024-09-03 DIAGNOSIS — C22.0 HCC (HEPATOCELLULAR CARCINOMA) (H): Primary | ICD-10-CM

## 2024-09-03 NOTE — TELEPHONE ENCOUNTER
Called pt to fup on him s/p Y90 with Dr. Santos on 8/30.     Inquired as to how he is doing    He states that he did well    Denies pain/nausea/ fatigue    States that he was a little tired but doing well.    He states that he did change the bandage and has noticed that there was a little blood on the bandage. Advised to keep the bandage on a little longer.    Denies active oozing.     Informed him to call with questions or issues with site.   Pt does not that it's quite bruised. Will monitor.    Asked him to call with any other questions.     Laura NAYAK RN, BSN  Interventional Radiology/Vascular  Nurse Coordinator  Phone: [phone]

## 2024-09-13 ENCOUNTER — TELEPHONE (OUTPATIENT)
Dept: VASCULAR SURGERY | Facility: CLINIC | Age: 79
End: 2024-09-13
Payer: MEDICARE

## 2024-09-13 NOTE — TELEPHONE ENCOUNTER
Called and left a VM for pt's wife.    Informed her that I wanted to fup on their fup appt with Dr. Santos    Left RN line for them to return my call.     Laura NAYAK RN, BSN  Interventional Radiology/Vascular  Nurse Coordinator   Phone: [phone]

## 2024-09-30 ENCOUNTER — HOSPITAL ENCOUNTER (OUTPATIENT)
Dept: MRI IMAGING | Facility: CLINIC | Age: 79
Discharge: HOME | End: 2024-09-30
Attending: RADIOLOGY
Payer: MEDICARE

## 2024-09-30 ENCOUNTER — LAB (OUTPATIENT)
Dept: LAB | Facility: CLINIC | Age: 79
End: 2024-09-30
Attending: RADIOLOGY
Payer: MEDICARE

## 2024-09-30 DIAGNOSIS — C22.0 HCC (HEPATOCELLULAR CARCINOMA) (H): ICD-10-CM

## 2024-09-30 LAB
ALBUMIN SERPL BCG-MCNC: 3.9 G/DL (ref 3.5–5.2)
ALP SERPL-CCNC: 104 U/L (ref 40–150)
ALT SERPL W P-5'-P-CCNC: 44 U/L (ref 0–70)
ANION GAP SERPL CALCULATED.3IONS-SCNC: 11 MMOL/L (ref 7–15)
AST SERPL W P-5'-P-CCNC: 57 U/L (ref 0–45)
BILIRUB SERPL-MCNC: 3.6 MG/DL
BUN SERPL-MCNC: 16.8 MG/DL (ref 8–23)
CALCIUM SERPL-MCNC: 9.3 MG/DL (ref 8.8–10.4)
CHLORIDE SERPL-SCNC: 101 MMOL/L (ref 98–107)
CREAT SERPL-MCNC: 0.91 MG/DL (ref 0.67–1.17)
EGFRCR SERPLBLD CKD-EPI 2021: 87 ML/MIN/1.73M2
ERYTHROCYTE [DISTWIDTH] IN BLOOD BY AUTOMATED COUNT: 20.2 % (ref 10–15)
GLUCOSE SERPL-MCNC: 102 MG/DL (ref 70–99)
HCO3 SERPL-SCNC: 26 MMOL/L (ref 22–29)
HCT VFR BLD AUTO: 31.4 % (ref 40–53)
HGB BLD-MCNC: 10.4 G/DL (ref 13.3–17.7)
INR PPP: 1.67 (ref 0.85–1.15)
MCH RBC QN AUTO: 30.1 PG (ref 26.5–33)
MCHC RBC AUTO-ENTMCNC: 33.1 G/DL (ref 31.5–36.5)
MCV RBC AUTO: 91 FL (ref 78–100)
PLATELET # BLD AUTO: 101 10E3/UL (ref 150–450)
POTASSIUM SERPL-SCNC: 4.6 MMOL/L (ref 3.4–5.3)
PROT SERPL-MCNC: 6.4 G/DL (ref 6.4–8.3)
RBC # BLD AUTO: 3.46 10E6/UL (ref 4.4–5.9)
SODIUM SERPL-SCNC: 138 MMOL/L (ref 135–145)
WBC # BLD AUTO: 5.8 10E3/UL (ref 4–11)

## 2024-09-30 PROCEDURE — G1010 CDSM STANSON: HCPCS

## 2024-09-30 PROCEDURE — A9585 GADOBUTROL INJECTION: HCPCS | Performed by: RADIOLOGY

## 2024-09-30 PROCEDURE — 85027 COMPLETE CBC AUTOMATED: CPT

## 2024-09-30 PROCEDURE — 82105 ALPHA-FETOPROTEIN SERUM: CPT

## 2024-09-30 PROCEDURE — 82947 ASSAY GLUCOSE BLOOD QUANT: CPT

## 2024-09-30 PROCEDURE — 85610 PROTHROMBIN TIME: CPT

## 2024-09-30 PROCEDURE — 36415 COLL VENOUS BLD VENIPUNCTURE: CPT

## 2024-09-30 PROCEDURE — 255N000002 HC RX 255 OP 636: Performed by: RADIOLOGY

## 2024-09-30 PROCEDURE — 84155 ASSAY OF PROTEIN SERUM: CPT

## 2024-09-30 RX ORDER — GADOBUTROL 604.72 MG/ML
9 INJECTION INTRAVENOUS ONCE
Status: COMPLETED | OUTPATIENT
Start: 2024-09-30 | End: 2024-09-30

## 2024-09-30 RX ADMIN — GADOBUTROL 9 ML: 604.72 INJECTION INTRAVENOUS at 13:59

## 2024-10-01 LAB — AFP SERPL-MCNC: 5.3 NG/ML
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
## 2024-10-04 ENCOUNTER — DOCUMENTATION ONLY (OUTPATIENT)
Dept: TRANSPLANT | Facility: CLINIC | Age: 79
End: 2024-10-04
Payer: MEDICARE

## 2024-10-04 ENCOUNTER — VIRTUAL VISIT (OUTPATIENT)
Dept: RADIOLOGY | Facility: CLINIC | Age: 79
End: 2024-10-04
Attending: RADIOLOGY
Payer: MEDICARE

## 2024-10-04 VITALS — WEIGHT: 192 LBS | BODY MASS INDEX: 26.01 KG/M2 | HEIGHT: 72 IN

## 2024-10-04 DIAGNOSIS — C22.0 HEPATOCELLULAR CARCINOMA (H): Primary | ICD-10-CM

## 2024-10-04 DIAGNOSIS — C22.0 HCC (HEPATOCELLULAR CARCINOMA) (H): Primary | ICD-10-CM

## 2024-10-04 PROCEDURE — 99214 OFFICE O/P EST MOD 30 MIN: CPT | Mod: 95 | Performed by: RADIOLOGY

## 2024-10-04 ASSESSMENT — PAIN SCALES - GENERAL: PAINLEVEL: NO PAIN (0)

## 2024-10-04 NOTE — CONFIDENTIAL NOTE
Multifocal HCC, lesions treated 2 previously over the last 18 months.     Lesion LR 5 2.0 cm, growth from end of July where it was 1.5 cm    Second lesion near the dome, LR 4 with diffusion restriction following enhancement.    Systemic treatment referral plan

## 2024-10-04 NOTE — NURSING NOTE
Current patient location: [id number]  [place] [address]    Is the patient currently in the state of [place]? YES    Visit mode:VIDEO    If the visit is dropped, the patient can be reconnected by: VIDEO VISIT: Send to e-mail at: [email or web address]    Will anyone else be joining the visit? NO  (If patient encounters technical issues they should call [phone]    Are changes needed to the allergy or medication list? No    Are refills needed on medications prescribed by this physician? NO    Rooming Documentation:  Questionnaire(s) not done per department protocol    Reason for visit: GI CURRAN
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
## 2024-10-05 ENCOUNTER — PATIENT OUTREACH (OUTPATIENT)
Dept: ONCOLOGY | Facility: CLINIC | Age: 79
End: 2024-10-05
Payer: MEDICARE

## 2024-10-05 NOTE — PROGRESS NOTES
New Patient Oncology Nurse Navigator Note     Referring provider:   Referred By    Provider Department Location Phone   Kera Santos MD [facility] [phone]        Referred to (specialty): Medical Oncology    Date Referral Received:   10/4/24     Evaluation for :   HCC for systemic therapy ; post Y90 treatment     Clinical History (per Nurse review of records provided):    Patient with unresectable biopsy-proven hepatocellular carcinoma (HCC) on a background of alcoholic cirrhosis. Underwent percutaneous biopsy on 2/2/2023 that showed moderately differentiated HCC.    Per notes of Dr. Herndon:  On 3/16/2023 we performed radioembolization of the mass. He made a good recovery and serial imaging has shown complete response. Then recently developed LIRADS 5 lesion in segment 7. On 8/30/2024 we performed selective radioembolization of the lesion.    Due to his bilirubin rise, no further liver directed therapy is recommended.  Patient was discussed at liver tumor conference today and consensus with group was to commence systemic therapy.  Patient is referred to medical oncology at this time.      Records Location (Care Everywhere, Media, etc.):   Epic      I called Parag to discuss referral to oncology.  I spoke with both he and his wife. I introduced my role and reviewed what this consult visit will entail/what to expect.  I reviewed the location and gave contact numbers including new patient scheduling and clinic phone numbers.   Parag has no other questions at this time.    I forwarded on referral with scheduling instructions for the following   PLAN:   HOLD: Dr. Ellis, 10/22 [facility], 140, 220, NEW, in person    I warm transferred call to our new patient scheduling to finalize appointment.    Stacy Vickers RN, BSN  Oncology New Patient Nurse Navigator   [facility]  [phone]

## 2024-10-14 NOTE — TELEPHONE ENCOUNTER
RECORDS STATUS - ALL OTHER DIAGNOSIS      RECORDS RECEIVED FROM: [facility],    NOTES STATUS DETAILS   OFFICE NOTE from referring provider Epic 10/04/24: Dr. Kera Santos   OFFICE NOTE from medical oncologist - 09/29/22: Dr. Bird Patel   OFFICE NOTE from other specialist [facility]:  05/01/24: Dr. Laura Neves   MEDICATION LIST [facility]    LABS     PATHOLOGY REPORTS Report in Epic 02/02/23: [id number]   ANYTHING RELATED TO DIAGNOSIS Epic Most recent 09/30/24   IMAGING (NEED IMAGES & REPORT)     CT SCANS PACS 12/17/22, 09/07/22: CT AP   MRI PACS 0/30/24-04/17/23: MR Liver  01/10/23: MR Abd  10/25/22: MR MRCP   ULTRASOUND PACS [facility]:  04/20/23: US Abd    PACS:  09/07/22-04/27/21: US Abd

## 2024-10-22 ENCOUNTER — ONCOLOGY VISIT (OUTPATIENT)
Dept: ONCOLOGY | Facility: CLINIC | Age: 79
End: 2024-10-22
Attending: RADIOLOGY
Payer: MEDICARE

## 2024-10-22 ENCOUNTER — LAB (OUTPATIENT)
Dept: INFUSION THERAPY | Facility: CLINIC | Age: 79
End: 2024-10-22
Attending: RADIOLOGY
Payer: MEDICARE

## 2024-10-22 ENCOUNTER — PRE VISIT (OUTPATIENT)
Dept: ONCOLOGY | Facility: CLINIC | Age: 79
End: 2024-10-22
Payer: MEDICARE

## 2024-10-22 VITALS
BODY MASS INDEX: 26.72 KG/M2 | OXYGEN SATURATION: 99 % | WEIGHT: 197 LBS | RESPIRATION RATE: 16 BRPM | SYSTOLIC BLOOD PRESSURE: 144 MMHG | DIASTOLIC BLOOD PRESSURE: 64 MMHG | HEART RATE: 70 BPM

## 2024-10-22 DIAGNOSIS — C22.0 HCC (HEPATOCELLULAR CARCINOMA) (H): ICD-10-CM

## 2024-10-22 LAB
AFP SERPL-MCNC: 5.4 NG/ML
ALBUMIN SERPL BCG-MCNC: 3.6 G/DL (ref 3.5–5.2)
ALP SERPL-CCNC: 107 U/L (ref 40–150)
ALT SERPL W P-5'-P-CCNC: 42 U/L (ref 0–70)
ANION GAP SERPL CALCULATED.3IONS-SCNC: 8 MMOL/L (ref 7–15)
AST SERPL W P-5'-P-CCNC: 49 U/L (ref 0–45)
BASOPHILS # BLD AUTO: 0 10E3/UL (ref 0–0.2)
BASOPHILS NFR BLD AUTO: 0 %
BILIRUB SERPL-MCNC: 2.7 MG/DL
BUN SERPL-MCNC: 21.2 MG/DL (ref 8–23)
CALCIUM SERPL-MCNC: 8.9 MG/DL (ref 8.8–10.4)
CHLORIDE SERPL-SCNC: 105 MMOL/L (ref 98–107)
CREAT SERPL-MCNC: 0.91 MG/DL (ref 0.67–1.17)
EGFRCR SERPLBLD CKD-EPI 2021: 87 ML/MIN/1.73M2
EOSINOPHIL # BLD AUTO: 0 10E3/UL (ref 0–0.7)
EOSINOPHIL NFR BLD AUTO: 0 %
ERYTHROCYTE [DISTWIDTH] IN BLOOD BY AUTOMATED COUNT: 20.3 % (ref 10–15)
GLUCOSE SERPL-MCNC: 97 MG/DL (ref 70–99)
HCO3 SERPL-SCNC: 26 MMOL/L (ref 22–29)
HCT VFR BLD AUTO: 29 % (ref 40–53)
HGB BLD-MCNC: 9.7 G/DL (ref 13.3–17.7)
IMM GRANULOCYTES # BLD: 0.1 10E3/UL
IMM GRANULOCYTES NFR BLD: 1 %
LYMPHOCYTES # BLD AUTO: 0.6 10E3/UL (ref 0.8–5.3)
LYMPHOCYTES NFR BLD AUTO: 11 %
MCH RBC QN AUTO: 30.2 PG (ref 26.5–33)
MCHC RBC AUTO-ENTMCNC: 33.4 G/DL (ref 31.5–36.5)
MCV RBC AUTO: 90 FL (ref 78–100)
MONOCYTES # BLD AUTO: 0.4 10E3/UL (ref 0–1.3)
MONOCYTES NFR BLD AUTO: 7 %
NEUTROPHILS # BLD AUTO: 4.3 10E3/UL (ref 1.6–8.3)
NEUTROPHILS NFR BLD AUTO: 80 %
NRBC # BLD AUTO: 0 10E3/UL
NRBC BLD AUTO-RTO: 0 /100
PLATELET # BLD AUTO: 101 10E3/UL (ref 150–450)
POTASSIUM SERPL-SCNC: 4.3 MMOL/L (ref 3.4–5.3)
PROT SERPL-MCNC: 6.1 G/DL (ref 6.4–8.3)
RBC # BLD AUTO: 3.21 10E6/UL (ref 4.4–5.9)
SODIUM SERPL-SCNC: 139 MMOL/L (ref 135–145)
WBC # BLD AUTO: 5.3 10E3/UL (ref 4–11)

## 2024-10-22 PROCEDURE — 85025 COMPLETE CBC W/AUTO DIFF WBC: CPT | Performed by: INTERNAL MEDICINE

## 2024-10-22 PROCEDURE — 36415 COLL VENOUS BLD VENIPUNCTURE: CPT | Performed by: INTERNAL MEDICINE

## 2024-10-22 PROCEDURE — G0463 HOSPITAL OUTPT CLINIC VISIT: HCPCS | Performed by: INTERNAL MEDICINE

## 2024-10-22 PROCEDURE — 82105 ALPHA-FETOPROTEIN SERUM: CPT | Performed by: INTERNAL MEDICINE

## 2024-10-22 PROCEDURE — G2211 COMPLEX E/M VISIT ADD ON: HCPCS | Performed by: INTERNAL MEDICINE

## 2024-10-22 PROCEDURE — 80053 COMPREHEN METABOLIC PANEL: CPT | Performed by: INTERNAL MEDICINE

## 2024-10-22 PROCEDURE — 99205 OFFICE O/P NEW HI 60 MIN: CPT | Performed by: INTERNAL MEDICINE

## 2024-10-22 ASSESSMENT — PAIN SCALES - GENERAL: PAINLEVEL: NO PAIN (0)

## 2024-10-22 NOTE — LETTER
10/22/2024         RE: Oskar Wilks  [address]  [place] [address]      This consult has been requested by Dr. Kera Santos for hepatocellular carcinoma.    Wife came with the patient.    Mr. Wilks is a 78-year-old gentleman with hepatocellular carcinoma arising in setting of alcoholic liver cirrhosis. Investigations are reviewed and summarized below.    ONCOLOGY HISTORY:  1.  On 08/11/2001, CT abdomen and pelvis revealed mild hepatosplenomegaly.  -Ultrasound abdomen on 06/14/2010 revealed liver cirrhosis and splenomegaly.  -MRI abdomen in 05/08/2015 revealed liver cirrhosis and splenomegaly.  -EGD on 08/13/2021 revealed small esophageal varices and mild portal hypertensive gastropathy.  2.  On 09/07/2022, CT abdomen and pelvis revealed liver cirrhosis, moderate splenomegaly, ascites and 8 mm hypodense lesion in tail of the pancreas.  -Paracentesis of 4700 mL was done on 09/08/2022.  -MRCP on 10/20/2022 revealed branch intraductal papillary mucinous neoplasm.  It also revealed 3.2 cm nodular area in the liver.  3.  MRI abdomen on 01/10/2023 revealed liver cirrhosis with diffuse hepatic iron deposition. A 4.6 x 2.7 cm LI-RADS 4 lesion within the left hepatic lobe.  There is liver cirrhosis, small volume ascites and periesophageal varices.  -EGD on 01/23/2023 revealed portal hypertensive gastropathy.  -On 01/20/2023, AFP of 3.2.  -CT chest on 01/26/2023 did not reveal any metastatic disease.  4.  Ultrasound-guided liver biopsy was done on 02/02/2023. Hepatocellular carcinoma, moderately differentiated.  5.  On 03/16/2023, he had Y-90 embolization of left hepatic lobe lesion.  -On 07/31/2024, MRI of the liver revealed  interval increased in size segment VII observation (1.9 cm, previously 1 cm) now with definite arterial phase hyperenhancement and threshold growth (LR-5).  -On 08/30/2024, Y-90 embolization done.  -On 09/30/2024, MRI liver reveals progression.  Interval treatment of the segment 7  observation, which is now hypoenhancing and decreased in size. Surrounding parenchymal enhancement is likely posttreatment change (LR TR equivocal). Segment IVb 2 cm observation with arterial phase hyperenhancement and washout (LR 5). Increased in size arterial enhancing observation at the dome measuring 1.5 cm, previously 0.9 cm (LR 4). Additional LR 3 observations in segments 5 and 6.    His case was discussed at tumor board at [facility].  No further liver directed therapy recommended.  Recommendation was for systemic therapy.    Patient presents to the clinic with his wife.  Patient has generalized weakness.  He feels tired all the time.  No headache.  Occasional dizziness.  No chest pain.  No shortness of breath.  No abdominal pain.  No nausea or vomiting.  No urinary or bowel complaints.  No bleeding.  No fever.  All other review of system negative.    Allergies: Reviewed    Medications: Reviewed    Past medical history:  -Liver cirrhosis  -Hepatocellular carcinoma.  -Right acetabular fracture status post ORIF  -Brain hematoma evacuation    Social history:  -He quit alcohol in July 2022.  Before that he had quit for many years then had a relapse  -He quit smoking many years ago.    Exam:  He is alert and oriented x 3.  Not in distress  Vitals: Reviewed  Rest of system not examined.    Labs: CBC and CMP reviewed.    Assessment:  1.  A 78-year-old gentleman with hepatocellular carcinoma which is progressing.  He had Y-90 embolization x 2 on 03/16/2023 and 08/30/2024.  2.  Liver cirrhosis  3.  Normocytic anemia from liver cirrhosis  4.  Chronic thrombocytopenia from liver cirrhosis.    Recommendation:  -Labs today.  -CT scan and bone scan soon.  -EGD (patient with talk to his liver doctor).  -Virtual visit after the scan.    Discussion:  1.  I had a long discussion with the patient and wife regarding hepatocellular carcinoma.  He was found to have hepatocellular carcinoma in February 2023.  Since  then he had Y-90 embolization twice.  Recent MRI on 09/30/2024 reveals progression with development of new lesions.  His case was discussed in tumor board at [facility].  No further liver directed therapy recommended.    Patient overall is doing well except for weakness.  Based on labs from today, he falls into Child Naqvi class A.  He is bilirubin has fluctuated.  Sometimes he was in Child Naqvi class B.    2.  We discussed regarding further workup. We will get CT scan and bone scan.    3.  Discussed regarding systemic treatment.  Treatment is based on Child-Naqvi class liver cirrhosis.  For class A, combination of atezolizumab and bevacizumab generally is used as first-line.   If bevacizumab cannot be used, other option is tremelimumab plus durvalumab.  For class B cirrhosis, generally sorafenib or nivolumab is recommended.    I will see him after the scans and decide regarding treatment.    4.  Patient advised to have an EGD done.  He is going to discuss this with his hepatologist.  He has upcoming appointment with them.    5.  Patient asked about liver transplant.  Advised him to discuss this with his hepatologist.  Unlikely he will be a transplant candidate given his age of 78.    6.  He and his wife had a few questions all answered.  I will see him after the scans.    Total visit time of 60 minutes.  Time spent in today's visit, review of chart/investigations today and documentation.          Saad Ellis MD

## 2024-10-22 NOTE — PATIENT INSTRUCTIONS
-Labs today.  -CT scan and bone scan soon.  -EGD (patient with talk to his liver doctor).  -Virtual visit after the scan.

## 2024-10-22 NOTE — LETTER
10/22/2024      Oskar Wilks  [address] [place] [address]      Dear Colleague,    Thank you for referring your patient, Oskar Wilks, to the [facility]. Please see a copy of my visit note below.    This consult has been requested by Dr. Kera Santso for hepatocellular carcinoma.    Wife came with the patient.    Mr. Wilks is a 78-year-old gentleman with hepatocellular carcinoma arising in setting of alcoholic liver cirrhosis. Investigations are reviewed and summarized below.    ONCOLOGY HISTORY:  1.  On 08/11/2001, CT abdomen and pelvis revealed mild hepatosplenomegaly.  -Ultrasound abdomen on 06/14/2010 revealed liver cirrhosis and splenomegaly.  -MRI abdomen in 05/08/2015 revealed liver cirrhosis and splenomegaly.  -EGD on 08/13/2021 revealed small esophageal varices and mild portal hypertensive gastropathy.  2.  On 09/07/2022, CT abdomen and pelvis revealed liver cirrhosis, moderate splenomegaly, ascites and 8 mm hypodense lesion in tail of the pancreas.  -Paracentesis of 4700 mL was done on 09/08/2022.  -MRCP on 10/20/2022 revealed branch intraductal papillary mucinous neoplasm.  It also revealed 3.2 cm nodular area in the liver.  3.  MRI abdomen on 01/10/2023 revealed liver cirrhosis with diffuse hepatic iron deposition. A 4.6 x 2.7 cm LI-RADS 4 lesion within the left hepatic lobe.  There is liver cirrhosis, small volume ascites and periesophageal varices.  -EGD on 01/23/2023 revealed portal hypertensive gastropathy.  -On 01/20/2023, AFP of 3.2.  -CT chest on 01/26/2023 did not reveal any metastatic disease.  4.  Ultrasound-guided liver biopsy was done on 02/02/2023. Hepatocellular carcinoma, moderately differentiated.  5.  On 03/16/2023, he had Y-90 embolization of left hepatic lobe lesion.  -On 07/31/2024, MRI of the liver revealed  interval increased in size segment VII observation (1.9 cm, previously 1 cm) now with definite arterial phase hyperenhancement  and threshold growth (LR-5).  -On 08/30/2024, Y-90 embolization done.  -On 09/30/2024, MRI liver reveals progression.  Interval treatment of the segment 7 observation, which is now hypoenhancing and decreased in size. Surrounding parenchymal enhancement is likely posttreatment change (LR TR equivocal). Segment IVb 2 cm observation with arterial phase hyperenhancement and washout (LR 5). Increased in size arterial enhancing observation at the dome measuring 1.5 cm, previously 0.9 cm (LR 4). Additional LR 3 observations in segments 5 and 6.    His case was discussed at tumor board at [facility].  No further liver directed therapy recommended.  Recommendation was for systemic therapy.    Patient presents to the clinic with his wife.  Patient has generalized weakness.  He feels tired all the time.  No headache.  Occasional dizziness.  No chest pain.  No shortness of breath.  No abdominal pain.  No nausea or vomiting.  No urinary or bowel complaints.  No bleeding.  No fever.  All other review of system negative.    Allergies: Reviewed    Medications: Reviewed    Past medical history:  -Liver cirrhosis  -Hepatocellular carcinoma.  -Right acetabular fracture status post ORIF  -Brain hematoma evacuation    Social history:  -He quit alcohol in July 2022.  Before that he had quit for many years then had a relapse  -He quit smoking many years ago.    Exam:  He is alert and oriented x 3.  Not in distress  Vitals: Reviewed  Rest of system not examined.    Labs: CBC and CMP reviewed.    Assessment:  1.  A 78-year-old gentleman with hepatocellular carcinoma which is progressing.  He had Y-90 embolization x 2 on 03/16/2023 and 08/30/2024.  2.  Liver cirrhosis  3.  Normocytic anemia from liver cirrhosis  4.  Chronic thrombocytopenia from liver cirrhosis.    Recommendation:  -Labs today.  -CT scan and bone scan soon.  -EGD (patient with talk to his liver doctor).  -Virtual visit after the scan.    Discussion:  1.  I had  a long discussion with the patient and wife regarding hepatocellular carcinoma.  He was found to have hepatocellular carcinoma in February 2023.  Since then he had Y-90 embolization twice.  Recent MRI on 09/30/2024 reveals progression with development of new lesions.  His case was discussed in tumor board at [facility].  No further liver directed therapy recommended.    Patient overall is doing well except for weakness.  Based on labs from today, he falls into Child Naqvi class A.  He is bilirubin has fluctuated.  Sometimes he was in Child Naqvi class B.    2.  We discussed regarding further workup. We will get CT scan and bone scan.    3.  Discussed regarding systemic treatment.  Treatment is based on Child-Naqvi class liver cirrhosis.  For class A, combination of atezolizumab and bevacizumab generally is used as first-line.   If bevacizumab cannot be used, other option is tremelimumab plus durvalumab.  For class B cirrhosis, generally sorafenib or nivolumab is recommended.    I will see him after the scans and decide regarding treatment.    4.  Patient advised to have an EGD done.  He is going to discuss this with his hepatologist.  He has upcoming appointment with them.    5.  Patient asked about liver transplant.  Advised him to discuss this with his hepatologist.  Unlikely he will be a transplant candidate given his age of 78.    6.  He and his wife had a few questions all answered.  I will see him after the scans.    Total visit time of 60 minutes.  Time spent in today's visit, review of chart/investigations today and documentation.      Again, thank you for allowing me to participate in the care of your patient.        Sincerely,        Saad Ellis MD

## 2024-10-24 NOTE — RESULT ENCOUNTER NOTE
Dear  Efe,    I reviewed your blood tests.  -Bilirubin is better at 2.7.  -Hemoglobin and platelet are low but remaining stable.  -Tumor marker, AFP, is normal.    Please, call me with any questions.    Saad Ellis MD

## 2024-10-27 NOTE — PROGRESS NOTES
This consult has been requested by Dr. Kera Santos for hepatocellular carcinoma.    Wife came with the patient.    Mr. Wilks is a 78-year-old gentleman with hepatocellular carcinoma arising in setting of alcoholic liver cirrhosis. Investigations are reviewed and summarized below.    ONCOLOGY HISTORY:  1.  On 08/11/2001, CT abdomen and pelvis revealed mild hepatosplenomegaly.  -Ultrasound abdomen on 06/14/2010 revealed liver cirrhosis and splenomegaly.  -MRI abdomen in 05/08/2015 revealed liver cirrhosis and splenomegaly.  -EGD on 08/13/2021 revealed small esophageal varices and mild portal hypertensive gastropathy.  2.  On 09/07/2022, CT abdomen and pelvis revealed liver cirrhosis, moderate splenomegaly, ascites and 8 mm hypodense lesion in tail of the pancreas.  -Paracentesis of 4700 mL was done on 09/08/2022.  -MRCP on 10/20/2022 revealed branch intraductal papillary mucinous neoplasm.  It also revealed 3.2 cm nodular area in the liver.  3.  MRI abdomen on 01/10/2023 revealed liver cirrhosis with diffuse hepatic iron deposition. A 4.6 x 2.7 cm LI-RADS 4 lesion within the left hepatic lobe.  There is liver cirrhosis, small volume ascites and periesophageal varices.  -EGD on 01/23/2023 revealed portal hypertensive gastropathy.  -On 01/20/2023, AFP of 3.2.  -CT chest on 01/26/2023 did not reveal any metastatic disease.  4.  Ultrasound-guided liver biopsy was done on 02/02/2023. Hepatocellular carcinoma, moderately differentiated.  5.  On 03/16/2023, he had Y-90 embolization of left hepatic lobe lesion.  -On 07/31/2024, MRI of the liver revealed  interval increased in size segment VII observation (1.9 cm, previously 1 cm) now with definite arterial phase hyperenhancement and threshold growth (LR-5).  -On 08/30/2024, Y-90 embolization done.  -On 09/30/2024, MRI liver reveals progression.  Interval treatment of the segment 7 observation, which is now hypoenhancing and decreased in size. Surrounding parenchymal  enhancement is likely posttreatment change (LR TR equivocal). Segment IVb 2 cm observation with arterial phase hyperenhancement and washout (LR 5). Increased in size arterial enhancing observation at the dome measuring 1.5 cm, previously 0.9 cm (LR 4). Additional LR 3 observations in segments 5 and 6.    His case was discussed at tumor board at [facility].  No further liver directed therapy recommended.  Recommendation was for systemic therapy.    Patient presents to the clinic with his wife.  Patient has generalized weakness.  He feels tired all the time.  No headache.  Occasional dizziness.  No chest pain.  No shortness of breath.  No abdominal pain.  No nausea or vomiting.  No urinary or bowel complaints.  No bleeding.  No fever.  All other review of system negative.    Allergies: Reviewed    Medications: Reviewed    Past medical history:  -Liver cirrhosis  -Hepatocellular carcinoma.  -Right acetabular fracture status post ORIF  -Brain hematoma evacuation    Social history:  -He quit alcohol in July 2022.  Before that he had quit for many years then had a relapse  -He quit smoking many years ago.    Exam:  He is alert and oriented x 3.  Not in distress  Vitals: Reviewed  Rest of system not examined.    Labs: CBC and CMP reviewed.    Assessment:  1.  A 78-year-old gentleman with hepatocellular carcinoma which is progressing.  He had Y-90 embolization x 2 on 03/16/2023 and 08/30/2024.  2.  Liver cirrhosis  3.  Normocytic anemia from liver cirrhosis  4.  Chronic thrombocytopenia from liver cirrhosis.    Recommendation:  -Labs today.  -CT scan and bone scan soon.  -EGD (patient with talk to his liver doctor).  -Virtual visit after the scan.    Discussion:  1.  I had a long discussion with the patient and wife regarding hepatocellular carcinoma.  He was found to have hepatocellular carcinoma in February 2023.  Since then he had Y-90 embolization twice.  Recent MRI on 09/30/2024 reveals progression with  development of new lesions.  His case was discussed in tumor board at [facility].  No further liver directed therapy recommended.    Patient overall is doing well except for weakness.  Based on labs from today, he falls into Child Naqvi class A.  He is bilirubin has fluctuated.  Sometimes he was in Child Naqvi class B.    2.  We discussed regarding further workup. We will get CT scan and bone scan.    3.  Discussed regarding systemic treatment.  Treatment is based on Child-Naqvi class liver cirrhosis.  For class A, combination of atezolizumab and bevacizumab generally is used as first-line.   If bevacizumab cannot be used, other option is tremelimumab plus durvalumab.  For class B cirrhosis, generally sorafenib or nivolumab is recommended.    I will see him after the scans and decide regarding treatment.    4.  Patient advised to have an EGD done.  He is going to discuss this with his hepatologist.  He has upcoming appointment with them.    5.  Patient asked about liver transplant.  Advised him to discuss this with his hepatologist.  Unlikely he will be a transplant candidate given his age of 78.    6.  He and his wife had a few questions all answered.  I will see him after the scans.    Total visit time of 60 minutes.  Time spent in today's visit, review of chart/investigations today and documentation.

## 2024-10-29 ENCOUNTER — OFFICE VISIT (OUTPATIENT)
Dept: GASTROENTEROLOGY | Facility: CLINIC | Age: 79
End: 2024-10-29
Attending: STUDENT IN AN ORGANIZED HEALTH CARE EDUCATION/TRAINING PROGRAM
Payer: MEDICARE

## 2024-10-29 VITALS
WEIGHT: 196.9 LBS | OXYGEN SATURATION: 100 % | SYSTOLIC BLOOD PRESSURE: 133 MMHG | TEMPERATURE: 98 F | HEIGHT: 72 IN | RESPIRATION RATE: 20 BRPM | BODY MASS INDEX: 26.67 KG/M2 | HEART RATE: 62 BPM | DIASTOLIC BLOOD PRESSURE: 63 MMHG

## 2024-10-29 DIAGNOSIS — C22.0 HEPATOCELLULAR CARCINOMA (H): Primary | ICD-10-CM

## 2024-10-29 DIAGNOSIS — K70.30 ALCOHOLIC CIRRHOSIS OF LIVER WITHOUT ASCITES (H): ICD-10-CM

## 2024-10-29 PROCEDURE — G0463 HOSPITAL OUTPT CLINIC VISIT: HCPCS | Performed by: STUDENT IN AN ORGANIZED HEALTH CARE EDUCATION/TRAINING PROGRAM

## 2024-10-29 PROCEDURE — 99214 OFFICE O/P EST MOD 30 MIN: CPT | Performed by: STUDENT IN AN ORGANIZED HEALTH CARE EDUCATION/TRAINING PROGRAM

## 2024-10-29 RX ORDER — OMEPRAZOLE 40 MG/1
40 CAPSULE, DELAYED RELEASE ORAL DAILY
COMMUNITY

## 2024-10-29 ASSESSMENT — PAIN SCALES - GENERAL: PAINLEVEL_OUTOF10: NO PAIN (0)

## 2024-10-29 NOTE — NURSING NOTE
"Chief Complaint   Patient presents with    RECHECK     Cirrhosis, HCC     Vital signs:  Temp: 98  F (36.7  C) Temp src: Oral BP: 133/63 Pulse: 62   Resp: 20 SpO2: 100 %     Height: 182.9 cm (6' 0.01\") Weight: 89.3 kg (196 lb 14.4 oz)  Estimated body mass index is 26.7 kg/m  as calculated from the following:    Height as of this encounter: 1.829 m (6' 0.01\").    Weight as of this encounter: 89.3 kg (196 lb 14.4 oz).      Sherley Coyle, [facility]  10/29/2024 10:21 AM    "
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
## 2024-10-29 NOTE — PROGRESS NOTES
[facility] Return Patient Visit    Date of Visit: October 29, 2024    Reason for referral: Alcohol related liver disease, HCC    Subjective: Mr. Wilks is a 78-year-old man with a history of alcohol-related liver disease who presents for follow up of multifocal HCC.     Initial History:     He has a history of cirrhosis, has been sober in the past, and drink heavily and in July 2022.  Started having issues with abdominal distention, was diagnosed with SBP September 2022.  He had been on torsemide just prior to this, since this admission was started on low-dose torsemide and spironolactone.  He is on Cipro for SBP prophylaxis.  His last paracentesis was 2022, 4.7 L was removed.    December 12 bilirubin was 3.6 and INR was 1.8.  September 9 bilirubin was 5.6.  He had a paracentesis 9/8 that was consistent with portal hypertension, paracentesis also showed SBP    Fell 12/5/2022 on the ice walking his puppy. Broke his hip and has surgery for this. Also broke two ribs. In rehab, fell again and had a L1 compression fracture. Getting OT/PT at home. About to graduate from OT because he is doing so well.  Uses walker for appointments.  Able to go up stairs without issues.  Independent with ADLs.    Was on narcotics related to surgery, had delirium related to this.  Otherwise no HE.     Found to have a 4.6 cm liver lesion, read as an LR 4 lesion.  Underwent a biopsy of this that came back consistent with HCC    3/16/2023 underwent TARE of the mass. Follow up imaging has not shown a change. At the time of the mapping there was concern for an additional new nodularity in the left hepatic lobe.     MRI 4/17/2023 showed his Y90 treated lesion - unchanged but follow-up MRI in June showed reduction in size of the HCC, now 3.2 cm.  It did have some enhancing features.     Had a recent EGD 4/2024 for anemia showing PHG. Colonoscopy 4/2024 showing 3 1-2 mm polyps - tubular adenomas. Colon AVMs s/p APC. Also  showed congested mucosa from portal HTN colopathy.    MRI liver 3/2024 showing mild iron deposition. Treated HCC non viable. Bile duct around treatment area with mild stricture. Showed trace ascites.      Interval Events:   - MRI 7/2024 showing new19 mm LR lesion - treated with Y90 but follow up MRI 9/30 showing treated lesion equivocal with new 2 cm HCC and 15 mm LR4 lesion. Discussed a tumor board and recommendation made to transition to systemic treatment. He met with Dr. Ellis to discuss starting systemic treatment.     ROS: 14 point ROS negative except for positives noted in HPI.    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 fib not on anticoagulation  Psoriasis  Basal cell carcinoma  Nonrheumatic mild aortic stenosis    PSHx:  Right acetabulum fracture 2/2 GLF s/p ORIF on 12/9/22  BRAIN HEMATOMA EVACUTATION   CRANIAL LESION RESECTION From trauma   MENISCECTOMY   MOUTH SURGERY      FamHx:  No family history of liver disease, liver cancer    SocHx:        Medications:  Current Outpatient Medications   Medication Sig Dispense Refill    ammonium lactate (AMLACTIN) 12 % external cream Apply topically daily as needed.      buPROPion (WELLBUTRIN XL) 300 MG 24 hr tablet Take 300 mg by mouth every morning      cholecalciferol 25 MCG (1000 UT) TABS Take 1 tablet by mouth daily      fluticasone (FLONASE) 50 MCG/ACT nasal spray Spray 2 sprays into both nostrils daily      folic acid (FOLVITE) 1 MG tablet Take 1 mg by mouth daily      ketoconazole (NIZORAL) 2 % external cream Apply topically daily as needed for irritation.      losartan (COZAAR) 25 MG tablet Take 1 tablet by mouth daily      metoprolol succinate ER (TOPROL XL) 25 MG 24 hr tablet Take 1 tablet by mouth daily at 2 pm      multivitamin w/minerals (THERA-VIT-M) tablet Take 1 tablet by mouth daily      spironolactone (ALDACTONE) 50 MG tablet Take 50 mg by mouth daily      torsemide (DEMADEX) 10 MG tablet Take 10 mg by  mouth daily       No current facility-administered medications for this visit.     No OTCs, herbals    Allergies:  Allergies   Allergen Reactions    Penicillins      PN: LW Reaction: Itching, Pruritis    Adhesive Tape Rash       Objective:  There were no vitals taken for this visit.  Constitutional: pleasant man in NAD  Eyes: non icteric  Respiratory: Normal respiratory excursion   MSK: normal range of motion of visualized extremities  Abd: Non distended  Ext: 1+ edema  Skin: No jaundice  Psychiatric: normal mood and orientation    Labs: Reviewed in EHR     Imaging: Reviewed in EHR    Endoscopy:    EGD 1/23/2023    Findings:        The examined esophagus was normal.        The Z-line was regular and was found 43 cm from the        incisors.        Mild portal hypertensive gastropathy was found in the        cardia, in the gastric fundus and in the gastric body.        Localized mild inflammation characterized by        congestion (edema), erosions and erythema was found        in the gastric antrum and in the prepyloric region of        the stomach. Biopsies were taken with a cold forceps        for Helicobacter pylori testing. Verification of        patient identification for the specimen was done.        Estimated blood loss was minimal.        The exam of the stomach was otherwise normal.        The examined duodenum was normal.     Impression:            - Normal esophagus.                          - Z-line regular, 43 cm from the                           incisors.                          - Portal hypertensive gastropathy.                          - Gastritis. Biopsied.                          - Normal examined duodenum.       Independently reviewed labs and imaging.     Assessment/Plan: Mr. Wilks is a 78-year-old man with a history of alcohol-related liver disease who presents for follow-up of multifocal HCC.     He originally did well with Y90 treatment of his HCC, but developed recurrent HCC that was  treated again with Y90. On his follow up MRI he was found to have a new HCC and enlarging LR 4 also c/f HCC. He was discussed at tumor board, given his elevated BR, concern he would not tolerate additional LR treatment and recommended he switch to systemic. He established with oncology to start systemic treatment.     MELD 3.0: 17 at 9/30/2024  2:57 PM  MELD-Na: 17 at 9/30/2024  2:57 PM  Calculated from:  Serum Creatinine: 0.91 mg/dL (Using min of 1 mg/dL) at 9/30/2024  2:57 PM  Serum Sodium: 138 mmol/L (Using max of 137 mmol/L) at 9/30/2024  2:57 PM  Total Bilirubin: 3.6 mg/dL at 9/30/2024  2:57 PM  Serum Albumin: 3.9 g/dL (Using max of 3.5 g/dL) at 9/30/2024  2:57 PM  INR(ratio): 1.67 at 9/30/2024  2:57 PM  Age at listing (hypothetical): 78 years  Sex: Male at 9/30/2024  2:57 PM    CP A/B given fluctuating in INR and BR. He does not have ascites on imaging, no HE. He has a good functional status and is able to carry out all ADLs    He had an EGD 4/2024 that showed no varices. He had PHG and colopathy that may put him at higher risk of bleeding but would not defer systemic treatment for this.     - Systemic treatment with medical oncology - MRIs every 3 months  - Recommend repeat EGD 4/2025    No orders of the defined types were placed in this encounter.    RTC 3 months with labs    Laura Neves MD MS  Hepatology/Liver Transplant  [facility]

## 2024-10-31 ENCOUNTER — HOSPITAL ENCOUNTER (OUTPATIENT)
Dept: CT IMAGING | Facility: CLINIC | Age: 79
Discharge: HOME | End: 2024-10-31
Attending: INTERNAL MEDICINE
Payer: MEDICARE

## 2024-10-31 ENCOUNTER — HOSPITAL ENCOUNTER (OUTPATIENT)
Dept: NUCLEAR MEDICINE | Facility: CLINIC | Age: 79
Setting detail: NUCLEAR MEDICINE
Discharge: HOME | End: 2024-10-31
Attending: INTERNAL MEDICINE
Payer: MEDICARE

## 2024-10-31 DIAGNOSIS — C22.0 HCC (HEPATOCELLULAR CARCINOMA) (H): ICD-10-CM

## 2024-10-31 PROCEDURE — A9503 TC99M MEDRONATE: HCPCS | Performed by: INTERNAL MEDICINE

## 2024-10-31 PROCEDURE — 343N000001 HC RX 343 MED OP 636: Performed by: INTERNAL MEDICINE

## 2024-10-31 PROCEDURE — 250N000011 HC RX IP 250 OP 636: Performed by: INTERNAL MEDICINE

## 2024-10-31 PROCEDURE — 78306 BONE IMAGING WHOLE BODY: CPT | Mod: MG

## 2024-10-31 PROCEDURE — 250N000009 HC RX 250: Performed by: INTERNAL MEDICINE

## 2024-10-31 PROCEDURE — 74177 CT ABD & PELVIS W/CONTRAST: CPT | Mod: MG

## 2024-10-31 RX ORDER — IOPAMIDOL 755 MG/ML
96 INJECTION, SOLUTION INTRAVASCULAR ONCE
Status: COMPLETED | OUTPATIENT
Start: 2024-10-31 | End: 2024-10-31

## 2024-10-31 RX ORDER — TC 99M MEDRONATE 20 MG/10ML
25 INJECTION, POWDER, LYOPHILIZED, FOR SOLUTION INTRAVENOUS ONCE
Status: COMPLETED | OUTPATIENT
Start: 2024-10-31 | End: 2024-10-31

## 2024-10-31 RX ADMIN — IOPAMIDOL 96 ML: 755 INJECTION, SOLUTION INTRAVENOUS at 10:03

## 2024-10-31 RX ADMIN — SODIUM CHLORIDE 67 ML: 9 INJECTION, SOLUTION INTRAVENOUS at 10:03

## 2024-10-31 RX ADMIN — TC 99M MEDRONATE 26.4 MILLICURIE: 20 INJECTION, POWDER, LYOPHILIZED, FOR SOLUTION INTRAVENOUS at 09:45

## 2024-11-01 ENCOUNTER — MYC MEDICAL ADVICE (OUTPATIENT)
Dept: GASTROENTEROLOGY | Facility: CLINIC | Age: 79
End: 2024-11-01
Payer: MEDICARE

## 2024-11-01 DIAGNOSIS — C22.0 HEPATOCELLULAR CARCINOMA (H): Primary | ICD-10-CM

## 2024-11-01 NOTE — TELEPHONE ENCOUNTER
Bone scan:  Nonspecific diffuse radiotracer uptake is seen throughout the osseous structures. While the exact etiology remains indeterminate, differential considerations include widespread osseous metastasis (unlikely given the lack of sclerotic lesions seen on   recent CT dated 10/31/2024), underlying metabolic bone disease, or sequelae of marrow stimulation if currently receiving chemotherapy.    Result discussed with the patient. Explained to him that bone scan reveals nonspecific radiotracer uptake.  Suspicion for malignancy is low.  Will get a PET scan for further evaluation.

## 2024-11-06 ENCOUNTER — VIRTUAL VISIT (OUTPATIENT)
Dept: ONCOLOGY | Facility: CLINIC | Age: 79
End: 2024-11-06
Attending: INTERNAL MEDICINE
Payer: MEDICARE

## 2024-11-06 DIAGNOSIS — C22.0 HCC (HEPATOCELLULAR CARCINOMA) (H): Primary | ICD-10-CM

## 2024-11-06 PROCEDURE — G2211 COMPLEX E/M VISIT ADD ON: HCPCS | Mod: 95 | Performed by: INTERNAL MEDICINE

## 2024-11-06 PROCEDURE — 99215 OFFICE O/P EST HI 40 MIN: CPT | Mod: 95 | Performed by: INTERNAL MEDICINE

## 2024-11-06 RX ORDER — ALBUTEROL SULFATE 0.83 MG/ML
2.5 SOLUTION RESPIRATORY (INHALATION)
OUTPATIENT
Start: 2024-11-25

## 2024-11-06 RX ORDER — EPINEPHRINE 1 MG/ML
0.3 INJECTION, SOLUTION INTRAMUSCULAR; SUBCUTANEOUS EVERY 5 MIN PRN
OUTPATIENT
Start: 2024-11-25

## 2024-11-06 RX ORDER — METHYLPREDNISOLONE SODIUM SUCCINATE 40 MG/ML
40 INJECTION INTRAMUSCULAR; INTRAVENOUS
Start: 2024-11-25

## 2024-11-06 RX ORDER — MEPERIDINE HYDROCHLORIDE 25 MG/ML
25 INJECTION INTRAMUSCULAR; INTRAVENOUS; SUBCUTANEOUS
OUTPATIENT
Start: 2024-11-25

## 2024-11-06 RX ORDER — DIPHENHYDRAMINE HYDROCHLORIDE 50 MG/ML
25 INJECTION INTRAMUSCULAR; INTRAVENOUS
Start: 2024-11-25

## 2024-11-06 RX ORDER — DIPHENHYDRAMINE HYDROCHLORIDE 50 MG/ML
50 INJECTION INTRAMUSCULAR; INTRAVENOUS
Start: 2024-11-25

## 2024-11-06 RX ORDER — ALBUTEROL SULFATE 90 UG/1
1-2 INHALANT RESPIRATORY (INHALATION)
Start: 2024-11-25

## 2024-11-06 RX ORDER — LORAZEPAM 2 MG/ML
0.5 INJECTION INTRAMUSCULAR EVERY 4 HOURS PRN
OUTPATIENT
Start: 2024-11-25

## 2024-11-06 NOTE — LETTER
11/6/2024         RE: Oskar Wilks  [address]  [facility] [address]      Virtual Visit Details    Type of service:  Video Visit     Originating Location (pt. Location): Home    Distant Location (provider location):  On-site  Platform used for Video Visit: [facility]    ONCOLOGY HISTORY: Mr. Wilks is a gentleman with hepatocellular carcinoma.  1.  On 08/11/2001, CT abdomen and pelvis revealed mild hepatosplenomegaly.  -Ultrasound abdomen on 06/14/2010 revealed liver cirrhosis and splenomegaly.  -MRI abdomen in 05/08/2015 revealed liver cirrhosis and splenomegaly.  -EGD on 08/13/2021 revealed small esophageal varices and mild portal hypertensive gastropathy.  2.  On 09/07/2022, CT abdomen and pelvis revealed liver cirrhosis, moderate splenomegaly, ascites and 8 mm hypodense lesion in tail of the pancreas.  -Paracentesis of 4700 mL was done on 09/08/2022.  -MRCP on 10/20/2022 revealed branch intraductal papillary mucinous neoplasm.  It also revealed 3.2 cm nodular area in the liver.  3.  MRI abdomen on 01/10/2023 revealed liver cirrhosis with diffuse hepatic iron deposition. A 4.6 x 2.7 cm LI-RADS 4 lesion within the left hepatic lobe.  There is liver cirrhosis, small volume ascites and periesophageal varices.  -EGD on 01/23/2023 revealed portal hypertensive gastropathy.  -On 01/20/2023, AFP of 3.2.  -CT chest on 01/26/2023 did not reveal any metastatic disease.  4.  Ultrasound-guided liver biopsy was done on 02/02/2023. Hepatocellular carcinoma, moderately differentiated.  5.  Evaluated at [facility]. Not a transplant candidate.  -On 03/16/2023, he had Y-90 embolization of left hepatic lobe lesion.  -On 07/31/2024, MRI of the liver revealed  interval increased in size segment VII observation (1.9 cm, previously 1 cm) now with definite arterial phase hyperenhancement and threshold growth (LR-5).  -On 08/30/2024, Y-90 embolization done.  -On 09/30/2024, MRI liver reveals progression.  Interval  treatment of the segment 7 observation, which is now hypoenhancing and decreased in size. Surrounding parenchymal enhancement is likely posttreatment change (LR TR equivocal). Segment IVb 2 cm observation with arterial phase hyperenhancement and washout (LR 5). Increased in size arterial enhancing observation at the dome measuring 1.5 cm, previously 0.9 cm (LR 4). Additional LR 3 observations in segments 5 and 6.  -Case was discussed at tumor board at [facility].  No further liver directed therapy recommended.  6.  EGD on 04/25/2024 did not reveal any esophageal varices.  There is portal hypertensive gastropathy.  -Colonoscopy on 04/25/2024 revealed multiple nonbleeding colonic angioectasias which was treated with argon plasma coagulation..  There was congested mucosa in entire colon consistent with portal hypertensive colopathy.    Subjective:  Wife was on the video.    Mr. Wilks is a 78-year-old gentleman with hepatocellular carcinoma which has progressed on liver directed therapy.  He is not a candidate for any further liver directed therapy.  He is not a transplant candidate.    CT chest, abdomen and pelvis on 10/31/2024:  1.  Liver lesions are again seen though are not well assessed. No evidence of new malignancy in the chest, abdomen, or pelvis.  2.  Cholelithiasis with gallbladder wall thickening and mucosal enhancement. Acute cholecystitis is possible.  3.  CT appearance of hepatic cirrhosis and portal hypertension.    Bone scan on 10/31/2024:  Nonspecific diffuse radiotracer uptake is seen throughout the osseous structures. While the exact etiology remains indeterminate, differential considerations include widespread osseous metastasis (unlikely given the lack of sclerotic lesions seen on recent CT dated 10/31/2024), underlying metabolic bone disease, or sequelae of marrow stimulation if currently receiving chemotherapy.    Patient has no new complaints.  Patient has generalized weakness.  He  is able to do all his activities. No headache.  Occasional dizziness.  No chest pain.  No shortness of breath.  No abdominal pain.  No nausea or vomiting.  No urinary or bowel complaints.  No bleeding.       Exam:  He is alert and oriented x 3.  Not in distress.  ECOG PS of 1.  Rest of system not examined as this is a video visit.     Assessment:  1.  A 78-year-old gentleman with hepatocellular carcinoma which is progressing.   2.  Liver cirrhosis. Child-Naqvi class A  3.  Normocytic anemia from liver cirrhosis  4.  Chronic thrombocytopenia from liver cirrhosis.    Plan:  -Start treatment with atezolizumab and bevacizumab.  -Schedule PET scan.  -See REFUGIO when he comes to start treatment.  -See me with cycle 2.    Discussion:  1.  I had a video visit with the patient and his wife.  Patient has no new complaints.    Scans were reviewed.  CT scan does not reveal any evidence of disease outside the liver.  Bone scan reveals nonspecific diffuse radiotracer uptake throughout the osseous structures.  Likely this is nonspecific but bone metastasis cannot be ruled out.    Discussed regarding getting a PET scan to evaluate bone lesions.  Patient agreeable for it.  That will be scheduled.    2.  Discussed regarding systemic treatment for hepatocellular carcinoma.  Patient falls into child Naqvi class A (no clinical ascites, no encephalopathy, bilirubin of 2.7, albumin of 3.6 and INR of 1.67).  Patient has good performance status.  He is not on any anticoagulation.    I discussed regarding atezolizumab with bevacizumab.  Regimen was reviewed.  Side effect of atezolizumab and bevacizumab reviewed.  There is no contraindication to either of these drugs.  Patient agreeable for it.  It will be scheduled.    Patient was recently seen by his hepatologist Dr. Laura Jean.  She is not recommending any repeat EGD at this time.  Last EGD did not reveal any esophageal varices.    3.  Patient and his wife had few questions which were all  answered.  He will see our REFUGIO when he comes to start treatment.  I will see him with cycle 2.       Total video visit time of 45 minutes.  Time spent in today's visit, review of chart/investigations today, discussing regarding high risk drugs and documentation.    Atezolizumab plus Bevacizumab in Unresectable Hepatocellular Carcinoma.     Art RS, Amina S, Pastora M, Jassi NC, Alexia M, Tonya TY, Naila M, Vincent V, Amada P, Derrick AO, Isela D, Ramsey W, Freedom DZ, Charli S, Dong J, Jone C, Kathrine S, Buckley Y, Lozoya HY, Ana AX, Pancho AL, [id number] Investigators     N Engl J Med. 2020;382(99):1894.      BACKGROUNDThe combination of atezolizumab and bevacizumab showed encouraging antitumor activity and safety in a phase 1b trial involving patients with unresectable hepatocellular carcinoma.  METHODSIn a global, open-label, phase 3 trial, patients with unresectable hepatocellular carcinoma who had not previously received systemic treatment were randomly assigned in a 2:1 ratio to receive either atezolizumab plus bevacizumab or sorafenib until unacceptable toxic effects occurred or there was a loss of clinical benefit. The coprimary end points were overall survival and progression-free survival in the intention-to-treat population, as assessed at an independent review facility according to Response Evaluation Criteria in Solid Tumors, version 1.1 (RECIST 1.1).  RESULTSThe intention-to-treat population included 336 patients in the atezolizumab-bevacizumab group and 165 patients in the sorafenib group. At the time of the primary analysis (August 29, 2019), the hazard ratio for death with atezolizumab-bevacizumab as compared with sorafenib was 0.58 (95% confidence interval [CI], 0.42 to 0.79; P<0.001). Overall survival at 12 months was 67.2% (95% CI, 61.3 to 73.1) with atezolizumab-bevacizumab and 54.6% (95% CI, 45.2 to 64.0) with sorafenib. Median progression-free survival was 6.8 months (95% CI, 5.7 to 8.3) and 4.3 months (95% CI, 4.0  to 5.6) in the respective groups (hazard ratio for disease progression or death, 0.59; 95% CI, 0.47 to 0.76; P<0.001). Grade 3 or 4 adverse events occurred in 56.5% of 329 patients who received at least one dose of atezolizumab-bevacizumab and in 55.1% of 156 patients who received at least one dose of sorafenib. Grade 3 or 4 hypertension occurred in 15.2% of patients in the atezolizumab-bevacizumab group; however, other high-grade toxic effects were infrequent.  CONCLUSIONSIn patients with unresectable hepatocellular carcinoma, atezolizumab combined with bevacizumab resulted in better overall and progression-free survival outcomes than sorafenib. (Funded by [employer]; ClinicalTrials.gov number, [id number].).        Saad Ellis MD

## 2024-11-06 NOTE — LETTER
11/6/2024      Oskar Wilks  [address]  [place] [address]      Dear Colleague,    Thank you for referring your patient, Oskar Wilks, to the [facility]. Please see a copy of my visit note below.    Virtual Visit Details    Type of service:  Video Visit     Originating Location (pt. Location): Home    Distant Location (provider location):  On-site  Platform used for Video Visit: [facility]    ONCOLOGY HISTORY: Mr. Wilks is a gentleman with hepatocellular carcinoma.  1.  On 08/11/2001, CT abdomen and pelvis revealed mild hepatosplenomegaly.  -Ultrasound abdomen on 06/14/2010 revealed liver cirrhosis and splenomegaly.  -MRI abdomen in 05/08/2015 revealed liver cirrhosis and splenomegaly.  -EGD on 08/13/2021 revealed small esophageal varices and mild portal hypertensive gastropathy.  2.  On 09/07/2022, CT abdomen and pelvis revealed liver cirrhosis, moderate splenomegaly, ascites and 8 mm hypodense lesion in tail of the pancreas.  -Paracentesis of 4700 mL was done on 09/08/2022.  -MRCP on 10/20/2022 revealed branch intraductal papillary mucinous neoplasm.  It also revealed 3.2 cm nodular area in the liver.  3.  MRI abdomen on 01/10/2023 revealed liver cirrhosis with diffuse hepatic iron deposition. A 4.6 x 2.7 cm LI-RADS 4 lesion within the left hepatic lobe.  There is liver cirrhosis, small volume ascites and periesophageal varices.  -EGD on 01/23/2023 revealed portal hypertensive gastropathy.  -On 01/20/2023, AFP of 3.2.  -CT chest on 01/26/2023 did not reveal any metastatic disease.  4.  Ultrasound-guided liver biopsy was done on 02/02/2023. Hepatocellular carcinoma, moderately differentiated.  5.  Evaluated at [facility]. Not a transplant candidate.  -On 03/16/2023, he had Y-90 embolization of left hepatic lobe lesion.  -On 07/31/2024, MRI of the liver revealed  interval increased in size segment VII observation (1.9 cm, previously 1 cm) now with definite  arterial phase hyperenhancement and threshold growth (LR-5).  -On 08/30/2024, Y-90 embolization done.  -On 09/30/2024, MRI liver reveals progression.  Interval treatment of the segment 7 observation, which is now hypoenhancing and decreased in size. Surrounding parenchymal enhancement is likely posttreatment change (LR TR equivocal). Segment IVb 2 cm observation with arterial phase hyperenhancement and washout (LR 5). Increased in size arterial enhancing observation at the dome measuring 1.5 cm, previously 0.9 cm (LR 4). Additional LR 3 observations in segments 5 and 6.  -Case was discussed at tumor board at [facility].  No further liver directed therapy recommended.  6.  EGD on 04/25/2024 did not reveal any esophageal varices.  There is portal hypertensive gastropathy.  -Colonoscopy on 04/25/2024 revealed multiple nonbleeding colonic angioectasias which was treated with argon plasma coagulation..  There was congested mucosa in entire colon consistent with portal hypertensive colopathy.    Subjective:  Wife was on the video.    Mr. Wilks is a 78-year-old gentleman with hepatocellular carcinoma which has progressed on liver directed therapy.  He is not a candidate for any further liver directed therapy.  He is not a transplant candidate.    CT chest, abdomen and pelvis on 10/31/2024:  1.  Liver lesions are again seen though are not well assessed. No evidence of new malignancy in the chest, abdomen, or pelvis.  2.  Cholelithiasis with gallbladder wall thickening and mucosal enhancement. Acute cholecystitis is possible.  3.  CT appearance of hepatic cirrhosis and portal hypertension.    Bone scan on 10/31/2024:  Nonspecific diffuse radiotracer uptake is seen throughout the osseous structures. While the exact etiology remains indeterminate, differential considerations include widespread osseous metastasis (unlikely given the lack of sclerotic lesions seen on recent CT dated 10/31/2024), underlying  metabolic bone disease, or sequelae of marrow stimulation if currently receiving chemotherapy.    Patient has no new complaints.  Patient has generalized weakness.  He is able to do all his activities. No headache.  Occasional dizziness.  No chest pain.  No shortness of breath.  No abdominal pain.  No nausea or vomiting.  No urinary or bowel complaints.  No bleeding.       Exam:  He is alert and oriented x 3.  Not in distress.  ECOG PS of 1.  Rest of system not examined as this is a video visit.     Assessment:  1.  A 78-year-old gentleman with hepatocellular carcinoma which is progressing.   2.  Liver cirrhosis. Child-Naqvi class A  3.  Normocytic anemia from liver cirrhosis  4.  Chronic thrombocytopenia from liver cirrhosis.    Plan:  -Start treatment with atezolizumab and bevacizumab.  -Schedule PET scan.  -See REFUGIO when he comes to start treatment.  -See me with cycle 2.    Discussion:  1.  I had a video visit with the patient and his wife.  Patient has no new complaints.    Scans were reviewed.  CT scan does not reveal any evidence of disease outside the liver.  Bone scan reveals nonspecific diffuse radiotracer uptake throughout the osseous structures.  Likely this is nonspecific but bone metastasis cannot be ruled out.    Discussed regarding getting a PET scan to evaluate bone lesions.  Patient agreeable for it.  That will be scheduled.    2.  Discussed regarding systemic treatment for hepatocellular carcinoma.  Patient falls into child Naqvi class A (no clinical ascites, no encephalopathy, bilirubin of 2.7, albumin of 3.6 and INR of 1.67).  Patient has good performance status.  He is not on any anticoagulation.    I discussed regarding atezolizumab with bevacizumab.  Regimen was reviewed.  Side effect of atezolizumab and bevacizumab reviewed.  There is no contraindication to either of these drugs.  Patient agreeable for it.  It will be scheduled.    Patient was recently seen by his hepatologist Dr. Sanchez  Ted.  She is not recommending any repeat EGD at this time.  Last EGD did not reveal any esophageal varices.    3.  Patient and his wife had few questions which were all answered.  He will see our REFUGIO when he comes to start treatment.  I will see him with cycle 2.       Total video visit time of 45 minutes.  Time spent in today's visit, review of chart/investigations today, discussing regarding high risk drugs and documentation.    Atezolizumab plus Bevacizumab in Unresectable Hepatocellular Carcinoma.     Art RS, Amina S, Pastora M, Jassi PA, Alexia M, Tonya TY, Naila M, Vincent V, Amada P, Derrick AO, Isela D, Ramsey W, Freedom DZ, Charli S, Dong J, Jone C, Kathrine S, Buckley Y, Darell HY, Ana AX, Pancho AL, [id number] Investigators     N Engl J Med. 2020;382(88):9494.      BACKGROUNDThe combination of atezolizumab and bevacizumab showed encouraging antitumor activity and safety in a phase 1b trial involving patients with unresectable hepatocellular carcinoma.  METHODSIn a global, open-label, phase 3 trial, patients with unresectable hepatocellular carcinoma who had not previously received systemic treatment were randomly assigned in a 2:1 ratio to receive either atezolizumab plus bevacizumab or sorafenib until unacceptable toxic effects occurred or there was a loss of clinical benefit. The coprimary end points were overall survival and progression-free survival in the intention-to-treat population, as assessed at an independent review facility according to Response Evaluation Criteria in Solid Tumors, version 1.1 (RECIST 1.1).  RESULTSThe intention-to-treat population included 336 patients in the atezolizumab-bevacizumab group and 165 patients in the sorafenib group. At the time of the primary analysis (August 29, 2019), the hazard ratio for death with atezolizumab-bevacizumab as compared with sorafenib was 0.58 (95% confidence interval [CI], 0.42 to 0.79; P<0.001). Overall survival at 12 months was 67.2% (95% CI, 61.3 to 73.1)  with atezolizumab-bevacizumab and 54.6% (95% CI, 45.2 to 64.0) with sorafenib. Median progression-free survival was 6.8 months (95% CI, 5.7 to 8.3) and 4.3 months (95% CI, 4.0 to 5.6) in the respective groups (hazard ratio for disease progression or death, 0.59; 95% CI, 0.47 to 0.76; P<0.001). Grade 3 or 4 adverse events occurred in 56.5% of 329 patients who received at least one dose of atezolizumab-bevacizumab and in 55.1% of 156 patients who received at least one dose of sorafenib. Grade 3 or 4 hypertension occurred in 15.2% of patients in the atezolizumab-bevacizumab group; however, other high-grade toxic effects were infrequent.  CONCLUSIONSIn patients with unresectable hepatocellular carcinoma, atezolizumab combined with bevacizumab resulted in better overall and progression-free survival outcomes than sorafenib. (Funded by [employer]; ClinicalTrials.gov number, [id number].).      Again, thank you for allowing me to participate in the care of your patient.        Sincerely,        Saad Ellis MD

## 2024-11-06 NOTE — NURSING NOTE
Current patient location: [address]  [place] [address]    Is the patient currently in the state of [place]? YES    Visit mode:VIDEO    If the visit is dropped, the patient can be reconnected by: VIDEO VISIT: Text to cell phone:   Telephone Information:   Mobile [phone]       Will anyone else be joining the visit? NO  (If patient encounters technical issues they should call [phone])    Are changes needed to the allergy or medication list? Pt stated no changes to allergies and Pt stated no med changes    Are refills needed on medications prescribed by this physician? NO    Rooming Documentation:  Questionnaire(s) completed    Reason for visit: RECHECK    Karen CURRAN

## 2024-11-06 NOTE — PROGRESS NOTES
Virtual Visit Details    Type of service:  Video Visit     Originating Location (pt. Location): Home    Distant Location (provider location):  On-site  Platform used for Video Visit: [facility]    ONCOLOGY HISTORY: Mr. Wilks is a gentleman with hepatocellular carcinoma.  1.  On 08/11/2001, CT abdomen and pelvis revealed mild hepatosplenomegaly.  -Ultrasound abdomen on 06/14/2010 revealed liver cirrhosis and splenomegaly.  -MRI abdomen in 05/08/2015 revealed liver cirrhosis and splenomegaly.  -EGD on 08/13/2021 revealed small esophageal varices and mild portal hypertensive gastropathy.  2.  On 09/07/2022, CT abdomen and pelvis revealed liver cirrhosis, moderate splenomegaly, ascites and 8 mm hypodense lesion in tail of the pancreas.  -Paracentesis of 4700 mL was done on 09/08/2022.  -MRCP on 10/20/2022 revealed branch intraductal papillary mucinous neoplasm.  It also revealed 3.2 cm nodular area in the liver.  3.  MRI abdomen on 01/10/2023 revealed liver cirrhosis with diffuse hepatic iron deposition. A 4.6 x 2.7 cm LI-RADS 4 lesion within the left hepatic lobe.  There is liver cirrhosis, small volume ascites and periesophageal varices.  -EGD on 01/23/2023 revealed portal hypertensive gastropathy.  -On 01/20/2023, AFP of 3.2.  -CT chest on 01/26/2023 did not reveal any metastatic disease.  4.  Ultrasound-guided liver biopsy was done on 02/02/2023. Hepatocellular carcinoma, moderately differentiated.  5.  Evaluated at [facility]. Not a transplant candidate.  -On 03/16/2023, he had Y-90 embolization of left hepatic lobe lesion.  -On 07/31/2024, MRI of the liver revealed  interval increased in size segment VII observation (1.9 cm, previously 1 cm) now with definite arterial phase hyperenhancement and threshold growth (LR-5).  -On 08/30/2024, Y-90 embolization done.  -On 09/30/2024, MRI liver reveals progression.  Interval treatment of the segment 7 observation, which is now hypoenhancing and decreased in size.  Surrounding parenchymal enhancement is likely posttreatment change (LR TR equivocal). Segment IVb 2 cm observation with arterial phase hyperenhancement and washout (LR 5). Increased in size arterial enhancing observation at the dome measuring 1.5 cm, previously 0.9 cm (LR 4). Additional LR 3 observations in segments 5 and 6.  -Case was discussed at tumor board at [facility].  No further liver directed therapy recommended.  6.  EGD on 04/25/2024 did not reveal any esophageal varices.  There is portal hypertensive gastropathy.  -Colonoscopy on 04/25/2024 revealed multiple nonbleeding colonic angioectasias which was treated with argon plasma coagulation..  There was congested mucosa in entire colon consistent with portal hypertensive colopathy.    Subjective:  Wife was on the video.    Mr. Wilks is a 78-year-old gentleman with hepatocellular carcinoma which has progressed on liver directed therapy.  He is not a candidate for any further liver directed therapy.  He is not a transplant candidate.    CT chest, abdomen and pelvis on 10/31/2024:  1.  Liver lesions are again seen though are not well assessed. No evidence of new malignancy in the chest, abdomen, or pelvis.  2.  Cholelithiasis with gallbladder wall thickening and mucosal enhancement. Acute cholecystitis is possible.  3.  CT appearance of hepatic cirrhosis and portal hypertension.    Bone scan on 10/31/2024:  Nonspecific diffuse radiotracer uptake is seen throughout the osseous structures. While the exact etiology remains indeterminate, differential considerations include widespread osseous metastasis (unlikely given the lack of sclerotic lesions seen on recent CT dated 10/31/2024), underlying metabolic bone disease, or sequelae of marrow stimulation if currently receiving chemotherapy.    Patient has no new complaints.  Patient has generalized weakness.  He is able to do all his activities. No headache.  Occasional dizziness.  No chest pain.  No  shortness of breath.  No abdominal pain.  No nausea or vomiting.  No urinary or bowel complaints.  No bleeding.       Exam:  He is alert and oriented x 3.  Not in distress.  ECOG PS of 1.  Rest of system not examined as this is a video visit.     Assessment:  1.  A 78-year-old gentleman with hepatocellular carcinoma which is progressing.   2.  Liver cirrhosis. Child-Naqvi class A  3.  Normocytic anemia from liver cirrhosis  4.  Chronic thrombocytopenia from liver cirrhosis.    Plan:  -Start treatment with atezolizumab and bevacizumab.  -Schedule PET scan.  -See REFUGIO when he comes to start treatment.  -See me with cycle 2.    Discussion:  1.  I had a video visit with the patient and his wife.  Patient has no new complaints.    Scans were reviewed.  CT scan does not reveal any evidence of disease outside the liver.  Bone scan reveals nonspecific diffuse radiotracer uptake throughout the osseous structures.  Likely this is nonspecific but bone metastasis cannot be ruled out.    Discussed regarding getting a PET scan to evaluate bone lesions.  Patient agreeable for it.  That will be scheduled.    2.  Discussed regarding systemic treatment for hepatocellular carcinoma.  Patient falls into child Naqvi class A (no clinical ascites, no encephalopathy, bilirubin of 2.7, albumin of 3.6 and INR of 1.67).  Patient has good performance status.  He is not on any anticoagulation.    I discussed regarding atezolizumab with bevacizumab.  Regimen was reviewed.  Side effect of atezolizumab and bevacizumab reviewed.  There is no contraindication to either of these drugs.  Patient agreeable for it.  It will be scheduled.    Patient was recently seen by his hepatologist Dr. Laura Jean.  She is not recommending any repeat EGD at this time.  Last EGD did not reveal any esophageal varices.    3.  Patient and his wife had few questions which were all answered.  He will see our REFUGIO when he comes to start treatment.  I will see him with cycle  2.       Total video visit time of 45 minutes.  Time spent in today's visit, review of chart/investigations today, discussing regarding high risk drugs and documentation.    Atezolizumab plus Bevacizumab in Unresectable Hepatocellular Carcinoma.     Art RS, Amina S, Pastora M, Jassi GA, Alexia M, Tonya TY, Naila M, Vincent V, Amada P, Derrick AO, Isela D, Ramsey W, Freedom DZ, Charli S, Umana J, Becerril C, Kathrine S, Buckley Y, Darell HY, Ana AX, Pancho AL, [id number] Investigators     N Engl J Med. 2020;382(20):7144.      BACKGROUNDThe combination of atezolizumab and bevacizumab showed encouraging antitumor activity and safety in a phase 1b trial involving patients with unresectable hepatocellular carcinoma.  METHODSIn a global, open-label, phase 3 trial, patients with unresectable hepatocellular carcinoma who had not previously received systemic treatment were randomly assigned in a 2:1 ratio to receive either atezolizumab plus bevacizumab or sorafenib until unacceptable toxic effects occurred or there was a loss of clinical benefit. The coprimary end points were overall survival and progression-free survival in the intention-to-treat population, as assessed at an independent review facility according to Response Evaluation Criteria in Solid Tumors, version 1.1 (RECIST 1.1).  RESULTSThe intention-to-treat population included 336 patients in the atezolizumab-bevacizumab group and 165 patients in the sorafenib group. At the time of the primary analysis (August 29, 2019), the hazard ratio for death with atezolizumab-bevacizumab as compared with sorafenib was 0.58 (95% confidence interval [CI], 0.42 to 0.79; P<0.001). Overall survival at 12 months was 67.2% (95% CI, 61.3 to 73.1) with atezolizumab-bevacizumab and 54.6% (95% CI, 45.2 to 64.0) with sorafenib. Median progression-free survival was 6.8 months (95% CI, 5.7 to 8.3) and 4.3 months (95% CI, 4.0 to 5.6) in the respective groups (hazard ratio for disease progression or death, 0.59; 95%  CI, 0.47 to 0.76; P<0.001). Grade 3 or 4 adverse events occurred in 56.5% of 329 patients who received at least one dose of atezolizumab-bevacizumab and in 55.1% of 156 patients who received at least one dose of sorafenib. Grade 3 or 4 hypertension occurred in 15.2% of patients in the atezolizumab-bevacizumab group; however, other high-grade toxic effects were infrequent.  CONCLUSIONSIn patients with unresectable hepatocellular carcinoma, atezolizumab combined with bevacizumab resulted in better overall and progression-free survival outcomes than sorafenib. (Funded by [employer] ClinicalTrials.gov number, [id number].).

## 2024-11-06 NOTE — LETTER
11/6/2024      Oskar Wilks  [address]  [place] [address]      Dear Colleague,    Thank you for referring your patient, Oskar Wilks, to the [facility]. Please see a copy of my visit note below.    Virtual Visit Details    Type of service:  Video Visit     Originating Location (pt. Location): Home    Distant Location (provider location):  On-site  Platform used for Video Visit: [facility]    ONCOLOGY HISTORY: Mr. Wilks is a gentleman with hepatocellular carcinoma.  1.  On 08/11/2001, CT abdomen and pelvis revealed mild hepatosplenomegaly.  -Ultrasound abdomen on 06/14/2010 revealed liver cirrhosis and splenomegaly.  -MRI abdomen in 05/08/2015 revealed liver cirrhosis and splenomegaly.  -EGD on 08/13/2021 revealed small esophageal varices and mild portal hypertensive gastropathy.  2.  On 09/07/2022, CT abdomen and pelvis revealed liver cirrhosis, moderate splenomegaly, ascites and 8 mm hypodense lesion in tail of the pancreas.  -Paracentesis of 4700 mL was done on 09/08/2022.  -MRCP on 10/20/2022 revealed branch intraductal papillary mucinous neoplasm.  It also revealed 3.2 cm nodular area in the liver.  3.  MRI abdomen on 01/10/2023 revealed liver cirrhosis with diffuse hepatic iron deposition. A 4.6 x 2.7 cm LI-RADS 4 lesion within the left hepatic lobe.  There is liver cirrhosis, small volume ascites and periesophageal varices.  -EGD on 01/23/2023 revealed portal hypertensive gastropathy.  -On 01/20/2023, AFP of 3.2.  -CT chest on 01/26/2023 did not reveal any metastatic disease.  4.  Ultrasound-guided liver biopsy was done on 02/02/2023. Hepatocellular carcinoma, moderately differentiated.  5.  Evaluated at [facility]. Not a transplant candidate.  -On 03/16/2023, he had Y-90 embolization of left hepatic lobe lesion.  -On 07/31/2024, MRI of the liver revealed  interval increased in size segment VII observation (1.9 cm, previously 1 cm) now with definite  arterial phase hyperenhancement and threshold growth (LR-5).  -On 08/30/2024, Y-90 embolization done.  -On 09/30/2024, MRI liver reveals progression.  Interval treatment of the segment 7 observation, which is now hypoenhancing and decreased in size. Surrounding parenchymal enhancement is likely posttreatment change (LR TR equivocal). Segment IVb 2 cm observation with arterial phase hyperenhancement and washout (LR 5). Increased in size arterial enhancing observation at the dome measuring 1.5 cm, previously 0.9 cm (LR 4). Additional LR 3 observations in segments 5 and 6.  -Case was discussed at tumor board at [facility].  No further liver directed therapy recommended.  6.  EGD on 04/25/2024 did not reveal any esophageal varices.  There is portal hypertensive gastropathy.  -Colonoscopy on 04/25/2024 revealed multiple nonbleeding colonic angioectasias which was treated with argon plasma coagulation..  There was congested mucosa in entire colon consistent with portal hypertensive colopathy.    Subjective:  Wife was on the video.    Mr. Wilks is a 78-year-old gentleman with hepatocellular carcinoma which has progressed on liver directed therapy.  He is not a candidate for any further liver directed therapy.  He is not a transplant candidate.    CT chest, abdomen and pelvis on 10/31/2024:  1.  Liver lesions are again seen though are not well assessed. No evidence of new malignancy in the chest, abdomen, or pelvis.  2.  Cholelithiasis with gallbladder wall thickening and mucosal enhancement. Acute cholecystitis is possible.  3.  CT appearance of hepatic cirrhosis and portal hypertension.    Bone scan on 10/31/2024:  Nonspecific diffuse radiotracer uptake is seen throughout the osseous structures. While the exact etiology remains indeterminate, differential considerations include widespread osseous metastasis (unlikely given the lack of sclerotic lesions seen on recent CT dated 10/31/2024), underlying  metabolic bone disease, or sequelae of marrow stimulation if currently receiving chemotherapy.    Patient has no new complaints.  Patient has generalized weakness.  He is able to do all his activities. No headache.  Occasional dizziness.  No chest pain.  No shortness of breath.  No abdominal pain.  No nausea or vomiting.  No urinary or bowel complaints.  No bleeding.       Exam:  He is alert and oriented x 3.  Not in distress.  ECOG PS of 1.  Rest of system not examined as this is a video visit.     Assessment:  1.  A 78-year-old gentleman with hepatocellular carcinoma which is progressing.   2.  Liver cirrhosis. Child-Naqvi class A  3.  Normocytic anemia from liver cirrhosis  4.  Chronic thrombocytopenia from liver cirrhosis.    Plan:  -Start treatment with atezolizumab and bevacizumab.  -Schedule PET scan.  -See REFUGIO when he comes to start treatment.  -See me with cycle 2.    Discussion:  1.  I had a video visit with the patient and his wife.  Patient has no new complaints.    Scans were reviewed.  CT scan does not reveal any evidence of disease outside the liver.  Bone scan reveals nonspecific diffuse radiotracer uptake throughout the osseous structures.  Likely this is nonspecific but bone metastasis cannot be ruled out.    Discussed regarding getting a PET scan to evaluate bone lesions.  Patient agreeable for it.  That will be scheduled.    2.  Discussed regarding systemic treatment for hepatocellular carcinoma.  Patient falls into child Naqvi class A (no clinical ascites, no encephalopathy, bilirubin of 2.7, albumin of 3.6 and INR of 1.67).  Patient has good performance status.  He is not on any anticoagulation.    I discussed regarding atezolizumab with bevacizumab.  Regimen was reviewed.  Side effect of atezolizumab and bevacizumab reviewed.  There is no contraindication to either of these drugs.  Patient agreeable for it.  It will be scheduled.    Patient was recently seen by his hepatologist Dr. Sanchez  Ted.  She is not recommending any repeat EGD at this time.  Last EGD did not reveal any esophageal varices.    3.  Patient and his wife had few questions which were all answered.  He will see our REFUGIO when he comes to start treatment.  I will see him with cycle 2.       Total video visit time of 45 minutes.  Time spent in today's visit, review of chart/investigations today, discussing regarding high risk drugs and documentation.    Atezolizumab plus Bevacizumab in Unresectable Hepatocellular Carcinoma.     Art RS, Amina S, Pastora M, Jassi NM, Alexia M, Tonya TY, Naila M, Vincent V, Amada P, Derrick AO, Isela D, Ramsey W, Freedom DZ, Charli S, Dong J, Jone C, Kathrine S, Buckley Y, Darell HY, Ana AX, Pancho AL, [id number] Investigators     N Engl J Med. 2020;382(89):9514.      BACKGROUNDThe combination of atezolizumab and bevacizumab showed encouraging antitumor activity and safety in a phase 1b trial involving patients with unresectable hepatocellular carcinoma.  METHODSIn a global, open-label, phase 3 trial, patients with unresectable hepatocellular carcinoma who had not previously received systemic treatment were randomly assigned in a 2:1 ratio to receive either atezolizumab plus bevacizumab or sorafenib until unacceptable toxic effects occurred or there was a loss of clinical benefit. The coprimary end points were overall survival and progression-free survival in the intention-to-treat population, as assessed at an independent review facility according to Response Evaluation Criteria in Solid Tumors, version 1.1 (RECIST 1.1).  RESULTSThe intention-to-treat population included 336 patients in the atezolizumab-bevacizumab group and 165 patients in the sorafenib group. At the time of the primary analysis (August 29, 2019), the hazard ratio for death with atezolizumab-bevacizumab as compared with sorafenib was 0.58 (95% confidence interval [CI], 0.42 to 0.79; P<0.001). Overall survival at 12 months was 67.2% (95% CI, 61.3 to 73.1)  with atezolizumab-bevacizumab and 54.6% (95% CI, 45.2 to 64.0) with sorafenib. Median progression-free survival was 6.8 months (95% CI, 5.7 to 8.3) and 4.3 months (95% CI, 4.0 to 5.6) in the respective groups (hazard ratio for disease progression or death, 0.59; 95% CI, 0.47 to 0.76; P<0.001). Grade 3 or 4 adverse events occurred in 56.5% of 329 patients who received at least one dose of atezolizumab-bevacizumab and in 55.1% of 156 patients who received at least one dose of sorafenib. Grade 3 or 4 hypertension occurred in 15.2% of patients in the atezolizumab-bevacizumab group; however, other high-grade toxic effects were infrequent.  CONCLUSIONSIn patients with unresectable hepatocellular carcinoma, atezolizumab combined with bevacizumab resulted in better overall and progression-free survival outcomes than sorafenib. (Funded by [employer]; ClinicalTrials.gov number, [id number].).      Again, thank you for allowing me to participate in the care of your patient.        Sincerely,        Saad Ellis MD

## 2024-11-06 NOTE — PATIENT INSTRUCTIONS
-Start treatment with atezolizumab and bevacizumab.  -Schedule PET scan.  -See REFUGIO when he comes to start treatment.  -See me with cycle 2.

## 2024-11-08 ENCOUNTER — MYC MEDICAL ADVICE (OUTPATIENT)
Dept: ONCOLOGY | Facility: CLINIC | Age: 79
End: 2024-11-08
Payer: MEDICARE

## 2024-11-18 ENCOUNTER — PATIENT OUTREACH (OUTPATIENT)
Dept: ONCOLOGY | Facility: CLINIC | Age: 79
End: 2024-11-18
Payer: MEDICARE

## 2024-11-18 DIAGNOSIS — K70.30 ALCOHOLIC CIRRHOSIS OF LIVER WITHOUT ASCITES (H): Primary | ICD-10-CM

## 2024-11-18 NOTE — PROGRESS NOTES
[facility]: Cancer Care Plan of Care Education Note                                    Discussion with Patient:                                                      Writer spoke with patient and his wife Ericka reviewing upcoming PETSCAN for 11/19 and C1D1 of treatment on 11/25.     No concerns at this time patient encouraged to call our clinic with any concerns.        Assessment:                                                      Assessment completed with:: Patient;Spouse or significant other    Plan of Care Education   Yearly learning assessment completed?: Yes (see Education tab)  Diagnosis:: (P) HCC  Does patient understand diagnosis?: (P) Yes  Tx plan/regimen:: (P) Tecentriq & Avastin every 3 weeks  Does patient understand treatment plan/regimen?: (P) Yes  Preparing for treatment:: (P) Reviewed treatment preparation information with patient (vascular access, day of chemo, visitor policy, what to bring, etc.)  Vascular access education provided for:: (P) Peripheral IV  Side effect education:: (P) Immune-mediated effects;Nausea/Vomiting;Skin changes;Infection;Lab value monitoring (anemia, neutropenia, thrombocytopenia);Fatigue;Mylosuppression  Safety/self care at home reviewed with patient:: (P) Yes  Coping - concerns/fears reviewed with patient:: (P) Yes  Plan of Care:: (P) REFUGIO follow-up appointment;Treatment schedule;Lab appointment  When to call provider:: (P) Bleeding;New/worsening pain;Increased shortness of breath;Temperature >100.4F;Shaking chills;Uncontrolled diarrhea/constipation;Uncontrolled nausea/vomiting  Reasons for deferring treatment reviewed with patient:: (P) Yes    Evaluation of Learning  Patient Education Provided: (P) Yes  Readiness:: (P) Acceptance  Method:: (P) Literature;Explanation  Response:: (P) Verbalizes understanding    Plan of Care Education Review:   Assessment completed with:: Patient;Spouse or significant other    Plan of Care Education   Yearly learning assessment  completed?: Yes (see Education tab)  Diagnosis:: HCC  Does patient understand diagnosis?: Yes  Tx plan/regimen:: Tecentriq & Avastin every 3 weeks  Does patient understand treatment plan/regimen?: Yes  Preparing for treatment:: Reviewed treatment preparation information with patient (vascular access, day of chemo, visitor policy, what to bring, etc.)  Vascular access education provided for:: Peripheral IV  Side effect education:: Immune-mediated effects;Nausea/Vomiting;Skin changes;Infection;Lab value monitoring (anemia, neutropenia, thrombocytopenia);Fatigue;Mylosuppression  Safety/self care at home reviewed with patient:: Yes  Coping - concerns/fears reviewed with patient:: Yes  Plan of Care:: REFUGIO follow-up appointment;Treatment schedule;Lab appointment  When to call provider:: Bleeding;New/worsening pain;Increased shortness of breath;Temperature >100.4F;Shaking chills;Uncontrolled diarrhea/constipation;Uncontrolled nausea/vomiting  Reasons for deferring treatment reviewed with patient:: Yes    Evaluation of Learning  Patient Education Provided: Yes  Readiness:: Acceptance  Method:: Literature;Explanation  Response:: Verbalizes understanding           Intervention/Education provided during outreach:                                                       No addition Education at this time.     Confirmed patient has clinic and triage numbers    Signature:  Ct Caraballo, RN

## 2024-11-18 NOTE — PROGRESS NOTES
11/18/24 1543   OTHER   Assessment completed with: Patient;Spouse or significant other   Plan of Care Education    Yearly learning assessment completed? Yes (see Education tab)   Diagnosis: HCC   Does patient understand diagnosis? Yes   Tx plan/regimen: Tecentriq & Avastin every 3 weeks   Does patient understand treatment plan/regimen? Yes   Preparing for treatment: Reviewed treatment preparation information with patient (vascular access, day of chemo, visitor policy, what to bring, etc.)   Vascular access education provided for: Peripheral IV   Side effect education: Immune-mediated effects;Nausea/Vomiting;Skin changes;Infection;Lab value monitoring (anemia, neutropenia, thrombocytopenia);Fatigue;Mylosuppression   Safety/self care at home reviewed with patient: Yes   Coping - concerns/fears reviewed with patient: Yes   Plan of Care: REFUGIO follow-up appointment;Treatment schedule;Lab appointment   When to call provider: Bleeding;New/worsening pain;Increased shortness of breath;Temperature >100.4F;Shaking chills;Uncontrolled diarrhea/constipation;Uncontrolled nausea/vomiting   Reasons for deferring treatment reviewed with patient: Yes   Evaluation of Learning   Patient Education Provided Yes   Readiness: Acceptance   Method: Literature;Explanation   Response: Verbalizes understanding

## 2024-11-19 ENCOUNTER — HOSPITAL ENCOUNTER (OUTPATIENT)
Dept: PET IMAGING | Facility: CLINIC | Age: 79
Discharge: HOME | End: 2024-11-19
Attending: INTERNAL MEDICINE
Payer: MEDICARE

## 2024-11-19 DIAGNOSIS — C22.0 HEPATOCELLULAR CARCINOMA (H): ICD-10-CM

## 2024-11-19 PROCEDURE — 78816 PET IMAGE W/CT FULL BODY: CPT | Mod: MG,PI

## 2024-11-19 PROCEDURE — 343N000001 HC RX 343 MED OP 636: Performed by: INTERNAL MEDICINE

## 2024-11-19 PROCEDURE — A9552 F18 FDG: HCPCS | Performed by: INTERNAL MEDICINE

## 2024-11-19 PROCEDURE — G1010 CDSM STANSON: HCPCS | Mod: PI

## 2024-11-19 RX ORDER — FLUDEOXYGLUCOSE F 18 200 MCI/ML
10-18 INJECTION, SOLUTION INTRAVENOUS ONCE
Status: COMPLETED | OUTPATIENT
Start: 2024-11-19 | End: 2024-11-19

## 2024-11-19 RX ADMIN — FLUDEOXYGLUCOSE F 18 12.2 MILLICURIE: 200 INJECTION, SOLUTION INTRAVENOUS at 07:10

## 2024-11-20 ENCOUNTER — DOCUMENTATION ONLY (OUTPATIENT)
Dept: PHARMACY | Facility: CLINIC | Age: 79
End: 2024-11-20
Payer: MEDICARE

## 2024-11-20 NOTE — RESULT ENCOUNTER NOTE
Dear Mr. Wilks,    PET scan does not reveal any cancer in bones. We will review it during appointment.    Please, call me with any questions.    Saad Ellis MD

## 2024-11-25 ENCOUNTER — INFUSION THERAPY VISIT (OUTPATIENT)
Dept: INFUSION THERAPY | Facility: CLINIC | Age: 79
End: 2024-11-25
Attending: INTERNAL MEDICINE
Payer: MEDICARE

## 2024-11-25 ENCOUNTER — ONCOLOGY VISIT (OUTPATIENT)
Dept: ONCOLOGY | Facility: CLINIC | Age: 79
End: 2024-11-25
Attending: INTERNAL MEDICINE
Payer: MEDICARE

## 2024-11-25 VITALS
BODY MASS INDEX: 26.72 KG/M2 | OXYGEN SATURATION: 99 % | DIASTOLIC BLOOD PRESSURE: 62 MMHG | TEMPERATURE: 98 F | RESPIRATION RATE: 18 BRPM | HEART RATE: 64 BPM | SYSTOLIC BLOOD PRESSURE: 102 MMHG | WEIGHT: 197.09 LBS

## 2024-11-25 VITALS
WEIGHT: 197 LBS | SYSTOLIC BLOOD PRESSURE: 102 MMHG | HEART RATE: 64 BPM | TEMPERATURE: 98 F | RESPIRATION RATE: 18 BRPM | BODY MASS INDEX: 26.71 KG/M2 | OXYGEN SATURATION: 99 % | DIASTOLIC BLOOD PRESSURE: 62 MMHG

## 2024-11-25 DIAGNOSIS — D69.6 THROMBOCYTOPENIA (H): ICD-10-CM

## 2024-11-25 DIAGNOSIS — C22.0 HCC (HEPATOCELLULAR CARCINOMA) (H): Primary | ICD-10-CM

## 2024-11-25 DIAGNOSIS — T45.1X5A CHEMOTHERAPY-INDUCED NAUSEA: ICD-10-CM

## 2024-11-25 DIAGNOSIS — C22.0 HCC (HEPATOCELLULAR CARCINOMA) (H): ICD-10-CM

## 2024-11-25 DIAGNOSIS — D64.9 NORMOCYTIC ANEMIA: Primary | ICD-10-CM

## 2024-11-25 DIAGNOSIS — R11.0 CHEMOTHERAPY-INDUCED NAUSEA: ICD-10-CM

## 2024-11-25 LAB
ACANTHOCYTES BLD QL SMEAR: SLIGHT
ALBUMIN SERPL BCG-MCNC: 3.7 G/DL (ref 3.5–5.2)
ALBUMIN UR-MCNC: 10 MG/DL
ALP SERPL-CCNC: 103 U/L (ref 40–150)
ALT SERPL W P-5'-P-CCNC: 49 U/L (ref 0–70)
ANION GAP SERPL CALCULATED.3IONS-SCNC: 9 MMOL/L (ref 7–15)
AST SERPL W P-5'-P-CCNC: 66 U/L (ref 0–45)
BILIRUB SERPL-MCNC: 3.5 MG/DL
BUN SERPL-MCNC: 21.1 MG/DL (ref 8–23)
CALCIUM SERPL-MCNC: 8.9 MG/DL (ref 8.8–10.4)
CHLORIDE SERPL-SCNC: 102 MMOL/L (ref 98–107)
CREAT SERPL-MCNC: 0.94 MG/DL (ref 0.67–1.17)
DACRYOCYTES BLD QL SMEAR: ABNORMAL
EGFRCR SERPLBLD CKD-EPI 2021: 83 ML/MIN/1.73M2
ELLIPTOCYTES BLD QL SMEAR: ABNORMAL
ERYTHROCYTE [DISTWIDTH] IN BLOOD BY AUTOMATED COUNT: 19.6 % (ref 10–15)
FRAGMENTS BLD QL SMEAR: SLIGHT
GLUCOSE SERPL-MCNC: 123 MG/DL (ref 70–99)
HCO3 SERPL-SCNC: 25 MMOL/L (ref 22–29)
HCT VFR BLD AUTO: 30.6 % (ref 40–53)
HGB BLD-MCNC: 10.1 G/DL (ref 13.3–17.7)
IRON BINDING CAPACITY (ROCHE): NORMAL
IRON SATN MFR SERPL: NORMAL %
IRON SERPL-MCNC: 147 UG/DL (ref 61–157)
MCH RBC QN AUTO: 30 PG (ref 26.5–33)
MCHC RBC AUTO-ENTMCNC: 33 G/DL (ref 31.5–36.5)
MCV RBC AUTO: 91 FL (ref 78–100)
PLAT MORPH BLD: ABNORMAL
PLATELET # BLD AUTO: 79 10E3/UL (ref 150–450)
POTASSIUM SERPL-SCNC: 4.2 MMOL/L (ref 3.4–5.3)
PROT SERPL-MCNC: 6.1 G/DL (ref 6.4–8.3)
RBC # BLD AUTO: 3.37 10E6/UL (ref 4.4–5.9)
RBC MORPH BLD: ABNORMAL
SODIUM SERPL-SCNC: 136 MMOL/L (ref 135–145)
TSH SERPL DL<=0.005 MIU/L-ACNC: 0.51 UIU/ML (ref 0.3–4.2)
UIBC SERPL-MCNC: NORMAL UG/DL
WBC # BLD AUTO: 5.7 10E3/UL (ref 4–11)

## 2024-11-25 PROCEDURE — 258N000003 HC RX IP 258 OP 636: Performed by: INTERNAL MEDICINE

## 2024-11-25 PROCEDURE — 84520 ASSAY OF UREA NITROGEN: CPT | Performed by: INTERNAL MEDICINE

## 2024-11-25 PROCEDURE — 36415 COLL VENOUS BLD VENIPUNCTURE: CPT | Performed by: INTERNAL MEDICINE

## 2024-11-25 PROCEDURE — 84075 ASSAY ALKALINE PHOSPHATASE: CPT | Performed by: INTERNAL MEDICINE

## 2024-11-25 PROCEDURE — 85014 HEMATOCRIT: CPT | Performed by: INTERNAL MEDICINE

## 2024-11-25 PROCEDURE — 83540 ASSAY OF IRON: CPT | Performed by: INTERNAL MEDICINE

## 2024-11-25 PROCEDURE — G0463 HOSPITAL OUTPT CLINIC VISIT: HCPCS | Mod: 25

## 2024-11-25 PROCEDURE — 250N000011 HC RX IP 250 OP 636: Mod: JZ | Performed by: INTERNAL MEDICINE

## 2024-11-25 PROCEDURE — 84443 ASSAY THYROID STIM HORMONE: CPT | Performed by: INTERNAL MEDICINE

## 2024-11-25 PROCEDURE — 96413 CHEMO IV INFUSION 1 HR: CPT

## 2024-11-25 PROCEDURE — 99214 OFFICE O/P EST MOD 30 MIN: CPT

## 2024-11-25 PROCEDURE — 86301 IMMUNOASSAY TUMOR CA 19-9: CPT | Performed by: INTERNAL MEDICINE

## 2024-11-25 PROCEDURE — 96417 CHEMO IV INFUS EACH ADDL SEQ: CPT

## 2024-11-25 PROCEDURE — 82310 ASSAY OF CALCIUM: CPT | Performed by: INTERNAL MEDICINE

## 2024-11-25 PROCEDURE — 83550 IRON BINDING TEST: CPT | Performed by: INTERNAL MEDICINE

## 2024-11-25 PROCEDURE — 81003 URINALYSIS AUTO W/O SCOPE: CPT | Performed by: INTERNAL MEDICINE

## 2024-11-25 RX ORDER — PROCHLORPERAZINE MALEATE 10 MG
10 TABLET ORAL EVERY 6 HOURS PRN
Qty: 30 TABLET | Refills: 1 | Status: SHIPPED | OUTPATIENT
Start: 2024-11-25

## 2024-11-25 RX ORDER — ONDANSETRON 4 MG/1
4 TABLET, ORALLY DISINTEGRATING ORAL EVERY 8 HOURS PRN
Qty: 30 TABLET | Refills: 1 | Status: SHIPPED | OUTPATIENT
Start: 2024-11-25

## 2024-11-25 RX ADMIN — BEVACIZUMAB-AWWB 1300 MG: 400 INJECTION, SOLUTION INTRAVENOUS at 10:50

## 2024-11-25 RX ADMIN — ATEZOLIZUMAB 1200 MG: 1200 INJECTION, SOLUTION INTRAVENOUS at 09:42

## 2024-11-25 RX ADMIN — SODIUM CHLORIDE 250 ML: 9 INJECTION, SOLUTION INTRAVENOUS at 09:28

## 2024-11-25 ASSESSMENT — PAIN SCALES - GENERAL: PAINLEVEL_OUTOF10: NO PAIN (0)

## 2024-11-25 NOTE — PROGRESS NOTES
Infusion Nursing Note:  Oskar Wilks presents today for C1D1 Peripheral labs/MVASI/Tecentriq.    Patient seen by provider today: Yes: MARYANNE Charles   present during visit today: Not Applicable.    Note: Patient and wife oriented to infusion center. Bilirubin levels elevated, provider aware. Okay to proceed with treatment without urine albumin levels first.      Intravenous Access:  Labs drawn without difficulty.  Peripheral IV placed.    Treatment Conditions:  Lab Results   Component Value Date    HGB 10.1 (L) 11/25/2024    WBC 5.7 11/25/2024    ANEUTAUTO 4.3 10/22/2024    PLT 79 (L) 11/25/2024        Lab Results   Component Value Date     11/25/2024    POTASSIUM 4.2 11/25/2024    CR 0.94 11/25/2024    ALIA 8.9 11/25/2024    BILITOTAL 3.5 (H) 11/25/2024    ALBUMIN 3.7 11/25/2024    ALT 49 11/25/2024    AST 66 (H) 11/25/2024       Results reviewed, labs MET treatment parameters, ok to proceed with treatment.  Urine protein 10 mg/dL. Per Fannie Castaneda, okay to proceed with treatment with elevated bilirubin levels.      Post Infusion Assessment:  Patient tolerated infusion without incident.  Blood return noted pre and post infusion.  Site patent and intact, free from redness, edema or discomfort.  No evidence of extravasations.  Access discontinued per protocol.       Discharge Plan:   Patient declined prescription refills.  Discharge instructions reviewed with: Patient.  Patient and/or family verbalized understanding of discharge instructions and all questions answered.  AVS to patient via [employer].  Patient will return 12/2 for next appointment.   Patient discharged in stable condition accompanied by: self and wife.  Departure Mode: Ambulatory.      Jose Francisco Meyer RN

## 2024-11-25 NOTE — PROGRESS NOTES
Oncology/Hematology Visit Note  11/25/2024     Reason for Visit: Follow up  hepatocellular carcinoma.     Oncology HPI:   1.  On 08/11/2001, CT abdomen and pelvis revealed mild hepatosplenomegaly.  -Ultrasound abdomen on 06/14/2010 revealed liver cirrhosis and splenomegaly.  -MRI abdomen in 05/08/2015 revealed liver cirrhosis and splenomegaly.  -EGD on 08/13/2021 revealed small esophageal varices and mild portal hypertensive gastropathy.  2.  On 09/07/2022, CT abdomen and pelvis revealed liver cirrhosis, moderate splenomegaly, ascites and 8 mm hypodense lesion in tail of the pancreas.  -Paracentesis of 4700 mL was done on 09/08/2022.  -MRCP on 10/20/2022 revealed branch intraductal papillary mucinous neoplasm.  It also revealed 3.2 cm nodular area in the liver.  3.  MRI abdomen on 01/10/2023 revealed liver cirrhosis with diffuse hepatic iron deposition. A 4.6 x 2.7 cm LI-RADS 4 lesion within the left hepatic lobe.  There is liver cirrhosis, small volume ascites and periesophageal varices.  -EGD on 01/23/2023 revealed portal hypertensive gastropathy.  -On 01/20/2023, AFP of 3.2.  -CT chest on 01/26/2023 did not reveal any metastatic disease.  4.  Ultrasound-guided liver biopsy was done on 02/02/2023. Hepatocellular carcinoma, moderately differentiated.  5.  Evaluated at [facility]. Not a transplant candidate.  -On 03/16/2023, he had Y-90 embolization of left hepatic lobe lesion.  -On 07/31/2024, MRI of the liver revealed  interval increased in size segment VII observation (1.9 cm, previously 1 cm) now with definite arterial phase hyperenhancement and threshold growth (LR-5).  -On 08/30/2024, Y-90 embolization done.  -On 09/30/2024, MRI liver reveals progression.  Interval treatment of the segment 7 observation, which is now hypoenhancing and decreased in size. Surrounding parenchymal enhancement is likely posttreatment change (LR TR equivocal). Segment IVb 2 cm observation with arterial phase hyperenhancement  and washout (LR 5). Increased in size arterial enhancing observation at the dome measuring 1.5 cm, previously 0.9 cm (LR 4). Additional LR 3 observations in segments 5 and 6.  -Case was discussed at tumor board at [facility].  No further liver directed therapy recommended.  6.  EGD on 04/25/2024 did not reveal any esophageal varices.  There is portal hypertensive gastropathy.  -Colonoscopy on 04/25/2024 revealed multiple nonbleeding colonic angioectasias which was treated with argon plasma coagulation..  There was congested mucosa in entire colon consistent with portal hypertensive colopathy.  7.  CT chest, abdomen and pelvis on 10/31/2024:1.  Liver lesions are again seen though are not well assessed. No evidence of new malignancy in the chest, abdomen, or pelvis.2.  Cholelithiasis with gallbladder wall thickening and mucosal enhancement. Acute cholecystitis is possible.  3.  CT appearance of hepatic cirrhosis and portal hypertension.  8.Bone scan on 10/31/2024:  Nonspecific diffuse radiotracer uptake is seen throughout the osseous structures. While the exact etiology remains indeterminate, differential considerations include widespread osseous metastasis (unlikely given the lack of sclerotic lesions seen on recent CT dated 10/31/2024), underlying metabolic bone disease, or sequelae of marrow stimulation if currently receiving chemotherapy.  9. 11/25/2024 D1C1 atezolizumab + bevacizumab    Current Treatment:atezolizumab and bevacizumab.     Interval history:   Parag presents to clinic today for D1C1 atezolizumab and bevacizumab, he is accompanied by his wife, Ericka.     He has fatigue at baseline. He has insomnia, using CBD oil, recently took 2 drops hs, but was confused upon waking. He has tried multiple different remedies in the past. CBD seems to work ok.     Review of Systems: See interval hx. Denies fevers, chills, dizziness,  changes in vision, abdominal pain, N/V, diarrhea, changes in urination,  bleeding, bruising, rash.     Current Outpatient Medications   Medication Sig Dispense Refill    ondansetron (ZOFRAN ODT) 4 MG ODT tab Take 1 tablet (4 mg) by mouth every 8 hours as needed for nausea. 30 tablet 1    prochlorperazine (COMPAZINE) 10 MG tablet Take 1 tablet (10 mg) by mouth every 6 hours as needed for nausea or vomiting. 30 tablet 1    ammonium lactate (AMLACTIN) 12 % external cream Apply topically daily as needed.      buPROPion (WELLBUTRIN XL) 300 MG 24 hr tablet Take 300 mg by mouth every morning      cholecalciferol 25 MCG (1000 UT) TABS Take 1 tablet by mouth daily      fluticasone (FLONASE) 50 MCG/ACT nasal spray Spray 2 sprays into both nostrils daily      folic acid (FOLVITE) 1 MG tablet Take 1 mg by mouth daily      ketoconazole (NIZORAL) 2 % external cream Apply topically daily as needed for irritation.      losartan (COZAAR) 25 MG tablet Take 1 tablet by mouth daily      metoprolol succinate ER (TOPROL XL) 25 MG 24 hr tablet Take 1 tablet by mouth daily at 2 pm      multivitamin w/minerals (THERA-VIT-M) tablet Take 1 tablet by mouth daily      omeprazole (PRILOSEC) 40 MG DR capsule Take 40 mg by mouth daily.      spironolactone (ALDACTONE) 50 MG tablet Take 50 mg by mouth daily      torsemide (DEMADEX) 10 MG tablet Take 10 mg by mouth daily            Allergies   Allergen Reactions    Penicillins      PN: LW Reaction: Itching, Pruritis    Adhesive Tape Rash       Exam:  Blood pressure 102/62, pulse 64, temperature 98  F (36.7  C), temperature source Oral, resp. rate 18, weight 89.4 kg (197 lb 1.5 oz), SpO2 99%.  Wt Readings from Last 4 Encounters:   11/25/24 89.4 kg (197 lb 1.5 oz)   11/25/24 89.4 kg (197 lb)   10/29/24 89.3 kg (196 lb 14.4 oz)   10/22/24 89.4 kg (197 lb)       Constitutional: Pleasant male in no acute distress.  Eyes: scleral icterus. No conjunctival erythema or discharge  Cardiovascular: Regular rate and rhythm. Heart murmur present  Respiratory: Clear to auscultation  bilaterally. No wheezes or crackles.  Gastrointestinal: Bowel sounds present. Abdomen soft, non-tender. No palpable hepatosplenomegaly or masses.   MSK: moving all extremities freely  Neuro: Cranial nerves II-XII intact  Gait: walking unassisted  Skin: No visible lesions, bruising or rashes  Psych: appropriate mood and affect     Labs:    Latest Reference Range & Units 24 07:40   Sodium 135 - 145 mmol/L 136   Potassium 3.4 - 5.3 mmol/L 4.2   Chloride 98 - 107 mmol/L 102   Carbon Dioxide (CO2) 22 - 29 mmol/L 25   Urea Nitrogen 8.0 - 23.0 mg/dL 21.1   Creatinine 0.67 - 1.17 mg/dL 0.94   GFR Estimate >60 mL/min/1.73m2 83   Calcium 8.8 - 10.4 mg/dL 8.9   Anion Gap 7 - 15 mmol/L 9   Albumin 3.5 - 5.2 g/dL 3.7   Protein Total 6.4 - 8.3 g/dL 6.1 (L)   Alkaline Phosphatase 40 - 150 U/L 103   ALT 0 - 70 U/L 49   AST 0 - 45 U/L 66 (H)   Bilirubin Total <=1.2 mg/dL 3.5 (H)   Glucose 70 - 99 mg/dL 123 (H)   Iron 61 - 157 ug/dL 147   Iron Binding Capacity  See Comment   Iron Sat Index  See Comment   TSH 0.30 - 4.20 uIU/mL 0.51   WBC 4.0 - 11.0 10e3/uL 5.7   Hemoglobin 13.3 - 17.7 g/dL 10.1 (L)   Hematocrit 40.0 - 53.0 % 30.6 (L)   Platelet Count 150 - 450 10e3/uL 79 (L)   RBC Count 4.40 - 5.90 10e6/uL 3.37 (L)   MCV 78 - 100 fL 91   MCH 26.5 - 33.0 pg 30.0   MCHC 31.5 - 36.5 g/dL 33.0   RDW 10.0 - 15.0 % 19.6 (H)   RBC Morphology  Confirmed RBC Indices   Platelet Morphology Automated Count Confirmed. Platelet morphology is normal.  Automated Count Confirmed. Platelet morphology is normal.   RBC Fragments None Seen  Slight !   Teardrop Cells None Seen  Moderate !   Acanthocytes None Seen  Slight !   Elliptocytes None Seen  Moderate !   Protein Albumin Urine Negative mg/dL 10 !   UIBC  See Comment       Imagin/19/24: PET/CT FINDINGS: Bone marrow is mildly FDG avid diffusely, consistent with either reaction to anemia or treatment effect. All other FDG activity is normal. No focal abnormal activity in the liver. No  lymphadenopathy.      CT FINDINGS: Moderate senescent intracranial change. Hypoattenuation of blood pool relative to myocardium, suggesting anemia. Calcified mitral annulus and aortic valve leaflets. Moderate cardiomegaly. Moderate calcified atherosclerosis, including   coronary and carotid disease. Cirrhosis with evidence of portal venous hypertension, including marked splenomegaly and upper abdominal venous collaterals with esophageal varices. Cholelithiasis. Mildly enlarged prostate. Sigmoid diverticulosis.   Gynecomastia. Moderate subcutaneous edema both lower legs. Calcific tendinitis right rotator cuff. Right parietal craniotomy. Benign variant persistent metopic suture. Screw fixation right superior pubic ramus. Old non-FDG avid compression fractures of   L1 and L4. Moderate degenerative change in the spine.                                                                       IMPRESSION:     No good evidence of hepatocellular carcinoma, though FDG PET/CT can be falsely negative, as HCC typically does not demonstrate metabolic activity in excess of background liver levels.     Impression/plan: Parag is a 79 year old male with hepatocellular carcinoma.     Hepatocellular Carcinoma  2/23 liver biopsy with HCC moderately differentiated- not a transplant candidate  -03/16/2023, he had Y-90 embolization of left hepatic lobe lesion.   - 07/31/2024, MRI of the liver revealed  interval increased in size segment VII observation    - 08/30/2024, Y-90 embolization done.   09/30/2024, MRI liver reveals progression   - progressed on liver directed therapy  - 11/25/2024 D1C1 atezolizumab with bevacizumab   - labs reviewed-notable for hyperbilirubinemia (range since March  2.2-3.6), thrombocytopenia and anemia- ok to proceed with D1C1  - 12/16 Dr. Ellis     Liver cirrhosis  Esophageal varices  Portal venous hypertension  - Naqvi class A (no clinical ascites, no encephalopathy, bilirubin of 2.7, albumin of 3.6 and INR of  1.67)   - Tbili 3.5 on today's labs  - bilateral 3+ LE edema, no abdominal ascites; advised compression stockings   - of note gynecomastia on PET/CT  - Follows Hepatology- Dr. Laura Jean ( not recommending EGD- last did not show esophageal varices)-11/19 PET with esophageal varices- will discuss with Dr. Ellis in the setting of Avastin    Normocytic Anemia   Secondary to liver cirrhosis  - Collect Iron Panel- labs today hemolyzed    Chronic Thrombocytopenia   Secondary to liver cirrhosis  11/25/2024 Plts 79k  - will recheck labs next week- given the possibility of further reduction by immunotherapy    Insomnia  -taking CBD drops nightly which helps  - advised trying 1 drop hs  - consider sleep hygiene referral    History of Compression Fractures  L1 and L4    Fannie Castaneda PA-C    AE: Atezolizumab derm, endo, C/D/N, heme,LFTs, antibodies/ asthenia, fatigue, PN, msk, cough/dyspnea  AE bevacizumab: HTN, hardik edema, VT, derm, endo, abd pain, n/v/d, stomatitis, heme, anxiety, dizziness, dysarthria, fatigue, HA, insomnia, myasthenia, voice idsorder, arthralgia, back pain, limb pain, myalgias

## 2024-11-25 NOTE — LETTER
11/25/2024      Oskar Wilks  [address]  [place] [address]      Dear Colleague,    Thank you for referring your patient, Oskar Wilks, to the [facility]. Please see a copy of my visit note below.    Oncology/Hematology Visit Note  11/25/2024     Reason for Visit: Follow up  hepatocellular carcinoma.     Oncology HPI:   1.  On 08/11/2001, CT abdomen and pelvis revealed mild hepatosplenomegaly.  -Ultrasound abdomen on 06/14/2010 revealed liver cirrhosis and splenomegaly.  -MRI abdomen in 05/08/2015 revealed liver cirrhosis and splenomegaly.  -EGD on 08/13/2021 revealed small esophageal varices and mild portal hypertensive gastropathy.  2.  On 09/07/2022, CT abdomen and pelvis revealed liver cirrhosis, moderate splenomegaly, ascites and 8 mm hypodense lesion in tail of the pancreas.  -Paracentesis of 4700 mL was done on 09/08/2022.  -MRCP on 10/20/2022 revealed branch intraductal papillary mucinous neoplasm.  It also revealed 3.2 cm nodular area in the liver.  3.  MRI abdomen on 01/10/2023 revealed liver cirrhosis with diffuse hepatic iron deposition. A 4.6 x 2.7 cm LI-RADS 4 lesion within the left hepatic lobe.  There is liver cirrhosis, small volume ascites and periesophageal varices.  -EGD on 01/23/2023 revealed portal hypertensive gastropathy.  -On 01/20/2023, AFP of 3.2.  -CT chest on 01/26/2023 did not reveal any metastatic disease.  4.  Ultrasound-guided liver biopsy was done on 02/02/2023. Hepatocellular carcinoma, moderately differentiated.  5.  Evaluated at [facility]. Not a transplant candidate.  -On 03/16/2023, he had Y-90 embolization of left hepatic lobe lesion.  -On 07/31/2024, MRI of the liver revealed  interval increased in size segment VII observation (1.9 cm, previously 1 cm) now with definite arterial phase hyperenhancement and threshold growth (LR-5).  -On 08/30/2024, Y-90 embolization done.  -On 09/30/2024, MRI liver reveals progression.   Interval treatment of the segment 7 observation, which is now hypoenhancing and decreased in size. Surrounding parenchymal enhancement is likely posttreatment change (LR TR equivocal). Segment IVb 2 cm observation with arterial phase hyperenhancement and washout (LR 5). Increased in size arterial enhancing observation at the dome measuring 1.5 cm, previously 0.9 cm (LR 4). Additional LR 3 observations in segments 5 and 6.  -Case was discussed at tumor board at [facility].  No further liver directed therapy recommended.  6.  EGD on 04/25/2024 did not reveal any esophageal varices.  There is portal hypertensive gastropathy.  -Colonoscopy on 04/25/2024 revealed multiple nonbleeding colonic angioectasias which was treated with argon plasma coagulation..  There was congested mucosa in entire colon consistent with portal hypertensive colopathy.  7.  CT chest, abdomen and pelvis on 10/31/2024:1.  Liver lesions are again seen though are not well assessed. No evidence of new malignancy in the chest, abdomen, or pelvis.2.  Cholelithiasis with gallbladder wall thickening and mucosal enhancement. Acute cholecystitis is possible.  3.  CT appearance of hepatic cirrhosis and portal hypertension.  8.Bone scan on 10/31/2024:  Nonspecific diffuse radiotracer uptake is seen throughout the osseous structures. While the exact etiology remains indeterminate, differential considerations include widespread osseous metastasis (unlikely given the lack of sclerotic lesions seen on recent CT dated 10/31/2024), underlying metabolic bone disease, or sequelae of marrow stimulation if currently receiving chemotherapy.  9. 11/25/2024 D1C1 atezolizumab + bevacizumab    Current Treatment:atezolizumab and bevacizumab.     Interval history:   Parag presents to clinic today for D1C1 atezolizumab and bevacizumab, he is accompanied by his wife, Ericka.     He has fatigue at baseline. He has insomnia, using CBD oil, recently took 2 drops hs, but  was confused upon waking. He has tried multiple different remedies in the past. CBD seems to work ok.     Review of Systems: See interval hx. Denies fevers, chills, dizziness,  changes in vision, abdominal pain, N/V, diarrhea, changes in urination, bleeding, bruising, rash.     Current Outpatient Medications   Medication Sig Dispense Refill     ondansetron (ZOFRAN ODT) 4 MG ODT tab Take 1 tablet (4 mg) by mouth every 8 hours as needed for nausea. 30 tablet 1     prochlorperazine (COMPAZINE) 10 MG tablet Take 1 tablet (10 mg) by mouth every 6 hours as needed for nausea or vomiting. 30 tablet 1     ammonium lactate (AMLACTIN) 12 % external cream Apply topically daily as needed.       buPROPion (WELLBUTRIN XL) 300 MG 24 hr tablet Take 300 mg by mouth every morning       cholecalciferol 25 MCG (1000 UT) TABS Take 1 tablet by mouth daily       fluticasone (FLONASE) 50 MCG/ACT nasal spray Spray 2 sprays into both nostrils daily       folic acid (FOLVITE) 1 MG tablet Take 1 mg by mouth daily       ketoconazole (NIZORAL) 2 % external cream Apply topically daily as needed for irritation.       losartan (COZAAR) 25 MG tablet Take 1 tablet by mouth daily       metoprolol succinate ER (TOPROL XL) 25 MG 24 hr tablet Take 1 tablet by mouth daily at 2 pm       multivitamin w/minerals (THERA-VIT-M) tablet Take 1 tablet by mouth daily       omeprazole (PRILOSEC) 40 MG DR capsule Take 40 mg by mouth daily.       spironolactone (ALDACTONE) 50 MG tablet Take 50 mg by mouth daily       torsemide (DEMADEX) 10 MG tablet Take 10 mg by mouth daily            Allergies   Allergen Reactions     Penicillins      PN: LW Reaction: Itching, Pruritis     Adhesive Tape Rash       Exam:  Blood pressure 102/62, pulse 64, temperature 98  F (36.7  C), temperature source Oral, resp. rate 18, weight 89.4 kg (197 lb 1.5 oz), SpO2 99%.  Wt Readings from Last 4 Encounters:   11/25/24 89.4 kg (197 lb 1.5 oz)   11/25/24 89.4 kg (197 lb)   10/29/24 89.3 kg  (196 lb 14.4 oz)   10/22/24 89.4 kg (197 lb)       Constitutional: Pleasant male in no acute distress.  Eyes: scleral icterus. No conjunctival erythema or discharge  Cardiovascular: Regular rate and rhythm. Heart murmur present  Respiratory: Clear to auscultation bilaterally. No wheezes or crackles.  Gastrointestinal: Bowel sounds present. Abdomen soft, non-tender. No palpable hepatosplenomegaly or masses.   MSK: moving all extremities freely  Neuro: Cranial nerves II-XII intact  Gait: walking unassisted  Skin: No visible lesions, bruising or rashes  Psych: appropriate mood and affect     Labs:    Latest Reference Range & Units 11/25/24 07:40   Sodium 135 - 145 mmol/L 136   Potassium 3.4 - 5.3 mmol/L 4.2   Chloride 98 - 107 mmol/L 102   Carbon Dioxide (CO2) 22 - 29 mmol/L 25   Urea Nitrogen 8.0 - 23.0 mg/dL 21.1   Creatinine 0.67 - 1.17 mg/dL 0.94   GFR Estimate >60 mL/min/1.73m2 83   Calcium 8.8 - 10.4 mg/dL 8.9   Anion Gap 7 - 15 mmol/L 9   Albumin 3.5 - 5.2 g/dL 3.7   Protein Total 6.4 - 8.3 g/dL 6.1 (L)   Alkaline Phosphatase 40 - 150 U/L 103   ALT 0 - 70 U/L 49   AST 0 - 45 U/L 66 (H)   Bilirubin Total <=1.2 mg/dL 3.5 (H)   Glucose 70 - 99 mg/dL 123 (H)   Iron 61 - 157 ug/dL 147   Iron Binding Capacity  See Comment   Iron Sat Index  See Comment   TSH 0.30 - 4.20 uIU/mL 0.51   WBC 4.0 - 11.0 10e3/uL 5.7   Hemoglobin 13.3 - 17.7 g/dL 10.1 (L)   Hematocrit 40.0 - 53.0 % 30.6 (L)   Platelet Count 150 - 450 10e3/uL 79 (L)   RBC Count 4.40 - 5.90 10e6/uL 3.37 (L)   MCV 78 - 100 fL 91   MCH 26.5 - 33.0 pg 30.0   MCHC 31.5 - 36.5 g/dL 33.0   RDW 10.0 - 15.0 % 19.6 (H)   RBC Morphology  Confirmed RBC Indices   Platelet Morphology Automated Count Confirmed. Platelet morphology is normal.  Automated Count Confirmed. Platelet morphology is normal.   RBC Fragments None Seen  Slight !   Teardrop Cells None Seen  Moderate !   Acanthocytes None Seen  Slight !   Elliptocytes None Seen  Moderate !   Protein Albumin Urine  Negative mg/dL 10 !   UIBC  See Comment       Imagin24: PET/CT FINDINGS: Bone marrow is mildly FDG avid diffusely, consistent with either reaction to anemia or treatment effect. All other FDG activity is normal. No focal abnormal activity in the liver. No lymphadenopathy.      CT FINDINGS: Moderate senescent intracranial change. Hypoattenuation of blood pool relative to myocardium, suggesting anemia. Calcified mitral annulus and aortic valve leaflets. Moderate cardiomegaly. Moderate calcified atherosclerosis, including   coronary and carotid disease. Cirrhosis with evidence of portal venous hypertension, including marked splenomegaly and upper abdominal venous collaterals with esophageal varices. Cholelithiasis. Mildly enlarged prostate. Sigmoid diverticulosis.   Gynecomastia. Moderate subcutaneous edema both lower legs. Calcific tendinitis right rotator cuff. Right parietal craniotomy. Benign variant persistent metopic suture. Screw fixation right superior pubic ramus. Old non-FDG avid compression fractures of   L1 and L4. Moderate degenerative change in the spine.                                                                       IMPRESSION:     No good evidence of hepatocellular carcinoma, though FDG PET/CT can be falsely negative, as HCC typically does not demonstrate metabolic activity in excess of background liver levels.     Impression/plan: Parag is a 79 year old male with hepatocellular carcinoma.     Hepatocellular Carcinoma   liver biopsy with HCC moderately differentiated- not a transplant candidate  -2023, he had Y-90 embolization of left hepatic lobe lesion.   - 2024, MRI of the liver revealed  interval increased in size segment VII observation    - 2024, Y-90 embolization done.   2024, MRI liver reveals progression   - progressed on liver directed therapy  - 2024 D1C1 atezolizumab with bevacizumab   - labs reviewed-notable for hyperbilirubinemia (range  since March  2.2-3.6), thrombocytopenia and anemia- ok to proceed with D1C1  - 12/16 Dr. Ellis     Liver cirrhosis  Esophageal varices  Portal venous hypertension  - Naqvi class A (no clinical ascites, no encephalopathy, bilirubin of 2.7, albumin of 3.6 and INR of 1.67)   - Tbili 3.5 on today's labs  - bilateral 3+ LE edema, no abdominal ascites; advised compression stockings   - of note gynecomastia on PET/CT  - Follows Hepatology- Dr. Laura Jean ( not recommending EGD- last did not show esophageal varices)-11/19 PET with esophageal varices- will discuss with Dr. Ellis in the setting of Avastin    Normocytic Anemia   Secondary to liver cirrhosis  - Collect Iron Panel- labs today hemolyzed    Chronic Thrombocytopenia   Secondary to liver cirrhosis  11/25/2024 Plts 79k  - will recheck labs next week- given the possibility of further reduction by immunotherapy    Insomnia  -taking CBD drops nightly which helps  - advised trying 1 drop hs  - consider sleep hygiene referral    History of Compression Fractures  L1 and L4    Fannie Castaneda PA-C    AE: Atezolizumab derm, endo, C/D/N, heme,LFTs, antibodies/ asthenia, fatigue, PN, msk, cough/dyspnea  AE bevacizumab: HTN, hardik edema, VT, derm, endo, abd pain, n/v/d, stomatitis, heme, anxiety, dizziness, dysarthria, fatigue, HA, insomnia, myasthenia, voice idsorder, arthralgia, back pain, limb pain, myalgias      Again, thank you for allowing me to participate in the care of your patient.        Sincerely,        Fannie Castaneda PA-C

## 2024-11-26 DIAGNOSIS — K70.30 ALCOHOLIC CIRRHOSIS OF LIVER WITHOUT ASCITES (H): Primary | ICD-10-CM

## 2024-11-26 LAB — CANCER AG19-9 SERPL IA-ACNC: 30 U/ML

## 2024-12-10 ENCOUNTER — HOSPITAL ENCOUNTER (INPATIENT)
Facility: CLINIC | Age: 79
DRG: 441 | End: 2024-12-10
Attending: INTERNAL MEDICINE | Admitting: STUDENT IN AN ORGANIZED HEALTH CARE EDUCATION/TRAINING PROGRAM
Payer: MEDICARE

## 2024-12-10 ENCOUNTER — LAB (OUTPATIENT)
Dept: INFUSION THERAPY | Facility: CLINIC | Age: 79
End: 2024-12-10
Payer: MEDICARE

## 2024-12-10 ENCOUNTER — NURSE TRIAGE (OUTPATIENT)
Dept: NURSING | Facility: CLINIC | Age: 79
End: 2024-12-10

## 2024-12-10 ENCOUNTER — ONCOLOGY VISIT (OUTPATIENT)
Dept: ONCOLOGY | Facility: CLINIC | Age: 79
End: 2024-12-10
Payer: MEDICARE

## 2024-12-10 ENCOUNTER — HOSPITAL ENCOUNTER (OUTPATIENT)
Dept: GENERAL RADIOLOGY | Facility: CLINIC | Age: 79
Discharge: HOME | End: 2024-12-10
Payer: MEDICARE

## 2024-12-10 ENCOUNTER — PATIENT OUTREACH (OUTPATIENT)
Dept: ONCOLOGY | Facility: CLINIC | Age: 79
End: 2024-12-10

## 2024-12-10 VITALS
TEMPERATURE: 98 F | RESPIRATION RATE: 16 BRPM | WEIGHT: 206.4 LBS | SYSTOLIC BLOOD PRESSURE: 112 MMHG | HEIGHT: 72 IN | DIASTOLIC BLOOD PRESSURE: 57 MMHG | BODY MASS INDEX: 27.96 KG/M2 | HEART RATE: 120 BPM | OXYGEN SATURATION: 99 %

## 2024-12-10 DIAGNOSIS — C22.0 HCC (HEPATOCELLULAR CARCINOMA) (H): ICD-10-CM

## 2024-12-10 DIAGNOSIS — D64.9 NORMOCYTIC ANEMIA: ICD-10-CM

## 2024-12-10 DIAGNOSIS — K65.2 SPONTANEOUS BACTERIAL PERITONITIS (H): Primary | ICD-10-CM

## 2024-12-10 DIAGNOSIS — R50.9 FEVER, UNSPECIFIED FEVER CAUSE: ICD-10-CM

## 2024-12-10 DIAGNOSIS — Z86.79 HISTORY OF ATRIAL FIBRILLATION: ICD-10-CM

## 2024-12-10 DIAGNOSIS — R05.9 COUGH, UNSPECIFIED TYPE: ICD-10-CM

## 2024-12-10 DIAGNOSIS — R50.9 FEVER, UNSPECIFIED FEVER CAUSE: Primary | ICD-10-CM

## 2024-12-10 DIAGNOSIS — I10 ESSENTIAL HYPERTENSION: ICD-10-CM

## 2024-12-10 PROBLEM — K72.90 LIVER FAILURE (H): Status: ACTIVE | Noted: 2024-12-10

## 2024-12-10 LAB
ALBUMIN SERPL BCG-MCNC: 3.3 G/DL (ref 3.5–5.2)
ALBUMIN UR-MCNC: 10 MG/DL
ALP SERPL-CCNC: 106 U/L (ref 40–150)
ALT SERPL W P-5'-P-CCNC: 102 U/L (ref 0–70)
ANION GAP SERPL CALCULATED.3IONS-SCNC: 10 MMOL/L (ref 7–15)
APPEARANCE UR: CLEAR
AST SERPL W P-5'-P-CCNC: 124 U/L (ref 0–45)
BASOPHILS # BLD AUTO: 0 10E3/UL (ref 0–0.2)
BASOPHILS NFR BLD AUTO: 0 %
BILIRUB SERPL-MCNC: 7.1 MG/DL
BILIRUB UR QL STRIP: ABNORMAL
BUN SERPL-MCNC: 16.6 MG/DL (ref 8–23)
CALCIUM SERPL-MCNC: 8.5 MG/DL (ref 8.8–10.4)
CHLORIDE SERPL-SCNC: 100 MMOL/L (ref 98–107)
COLOR UR AUTO: ABNORMAL
CREAT SERPL-MCNC: 0.83 MG/DL (ref 0.67–1.17)
EGFRCR SERPLBLD CKD-EPI 2021: 90 ML/MIN/1.73M2
EOSINOPHIL # BLD AUTO: 0 10E3/UL (ref 0–0.7)
EOSINOPHIL NFR BLD AUTO: 0 %
ERYTHROCYTE [DISTWIDTH] IN BLOOD BY AUTOMATED COUNT: 20.9 % (ref 10–15)
FLUAV RNA SPEC QL NAA+PROBE: NEGATIVE
FLUBV RNA RESP QL NAA+PROBE: NEGATIVE
GLUCOSE BLDC GLUCOMTR-MCNC: 105 MG/DL (ref 70–99)
GLUCOSE SERPL-MCNC: 101 MG/DL (ref 70–99)
GLUCOSE UR STRIP-MCNC: NEGATIVE MG/DL
HCO3 SERPL-SCNC: 23 MMOL/L (ref 22–29)
HCT VFR BLD AUTO: 30 % (ref 40–53)
HGB BLD-MCNC: 9.9 G/DL (ref 13.3–17.7)
HGB UR QL STRIP: NEGATIVE
IMM GRANULOCYTES # BLD: 0.1 10E3/UL
IMM GRANULOCYTES NFR BLD: 2 %
IRON BINDING CAPACITY (ROCHE): NORMAL
IRON SATN MFR SERPL: NORMAL %
IRON SERPL-MCNC: 131 UG/DL (ref 61–157)
KETONES UR STRIP-MCNC: NEGATIVE MG/DL
LACTATE SERPL-SCNC: 2.3 MMOL/L (ref 0.7–2)
LACTATE SERPL-SCNC: 4 MMOL/L (ref 0.7–2)
LEUKOCYTE ESTERASE UR QL STRIP: NEGATIVE
LYMPHOCYTES # BLD AUTO: 0.5 10E3/UL (ref 0.8–5.3)
LYMPHOCYTES NFR BLD AUTO: 7 %
MAGNESIUM SERPL-MCNC: 1.7 MG/DL (ref 1.7–2.3)
MCH RBC QN AUTO: 29.9 PG (ref 26.5–33)
MCHC RBC AUTO-ENTMCNC: 33 G/DL (ref 31.5–36.5)
MCV RBC AUTO: 91 FL (ref 78–100)
MONOCYTES # BLD AUTO: 0.5 10E3/UL (ref 0–1.3)
MONOCYTES NFR BLD AUTO: 7 %
MUCOUS THREADS #/AREA URNS LPF: PRESENT /LPF
NEUTROPHILS # BLD AUTO: 6 10E3/UL (ref 1.6–8.3)
NEUTROPHILS NFR BLD AUTO: 84 %
NITRATE UR QL: NEGATIVE
NRBC # BLD AUTO: 0 10E3/UL
NRBC BLD AUTO-RTO: 0 /100
PH UR STRIP: 5.5 [PH] (ref 5–7)
PLATELET # BLD AUTO: 58 10E3/UL (ref 150–450)
POTASSIUM SERPL-SCNC: 4.7 MMOL/L (ref 3.4–5.3)
PROT SERPL-MCNC: 5.5 G/DL (ref 6.4–8.3)
RBC # BLD AUTO: 3.31 10E6/UL (ref 4.4–5.9)
RBC URINE: 1 /HPF
RSV RNA SPEC NAA+PROBE: NEGATIVE
SARS-COV-2 RNA RESP QL NAA+PROBE: NEGATIVE
SODIUM SERPL-SCNC: 133 MMOL/L (ref 135–145)
SP GR UR STRIP: 1.02 (ref 1–1.03)
SQUAMOUS EPITHELIAL: <1 /HPF
UROBILINOGEN UR STRIP-MCNC: 4 MG/DL
WBC # BLD AUTO: 7.1 10E3/UL (ref 4–11)
WBC URINE: 1 /HPF

## 2024-12-10 PROCEDURE — 99215 OFFICE O/P EST HI 40 MIN: CPT

## 2024-12-10 PROCEDURE — 71046 X-RAY EXAM CHEST 2 VIEWS: CPT

## 2024-12-10 PROCEDURE — 99223 1ST HOSP IP/OBS HIGH 75: CPT | Mod: AI | Performed by: PHYSICIAN ASSISTANT

## 2024-12-10 PROCEDURE — 36415 COLL VENOUS BLD VENIPUNCTURE: CPT | Performed by: INTERNAL MEDICINE

## 2024-12-10 PROCEDURE — 83605 ASSAY OF LACTIC ACID: CPT | Performed by: INTERNAL MEDICINE

## 2024-12-10 PROCEDURE — 93010 ELECTROCARDIOGRAM REPORT: CPT | Performed by: INTERNAL MEDICINE

## 2024-12-10 PROCEDURE — 83735 ASSAY OF MAGNESIUM: CPT | Performed by: NURSE PRACTITIONER

## 2024-12-10 PROCEDURE — 250N000013 HC RX MED GY IP 250 OP 250 PS 637: Performed by: INTERNAL MEDICINE

## 2024-12-10 PROCEDURE — 99207 PR NO BILLABLE SERVICE THIS VISIT: CPT | Performed by: NURSE PRACTITIONER

## 2024-12-10 PROCEDURE — 36415 COLL VENOUS BLD VENIPUNCTURE: CPT | Performed by: NURSE PRACTITIONER

## 2024-12-10 PROCEDURE — 83605 ASSAY OF LACTIC ACID: CPT | Performed by: NURSE PRACTITIONER

## 2024-12-10 PROCEDURE — 250N000009 HC RX 250: Performed by: NURSE PRACTITIONER

## 2024-12-10 PROCEDURE — 250N000011 HC RX IP 250 OP 636: Performed by: PHYSICIAN ASSISTANT

## 2024-12-10 PROCEDURE — 81003 URINALYSIS AUTO W/O SCOPE: CPT

## 2024-12-10 PROCEDURE — 82247 BILIRUBIN TOTAL: CPT

## 2024-12-10 PROCEDURE — 82805 BLOOD GASES W/O2 SATURATION: CPT | Performed by: NURSE PRACTITIONER

## 2024-12-10 PROCEDURE — 87637 SARSCOV2&INF A&B&RSV AMP PRB: CPT

## 2024-12-10 PROCEDURE — 93005 ELECTROCARDIOGRAM TRACING: CPT

## 2024-12-10 PROCEDURE — 120N000001 HC R&B MED SURG/OB

## 2024-12-10 PROCEDURE — 85610 PROTHROMBIN TIME: CPT | Performed by: NURSE PRACTITIONER

## 2024-12-10 PROCEDURE — 36415 COLL VENOUS BLD VENIPUNCTURE: CPT

## 2024-12-10 PROCEDURE — 84155 ASSAY OF PROTEIN SERUM: CPT

## 2024-12-10 PROCEDURE — 250N000009 HC RX 250: Performed by: PHYSICIAN ASSISTANT

## 2024-12-10 PROCEDURE — G2211 COMPLEX E/M VISIT ADD ON: HCPCS

## 2024-12-10 PROCEDURE — G0463 HOSPITAL OUTPT CLINIC VISIT: HCPCS

## 2024-12-10 PROCEDURE — 85025 COMPLETE CBC W/AUTO DIFF WBC: CPT

## 2024-12-10 PROCEDURE — 83550 IRON BINDING TEST: CPT

## 2024-12-10 RX ORDER — TORSEMIDE 5 MG/1
10 TABLET ORAL DAILY
Status: DISCONTINUED | OUTPATIENT
Start: 2024-12-11 | End: 2024-12-13 | Stop reason: HOSPADM

## 2024-12-10 RX ORDER — CEFTRIAXONE 2 G/1
2 INJECTION, POWDER, FOR SOLUTION INTRAMUSCULAR; INTRAVENOUS EVERY 24 HOURS
Status: DISCONTINUED | OUTPATIENT
Start: 2024-12-10 | End: 2024-12-13 | Stop reason: HOSPADM

## 2024-12-10 RX ORDER — NALOXONE HYDROCHLORIDE 0.4 MG/ML
0.4 INJECTION, SOLUTION INTRAMUSCULAR; INTRAVENOUS; SUBCUTANEOUS
Status: DISCONTINUED | OUTPATIENT
Start: 2024-12-10 | End: 2024-12-13 | Stop reason: HOSPADM

## 2024-12-10 RX ORDER — BUPROPION HYDROCHLORIDE 300 MG/1
300 TABLET ORAL EVERY MORNING
Status: DISCONTINUED | OUTPATIENT
Start: 2024-12-11 | End: 2024-12-13 | Stop reason: HOSPADM

## 2024-12-10 RX ORDER — ACETAMINOPHEN 650 MG/1
650 SUPPOSITORY RECTAL EVERY 4 HOURS PRN
Status: DISCONTINUED | OUTPATIENT
Start: 2024-12-10 | End: 2024-12-10

## 2024-12-10 RX ORDER — FOLIC ACID 1 MG/1
1 TABLET ORAL DAILY
Status: DISCONTINUED | OUTPATIENT
Start: 2024-12-11 | End: 2024-12-13 | Stop reason: HOSPADM

## 2024-12-10 RX ORDER — POLYETHYLENE GLYCOL 3350 17 G/17G
17 POWDER, FOR SOLUTION ORAL 2 TIMES DAILY PRN
Status: DISCONTINUED | OUTPATIENT
Start: 2024-12-10 | End: 2024-12-13 | Stop reason: HOSPADM

## 2024-12-10 RX ORDER — LOSARTAN POTASSIUM 25 MG/1
25 TABLET ORAL DAILY
Status: DISCONTINUED | OUTPATIENT
Start: 2024-12-11 | End: 2024-12-11

## 2024-12-10 RX ORDER — NALOXONE HYDROCHLORIDE 0.4 MG/ML
0.2 INJECTION, SOLUTION INTRAMUSCULAR; INTRAVENOUS; SUBCUTANEOUS
Status: DISCONTINUED | OUTPATIENT
Start: 2024-12-10 | End: 2024-12-13 | Stop reason: HOSPADM

## 2024-12-10 RX ORDER — SPIRONOLACTONE 50 MG/1
50 TABLET, FILM COATED ORAL DAILY
COMMUNITY

## 2024-12-10 RX ORDER — BISACODYL 10 MG
10 SUPPOSITORY, RECTAL RECTAL DAILY PRN
Status: DISCONTINUED | OUTPATIENT
Start: 2024-12-10 | End: 2024-12-13 | Stop reason: HOSPADM

## 2024-12-10 RX ORDER — HYDROMORPHONE HCL IN WATER/PF 6 MG/30 ML
0.4 PATIENT CONTROLLED ANALGESIA SYRINGE INTRAVENOUS
Status: DISCONTINUED | OUTPATIENT
Start: 2024-12-10 | End: 2024-12-13 | Stop reason: HOSPADM

## 2024-12-10 RX ORDER — ONDANSETRON 4 MG/1
4 TABLET, ORALLY DISINTEGRATING ORAL EVERY 6 HOURS PRN
Status: DISCONTINUED | OUTPATIENT
Start: 2024-12-10 | End: 2024-12-13 | Stop reason: HOSPADM

## 2024-12-10 RX ORDER — METOPROLOL SUCCINATE 25 MG/1
25 TABLET, EXTENDED RELEASE ORAL DAILY
Status: DISCONTINUED | OUTPATIENT
Start: 2024-12-11 | End: 2024-12-11

## 2024-12-10 RX ORDER — AMOXICILLIN 250 MG
1 CAPSULE ORAL 2 TIMES DAILY PRN
Status: DISCONTINUED | OUTPATIENT
Start: 2024-12-10 | End: 2024-12-13 | Stop reason: HOSPADM

## 2024-12-10 RX ORDER — METOPROLOL TARTRATE 1 MG/ML
2.5 INJECTION, SOLUTION INTRAVENOUS ONCE
Status: COMPLETED | OUTPATIENT
Start: 2024-12-10 | End: 2024-12-10

## 2024-12-10 RX ORDER — LOSARTAN POTASSIUM 25 MG/1
25 TABLET ORAL DAILY
Status: ON HOLD | COMMUNITY
End: 2024-12-13

## 2024-12-10 RX ORDER — SPIRONOLACTONE 25 MG/1
50 TABLET ORAL DAILY
Status: DISCONTINUED | OUTPATIENT
Start: 2024-12-11 | End: 2024-12-13 | Stop reason: HOSPADM

## 2024-12-10 RX ORDER — HYDROMORPHONE HCL IN WATER/PF 6 MG/30 ML
0.2 PATIENT CONTROLLED ANALGESIA SYRINGE INTRAVENOUS
Status: DISCONTINUED | OUTPATIENT
Start: 2024-12-10 | End: 2024-12-13 | Stop reason: HOSPADM

## 2024-12-10 RX ORDER — METOPROLOL TARTRATE 1 MG/ML
2.5 INJECTION, SOLUTION INTRAVENOUS EVERY 4 HOURS PRN
Status: DISCONTINUED | OUTPATIENT
Start: 2024-12-10 | End: 2024-12-13 | Stop reason: HOSPADM

## 2024-12-10 RX ORDER — MAGNESIUM OXIDE 400 MG/1
400 TABLET ORAL EVERY 4 HOURS
Status: COMPLETED | OUTPATIENT
Start: 2024-12-11 | End: 2024-12-11

## 2024-12-10 RX ORDER — ONDANSETRON 2 MG/ML
4 INJECTION INTRAMUSCULAR; INTRAVENOUS EVERY 6 HOURS PRN
Status: DISCONTINUED | OUTPATIENT
Start: 2024-12-10 | End: 2024-12-13 | Stop reason: HOSPADM

## 2024-12-10 RX ORDER — AMOXICILLIN 250 MG
2 CAPSULE ORAL 2 TIMES DAILY PRN
Status: DISCONTINUED | OUTPATIENT
Start: 2024-12-10 | End: 2024-12-13 | Stop reason: HOSPADM

## 2024-12-10 RX ORDER — CALCIUM CARBONATE 500 MG/1
1000 TABLET, CHEWABLE ORAL 4 TIMES DAILY PRN
Status: DISCONTINUED | OUTPATIENT
Start: 2024-12-10 | End: 2024-12-13 | Stop reason: HOSPADM

## 2024-12-10 RX ORDER — ACETAMINOPHEN 325 MG/1
650 TABLET ORAL EVERY 4 HOURS PRN
Status: DISCONTINUED | OUTPATIENT
Start: 2024-12-10 | End: 2024-12-10

## 2024-12-10 RX ORDER — LIDOCAINE 40 MG/G
CREAM TOPICAL
Status: DISCONTINUED | OUTPATIENT
Start: 2024-12-10 | End: 2024-12-13 | Stop reason: HOSPADM

## 2024-12-10 RX ORDER — OXYCODONE HYDROCHLORIDE 5 MG/1
5 TABLET ORAL EVERY 4 HOURS PRN
Status: DISCONTINUED | OUTPATIENT
Start: 2024-12-10 | End: 2024-12-13 | Stop reason: HOSPADM

## 2024-12-10 RX ADMIN — METOPROLOL TARTRATE 2.5 MG: 5 INJECTION INTRAVENOUS at 22:20

## 2024-12-10 RX ADMIN — CEFTRIAXONE SODIUM 2 G: 2 INJECTION, POWDER, FOR SOLUTION INTRAMUSCULAR; INTRAVENOUS at 19:22

## 2024-12-10 RX ADMIN — Medication 400 MG: at 23:39

## 2024-12-10 RX ADMIN — METOPROLOL TARTRATE 2.5 MG: 5 INJECTION INTRAVENOUS at 19:18

## 2024-12-10 ASSESSMENT — PAIN SCALES - GENERAL: PAINLEVEL_OUTOF10: NO PAIN (0)

## 2024-12-10 ASSESSMENT — ACTIVITIES OF DAILY LIVING (ADL)
ADLS_ACUITY_SCORE: 48
ADLS_ACUITY_SCORE: 42
ADLS_ACUITY_SCORE: 48
ADLS_ACUITY_SCORE: 42
ADLS_ACUITY_SCORE: 48

## 2024-12-10 NOTE — PROGRESS NOTES
Oncology/Hematology Visit Note  12/10/2024     Reason for Visit:  Cough, reported fever    Hepatocellular Carcinoma    Oncology HPI:   1.  On 08/11/2001, CT abdomen and pelvis revealed mild hepatosplenomegaly.  -Ultrasound abdomen on 06/14/2010 revealed liver cirrhosis and splenomegaly.  -MRI abdomen in 05/08/2015 revealed liver cirrhosis and splenomegaly.  -EGD on 08/13/2021 revealed small esophageal varices and mild portal hypertensive gastropathy.  2.  On 09/07/2022, CT abdomen and pelvis revealed liver cirrhosis, moderate splenomegaly, ascites and 8 mm hypodense lesion in tail of the pancreas.  -Paracentesis of 4700 mL was done on 09/08/2022.  -MRCP on 10/20/2022 revealed branch intraductal papillary mucinous neoplasm.  It also revealed 3.2 cm nodular area in the liver.  3.  MRI abdomen on 01/10/2023 revealed liver cirrhosis with diffuse hepatic iron deposition. A 4.6 x 2.7 cm LI-RADS 4 lesion within the left hepatic lobe.  There is liver cirrhosis, small volume ascites and periesophageal varices.  -EGD on 01/23/2023 revealed portal hypertensive gastropathy.  -On 01/20/2023, AFP of 3.2.  -CT chest on 01/26/2023 did not reveal any metastatic disease.  4.  Ultrasound-guided liver biopsy was done on 02/02/2023. Hepatocellular carcinoma, moderately differentiated.  5.  Evaluated at [facility]. Not a transplant candidate.  -On 03/16/2023, he had Y-90 embolization of left hepatic lobe lesion.  -On 07/31/2024, MRI of the liver revealed  interval increased in size segment VII observation (1.9 cm, previously 1 cm) now with definite arterial phase hyperenhancement and threshold growth (LR-5).  -On 08/30/2024, Y-90 embolization done.  -On 09/30/2024, MRI liver reveals progression.  Interval treatment of the segment 7 observation, which is now hypoenhancing and decreased in size. Surrounding parenchymal enhancement is likely posttreatment change (LR TR equivocal). Segment IVb 2 cm observation with arterial phase  "hyperenhancement and washout (LR 5). Increased in size arterial enhancing observation at the dome measuring 1.5 cm, previously 0.9 cm (LR 4). Additional LR 3 observations in segments 5 and 6.  -Case was discussed at tumor board at [facility].  No further liver directed therapy recommended.  6.  EGD on 04/25/2024 did not reveal any esophageal varices.  There is portal hypertensive gastropathy.  -Colonoscopy on 04/25/2024 revealed multiple nonbleeding colonic angioectasias which was treated with argon plasma coagulation..  There was congested mucosa in entire colon consistent with portal hypertensive colopathy.  7.  CT chest, abdomen and pelvis on 10/31/2024:1.  Liver lesions are again seen though are not well assessed. No evidence of new malignancy in the chest, abdomen, or pelvis.2.  Cholelithiasis with gallbladder wall thickening and mucosal enhancement. Acute cholecystitis is possible.  3.  CT appearance of hepatic cirrhosis and portal hypertension.  8.Bone scan on 10/31/2024:  Nonspecific diffuse radiotracer uptake is seen throughout the osseous structures. While the exact etiology remains indeterminate, differential considerations include widespread osseous metastasis (unlikely given the lack of sclerotic lesions seen on recent CT dated 10/31/2024), underlying metabolic bone disease, or sequelae of marrow stimulation if currently receiving chemotherapy.  9. 11/25/2024 D1C1 atezolizumab + bevacizumab    Current Treatment: atezolizumab and bevacizumab.     Interval history:   Parag presents to clinic today for new cough, and reported fever at home yesterday of greater than 101 per triage note. His wife, Ericka, is providing most of the history today.     Ericka, reports that he began coughing a couple of days ago, and when she took his temperature last night is was greater than 100.4. He is eating less, and has become weak.      He denies abdominal pain.  When asked about abdominal distention, he states \" I " "didn't even notice\".      Denies chest pain or shortness of breath, although wife reports that his breaths appear \"more shallow\".    Review of Systems: See interval hx. Denies fevers, chills, dizziness,  changes in vision, abdominal pain, N/V, diarrhea, changes in urination, bleeding, bruising, rash.     Current Outpatient Medications   Medication Sig Dispense Refill    ammonium lactate (AMLACTIN) 12 % external cream Apply topically daily as needed.      buPROPion (WELLBUTRIN XL) 300 MG 24 hr tablet Take 300 mg by mouth every morning      cholecalciferol 25 MCG (1000 UT) TABS Take 1 tablet by mouth daily      fluticasone (FLONASE) 50 MCG/ACT nasal spray Spray 2 sprays into both nostrils daily      folic acid (FOLVITE) 1 MG tablet Take 1 mg by mouth daily      ketoconazole (NIZORAL) 2 % external cream Apply topically daily as needed for irritation.      metoprolol succinate ER (TOPROL XL) 25 MG 24 hr tablet Take 1 tablet by mouth daily at 2 pm      multivitamin w/minerals (THERA-VIT-M) tablet Take 1 tablet by mouth daily      omeprazole (PRILOSEC) 40 MG DR capsule Take 40 mg by mouth daily.      ondansetron (ZOFRAN ODT) 4 MG ODT tab Take 1 tablet (4 mg) by mouth every 8 hours as needed for nausea. 30 tablet 1    prochlorperazine (COMPAZINE) 10 MG tablet Take 1 tablet (10 mg) by mouth every 6 hours as needed for nausea or vomiting. 30 tablet 1    torsemide (DEMADEX) 10 MG tablet Take 10 mg by mouth daily      losartan (COZAAR) 25 MG tablet Take 1 tablet by mouth daily      spironolactone (ALDACTONE) 50 MG tablet Take 50 mg by mouth daily            Allergies   Allergen Reactions    Penicillins      PN: LW Reaction: Itching, Pruritis    Adhesive Tape Rash       Exam:  Blood pressure 112/57, pulse 120, temperature 98  F (36.7  C), temperature source Oral, resp. rate 16, height 1.829 m (6'), weight 93.6 kg (206 lb 6.4 oz), SpO2 99%.  Wt Readings from Last 4 Encounters:   12/10/24 93.6 kg (206 lb 6.4 oz)   11/25/24 89.4 " kg (197 lb 1.5 oz)   11/25/24 89.4 kg (197 lb)   10/29/24 89.3 kg (196 lb 14.4 oz)       Constitutional: Pleasant male in no acute distress.  Eyes: scleral icterus. No conjunctival erythema or discharge  Cardiovascular: Tachycardia.  Heart murmur present; 3 + edema  Respiratory: Clear to auscultation bilaterally. No wheezes or crackles.  Gastrointestinal: Bowel sounds present. Abdominal distention without tenderness, guarding, or rigidity  MSK: moving all extremities freely  Neuro: Cranial nerves II-XII intact  Gait: w/c   Skin: No visible lesions, bruising or rashes  Psych: subdued    Labs:    Latest Reference Range & Units 12/10/24 10:22 12/10/24 11:03   Sodium 135 - 145 mmol/L 133 (L)    Potassium 3.4 - 5.3 mmol/L 4.7    Chloride 98 - 107 mmol/L 100    Carbon Dioxide (CO2) 22 - 29 mmol/L 23    Urea Nitrogen 8.0 - 23.0 mg/dL 16.6    Creatinine 0.67 - 1.17 mg/dL 0.83    GFR Estimate >60 mL/min/1.73m2 90    Calcium 8.8 - 10.4 mg/dL 8.5 (L)    Anion Gap 7 - 15 mmol/L 10    Albumin 3.5 - 5.2 g/dL 3.3 (L)    Protein Total 6.4 - 8.3 g/dL 5.5 (L)    Alkaline Phosphatase 40 - 150 U/L 106    ALT 0 - 70 U/L 102 (H)    AST 0 - 45 U/L 124 (H)    Bilirubin Total <=1.2 mg/dL 7.1 (H)    Glucose 70 - 99 mg/dL 101 (H)    Iron 61 - 157 ug/dL 131    Iron Binding Capacity  See Comment    Iron Sat Index  See Comment    WBC 4.0 - 11.0 10e3/uL 7.1    Hemoglobin 13.3 - 17.7 g/dL 9.9 (L)    Hematocrit 40.0 - 53.0 % 30.0 (L)    Platelet Count 150 - 450 10e3/uL 58 (L)    RBC Count 4.40 - 5.90 10e6/uL 3.31 (L)    MCV 78 - 100 fL 91    MCH 26.5 - 33.0 pg 29.9    MCHC 31.5 - 36.5 g/dL 33.0    RDW 10.0 - 15.0 % 20.9 (H)    % Neutrophils % 84    % Lymphocytes % 7    % Monocytes % 7    % Eosinophils % 0    % Basophils % 0    Absolute Basophils 0.0 - 0.2 10e3/uL 0.0    Absolute Eosinophils 0.0 - 0.7 10e3/uL 0.0    Absolute Immature Granulocytes <=0.4 10e3/uL 0.1    Absolute Lymphocytes 0.8 - 5.3 10e3/uL 0.5 (L)    Absolute Monocytes 0.0 - 1.3  10e3/uL 0.5    % Immature Granulocytes % 2    Absolute Neutrophils 1.6 - 8.3 10e3/uL 6.0    Absolute NRBCs 10e3/uL 0.0    NRBCs per 100 WBC <1 /100 0    Color Urine Colorless, Straw, Light Yellow, Yellow   Orange !   Appearance Urine Clear   Clear   Glucose Urine Negative mg/dL  Negative   Bilirubin Urine Negative   Small !   Ketones Urine Negative mg/dL  Negative   Specific Gravity Urine 1.003 - 1.035   1.025   pH Urine 5.0 - 7.0   5.5   Protein Albumin Urine Negative mg/dL  10 !   Urobilinogen mg/dL Normal, 2.0 mg/dL  4.0 !   Nitrite Urine Negative   Negative   Blood Urine Negative   Negative   Leukocyte Esterase Urine Negative   Negative   WBC Urine <=5 /HPF  1   RBC Urine <=2 /HPF  1   Squamous Epithelial /HPF Urine <=1 /HPF  <1   Mucus Urine None Seen /LPF  Present !       Imaging:  CHEST TWO VIEWS 12/10/2024 9:54 AM      HISTORY: Hepatocellular CA- currently receiving atezolizumab and  bevacizumab- fever and cough; Fever, unspecified fever cause; Cough,  unspecified type     COMPARISON: CT chest 10/31/2024.                                                                       IMPRESSION: Elevation of the right hemidiaphragm. Left basilar  atelectasis. No focal consolidation, pleural effusion or pneumothorax.  Similar cardiomediastinal silhouette. Remote right rib fractures.  Aortic calcification.     MEGAN HODGES MD      Impression/plan: Parag is a 79 year old male with hepatocellular carcinoma. Current treatment of atezolizumab and bevacizumab, last treatment was 11/25/2024.     Possible Decompensating Liver Failure  Presented to clinic today with 2 day onset of weakness and cough, reported fever >100   - physical exam with abdominal distention, 3+ bilateral LE edema and worsening scleral icterus  - Planned US abdomen this afternoon  - 12/10/2024 labs revealing bilirubin of 7.1, albumin of 3.3 and transaminitis  - Given his progressive weakness, and signs of worsening liver disease, felt that  admission would be appropriate  - Bed is pending- patient has been informed and awaiting instructions from admission team  - if symptoms worsen he should go to ED and not await admission instructions- ok to take daily medications    Cough/Infection  Afebrile, in no acute distress  - infection work up- with Chest Xray and UA  - New cough, likely secondary to increased volume. Chest Xray negative for acute findings or pneumonia. He has not taken diuretics today. He is tachycardic, RR 16, and oxygen saturation of 99%, sitting comfortably in wheelchair.   - no leukocytosis on labs today, UA negative   -  maintain low threshold for further imaging and work-up in the setting of tachycardia and new cough. Reassuring that other vital signs are stable  - if symptoms worsen he should go to ED and not await admission instructions- ok to take daily medications    Liver cirrhosis  Esophageal varices  Portal venous hypertension  - previously felt to be Naqvi class A (no clinical ascites, no encephalopathy, bilirubin of 2.7, albumin of 3.6 and INR of 1.67) - see above  - of note gynecomastia on PET/CT  - Follows Hepatology- Dr. Laura Jean ( not recommending EGD- last did not show esophageal varices) reviewed recent PET with Dr. Jean     Hepatocellular Carcinoma  - 2/23 liver biopsy with HCC moderately differentiated- not a transplant candidate  - 03/16/2023, he had Y-90 embolization of left hepatic lobe lesion.   - 07/31/2024, MRI of the liver revealed  interval increased in size segment VII observation    - 08/30/2024, Y-90 embolization done.   09/30/2024, MRI liver reveals progression   - progressed on liver directed therapy  - 11/25/2024 D1C1 atezolizumab with bevacizumab   - 12/10/2024 labs as above- corrected calcium 9.1  - 12/16 Dr. Ellis as scheduled     Normocytic Anemia   Secondary to liver cirrhosis    Chronic Thrombocytopenia   Secondary to liver cirrhosis  12/10/2024 Plts 58k    Insomnia  -taking CBD drops  nightly which helps  - advised trying 1 drop hs  - consider sleep hygiene referral    History of Compression Fractures  L1 and L4    Fannie Castaneda PA-C    AE: Atezolizumab derm, endo, C/D/N, heme,LFTs, antibodies/ asthenia, fatigue, PN, msk, cough/dyspnea  AE bevacizumab: HTN, hardik edema, VT, derm, endo, abd pain, n/v/d, stomatitis, heme, anxiety, dizziness, dysarthria, fatigue, HA, insomnia, myasthenia, voice idsorder, arthralgia, back pain, limb pain, myalgias

## 2024-12-10 NOTE — LETTER
12/10/2024      Oskar Wilks  [address]  [place] [address]      Dear Colleague,    Thank you for referring your patient, Oskar Wilks, to the [facility]. Please see a copy of my visit note below.    Oncology/Hematology Visit Note  12/10/2024     Reason for Visit:  Cough, reported fever    Hepatocellular Carcinoma    Oncology HPI:   1.  On 08/11/2001, CT abdomen and pelvis revealed mild hepatosplenomegaly.  -Ultrasound abdomen on 06/14/2010 revealed liver cirrhosis and splenomegaly.  -MRI abdomen in 05/08/2015 revealed liver cirrhosis and splenomegaly.  -EGD on 08/13/2021 revealed small esophageal varices and mild portal hypertensive gastropathy.  2.  On 09/07/2022, CT abdomen and pelvis revealed liver cirrhosis, moderate splenomegaly, ascites and 8 mm hypodense lesion in tail of the pancreas.  -Paracentesis of 4700 mL was done on 09/08/2022.  -MRCP on 10/20/2022 revealed branch intraductal papillary mucinous neoplasm.  It also revealed 3.2 cm nodular area in the liver.  3.  MRI abdomen on 01/10/2023 revealed liver cirrhosis with diffuse hepatic iron deposition. A 4.6 x 2.7 cm LI-RADS 4 lesion within the left hepatic lobe.  There is liver cirrhosis, small volume ascites and periesophageal varices.  -EGD on 01/23/2023 revealed portal hypertensive gastropathy.  -On 01/20/2023, AFP of 3.2.  -CT chest on 01/26/2023 did not reveal any metastatic disease.  4.  Ultrasound-guided liver biopsy was done on 02/02/2023. Hepatocellular carcinoma, moderately differentiated.  5.  Evaluated at [facility]. Not a transplant candidate.  -On 03/16/2023, he had Y-90 embolization of left hepatic lobe lesion.  -On 07/31/2024, MRI of the liver revealed  interval increased in size segment VII observation (1.9 cm, previously 1 cm) now with definite arterial phase hyperenhancement and threshold growth (LR-5).  -On 08/30/2024, Y-90 embolization done.  -On 09/30/2024, MRI liver reveals  progression.  Interval treatment of the segment 7 observation, which is now hypoenhancing and decreased in size. Surrounding parenchymal enhancement is likely posttreatment change (LR TR equivocal). Segment IVb 2 cm observation with arterial phase hyperenhancement and washout (LR 5). Increased in size arterial enhancing observation at the dome measuring 1.5 cm, previously 0.9 cm (LR 4). Additional LR 3 observations in segments 5 and 6.  -Case was discussed at tumor board at [facility].  No further liver directed therapy recommended.  6.  EGD on 04/25/2024 did not reveal any esophageal varices.  There is portal hypertensive gastropathy.  -Colonoscopy on 04/25/2024 revealed multiple nonbleeding colonic angioectasias which was treated with argon plasma coagulation..  There was congested mucosa in entire colon consistent with portal hypertensive colopathy.  7.  CT chest, abdomen and pelvis on 10/31/2024:1.  Liver lesions are again seen though are not well assessed. No evidence of new malignancy in the chest, abdomen, or pelvis.2.  Cholelithiasis with gallbladder wall thickening and mucosal enhancement. Acute cholecystitis is possible.  3.  CT appearance of hepatic cirrhosis and portal hypertension.  8.Bone scan on 10/31/2024:  Nonspecific diffuse radiotracer uptake is seen throughout the osseous structures. While the exact etiology remains indeterminate, differential considerations include widespread osseous metastasis (unlikely given the lack of sclerotic lesions seen on recent CT dated 10/31/2024), underlying metabolic bone disease, or sequelae of marrow stimulation if currently receiving chemotherapy.  9. 11/25/2024 D1C1 atezolizumab + bevacizumab    Current Treatment: atezolizumab and bevacizumab.     Interval history:   Parag presents to clinic today for new cough, and reported fever at home yesterday of greater than 101 per triage note. His wife, Ericka, is providing most of the history today.     Ericka,  "reports that he began coughing a couple of days ago, and when she took his temperature last night is was greater than 100.4. He is eating less, and has become weak.      He denies abdominal pain.  When asked about abdominal distention, he states \" I didn't even notice\".      Denies chest pain or shortness of breath, although wife reports that his breaths appear \"more shallow\".    Review of Systems: See interval hx. Denies fevers, chills, dizziness,  changes in vision, abdominal pain, N/V, diarrhea, changes in urination, bleeding, bruising, rash.     Current Outpatient Medications   Medication Sig Dispense Refill     ammonium lactate (AMLACTIN) 12 % external cream Apply topically daily as needed.       buPROPion (WELLBUTRIN XL) 300 MG 24 hr tablet Take 300 mg by mouth every morning       cholecalciferol 25 MCG (1000 UT) TABS Take 1 tablet by mouth daily       fluticasone (FLONASE) 50 MCG/ACT nasal spray Spray 2 sprays into both nostrils daily       folic acid (FOLVITE) 1 MG tablet Take 1 mg by mouth daily       ketoconazole (NIZORAL) 2 % external cream Apply topically daily as needed for irritation.       metoprolol succinate ER (TOPROL XL) 25 MG 24 hr tablet Take 1 tablet by mouth daily at 2 pm       multivitamin w/minerals (THERA-VIT-M) tablet Take 1 tablet by mouth daily       omeprazole (PRILOSEC) 40 MG DR capsule Take 40 mg by mouth daily.       ondansetron (ZOFRAN ODT) 4 MG ODT tab Take 1 tablet (4 mg) by mouth every 8 hours as needed for nausea. 30 tablet 1     prochlorperazine (COMPAZINE) 10 MG tablet Take 1 tablet (10 mg) by mouth every 6 hours as needed for nausea or vomiting. 30 tablet 1     torsemide (DEMADEX) 10 MG tablet Take 10 mg by mouth daily       losartan (COZAAR) 25 MG tablet Take 1 tablet by mouth daily       spironolactone (ALDACTONE) 50 MG tablet Take 50 mg by mouth daily            Allergies   Allergen Reactions     Penicillins      PN: LW Reaction: Itching, Pruritis     Adhesive Tape Rash "       Exam:  Blood pressure 112/57, pulse 120, temperature 98  F (36.7  C), temperature source Oral, resp. rate 16, height 1.829 m (6'), weight 93.6 kg (206 lb 6.4 oz), SpO2 99%.  Wt Readings from Last 4 Encounters:   12/10/24 93.6 kg (206 lb 6.4 oz)   11/25/24 89.4 kg (197 lb 1.5 oz)   11/25/24 89.4 kg (197 lb)   10/29/24 89.3 kg (196 lb 14.4 oz)       Constitutional: Pleasant male in no acute distress.  Eyes: scleral icterus. No conjunctival erythema or discharge  Cardiovascular: Tachycardia.  Heart murmur present; 3 + edema  Respiratory: Clear to auscultation bilaterally. No wheezes or crackles.  Gastrointestinal: Bowel sounds present. Abdominal distention without tenderness, guarding, or rigidity  MSK: moving all extremities freely  Neuro: Cranial nerves II-XII intact  Gait: w/c   Skin: No visible lesions, bruising or rashes  Psych: subdued    Labs:    Latest Reference Range & Units 12/10/24 10:22 12/10/24 11:03   Sodium 135 - 145 mmol/L 133 (L)    Potassium 3.4 - 5.3 mmol/L 4.7    Chloride 98 - 107 mmol/L 100    Carbon Dioxide (CO2) 22 - 29 mmol/L 23    Urea Nitrogen 8.0 - 23.0 mg/dL 16.6    Creatinine 0.67 - 1.17 mg/dL 0.83    GFR Estimate >60 mL/min/1.73m2 90    Calcium 8.8 - 10.4 mg/dL 8.5 (L)    Anion Gap 7 - 15 mmol/L 10    Albumin 3.5 - 5.2 g/dL 3.3 (L)    Protein Total 6.4 - 8.3 g/dL 5.5 (L)    Alkaline Phosphatase 40 - 150 U/L 106    ALT 0 - 70 U/L 102 (H)    AST 0 - 45 U/L 124 (H)    Bilirubin Total <=1.2 mg/dL 7.1 (H)    Glucose 70 - 99 mg/dL 101 (H)    Iron 61 - 157 ug/dL 131    Iron Binding Capacity  See Comment    Iron Sat Index  See Comment    WBC 4.0 - 11.0 10e3/uL 7.1    Hemoglobin 13.3 - 17.7 g/dL 9.9 (L)    Hematocrit 40.0 - 53.0 % 30.0 (L)    Platelet Count 150 - 450 10e3/uL 58 (L)    RBC Count 4.40 - 5.90 10e6/uL 3.31 (L)    MCV 78 - 100 fL 91    MCH 26.5 - 33.0 pg 29.9    MCHC 31.5 - 36.5 g/dL 33.0    RDW 10.0 - 15.0 % 20.9 (H)    % Neutrophils % 84    % Lymphocytes % 7    % Monocytes % 7     % Eosinophils % 0    % Basophils % 0    Absolute Basophils 0.0 - 0.2 10e3/uL 0.0    Absolute Eosinophils 0.0 - 0.7 10e3/uL 0.0    Absolute Immature Granulocytes <=0.4 10e3/uL 0.1    Absolute Lymphocytes 0.8 - 5.3 10e3/uL 0.5 (L)    Absolute Monocytes 0.0 - 1.3 10e3/uL 0.5    % Immature Granulocytes % 2    Absolute Neutrophils 1.6 - 8.3 10e3/uL 6.0    Absolute NRBCs 10e3/uL 0.0    NRBCs per 100 WBC <1 /100 0    Color Urine Colorless, Straw, Light Yellow, Yellow   Orange !   Appearance Urine Clear   Clear   Glucose Urine Negative mg/dL  Negative   Bilirubin Urine Negative   Small !   Ketones Urine Negative mg/dL  Negative   Specific Gravity Urine 1.003 - 1.035   1.025   pH Urine 5.0 - 7.0   5.5   Protein Albumin Urine Negative mg/dL  10 !   Urobilinogen mg/dL Normal, 2.0 mg/dL  4.0 !   Nitrite Urine Negative   Negative   Blood Urine Negative   Negative   Leukocyte Esterase Urine Negative   Negative   WBC Urine <=5 /HPF  1   RBC Urine <=2 /HPF  1   Squamous Epithelial /HPF Urine <=1 /HPF  <1   Mucus Urine None Seen /LPF  Present !       Imaging:  CHEST TWO VIEWS 12/10/2024 9:54 AM      HISTORY: Hepatocellular CA- currently receiving atezolizumab and  bevacizumab- fever and cough; Fever, unspecified fever cause; Cough,  unspecified type     COMPARISON: CT chest 10/31/2024.                                                                       IMPRESSION: Elevation of the right hemidiaphragm. Left basilar  atelectasis. No focal consolidation, pleural effusion or pneumothorax.  Similar cardiomediastinal silhouette. Remote right rib fractures.  Aortic calcification.     MEGAN HODGES MD      Impression/plan: Parag is a 79 year old male with hepatocellular carcinoma. Current treatment of atezolizumab and bevacizumab, last treatment was 11/25/2024.     Possible Decompensating Liver Failure  Presented to clinic today with 2 day onset of weakness and cough, reported fever >100   - physical exam with abdominal  distention, 3+ bilateral LE edema and worsening scleral icterus  - Planned US abdomen this afternoon  - 12/10/2024 labs revealing bilirubin of 7.1, albumin of 3.3 and transaminitis  - Given his progressive weakness, and signs of worsening liver disease, felt that admission would be appropriate  - Bed is pending- patient has been informed and awaiting instructions from admission team  - if symptoms worsen he should go to ED and not await admission instructions- ok to take daily medications    Cough  Afebrile, in no acute distress  - New cough, likely secondary to increased volume. Chest Xray negative for acute findings or pneumonia. He has not taken diuretics today. He is tachycardic, RR 16, and oxygen saturation of 99%, sitting comfortably in wheelchair.   - no leukocytosis on labs today  - suspect this is fluid overload, but maintain low threshold for further imaging and work-up in the setting of tachycardia and new cough. Reassuring that other vital signs are stable    Liver cirrhosis  Esophageal varices  Portal venous hypertension  - previously felt to be Naqvi class A (no clinical ascites, no encephalopathy, bilirubin of 2.7, albumin of 3.6 and INR of 1.67) - see above  - of note gynecomastia on PET/CT  - Follows Hepatology- Dr. Laura Jean ( not recommending EGD- last did not show esophageal varices) reviewed recent PET with Dr. Jean     Hepatocellular Carcinoma  - 2/23 liver biopsy with HCC moderately differentiated- not a transplant candidate  - 03/16/2023, he had Y-90 embolization of left hepatic lobe lesion.   - 07/31/2024, MRI of the liver revealed  interval increased in size segment VII observation    - 08/30/2024, Y-90 embolization done.   09/30/2024, MRI liver reveals progression   - progressed on liver directed therapy  - 11/25/2024 D1C1 atezolizumab with bevacizumab   - 12/10/2024 labs as above- corrected calcium 9.1  - 12/16 Dr. Ellsi as scheduled     Normocytic Anemia   Secondary to liver  cirrhosis    Chronic Thrombocytopenia   Secondary to liver cirrhosis  12/10/2024 Plts 58k    Insomnia  -taking CBD drops nightly which helps  - advised trying 1 drop hs  - consider sleep hygiene referral    History of Compression Fractures  L1 and L4    Fannie Castaneda PA-C    AE: Atezolizumab derm, endo, C/D/N, heme,LFTs, antibodies/ asthenia, fatigue, PN, msk, cough/dyspnea  AE bevacizumab: HTN, hardik edema, VT, derm, endo, abd pain, n/v/d, stomatitis, heme, anxiety, dizziness, dysarthria, fatigue, HA, insomnia, myasthenia, voice idsorder, arthralgia, back pain, limb pain, myalgias    Oncology Rooming Note    December 10, 2024 10:09 AM   Oskar Wilks is a 79 year old male who presents for:    Chief Complaint   Patient presents with     Oncology Clinic Visit     HCC (hepatocellular carcinoma) (H)     Initial Vitals: /57 (BP Location: Left arm, Patient Position: Sitting, Cuff Size: Adult Large)   Pulse 120   Temp 98  F (36.7  C) (Oral)   Resp 16   Ht 1.829 m (6')   Wt 93.6 kg (206 lb 6.4 oz)   SpO2 99%   BMI 27.99 kg/m   Estimated body mass index is 27.99 kg/m  as calculated from the following:    Height as of this encounter: 1.829 m (6').    Weight as of this encounter: 93.6 kg (206 lb 6.4 oz). Body surface area is 2.18 meters squared.  No Pain (0) Comment: Data Unavailable   No LMP for male patient.  Allergies reviewed: Yes  Medications reviewed: Yes    Medications: Medication refills not needed today.  Pharmacy name entered into [employer]: [id number] [place] - [address] [facility] [address]    Frailty Screening:   Is the patient here for a new oncology consult visit in cancer care? 2. No      Clinical concerns: None       Opal Puentes MA                Again, thank you for allowing me to participate in the care of your patient.        Sincerely,        Fannie Castaneda PA-C

## 2024-12-10 NOTE — PROGRESS NOTES
Transfer Type: [facility]  Transfer Triage Note    Date of call: 12/10/24  Time of call: 1:04 PM    Current Patient Location:  outpatient cancer clinic  Current Level of Care: Outpatient    Vitals: VSS  Diagnosis: ?decompensated cirrhosis in setting of cancer and possible infection  Reason for requested transfer: Patient has established care here  Procedure can be done here and not at referring hospital  Further diagnostic work up, management, and consultation for specialized care   Isolation Needs: Special Precautions COVID-19    Care everywhere has been updated and reviewed: N/A  Necessary images have been sent through PACS: N/A    If patient is transferring for specialty care or specific procedure, the specialist required has participated in the transfer call and agreed with need for transfer and anticipated timeline: No    Transfer accepted: Yes, sosa     Recommendations for Management and Stabilization: Not needed    Additional Comments: 78 yo with history of HCC and cirrhosis seen in oncology clinic.  Labs notable for plts 5.8, albumin 3.3, bilirubin 7.1 from 3.5.  He has what appears to be new onset ascites and bilateral LE.  He last received chemotherapy on 11/25 and is seen for acute visit due to declining at home.  He has new onset cough with negative xr.  Temp noted at home, but none in clinic with otherwise stable vitals.  He has no abdominal pain on exam.  Urine was negative as well.  US in outpatient setting has not been completed yet.      Jose Luis Han MD

## 2024-12-10 NOTE — TELEPHONE ENCOUNTER
Writer noted this morning patient is scheduled to see Fannie Gross at 10a.    A request came after appt to have patient directly admitted.       Order placed for direct admission.    Ct Caraballo RN

## 2024-12-10 NOTE — H&P
[facility]  History and Physical - Hospitalist Service       Date of Admission:  12/10/2024  PRIMARY CARE PROVIDER:    Viet López    Assessment & Plan   Oskar Wilks is a 79 year old male admitted on 12/10/2024 as a direct admit from  Cancer Center in [place] due to concerns for decompensating liver failure.      Past medical history significant for Hepatocellular carcinoma, Alcohol induced liver cirrhosis, Esophageal varices, Portal HTN, HTN, Paroxysmal atrial fib, Chronic normocytic anemia, Chronic thrombocytopenia, MDD with anxiety, Insomnia, Known heart murmur, Psoriasis, GERD, History of compression fractures (L1 and L4), History of alcohol use D/O (remission).    Patient was seen in outpatient cancer center on day of admission.  He had presented due to a new cough and reported fever (greater than 101  F).  It was reported that patient developed a cough a couple of days prior to admission.  Patient's wife checked his temperature for the night prior to admit and found it to be greater than 100.4  F.  Patient has been eating less and becoming increasingly weak.    In clinic, patient was found to be tachycardic with a heart rate over 120 and was afebrile.  He was also noted to have 3+ edema of the lower extremities as well as abdominal distention.  Workup completed in the outpatient setting included a 2 view chest x-ray that revealed elevation of the right hemidiaphragm, left basilar atelectasis otherwise no focal consolidation, pleural effusion or pneumothorax.  There is mention of remote right rib fractures.  Respiratory viral PCR panel was all negative.  CMP revealed a sodium of 133, calcium of 8.5, albumin of 3.3, total protein of 5.5, ALT of 102, AST of 124, total bilirubin of 7.1 and glucose of 101.  CBC with differential revealed a hemoglobin of 9.9, hematocrit of 30, platelet count of 58, RBC count of 3.31, RDW of 20.9 and absolute lymphocytes of 0.5 otherwise within  normal limits.  Iron studies were obtained as well.  UA revealed an orange appearance with small amount of bilirubin, protein albumin of 10, urobilinogen of 4 and mucus present.    Suspected decompensated liver failure  Suspected new ascites  Painless Jaundice  Elevated Total bili  *Patient with one time history of ascites that required paracentesis with 4.7 L removed in 9/2022.  - Limited Abd US to further assess the liver and for ascites.    - Anticipate patient has developed ascites (new development per patient and his wife; who also report previous ascites 9/2022 and upon chart review was treated for SBP).  --Await limited Abd US results and likely proceed with US guided paracentesis (therapeutic and diagnostic).  - Hepatic panel ordered for 12/11.      Tachycardia  Paroxysmal atrial fib  - Obtain EKG.    - Monitor on telemetry.    - ECHO ordered.    - Resumed on PTA Toprol CL 25 mg/d.      Worsening lower extremity edema  - Bilateral venous US of the lower extremities ordered.      Reported fever  Acute cough  *Infectious work-up has been unremarkable at this point.  Suspect cough is secondary to fluid overload.    - Empirically starting ceftriaxone 2 g/d for possible SBP.      Lactic acidosis  *Lactic acid level checked due to abnormal vital signs and elevated at 2.3.    - Empirically starting ceftriaxone 2 g/d for possible SBP.      Mild hypervolemic hyponatremia  - BMP ordered for 12/11.      Physical deconditioning  - PT/OT consults requested.    - SW/CM consults requested.      Alcohol induced cirrhosis  Esophageal varices, history of (2021)  Portal HTN  *Follows with Hepatology (Dr. Laura Jean; had previously not recommended EGD as the last study was negative for esophageal varices).    *Complaint with PTA medications torsemide 10 mg/d and spironolactone 50 mg/d.    - Resumed on PTA torsemide and spironolactone.      Hepatocellular carcinoma  *Confirmed on liver biopsy 2/2023 and not a transplant  candidate.    *Follows with [facility] and underwent D1C1 atezolizumab with bevacizumab on 11/25/24.    - [facility] consult requested.      Chronic normocytic anemia  Chronic thrombocytopenia  *Secondary to liver cirrhosis.  HGB baseline between 9.5-11.3 since 1/2023.  PLT baseline between  since 1/2023.    - CBC ordered for 12/11.      HTN  - Resumed on PTA Toprol XL 25 mg/d and losartan 25 mg/d.  Hold parameters in place.    - Resumed on diuretic therapy as above as well.      MDD with anxiety  - Resumed on PTA Wellbutrin  mg every AM.      Insomnia  *Noted on chart review.  Not currently on any medications.      Psoriasis  - Hold PTA Amlactin cream and Nizoral cream and resume at discharge.      GERD  - Resumed on PTA omeprazole 40 mg/d.      Known heart murmur  *Patient and his wife indicated they are aware of systolic heart murmur.    - ECHO ordered.      History of compression fractures (L1 and L4)  *Noted on chart review.  Patient without pain.      History of alcohol use D/O (remission)  *Has been sober from alcohol for the past 3 years.      Clinically Significant Risk Factors Present on Admission         # Hyponatremia: Lowest Na = 133 mmol/L in last 2 days, will monitor as appropriate   # Hypocalcemia: Lowest Ca = 8.5 mg/dL in last 2 days, will monitor and replace as appropriate     # Hypoalbuminemia: Lowest albumin = 3.3 g/dL at 12/10/2024 10:22 AM, will monitor as appropriate   # Thrombocytopenia: Lowest platelets = 58 in last 2 days, will monitor for bleeding   # Hypertension: Noted on problem list      # Anemia: based on hgb <11  # Overweight: Estimated body mass index is 27.87 kg/m  as calculated from the following:    Height as of an earlier encounter on 12/10/24: 1.829 m (6').    Weight as of this encounter: 93.2 kg (205 lb 8 oz).              Diet: Cardiac diet  DVT Prophylaxis: Pneumatic Compression Devices  Purvis Catheter: Not present  Lines: None     Cardiac Monitoring:  ORDERED  Code Status: FULL CODE    Disposition Plan   Inpatient status.  Anticipate greater than 2 evening hospitalization while undergoing continued work-up/management of suspected decompensating liver failure.      Medically Ready for Discharge: Anticipated in 2-4 Days    The patient's care was discussed with the Bedside Nurse, Patient, and Patient's Family.  Reviewed outpatient Oncology notes.      The patient has been discussed with Dr. Vela, who agrees with the assessment and plan at this time.    Jorgito Prater PA-C  [facility]  Securely message with the Vocera Web Console (learn more here)    ______________________________________________________________________    Chief Complaint   Direct admit due to suspected decompensating liver failure.     History is obtained from the patient, the patient's wife and EMR.      History of Present Illness   Oskar Wilks is a 79 year old male admitted on 12/10/2024 as a direct admit from  Cancer Center in [place] due to concerns for decompensating liver failure.      Past medical history significant for Hepatocellular carcinoma, Alcohol induced liver cirrhosis, Esophageal varices, Portal HTN, HTN, Paroxysmal atrial fib, Chronic normocytic anemia, Chronic thrombocytopenia, MDD with anxiety, Insomnia, Known heart murmur, Psoriasis, GERD, History of compression fractures (L1 and L4), History of alcohol use D/O (remission).    Patient was seen in outpatient cancer center on day of admission.  He had presented due to a new cough and reported fever (greater than 101  F).  It was reported that patient developed a cough a couple of days prior to admission.  Patient's wife checked his temperature for the night prior to admit and found it to be greater than 100.4  F.  Patient has been eating less and becoming increasingly weak.    In clinic patient was found to be tachycardic with a heart rate over 120 and was afebrile.  He was also noted  to have 3+ edema of the lower extremities as well as abdominal distention.  Workup completed in the outpatient setting included a 2 view chest x-ray that revealed elevation of the right hemidiaphragm, left basilar atelectasis otherwise no focal consolidation, pleural effusion or pneumothorax.  There is mention of remote right rib fractures.  Respiratory viral PCR panel was all negative.  CMP revealed a sodium of 133, calcium of 8.5, albumin of 3.3, total protein of 5.5, ALT of 102, AST of 124, total bilirubin of 7.1 and glucose of 101.  CBC with differential revealed a hemoglobin of 9.9, hematocrit of 30, platelet count of 58, RBC count of 3.31, RDW of 20.9 and absolute lymphocytes of 0.5 otherwise within normal limits.  Iron studies were obtained as well.  UA revealed an orange appearance with small amount of bilirubin, protein albumin of 10, urobilinogen of 4 and mucus present.    Patient was seen in his hospital room where he was laying comfortably in bed upon arrival.  Patient's wife was present in the room.  Briefly reviewed events that led to patient's presentation to outpatient oncology clinic and subsequent direct admission to [facility].  We discussed worsening cough that began on Sunday 12/8 and has worsened since then.  He also spiked a fever of around 101  F the evening of 12/9.  Reviewed that patient underwent infusion for treatment of his hepatocellular carcinoma on 11/25 and had been doing well up until this past weekend.    I reviewed lab studies and chest x-ray completed in clinic.  Reviewed overall plan regarding admission and ongoing workup.    Upon questioning, patient did indicate he had a fever last night.  He has been coughing ever since Sunday.  He has had worsening fatigue and weakness.  He has also had increased nausea and decreased appetite.  Initially, patient is unsure of his stomach is more swollen than normal however his wife indicated that it does seem that way and also  indicated that the oncology PA felt that it was quite larger than when he was last assessed.  We discussed and reviewed his previous occurrence of ascites that occurred in 2022 where they removed almost 5 L.    Both patient and his wife indicated that he has chronic lower extremity edema for years and utilizes compression stockings but do believe that his swelling has worsened in the last week or more.  His left leg is more swollen than the right which seems to be his baseline per report.  He has had decreased activity however is still getting up and spending some time on a treadmill at home.  Patient feels he bruises quite easily.  He had an episode of dizziness on Sunday.  Patient's wife indicated that he has had increased yellowing of his skin since Sunday as well.    Patient resides in a townhouse with his wife and dog in [place], [place].  He is a former smoker quit in 1985.  He has been sober from alcohol for about 3 years.  He does not use recreational drugs.  He does not utilize a cane or a walker.  He does not utilize supplemental oxygen or CPAP machine.    Discussed and reviewed CODE STATUS and patient elected to be full code.      Past Medical History    I have reviewed this patient's medical history and updated it with pertinent information if needed.   Past Medical History:   Diagnosis Date    Cancer (H)     liver    Cirrhosis of liver (H)     Hip fracture (H)    Hepatocellular carcinoma, Alcohol induced liver cirrhosis, Esophageal varices, Portal HTN, HTN, Paroxysmal atrial fib, Chronic normocytic anemia, Chronic thrombocytopenia, MDD with anxiety, Insomnia, Known heart murmur, Psoriasis, GERD, History of compression fractures (L1 and L4), History of alcohol use D/O (remission).    Prior to Admission Medications   Prior to Admission Medications   Prescriptions Last Dose Informant Patient Reported? Taking?   ammonium lactate (AMLACTIN) 12 % external cream   Yes No   Sig: Apply topically daily as  needed.   buPROPion (WELLBUTRIN XL) 300 MG 24 hr tablet  Spouse/Significant Other Yes No   Sig: Take 300 mg by mouth every morning   cholecalciferol 25 MCG (1000 UT) TABS  Spouse/Significant Other Yes No   Sig: Take 1 tablet by mouth daily   fluticasone (FLONASE) 50 MCG/ACT nasal spray  Spouse/Significant Other Yes No   Sig: Spray 2 sprays into both nostrils daily   folic acid (FOLVITE) 1 MG tablet   Yes No   Sig: Take 1 mg by mouth daily   ketoconazole (NIZORAL) 2 % external cream  Spouse/Significant Other Yes No   Sig: Apply topically daily as needed for irritation.   losartan (COZAAR) 25 MG tablet  Spouse/Significant Other Yes No   Sig: Take 1 tablet by mouth daily   metoprolol succinate ER (TOPROL XL) 25 MG 24 hr tablet   Yes No   Sig: Take 1 tablet by mouth daily at 2 pm   multivitamin w/minerals (THERA-VIT-M) tablet  Spouse/Significant Other Yes No   Sig: Take 1 tablet by mouth daily   omeprazole (PRILOSEC) 40 MG DR capsule   Yes No   Sig: Take 40 mg by mouth daily.   ondansetron (ZOFRAN ODT) 4 MG ODT tab   No No   Sig: Take 1 tablet (4 mg) by mouth every 8 hours as needed for nausea.   prochlorperazine (COMPAZINE) 10 MG tablet   No No   Sig: Take 1 tablet (10 mg) by mouth every 6 hours as needed for nausea or vomiting.   spironolactone (ALDACTONE) 50 MG tablet  Spouse/Significant Other Yes No   Sig: Take 50 mg by mouth daily   torsemide (DEMADEX) 10 MG tablet  Spouse/Significant Other Yes No   Sig: Take 10 mg by mouth daily      Facility-Administered Medications: None     Allergies   Allergies   Allergen Reactions    Penicillins      PN: LW Reaction: Itching, Pruritis    Adhesive Tape Rash       Physical Exam   Vital Signs: Temp: 97.6  F (36.4  C) Temp src: Oral BP: 133/86 Pulse: (!) 122   Resp: 16 SpO2: 97 % O2 Device: None (Room air)    Weight: 205 lbs 8 oz    Constitutional: Awake, alert, cooperative, no apparent distress.    ENT: Normocephalic, without obvious abnormality, atraumatic, oral pharynx with  moist mucus membranes, tonsils without erythema or exudates.  Eyes pupils are equal, round and reactive to light; extra occular movements intact.  Scleral icterus present.    Neck: Supple, symmetrical, trachea midline, no adenopathy.  Pulmonary: No increased work of breathing, fair air exchange but diminished at the bases, clear to auscultation bilaterally, no crackles or wheezing.  Cardiovascular: Irreg rhythm with tachycardia, normal S1 and S2, no S3 or S4, and systolic murmur noted.  GI: Normal bowel sounds, soft, distended, non-tender.    Skin/Integumen: Jaundice  Neuro: CN II-XII grossly intact.    Psych:  Alert and oriented x 3. Normal affect.  Extremities: 3+ pitting lower extremity edema noted (worse on left), and calves are non-tender to palpation bilaterally. .      Medical Decision Making       Please see A&P for additional details of medical decision making.  75 MINUTES SPENT BY ME on the date of service doing chart review, history, exam, documentation & further activities per the note.         Data   Data reviewed today: I reviewed all medications, new labs and imaging results over the last 24 hours. I personally reviewed no images or EKG's today.      I have personally reviewed the following data over the past 24 hrs:    7.1  \   9.9 (L)   / 58 (L)     133 (L) 100 16.6 /  101 (H)   4.7 23 0.83 \     ALT: 102 (H) AST: 124 (H) AP: 106 TBILI: 7.1 (H)   ALB: 3.3 (L) TOT PROTEIN: 5.5 (L) LIPASE: N/A     Procal: N/A CRP: N/A Lactic Acid: 2.3 (H)         Imaging results reviewed over the past 24 hrs:   Recent Results (from the past 24 hours)   XR Chest 2 Views    Narrative    CHEST TWO VIEWS 12/10/2024 9:54 AM     HISTORY: Hepatocellular CA- currently receiving atezolizumab and  bevacizumab- fever and cough; Fever, unspecified fever cause; Cough,  unspecified type    COMPARISON: CT chest 10/31/2024.       Impression    IMPRESSION: Elevation of the right hemidiaphragm. Left basilar  atelectasis. No focal  consolidation, pleural effusion or pneumothorax.  Similar cardiomediastinal silhouette. Remote right rib fractures.  Aortic calcification.    MEGAN HODGES MD         SYSTEM ID:  [id number]

## 2024-12-10 NOTE — PROGRESS NOTES
Writer called station 88 has a bed ready and is able to accept patient now.    I spoke with patient's spouse, Pat, and they are already checking to station 88 for direct admission.     Ct Caraballo RN

## 2024-12-10 NOTE — PROGRESS NOTES
Oncology Rooming Note    December 10, 2024 10:09 AM   Oskar Wilks is a 79 year old male who presents for:    Chief Complaint   Patient presents with    Oncology Clinic Visit     HCC (hepatocellular carcinoma) (H)     Initial Vitals: /57 (BP Location: Left arm, Patient Position: Sitting, Cuff Size: Adult Large)   Pulse 120   Temp 98  F (36.7  C) (Oral)   Resp 16   Ht 1.829 m (6')   Wt 93.6 kg (206 lb 6.4 oz)   SpO2 99%   BMI 27.99 kg/m   Estimated body mass index is 27.99 kg/m  as calculated from the following:    Height as of this encounter: 1.829 m (6').    Weight as of this encounter: 93.6 kg (206 lb 6.4 oz). Body surface area is 2.18 meters squared.  No Pain (0) Comment: Data Unavailable   No LMP for male patient.  Allergies reviewed: Yes  Medications reviewed: Yes    Medications: Medication refills not needed today.  Pharmacy name entered into [facility]: [id number] - [place], [place] - [address] AT [facility] [place] [address]    Frailty Screening:   Is the patient here for a new oncology consult visit in cancer care? 2. No      Clinical concerns: None       Opal Puentes MA

## 2024-12-10 NOTE — TELEPHONE ENCOUNTER
Patient gives verbal consent to speak with wife maureen at time of call regarding PHI.         Nurse Triage SBAR    Is this a 2nd Level Triage? NO    Situation: Fever in cancer patient    Background:   Call from patient wife stating that on 11/25/24 patient had an infusion for liver cancer tx. Patient has started to develop a cough and a fever of greater than 101.    Assessment:   Call from patient wife stating that on 11/25/24 patient had an infusion for liver cancer tx. Patient has started to develop a cough and a fever of greater than 101.    Protocol Recommended Disposition:   Go to ED Now    Recommendation:  writer spoke with on call provider at time of call who recommends that patient be seen in clinic today. Writer to route encounter to care team for scheduling and follow up upon clinic open.      Paged to provider    Does the patient meet one of the following criteria for ADS visit consideration? 16+ years old, with an [facility] PCP     TIP  Providers, please consider if this condition is appropriate for management at one of our Acute and Diagnostic Services sites.     If patient is a good candidate, please use dotphrase <dot>triageresponse and select Refer to ADS to document.        Provider consult indicated.     Reason for page: Consult    Page sent to Dr. Washington by RN at [phone].     Zaira Tellez RN          Provider, Dr. washington, returning page to Nurse Advisors at [phone]    Provider recommended plan of care: Patient to be seen in clinic today.     patient notified and verbalized understanding.     Zaira Tellez RN         Reason for Disposition   [1] Neutropenia known or suspected (e.g., recent cancer chemotherapy) AND [2] fever > 100.4 F (38.0 C)    Additional Information   Negative: Shock suspected (e.g., cold/pale/clammy skin, too weak to stand, low BP, rapid pulse)   Negative: Difficult to awaken or acting confused (e.g., disoriented, slurred speech)   Negative: Bluish (or gray) lips or face now    Negative: New-onset rash with many purple (or blood-colored) spots or dots   Negative: Sounds like a life-threatening emergency to the triager   Negative: Fever > 103 F (39.4 C)    Protocols used: Cancer - Fever-A-AH

## 2024-12-11 ENCOUNTER — APPOINTMENT (OUTPATIENT)
Dept: ULTRASOUND IMAGING | Facility: CLINIC | Age: 79
DRG: 441 | End: 2024-12-11
Attending: PHYSICIAN ASSISTANT
Payer: MEDICARE

## 2024-12-11 ENCOUNTER — APPOINTMENT (OUTPATIENT)
Dept: ULTRASOUND IMAGING | Facility: CLINIC | Age: 79
DRG: 441 | End: 2024-12-11
Attending: INTERNAL MEDICINE
Payer: MEDICARE

## 2024-12-11 ENCOUNTER — APPOINTMENT (OUTPATIENT)
Dept: PHYSICAL THERAPY | Facility: CLINIC | Age: 79
DRG: 441 | End: 2024-12-11
Attending: PHYSICIAN ASSISTANT
Payer: MEDICARE

## 2024-12-11 LAB
ALBUMIN BODY FLUID SOURCE: NORMAL
ALBUMIN FLD-MCNC: 0.7 G/DL
ALBUMIN SERPL BCG-MCNC: 3.2 G/DL (ref 3.5–5.2)
ALP SERPL-CCNC: 106 U/L (ref 40–150)
ALT SERPL W P-5'-P-CCNC: 119 U/L (ref 0–70)
ANION GAP SERPL CALCULATED.3IONS-SCNC: 11 MMOL/L (ref 7–15)
APPEARANCE FLD: ABNORMAL
AST SERPL W P-5'-P-CCNC: 140 U/L (ref 0–45)
BASE EXCESS BLDV CALC-SCNC: 0.6 MMOL/L (ref -3–3)
BILIRUB DIRECT SERPL-MCNC: 3.37 MG/DL (ref 0–0.3)
BILIRUB SERPL-MCNC: 8.3 MG/DL
BUN SERPL-MCNC: 19.8 MG/DL (ref 8–23)
CALCIUM SERPL-MCNC: 8.5 MG/DL (ref 8.8–10.4)
CELL COUNT BODY FLUID SOURCE: ABNORMAL
CHLORIDE SERPL-SCNC: 100 MMOL/L (ref 98–107)
COLOR FLD: ABNORMAL
CREAT SERPL-MCNC: 0.77 MG/DL (ref 0.67–1.17)
EGFRCR SERPLBLD CKD-EPI 2021: >90 ML/MIN/1.73M2
ERYTHROCYTE [DISTWIDTH] IN BLOOD BY AUTOMATED COUNT: 20.9 % (ref 10–15)
GLUCOSE SERPL-MCNC: 102 MG/DL (ref 70–99)
HCO3 BLDV-SCNC: 25 MMOL/L (ref 21–28)
HCO3 SERPL-SCNC: 22 MMOL/L (ref 22–29)
HCT VFR BLD AUTO: 29.9 % (ref 40–53)
HGB BLD-MCNC: 10.1 G/DL (ref 13.3–17.7)
INR PPP: 2.29 (ref 0.85–1.15)
LACTATE SERPL-SCNC: 2.8 MMOL/L (ref 0.7–2)
MAGNESIUM SERPL-MCNC: 1.8 MG/DL (ref 1.7–2.3)
MCH RBC QN AUTO: 30.2 PG (ref 26.5–33)
MCHC RBC AUTO-ENTMCNC: 33.8 G/DL (ref 31.5–36.5)
MCV RBC AUTO: 90 FL (ref 78–100)
O2/TOTAL GAS SETTING VFR VENT: 0 %
OXYHGB MFR BLDV: 52 % (ref 70–75)
PCO2 BLDV: 39 MM HG (ref 40–50)
PH BLDV: 7.42 [PH] (ref 7.32–7.43)
PLATELET # BLD AUTO: 59 10E3/UL (ref 150–450)
PO2 BLDV: 31 MM HG (ref 25–47)
POTASSIUM SERPL-SCNC: 4.5 MMOL/L (ref 3.4–5.3)
PROCALCITONIN SERPL IA-MCNC: 0.28 NG/ML
PROT FLD-MCNC: 0.9 G/DL
PROT SERPL-MCNC: 5.5 G/DL (ref 6.4–8.3)
PROTEIN BODY FLUID SOURCE: NORMAL
RBC # BLD AUTO: 3.34 10E6/UL (ref 4.4–5.9)
SAO2 % BLDV: 54.5 % (ref 70–75)
SODIUM SERPL-SCNC: 133 MMOL/L (ref 135–145)
WBC # BLD AUTO: 7 10E3/UL (ref 4–11)
WBC # FLD AUTO: 426 /UL

## 2024-12-11 PROCEDURE — 85018 HEMOGLOBIN: CPT | Performed by: PHYSICIAN ASSISTANT

## 2024-12-11 PROCEDURE — 250N000013 HC RX MED GY IP 250 OP 250 PS 637: Performed by: PHYSICIAN ASSISTANT

## 2024-12-11 PROCEDURE — 93970 EXTREMITY STUDY: CPT

## 2024-12-11 PROCEDURE — 99233 SBSQ HOSP IP/OBS HIGH 50: CPT | Performed by: INTERNAL MEDICINE

## 2024-12-11 PROCEDURE — 82248 BILIRUBIN DIRECT: CPT | Performed by: PHYSICIAN ASSISTANT

## 2024-12-11 PROCEDURE — 82310 ASSAY OF CALCIUM: CPT | Performed by: PHYSICIAN ASSISTANT

## 2024-12-11 PROCEDURE — 82374 ASSAY BLOOD CARBON DIOXIDE: CPT | Performed by: PHYSICIAN ASSISTANT

## 2024-12-11 PROCEDURE — 83735 ASSAY OF MAGNESIUM: CPT | Performed by: INTERNAL MEDICINE

## 2024-12-11 PROCEDURE — 250N000013 HC RX MED GY IP 250 OP 250 PS 637: Performed by: INTERNAL MEDICINE

## 2024-12-11 PROCEDURE — 0W9G3ZZ DRAINAGE OF PERITONEAL CAVITY, PERCUTANEOUS APPROACH: ICD-10-PCS | Performed by: RADIOLOGY

## 2024-12-11 PROCEDURE — 82042 OTHER SOURCE ALBUMIN QUAN EA: CPT | Performed by: INTERNAL MEDICINE

## 2024-12-11 PROCEDURE — 87070 CULTURE OTHR SPECIMN AEROBIC: CPT | Performed by: INTERNAL MEDICINE

## 2024-12-11 PROCEDURE — 49083 ABD PARACENTESIS W/IMAGING: CPT

## 2024-12-11 PROCEDURE — 82565 ASSAY OF CREATININE: CPT | Performed by: PHYSICIAN ASSISTANT

## 2024-12-11 PROCEDURE — 80053 COMPREHEN METABOLIC PANEL: CPT | Performed by: PHYSICIAN ASSISTANT

## 2024-12-11 PROCEDURE — 99223 1ST HOSP IP/OBS HIGH 75: CPT | Performed by: INTERNAL MEDICINE

## 2024-12-11 PROCEDURE — 89051 BODY FLUID CELL COUNT: CPT | Performed by: INTERNAL MEDICINE

## 2024-12-11 PROCEDURE — 250N000009 HC RX 250: Performed by: RADIOLOGY

## 2024-12-11 PROCEDURE — 272N000706 US PARACENTESIS WITHOUT ALBUMIN

## 2024-12-11 PROCEDURE — 84145 PROCALCITONIN (PCT): CPT | Performed by: INTERNAL MEDICINE

## 2024-12-11 PROCEDURE — 85027 COMPLETE CBC AUTOMATED: CPT | Performed by: PHYSICIAN ASSISTANT

## 2024-12-11 PROCEDURE — 99418 PROLNG IP/OBS E/M EA 15 MIN: CPT | Performed by: PHYSICIAN ASSISTANT

## 2024-12-11 PROCEDURE — 87040 BLOOD CULTURE FOR BACTERIA: CPT | Performed by: INTERNAL MEDICINE

## 2024-12-11 PROCEDURE — 99223 1ST HOSP IP/OBS HIGH 75: CPT | Performed by: PHYSICIAN ASSISTANT

## 2024-12-11 PROCEDURE — 120N000001 HC R&B MED SURG/OB

## 2024-12-11 PROCEDURE — 84157 ASSAY OF PROTEIN OTHER: CPT | Performed by: INTERNAL MEDICINE

## 2024-12-11 PROCEDURE — 250N000011 HC RX IP 250 OP 636: Performed by: INTERNAL MEDICINE

## 2024-12-11 PROCEDURE — 97530 THERAPEUTIC ACTIVITIES: CPT | Mod: GP

## 2024-12-11 PROCEDURE — 250N000011 HC RX IP 250 OP 636: Performed by: PHYSICIAN ASSISTANT

## 2024-12-11 PROCEDURE — 76700 US EXAM ABDOM COMPLETE: CPT

## 2024-12-11 PROCEDURE — 36415 COLL VENOUS BLD VENIPUNCTURE: CPT | Performed by: PHYSICIAN ASSISTANT

## 2024-12-11 PROCEDURE — 97161 PT EVAL LOW COMPLEX 20 MIN: CPT | Mod: GP

## 2024-12-11 PROCEDURE — 36415 COLL VENOUS BLD VENIPUNCTURE: CPT | Performed by: INTERNAL MEDICINE

## 2024-12-11 PROCEDURE — 97116 GAIT TRAINING THERAPY: CPT | Mod: GP

## 2024-12-11 RX ORDER — DIAPER,BRIEF,INFANT-TODD,DISP
EACH MISCELLANEOUS 2 TIMES DAILY
Status: DISCONTINUED | OUTPATIENT
Start: 2024-12-11 | End: 2024-12-13 | Stop reason: HOSPADM

## 2024-12-11 RX ORDER — DIGOXIN 0.25 MG/ML
500 INJECTION INTRAMUSCULAR; INTRAVENOUS ONCE
Status: COMPLETED | OUTPATIENT
Start: 2024-12-11 | End: 2024-12-11

## 2024-12-11 RX ORDER — LIDOCAINE HYDROCHLORIDE 10 MG/ML
10 INJECTION, SOLUTION EPIDURAL; INFILTRATION; INTRACAUDAL; PERINEURAL ONCE
Status: COMPLETED | OUTPATIENT
Start: 2024-12-11 | End: 2024-12-11

## 2024-12-11 RX ORDER — METOPROLOL SUCCINATE 25 MG/1
25 TABLET, EXTENDED RELEASE ORAL 2 TIMES DAILY
Status: DISCONTINUED | OUTPATIENT
Start: 2024-12-11 | End: 2024-12-13 | Stop reason: HOSPADM

## 2024-12-11 RX ADMIN — BUPROPION HYDROCHLORIDE 300 MG: 300 TABLET, EXTENDED RELEASE ORAL at 08:46

## 2024-12-11 RX ADMIN — HYDROCORTISONE: 0.5 CREAM TOPICAL at 20:38

## 2024-12-11 RX ADMIN — METOPROLOL SUCCINATE 25 MG: 25 TABLET, EXTENDED RELEASE ORAL at 20:37

## 2024-12-11 RX ADMIN — Medication 1 MG: at 20:37

## 2024-12-11 RX ADMIN — LIDOCAINE HYDROCHLORIDE ANHYDROUS 10 ML: 10 INJECTION, SOLUTION INFILTRATION at 09:18

## 2024-12-11 RX ADMIN — TORSEMIDE 10 MG: 5 TABLET ORAL at 08:46

## 2024-12-11 RX ADMIN — METOPROLOL SUCCINATE 25 MG: 25 TABLET, EXTENDED RELEASE ORAL at 13:47

## 2024-12-11 RX ADMIN — FOLIC ACID 1 MG: 1 TABLET ORAL at 08:46

## 2024-12-11 RX ADMIN — SPIRONOLACTONE 50 MG: 25 TABLET ORAL at 08:46

## 2024-12-11 RX ADMIN — OMEPRAZOLE 40 MG: 20 CAPSULE, DELAYED RELEASE ORAL at 08:50

## 2024-12-11 RX ADMIN — CEFTRIAXONE SODIUM 2 G: 2 INJECTION, POWDER, FOR SOLUTION INTRAMUSCULAR; INTRAVENOUS at 18:21

## 2024-12-11 RX ADMIN — HYDROCORTISONE: 0.5 CREAM TOPICAL at 15:07

## 2024-12-11 RX ADMIN — LOSARTAN POTASSIUM 25 MG: 25 TABLET, FILM COATED ORAL at 08:47

## 2024-12-11 RX ADMIN — DIGOXIN 500 MCG: 0.25 INJECTION INTRAMUSCULAR; INTRAVENOUS at 13:22

## 2024-12-11 RX ADMIN — Medication 400 MG: at 03:45

## 2024-12-11 ASSESSMENT — ACTIVITIES OF DAILY LIVING (ADL)
ADLS_ACUITY_SCORE: 42
ADLS_ACUITY_SCORE: 48
ADLS_ACUITY_SCORE: 42
ADLS_ACUITY_SCORE: 48
ADLS_ACUITY_SCORE: 42
ADLS_ACUITY_SCORE: 48
ADLS_ACUITY_SCORE: 42
ADLS_ACUITY_SCORE: 42
ADLS_ACUITY_SCORE: 57
ADLS_ACUITY_SCORE: 42
ADLS_ACUITY_SCORE: 48
ADLS_ACUITY_SCORE: 48
ADLS_ACUITY_SCORE: 57
ADLS_ACUITY_SCORE: 48
ADLS_ACUITY_SCORE: 48
ADLS_ACUITY_SCORE: 57
ADLS_ACUITY_SCORE: 48
ADLS_ACUITY_SCORE: 42
ADLS_ACUITY_SCORE: 57
ADLS_ACUITY_SCORE: 48
ADLS_ACUITY_SCORE: 57

## 2024-12-11 NOTE — PROGRESS NOTES
[facility] brief note:    Transient rigors, upper airway expiratory wheezes possibly 2/2 developing fever in setting of concern for SBP in setting of hypervolemia.  Paroxysmal atrial fibrillation with rapid ventricular response 2/2 concern for infection/fever, decompensated clinical status.  Lactic acidosis suspect multifactorial 2/2 decompensated liver failure, malignancy, concern for infection, tachycardia.  Coagulopathy 2/2 decompensated liver failure.    RRT initially called at 2114 as pt with transient rigors, upper airway expiratory wheezes; however, resolved at time of arrival.  At time of arrival, pt lying in bed, HOB at 45 degrees, awake, alert, in no overt distress with VS noting SBP 130s, HR 110s-130s, RR 10s-low 20s, O2 sats > 92% on RA, afebrile.  Pt notes recent chills; no tremor at baseline.  Pt reports SOB while HOB was previously lowered and laying on R side but notes SOB improved after lying supine with HOB > 45 degrees.  Pt's lung sounds clear throughout, no obvious crackles or wheezes noted, no upper airway wheezes or stridor noted.  Pt's heart tones normal S1S2, harsh systolic murmur 5/6, no obvious rub or gallop noted, tachycardic, irregular rate and rhythm, a-fib RVR.  Pt's abdomen soft, nondistended, nontender to palpation.  Pt's BLE with +2 pitting edema.  Pt's skin with scattered ecchymoses throughout.      Interventions:  - In setting of RRT initially activated for rigors and wheezes but had resolved by time of arrival, will formally cancel RRT  - Requested for nursing to check axillary temp; if develops fever, please contact house [facility], will check blood cultures at that time  - Pt continues on ceftriaxone  - In setting of elevated transaminases, does not have PRN APAP; recommended to reduce room temp, minimal blankets or ice pack if pt develops fever  - 2154: Contacted by nursing that pt's HR sustaining 130s, notes pt received 2.5 mg IV metoprolol at 1918, has available Q4H PRN.  Pt's SBP  120s, temp 99.6.  Will judiciously order an additional 2.5 mg IV metoprolol now.  Suspect paroxysmal atrial fibrillation will be challenging to control rate in setting of decompensated clinical status, fever/infection.  - Pt remains on telemetry monitoring  - Will add on Mg; noted recent TSH WNL  - Will order high Mg replacement protocol  - 2159: Contacted by nursing that pt's lactic acid 4.0, up from 2.3.  In setting of concern for volume overload and pt's BP stable at this time, will defer IVF bolus.  If pt develops hypotension, will trial IVF bolus at that time.  Pt continues on PTA torsemide, spironolactone.  Pt reports no acute abdominal pain.  Discussed with nursing if pt develops true fever, please contact [facility] at that time and will order blood cultures.    - Will repeat lactic acid at 0000; will check INR, VBG at that time as well      NARESH Riley, CNP  [facility]    Medical Decision Making       25 MINUTES SPENT BY ME on the date of service doing chart review, history, exam, documentation & further activities per the note.

## 2024-12-11 NOTE — CONSULTS
Cardiology Consultation      Oskar Wilks MRN# [id number]   YOB: 1945 Age: 79 year old   Date of Admission: 12/10/2024     Reason for consult: Atrial flutter           Assessment and Plan:     79-year-old gentleman with a history of alcoholic liver disease/cirrhosis, hypertension and paroxysmal atrial fibrillation for which she has undergone ETHEL guided cardioversion in the past who was admitted to [facility] on 12/10/2024 for decompensating hepatic failure.    Here he has been noted to be in atrial fibrillation with rapid ventricular response.  This was associated with fever and chills.  Labs are remarkable for anemia, thrombocytopenia and a therapeutic INR.    IMPRESSION:    Paroxysmal atrial fibrillation with rapid ventricular response.    Status post ETHEL guided cardioversion in 2021.  Normal left ventricular systolic function on echocardiography dated 8/15/2022.  Moderate aortic stenosis.  Alcoholic cirrhosis with decompensation.    PLAN    -The atrial fibrillation with rapid ventricular response is almost certainly secondary to his underlying hepatic failure and possible underlying fever/infection.    -He is minimally symptomatic at this time but heart rates are rapid.  Will increase his metoprolol XL to twice daily and give 500 mcg of digoxin IV x 1.  Anticoagulation is obviously contraindicated given his coagulopathy.    -An echocardiogram has been ordered and is pending.    >80 minutes of time spent today pre and post charting, reviewing pertinent investigations personally as well as extensive review of previous records and coordinating plans for further evaluation.               Chief Complaint:   No chief complaint on file.           History of Present Illness:   This patient is a 79 year old male with a history of PAFF (status post ETHEL guided cardioversion in 2021) with alcoholic cirrhosis who was admitted to [facility] on 12/1/2024 with decompensated hepatic failure.    He was found  to have lactic acidosis related to multifactorial hepatic failure with concern for underlying infection.  He was also coagulopathic and was noted to be in atrial fibrillation with rapid ventricular response.  He has been on metoprolol and cardiology consultation was requested.    At the time I saw him he denied any palpitations, chest discomfort or dyspnea.  He denies any syncope or presyncope.  He does not recall any recurrence of his atrial fibrillation since .           Physical Exam:   Vitals were reviewed  Blood pressure 135/87, pulse 99, temperature 97.9  F (36.6  C), temperature source Oral, resp. rate 16, weight 90.2 kg (198 lb 12.8 oz), SpO2 97%.  Temperatures:  Current - Temp: 97.9  F (36.6  C); Max - Temp  Av  F (36.7  C)  Min: 97.4  F (36.3  C)  Max: 99.6  F (37.6  C)  Respiration range: Resp  Av.7  Min: 16  Max: 22  Pulse range: Pulse  Av.7  Min: 99  Max: 137  Blood pressure range: Systolic (24hrs), Av , Min:124 , Max:171   ; Diastolic (24hrs), Av, Min:65, Max:87    Pulse oximetry range: SpO2  Av.9 %  Min: 96 %  Max: 98 %  No intake or output data in the 24 hours ending 24 1119  Constitutional:   awake, alert, cooperative, no apparent distress, and appears stated age     Eyes:   Lids and lashes normal, pupils equal, round and reactive to light, extra ocular muscles intact, sclera clear, conjunctiva normal     Neck:   supple, symmetrical, trachea midline, no JVD     Back:   symmetric     Lungs:   No increased work of breathing, good air exchange, clear to auscultation bilaterally, no crackles or wheezing       Cardiovascular:   Rapid, irregular, 2/6 systolic ejection murmur at right upper sternal border.     Abdomen:   Distended     Musculoskeletal:   motor strength is 5 out of 5 all extremities bilaterally     Neurologic:   Grossly nonfocal     Skin:   no bruising or bleeding     Additional findings:     2+ pitting edema bilateral lower extremities.                  Past Medical History:   I have reviewed this patient's past medical history  Past Medical History:   Diagnosis Date    Cancer (H)     liver    Cirrhosis of liver (H)     Hip fracture (H)              Past Surgical History:   I have reviewed this patient's past surgical history  Past Surgical History:   Procedure Laterality Date    ESOPHAGOSCOPY, GASTROSCOPY, DUODENOSCOPY (EGD), COMBINED N/A 2024    Procedure: Esophagoscopy, gastroscopy, duodenoscopy (EGD), combined;  Surgeon: Moses Bahtt MD;  Location: [facility]    IR LIVER BIOPSY PERCUTANEOUS  2023    IR PARACENTESIS  2022    IR SIRT (SELECTIVE INTERNAL RADIO THERAPY)  2023    IR SIRT (SELECTIVE INTERNAL RADIO THERAPY)  2024    IR VISCERAL ANGIOGRAM  3/14/2023    IR VISCERAL ANGIOGRAM  2024    IR VISCERAL EMBOLIZATION  3/16/2023    IR VISCERAL EMBOLIZATION  2024    ORIF HIP FRACTURE                 Social History:   I have reviewed this patient's social history  Social History     Tobacco Use    Smoking status: Former     Current packs/day: 0.00     Types: Cigarettes     Start date: 1977     Quit date: 1985     Years since quittin.9    Smokeless tobacco: Never   Substance Use Topics    Alcohol use: Not Currently     Comment: sober since 2022             Family History:   I have reviewed this patient's family history  No family history on file.          Allergies:     Allergies   Allergen Reactions    Penicillins      PN: LW Reaction: Itching, Pruritis    Adhesive Tape Rash             Medications:   I have reviewed this patient's current medications  Medications Prior to Admission   Medication Sig Dispense Refill Last Dose/Taking    ammonium lactate (AMLACTIN) 12 % external cream Apply topically daily as needed.   Taking As Needed    buPROPion (WELLBUTRIN XL) 300 MG 24 hr tablet Take 300 mg by mouth every morning   12/10/2024 at  1:00 PM    cholecalciferol 25 MCG (1000 UT) TABS Take 1 tablet by mouth  daily   12/10/2024 at  1:00 PM    fluticasone (FLONASE) 50 MCG/ACT nasal spray Spray 2 sprays into both nostrils daily   12/10/2024 at  1:00 PM    folic acid (FOLVITE) 1 MG tablet Take 1 mg by mouth daily   12/10/2024 at  1:00 PM    ketoconazole (NIZORAL) 2 % external cream Apply topically daily as needed for irritation.   Taking As Needed    losartan (COZAAR) 25 MG tablet Take 25 mg by mouth daily.   12/10/2024 at  1:00 PM    metoprolol succinate ER (TOPROL XL) 25 MG 24 hr tablet Take 1 tablet by mouth daily at 2 pm   12/10/2024 at  1:00 PM    multivitamin w/minerals (THERA-VIT-M) tablet Take 1 tablet by mouth daily   12/10/2024 at  1:00 PM    omeprazole (PRILOSEC) 40 MG DR capsule Take 40 mg by mouth daily.   12/10/2024    ondansetron (ZOFRAN ODT) 4 MG ODT tab Take 1 tablet (4 mg) by mouth every 8 hours as needed for nausea. 30 tablet 1 Taking As Needed    prochlorperazine (COMPAZINE) 10 MG tablet Take 1 tablet (10 mg) by mouth every 6 hours as needed for nausea or vomiting. 30 tablet 1 Taking As Needed    spironolactone (ALDACTONE) 50 MG tablet Take 50 mg by mouth daily.   12/10/2024 at  1:00 PM    torsemide (DEMADEX) 10 MG tablet Take 10 mg by mouth daily. 1/2 x 20 mg tab   12/10/2024 at  1:00 PM     Current Facility-Administered Medications   Medication Dose Route Frequency Provider Last Rate Last Admin    bisacodyl (DULCOLAX) suppository 10 mg  10 mg Rectal Daily PRN Jorgito Prater PA-C        buPROPion (WELLBUTRIN XL) 24 hr tablet 300 mg  300 mg Oral QAM Jorgito Prater PA-C   300 mg at 12/11/24 0846    calcium carbonate (TUMS) chewable tablet 1,000 mg  1,000 mg Oral 4x Daily PRN Jorgito Prater PA-C        cefTRIAXone (ROCEPHIN) 2 g vial to attach to  ml bag for ADULTS or NS 50 ml bag for PEDS  2 g Intravenous Q24H Jorgito Prater PA-C   2 g at 12/10/24 1922    folic acid (FOLVITE) tablet 1 mg  1 mg Oral Daily Jorgito Prater PA-C   1 mg at 12/11/24  0846    HYDROmorphone (DILAUDID) injection 0.2 mg  0.2 mg Intravenous Q2H PRN Jorgito Prater PA-C        HYDROmorphone (DILAUDID) injection 0.4 mg  0.4 mg Intravenous Q2H PRN Jorgito Prater PA-C        lidocaine (LMX4) cream   Topical Q1H PRN Jorgito Prater PA-C        lidocaine 1 % 0.1-1 mL  0.1-1 mL Other Q1H PRN Jorgito Prater PA-C        losartan (COZAAR) tablet 25 mg  25 mg Oral Daily Jorgito Prater PA-C   25 mg at 12/11/24 0847    melatonin tablet 1 mg  1 mg Oral At Bedtime PRN Jorgito Prater PA-C        metoprolol (LOPRESSOR) injection 2.5 mg  2.5 mg Intravenous Q4H PRN Jorgito Prater PA-C   2.5 mg at 12/10/24 1918    metoprolol succinate ER (TOPROL XL) 24 hr tablet 25 mg  25 mg Oral Daily Jorgito Prater PA-C        naloxone (NARCAN) injection 0.2 mg  0.2 mg Intravenous Q2 Min PRN KingJuanito avilez RPH        Or    naloxone (NARCAN) injection 0.4 mg  0.4 mg Intravenous Q2 Min PRN KingJuanito avilez RPH        Or    naloxone (NARCAN) injection 0.2 mg  0.2 mg Intramuscular Q2 Min PRN KingJuanito avilez RPH        Or    naloxone (NARCAN) injection 0.4 mg  0.4 mg Intramuscular Q2 Min PRN KingJuanito avilez RPH        omeprazole (PriLOSEC) CR capsule 40 mg  40 mg Oral Daily Jorgito Prater PA-C   40 mg at 12/11/24 0850    ondansetron (ZOFRAN ODT) ODT tab 4 mg  4 mg Oral Q6H PRN Jorgito Prater PA-C        Or    ondansetron (ZOFRAN) injection 4 mg  4 mg Intravenous Q6H PRN Jorgito Prater PA-C        oxyCODONE (ROXICODONE) tablet 5 mg  5 mg Oral Q4H PRN Jorgito Prater PA-C        oxyCODONE IR (ROXICODONE) half-tab 2.5 mg  2.5 mg Oral Q4H PRN Jorgito Prater PA-C        polyethylene glycol (MIRALAX) Packet 17 g  17 g Oral BID PRN Jorgito Prater PA-C        senna-docusate (SENOKOT-S/PERICOLACE) 8.6-50 MG per tablet 1 tablet  1 tablet Oral BID PRN Jorgito Prater PA-C         Or    senna-docusate (SENOKOT-S/PERICOLACE) 8.6-50 MG per tablet 2 tablet  2 tablet Oral BID PRN Jorgito Prater PA-C        sodium chloride (PF) 0.9% PF flush 3 mL  3 mL Intracatheter Q8H Jorgito Prater PA-C   3 mL at 12/11/24 0855    sodium chloride (PF) 0.9% PF flush 3 mL  3 mL Intracatheter q1 min prn Jorgito Prater PA-C        spironolactone (ALDACTONE) tablet 50 mg  50 mg Oral Daily Jorgito Prater PA-C   50 mg at 12/11/24 0846    torsemide (DEMADEX) tablet 10 mg  10 mg Oral Daily Jorgito Prater PA-C   10 mg at 12/11/24 0846             Review of Systems:     The 10 point Review of Systems is negative other than noted in the HPI            Data:   All laboratory data reviewed  Results for orders placed or performed during the hospital encounter of 12/10/24 (from the past 24 hours)   Lactic Acid Whole Blood w/ 1x repeat in 2 hrs when >2   Result Value Ref Range    Lactic Acid, Initial 2.3 (H) 0.7 - 2.0 mmol/L   EKG 12-lead, tracing only   Result Value Ref Range    Systolic Blood Pressure  mmHg    Diastolic Blood Pressure  mmHg    Ventricular Rate 115 BPM    Atrial Rate 340 BPM    RI Interval  ms    QRS Duration 94 ms     ms    QTc 462 ms    P Axis  degrees    R AXIS 113 degrees    T Axis -11 degrees    Interpretation ECG       Atrial flutter with variable A-V block  Left posterior fascicular block  Inferior infarct , age undetermined  Abnormal ECG  When compared with ECG of 26-Aug-2024 06:53,  Significant changes have occurred     Glucose by meter   Result Value Ref Range    GLUCOSE BY METER POCT 105 (H) 70 - 99 mg/dL   Lactic acid whole blood   Result Value Ref Range    Lactic Acid 4.0 (HH) 0.7 - 2.0 mmol/L   Magnesium   Result Value Ref Range    Magnesium 1.7 1.7 - 2.3 mg/dL   INR   Result Value Ref Range    INR 2.29 (H) 0.85 - 1.15   Lactic acid whole blood   Result Value Ref Range    Lactic Acid 2.8 (H) 0.7 - 2.0 mmol/L   Blood gas venous    Result Value Ref Range    pH Venous 7.42 7.32 - 7.43    pCO2 Venous 39 (L) 40 - 50 mm Hg    pO2 Venous 31 25 - 47 mm Hg    Bicarbonate Venous 25 21 - 28 mmol/L    Base Excess/Deficit Venous 0.6 -3.0 - 3.0 mmol/L    FIO2 0     Oxyhemoglobin Venous 52 (L) 70 - 75 %    O2 Sat, Venous 54.5 (L) 70.0 - 75.0 %    Narrative    In healthy individuals, oxyhemoglobin (O2Hb) and oxygen saturation (SO2) are approximately equal. In the presence of dyshemoglobins, oxyhemoglobin can be considerably lower than oxygen saturation.   Cell count with differential fluid    Narrative    The following orders were created for panel order Cell count with differential fluid.  Procedure                               Abnormality         Status                     ---------                               -----------         ------                     Cell Count Body Fluid[[id number]]        Abnormal            Preliminary result         Differential Body Fluid[[id number]]      Abnormal            Preliminary result           Please view results for these tests on the individual orders.   Albumin fluid   Result Value Ref Range    Albumin Fluid Source Abdomen     Albumin fluid 0.7 g/dL    Narrative    No reference ranges have been established. This result should be interpreted in the context of the patient's clinical condition and compared to simultaneous measurement in the patient's blood. This is a lab developed test. It has not been cleared or approved by the FDA. FDA clearance is not required for clinical use.   Protein fluid   Result Value Ref Range    Protein Fluid Source Abdomen     Protein Total Fluid 0.9 g/dL    Narrative    No reference ranges have been established. This result should be interpreted in the context of the patient's clinical condition and compared to simultaneous measurement in the patient's blood. This is a lab developed test. It has not been cleared or approved by the FDA. FDA clearance is not required for clinical use.    Cell Count Body Fluid   Result Value Ref Range    Color Orange (A) Colorless, Yellow    Clarity Cloudy (A) Clear    Cell Count Fluid Source Abdomen     Total Nucleated Cells 426 /uL    Narrative    No reference ranges have been established.  This result  should be interpreted in the context of the patient's clinical condition and   compared to simultaneous measurement in the patient's blood.         Differential Body Fluid   Result Value Ref Range    % Neutrophils 60 %    % Lymphocytes 24 %    % Monocyte/Macrophages 14 %    % Lining Cells 2 %    Absolute Neutrophils, Body Fluid 255.6 (HH)   /uL   US Lower Extremity Venous Duplex Bilateral    Narrative    VENOUS ULTRASOUND BILATERAL LOWER EXTREMITIES  12/11/2024 10:55 AM     HISTORY: Worsening bilateral lower extremity edema.    COMPARISON: None.    TECHNIQUE: Color Doppler and spectral waveform analysis performed  throughout the deep veins of both lower extremities.    FINDINGS: Both common femoral, proximal great saphenous, femoral, and  popliteal veins demonstrate normal blood flow, compression, and  augmentation. Posterior tibial and peroneal veins are compressible.      Impression    IMPRESSION: Negative for deep venous thrombosis in both lower  extremities.     EKG results: Probable atrial flutter with a ventricular rate of 115 bpm.    Clinically Significant Risk Factors Present on Admission         # Hyponatremia: Lowest Na = 133 mmol/L in last 2 days, will monitor as appropriate   # Hypocalcemia: Lowest Ca = 8.5 mg/dL in last 2 days, will monitor and replace as appropriate     # Hypoalbuminemia: Lowest albumin = 3.3 g/dL at 12/10/2024 10:22 AM, will monitor as appropriate  # Coagulation Defect: INR = 2.29 (Ref range: 0.85 - 1.15) and/or PTT = N/A, will monitor for bleeding  # Thrombocytopenia: Lowest platelets = 58 in last 2 days, will monitor for bleeding   # Hypertension: Noted on problem list      # Anemia: based on hgb <11       # Overweight: Estimated  body mass index is 26.96 kg/m  as calculated from the following:    Height as of an earlier encounter on 12/10/24: 1.829 m (6').    Weight as of this encounter: 90.2 kg (198 lb 12.8 oz).

## 2024-12-11 NOTE — PROVIDER NOTIFICATION
MD Notification    Notified Person: MD    Notified Person Name: Dr Rasmussen    Notification Date/Time: 12/11/2024 1050    Notification Interaction: Vocera    Purpose of Notification: pt has a critical lab value of 255.6 for his Absolute Neutrophils checked from his Abdominal fluid.    Orders Received: Noted. Continue current abx regimen    Comments:

## 2024-12-11 NOTE — PROGRESS NOTES
MD Notification    Notified Person: MD    Notified Person Name:  Pedro chavez   Notification Date/Time:  12/11/24 0217  Notification Interaction:  vocera  Purpose of Notification:  Lactic acid down to 2.8   Orders Received:  No change to plan.  Comments:

## 2024-12-11 NOTE — PHARMACY-ADMISSION MEDICATION HISTORY
Pharmacist Admission Medication History    Admission medication history is complete. The information provided in this note is only as accurate as the sources available at the time of the update.    Information Source(s): Patient and Family member via in-person    Pertinent Information:     Changes made to PTA medication list:  Added: Re-entered Losartan and Spironolactone   Deleted: None  Changed: None    Allergies reviewed with patient and updates made in EHR: no    Medication History Completed By: Juanito King [facility] 12/10/2024 6:14 PM    PTA Med List   Medication Sig Last Dose/Taking    ammonium lactate (AMLACTIN) 12 % external cream Apply topically daily as needed. Taking As Needed    buPROPion (WELLBUTRIN XL) 300 MG 24 hr tablet Take 300 mg by mouth every morning 12/10/2024 at  1:00 PM    cholecalciferol 25 MCG (1000 UT) TABS Take 1 tablet by mouth daily 12/10/2024 at  1:00 PM    fluticasone (FLONASE) 50 MCG/ACT nasal spray Spray 2 sprays into both nostrils daily 12/10/2024 at  1:00 PM    folic acid (FOLVITE) 1 MG tablet Take 1 mg by mouth daily 12/10/2024 at  1:00 PM    ketoconazole (NIZORAL) 2 % external cream Apply topically daily as needed for irritation. Taking As Needed    losartan (COZAAR) 25 MG tablet Take 25 mg by mouth daily. 12/10/2024 at  1:00 PM    metoprolol succinate ER (TOPROL XL) 25 MG 24 hr tablet Take 1 tablet by mouth daily at 2 pm 12/10/2024 at  1:00 PM    multivitamin w/minerals (THERA-VIT-M) tablet Take 1 tablet by mouth daily 12/10/2024 at  1:00 PM    omeprazole (PRILOSEC) 40 MG DR capsule Take 40 mg by mouth daily. 12/10/2024    ondansetron (ZOFRAN ODT) 4 MG ODT tab Take 1 tablet (4 mg) by mouth every 8 hours as needed for nausea. Taking As Needed    prochlorperazine (COMPAZINE) 10 MG tablet Take 1 tablet (10 mg) by mouth every 6 hours as needed for nausea or vomiting. Taking As Needed    spironolactone (ALDACTONE) 50 MG tablet Take 50 mg by mouth daily. 12/10/2024 at  1:00 PM     torsemide (DEMADEX) 10 MG tablet Take 10 mg by mouth daily. 1/2 x 20 mg tab 12/10/2024 at  1:00 PM
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
## 2024-12-11 NOTE — PROGRESS NOTES
"   12/11/24 1533   Appointment Info   Signing Clinician's Name / Credentials (PT) Iam Castillo, PT, DPT       Present no   Living Environment   People in Home spouse   Current Living Arrangements house  (townhouse)   Home Accessibility stairs to enter home   Number of Stairs, Main Entrance 3   Stair Railings, Main Entrance railings safe and in good condition;railings on both sides of stairs   Transportation Anticipated car, drives self;family or friend will provide   Living Environment Comments Pt lives in single level [facility] with his spouse. Pt and spouse both drive at baseline.   Self-Care   Usual Activity Tolerance good   Current Activity Tolerance moderate   Regular Exercise Yes   Activity/Exercise Type other (see comments)  (pt and spouse exercise in their home in home gym)   Equipment Currently Used at Home cane, straight;walker, standard   Fall history within last six months yes   Number of times patient has fallen within last six months 1   Activity/Exercise/Self-Care Comment Pt reporting mod I at baseline with intermittent use of SEC. Owns FWW, but does not typically use it. IND with ADLs. Spouse reporting that she works outside of the home ~15 hours per week.   General Information   Onset of Illness/Injury or Date of Surgery 12/10/24   Referring Physician Jorgito Prater PA-C   Patient/Family Therapy Goals Statement (PT) improve strength   Pertinent History of Current Problem (include personal factors and/or comorbidities that impact the POC) Per chart review, \"Patient is a 79-year-old male with past medical history significant for hepatocellular carcinoma, alcohol induced liver cirrhosis, esophageal varices, portal hypertension, essential hypertension, paroxysmal atrial fibrillation, chronic normocytic anemia, chronic thrombocytopenia, MDD with anxiety, insomnia, moderate aortic stenosis, psoriasis, GERD, history of compression fractures of L1 and L4 and history of " "alcohol use disorder in remission who was requested to be admitted as a direct admit from [facility] in [place] due to the concern for decompensating liver failure and possible SBP.\"   Existing Precautions/Restrictions fall   Cognition   Affect/Mental Status (Cognition) WFL;low arousal/lethargic   Orientation Status (Cognition) oriented x 4   Follows Commands (Cognition) WFL   Integumentary/Edema   Integumentary/Edema no deficits were identifed   Posture    Posture Forward head position;Protracted shoulders   Range of Motion (ROM)   Range of Motion ROM is WFL   Strength (Manual Muscle Testing)   Strength (Manual Muscle Testing) Deficits observed during functional mobility   Strength Comments mild functional strength deficits   Bed Mobility   Comment, (Bed Mobility) SBA supine<>sit   Transfers   Comment, (Transfers) CGA sit>stand w/ SEC   Gait/Stairs (Locomotion)   Distance in Feet (Gait) 10' eval   Comment, (Gait/Stairs) pt ambulated 10' w/ SEC and CGA for eval   Balance   Balance Comments mild impaired dynamic balance   Sensory Examination   Sensory Perception patient reports no sensory changes   Clinical Impression   Criteria for Skilled Therapeutic Intervention Yes, treatment indicated   PT Diagnosis (PT) Impaired functional mobility   Influenced by the following impairments impaired strength, balance, activity tolerance   Functional limitations due to impairments limited IND with mobility   Clinical Presentation (PT Evaluation Complexity) stable   Clinical Presentation Rationale clinical judgement   Clinical Decision Making (Complexity) low complexity   Planned Therapy Interventions (PT) balance training;bed mobility training;gait training;home exercise program;neuromuscular re-education;patient/family education;ROM (range of motion);stair training;strengthening;transfer training;progressive activity/exercise;risk factor education;home program guidelines   Risk & Benefits of therapy have been explained " evaluation/treatment results reviewed;care plan/treatment goals reviewed;risks/benefits reviewed;current/potential barriers reviewed;participants voiced agreement with care plan;participants included;patient   PT Total Evaluation Time   PT Eval, Low Complexity Minutes (00369) 8   Physical Therapy Goals   PT Frequency Daily   PT Predicted Duration/Target Date for Goal Attainment 12/18/24   PT Goals Bed Mobility;Transfers;Gait;Stairs   PT: Bed Mobility Independent;Supine to/from sit   PT: Transfers Modified independent;Sit to/from stand;Bed to/from chair;Assistive device   PT: Gait Modified independent;Assistive device;Greater than 200 feet   PT: Stairs Modified independent;3 stairs;Rail on both sides   Interventions   Interventions Quick Adds Gait Training;Therapeutic Activity   Therapeutic Activity   Therapeutic Activities: dynamic activities to improve functional performance Minutes (09307) 15   Symptoms Noted During/After Treatment Fatigue;Shortness of breath   Treatment Detail/Skilled Intervention Greeted pt upon arrival to room. Pt agreeable to working with PT. Supine>sit w/ SBA. Cueing and encouragement throughout for patient to complete as much of the transition under their own power as they can. Sit>stand from edge of bed w/ SEC and CGA. Cueing for safe and efficient sit<>stand procedure including scooting to the front edge of the chair, to lean forward with nose over toes, and hand and foot placement. Following ambulation, pt performed repeat sit<>stands x10 from recliner w/ FWW and SBA throughout. Cueing for use of upper extremities during concentric portion and to cross upper extremities across patient's chest during eccentric portion. Stand pivot transfer recliner>bed w/ SBA and no AD. Sit>supine IND. Pt left with all needs met and call light within reach. Time spent discussing discharge recommendations, pt and spouse both agreeable to discharge w/ HHPT.   Gait Training   Gait Training Minutes (01807) 14    Symptoms Noted During/After Treatment (Gait Training) fatigue;shortness of breath   Treatment Detail/Skilled Intervention Pt ambulated 125' w/ SEC and CGA throughout, progressing to mostly SBA. Pt demonstrating mild lateral path deviations while ambulating, able to self correct each time without physical assist. Cueing to move in a slow and controlled manner with good adherence. Pt ambulated 125' w/ FWW and SBA throughout. Pt demonstrating improved gait speed and upright posture with decreased lateral path deviations. Pt noting feeling more stable with use of FWW at this time.   Distance in Feet 125' x2   PT Discharge Planning   PT Plan general LE strengthening, ambulate w/ FWW vs SEC, stairs as able (3 TYLER), balance progressions   PT Discharge Recommendation (DC Rec) home with assist;home with home care physical therapy   PT Rationale for DC Rec Patient appears to be below baseline mobility levels at this time, but mobilizing safely with use of FWW. Patient lives with supportive spouse at home. Currently, patient appears most stable with use of FWW for transfers and ambulation. Pt already owns FWW and a cane at home. Recommend discharge with HHPT to continue increasing safety and independence with functional mobility to return to PLOF. Pt and spouse reporting that they have a home gym and would appreciate HHPT to assist in designing a home exercise program involving their exercise equipment.   PT Brief overview of current status SBA w/ FWW: Goals of therapy will be to address safe mobility and make recs for d/c to next level of care. Pt and RN will continue to follow all falls risk precautions as documented by RN staff while hospitalized   PT Equipment Needed at Discharge walker, rolling  (pt owns FWW)   Physical Therapy Time and Intention   Timed Code Treatment Minutes 29   Total Session Time (sum of timed and untimed services) 37
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
## 2024-12-11 NOTE — PLAN OF CARE
Goal Outcome Evaluation:  Orientation: A/O x4   Aggression Stop Light: Green  Activity: SBA GBW   Diet/BS Checks: NPO  Tele: Afib  IV Access/Drains: R PIV SL  Pain Management: denies  Abnormal VS/Results: VSS but htn and increased HR sustaining in 130, given PRN metoprolol, now in the one-teens   Bowel/Bladder: cont b/b  Skin/Wounds: scattered bruising and scabs  Consults: Cards, SW, Hem/Onc, ot/pt  D/C Disposition: pending   Other Info:   -RRT called for rigors and SOB. Pt saw improvement in symptoms once HOB was elevated and not lying on side. Pt had low grade fever and was given a fan which helped with improvement. Lactic acid came back at 4.0.  Per NP no further treatment at that time, pt already on abx continue to monitor.   -Pts 0000 lactic acid came back at 2.8, no further treatment at this time.  -mag 1.7, replaced, recheck in a.m.

## 2024-12-11 NOTE — PROVIDER NOTIFICATION
MD Notification    Notified Person: MD    Notified Person Name: Dr Rasmussen     Notification Date/Time: 12/11/2024  1212    Notification Interaction: Vocera    Purpose of Notification: pt developed rash on chest d/t allergic reaction to the regular tele patches, can you please order a cream to help alleviate    Orders Received: Hydrocortisone cream ordered     Comments:

## 2024-12-11 NOTE — PROVIDER NOTIFICATION
MD Notification    Notified Person: MD    Notified Person Name:  Pedro Wong  Notification Date/Time:  12/10/24 2201  Notification Interaction:  Vocera   Purpose of Notification:  Lactic acid came back at 4.0, should we call another RRT even though you just saw the patient?   Orders Received:  No, will not treat lactic acid. Recheck in 2hr.   Comments:

## 2024-12-11 NOTE — PLAN OF CARE
Goal Outcome Evaluation:      Plan of Care Reviewed With: patient, spouse          Outcome Evaluation: discharge to home with spouse and possible need for homecare pending final recommenations of provider(s)

## 2024-12-11 NOTE — CONSULTS
[facility] Consultation      Oskar Wilks MRN# [id number]   YOB: 1945 Age: 79 year old   Date of Admission: 12/10/2024  Requesting physician: Abdias Vela MD  Reason for consult: Hepatocellular carcinoma.           Assessment and Plan:   79-year-old male with unresectable hepatocellular carcinoma, alcohol induced cirrhosis, esophageal varices, portal hypertension, admitted for new ascites and hyperbilirubinemia.    1.  Unresectable hepatocellular carcinoma  - Status post cycle 1 atezolizumab and bevacizumab given on 11/25/2024.  - Treatment on hold while hospitalized.  - Follow-up with his primary oncologist Dr. Saad Ellis after discharge.    2.  New ascites  3.  Hyperbilirubinemia  - Bilirubin has increased from 3.5-7.1 between 11/25 and 12/10.  ALT and AST elevated at 102 and 124 respectively.  - 12/11 abdominal ultrasound pending.  Paracentesis performed today.    4.  Worsening bilateral lower extremity edema  - Has chronic bilateral lower extremity edema, recently worsened.  - 12/11 bilateral lower extremity venous duplex ultrasound results pending.    5.  Alcohol-induced cirrhosis  6.  Esophageal varices  7.  Portal hypertension  - Management as per hepatology and hospitalist.    8.  Chronic anemia  - Hemoglobin stable at 9.9.  - Follow CBC daily and transfuse for hemoglobin less than 7 g/dL.    9.  Thrombocytopenia  - Likely related to cirrhosis.  Platelets have decreased from 79,000-58,000 between 11/25 and 12/10, likely related to acute illness and underlying cirrhosis.  -Continue to monitor CBC daily.  - PCD's for DVT prophylaxis.    Thank you for the consult.  Will follow with you.    Yessenia Becerril MD  [facility] Hematology/Oncology     Total time spent today: 80 minutes in chart review, patient evaluation, counseling, documentation, test and/or medication/prescription orders, and coordination of care.              Chief Complaint:   Cough, increased  ascites.         History of Present Illness:   This patient is a 79 year old male with unresectable hepatocellular carcinoma found to have a 4.6 x 2.7 cm lesion in left hepatic lobe on abdominal MRI 1/10/2023 with liver cirrhosis, 2023 liver biopsy confirming moderately differentiated hepatocellular carcinoma.  He was evaluated at [facility] and deemed not a transplant candidate.  On 3/16/2023, he underwent Y-90 embolization of left hepatic lobe lesion with repeat Y-90 embolization on 2024.  On 2024, MRI liver showed progression.  He was not a candidate for further liver directed therapy.  10/31/2024 CT chest/abdomen/pelvis showed liver lesions not well assessed.  On 2024, he received cycle 1 atezolizumab and bevacizumab.    On 12/10/2024, he was seen in the oncology clinic with new cough and fever at home.  Denied abdominal pain, chest pain, shortness of breath.  His labs showed hemoglobin 9.9, WBC 7.1, platelets decreased to 58,000, sodium low at 133, calcium low at 8.5, creatinine 0.83, albumin 3.3, alkaline phosphatase normal at 106, , , total bilirubin increased to 7.1.  Lactic acid elevated at 2.3.    He was then admitted to [facility] on 12/10/2024 with suspected decompensated liver failure, new ascites, painless jaundice, worsening lower extremity edema, fever, acute cough.         Physical Exam:   Vitals were reviewed  Blood pressure 124/73, pulse 109, temperature 98  F (36.7  C), temperature source Oral, resp. rate 16, weight 90.2 kg (198 lb 12.8 oz), SpO2 98%.  Temperatures:  Current - Temp: 98  F (36.7  C); Max - Temp  Av  F (36.7  C)  Min: 97.4  F (36.3  C)  Max: 99.6  F (37.6  C)  Respiration range: Resp  Av.7  Min: 16  Max: 22  Pulse range: Pulse  Av.8  Min: 109  Max: 137  Blood pressure range: Systolic (24hrs), Av , Min:112 , Max:171   ; Diastolic (24hrs), Av, Min:57, Max:86    Pulse oximetry range: SpO2  Av.1 %  Min: 96 %  Max: 99 %  No  intake or output data in the 24 hours ending 24 0818    GENERAL: No acute distress.  SKIN: Jaundiced.  HEENT: Normocephalic, atraumatic. Eyes anicteric. Oropharynx is clear.  LYMPH: No palpable lymphadenopathy in the cervical, supraclavicular regions.  LUNGS: No audible cough or wheezing.  ABDOMEN: Nondistended, nontender with no palpable hepatosplenomegaly.  EXTREMITIES: No clubbing, cyanosis; 1+ pitting bilateral lower extremity edema.  MENTAL: Alert and oriented to person, place, and time.  NEURO: No focal motor deficits.              Past Medical History:   I have reviewed this patient's past medical history  Past Medical History:   Diagnosis Date    Cancer (H)     liver    Cirrhosis of liver (H)     Hip fracture (H)              Past Surgical History:   I have reviewed this patient's past surgical history  Past Surgical History:   Procedure Laterality Date    ESOPHAGOSCOPY, GASTROSCOPY, DUODENOSCOPY (EGD), COMBINED N/A 2024    Procedure: Esophagoscopy, gastroscopy, duodenoscopy (EGD), combined;  Surgeon: Moses Bhatt MD;  Location: [facility]    IR LIVER BIOPSY PERCUTANEOUS  2023    IR PARACENTESIS  2022    IR SIRT (SELECTIVE INTERNAL RADIO THERAPY)  2023    IR SIRT (SELECTIVE INTERNAL RADIO THERAPY)  2024    IR VISCERAL ANGIOGRAM  3/14/2023    IR VISCERAL ANGIOGRAM  2024    IR VISCERAL EMBOLIZATION  3/16/2023    IR VISCERAL EMBOLIZATION  2024    ORIF HIP FRACTURE                 Social History:   I have reviewed this patient's social history  Social History     Tobacco Use    Smoking status: Former     Current packs/day: 0.00     Types: Cigarettes     Start date: 1977     Quit date: 1985     Years since quittin.9    Smokeless tobacco: Never   Substance Use Topics    Alcohol use: Not Currently     Comment: sober since 2022             Family History:   I have reviewed this patient's family history  No family history on file.          Allergies:      Allergies   Allergen Reactions    Penicillins      PN: LW Reaction: Itching, Pruritis    Adhesive Tape Rash             Medications:   I have reviewed this patient's current medications  Medications Prior to Admission   Medication Sig Dispense Refill Last Dose/Taking    ammonium lactate (AMLACTIN) 12 % external cream Apply topically daily as needed.   Taking As Needed    buPROPion (WELLBUTRIN XL) 300 MG 24 hr tablet Take 300 mg by mouth every morning   12/10/2024 at  1:00 PM    cholecalciferol 25 MCG (1000 UT) TABS Take 1 tablet by mouth daily   12/10/2024 at  1:00 PM    fluticasone (FLONASE) 50 MCG/ACT nasal spray Spray 2 sprays into both nostrils daily   12/10/2024 at  1:00 PM    folic acid (FOLVITE) 1 MG tablet Take 1 mg by mouth daily   12/10/2024 at  1:00 PM    ketoconazole (NIZORAL) 2 % external cream Apply topically daily as needed for irritation.   Taking As Needed    losartan (COZAAR) 25 MG tablet Take 25 mg by mouth daily.   12/10/2024 at  1:00 PM    metoprolol succinate ER (TOPROL XL) 25 MG 24 hr tablet Take 1 tablet by mouth daily at 2 pm   12/10/2024 at  1:00 PM    multivitamin w/minerals (THERA-VIT-M) tablet Take 1 tablet by mouth daily   12/10/2024 at  1:00 PM    omeprazole (PRILOSEC) 40 MG DR capsule Take 40 mg by mouth daily.   12/10/2024    ondansetron (ZOFRAN ODT) 4 MG ODT tab Take 1 tablet (4 mg) by mouth every 8 hours as needed for nausea. 30 tablet 1 Taking As Needed    prochlorperazine (COMPAZINE) 10 MG tablet Take 1 tablet (10 mg) by mouth every 6 hours as needed for nausea or vomiting. 30 tablet 1 Taking As Needed    spironolactone (ALDACTONE) 50 MG tablet Take 50 mg by mouth daily.   12/10/2024 at  1:00 PM    torsemide (DEMADEX) 10 MG tablet Take 10 mg by mouth daily. 1/2 x 20 mg tab   12/10/2024 at  1:00 PM     Current Facility-Administered Medications   Medication Dose Route Frequency Provider Last Rate Last Admin    bisacodyl (DULCOLAX) suppository 10 mg  10 mg Rectal Daily PRN  Jorgito Prater PA-C        buPROPion (WELLBUTRIN XL) 24 hr tablet 300 mg  300 mg Oral QAM Jorgito Prater PA-C        calcium carbonate (TUMS) chewable tablet 1,000 mg  1,000 mg Oral 4x Daily PRN Jorgito Prater PA-C        cefTRIAXone (ROCEPHIN) 2 g vial to attach to  ml bag for ADULTS or NS 50 ml bag for PEDS  2 g Intravenous Q24H Jorgito Prater PA-C   2 g at 12/10/24 1922    folic acid (FOLVITE) tablet 1 mg  1 mg Oral Daily Jorgito Prater PA-C        HYDROmorphone (DILAUDID) injection 0.2 mg  0.2 mg Intravenous Q2H PRN Jorgito Prater PA-C        HYDROmorphone (DILAUDID) injection 0.4 mg  0.4 mg Intravenous Q2H PRN Jorgito Prater PA-C        lidocaine (LMX4) cream   Topical Q1H PRN Jorgito Prater PA-C        lidocaine 1 % 0.1-1 mL  0.1-1 mL Other Q1H PRN Jorgito Prater PA-C        losartan (COZAAR) tablet 25 mg  25 mg Oral Daily Jorgito Prater PA-C        melatonin tablet 1 mg  1 mg Oral At Bedtime PRN Jorgito Prater PA-C        metoprolol (LOPRESSOR) injection 2.5 mg  2.5 mg Intravenous Q4H PRN Jorgito Prater PA-C   2.5 mg at 12/10/24 1918    metoprolol succinate ER (TOPROL XL) 24 hr tablet 25 mg  25 mg Oral Daily Jorgito Prater PA-C        naloxone (NARCAN) injection 0.2 mg  0.2 mg Intravenous Q2 Min PRN Juanito King RPH        Or    naloxone (NARCAN) injection 0.4 mg  0.4 mg Intravenous Q2 Min PRN Juanito King RPH        Or    naloxone (NARCAN) injection 0.2 mg  0.2 mg Intramuscular Q2 Min PRN Juanito King RPH        Or    naloxone (NARCAN) injection 0.4 mg  0.4 mg Intramuscular Q2 Min PRN Juanito King RPH        omeprazole (PriLOSEC) CR capsule 40 mg  40 mg Oral Daily Jorgito Prater PA-C        ondansetron (ZOFRAN ODT) ODT tab 4 mg  4 mg Oral Q6H PRN Jorgito Prater PA-C        Or    ondansetron (ZOFRAN) injection 4 mg  4 mg  Intravenous Q6H PRN Jorgito Prater PA-C        oxyCODONE (ROXICODONE) tablet 5 mg  5 mg Oral Q4H PRN Jorgito Prater PA-C        oxyCODONE IR (ROXICODONE) half-tab 2.5 mg  2.5 mg Oral Q4H PRN Jorgito Prater PA-C        polyethylene glycol (MIRALAX) Packet 17 g  17 g Oral BID PRN Jorgito Prater PA-C        senna-docusate (SENOKOT-S/PERICOLACE) 8.6-50 MG per tablet 1 tablet  1 tablet Oral BID PRN Jorgito Prater PA-C        Or    senna-docusate (SENOKOT-S/PERICOLACE) 8.6-50 MG per tablet 2 tablet  2 tablet Oral BID PRN Jorgito Prater PA-C        sodium chloride (PF) 0.9% PF flush 3 mL  3 mL Intracatheter Q8H Jorgito Prater PA-C   3 mL at 12/11/24 0345    sodium chloride (PF) 0.9% PF flush 3 mL  3 mL Intracatheter q1 min prn Jorgito Prater PA-C        spironolactone (ALDACTONE) tablet 50 mg  50 mg Oral Daily Jorgito Prater PA-C        torsemide (DEMADEX) tablet 10 mg  10 mg Oral Daily Jorgito Prater PA-C                 Review of Systems:     The 14 point Review of Systems is negative other than noted in the HPI.            Data:   All laboratory data reviewed  Results for orders placed or performed during the hospital encounter of 12/10/24 (from the past 24 hours)   Lactic Acid Whole Blood w/ 1x repeat in 2 hrs when >2   Result Value Ref Range    Lactic Acid, Initial 2.3 (H) 0.7 - 2.0 mmol/L   EKG 12-lead, tracing only   Result Value Ref Range    Systolic Blood Pressure  mmHg    Diastolic Blood Pressure  mmHg    Ventricular Rate 115 BPM    Atrial Rate 340 BPM    AZ Interval  ms    QRS Duration 94 ms     ms    QTc 462 ms    P Axis  degrees    R AXIS 113 degrees    T Axis -11 degrees    Interpretation ECG       Atrial flutter with variable A-V block  Left posterior fascicular block  Inferior infarct , age undetermined  Abnormal ECG  When compared with ECG of 26-Aug-2024 06:53,  Significant changes have  occurred     Glucose by meter   Result Value Ref Range    GLUCOSE BY METER POCT 105 (H) 70 - 99 mg/dL   Lactic acid whole blood   Result Value Ref Range    Lactic Acid 4.0 (HH) 0.7 - 2.0 mmol/L   Magnesium   Result Value Ref Range    Magnesium 1.7 1.7 - 2.3 mg/dL   INR   Result Value Ref Range    INR 2.29 (H) 0.85 - 1.15   Lactic acid whole blood   Result Value Ref Range    Lactic Acid 2.8 (H) 0.7 - 2.0 mmol/L   Blood gas venous   Result Value Ref Range    pH Venous 7.42 7.32 - 7.43    pCO2 Venous 39 (L) 40 - 50 mm Hg    pO2 Venous 31 25 - 47 mm Hg    Bicarbonate Venous 25 21 - 28 mmol/L    Base Excess/Deficit Venous 0.6 -3.0 - 3.0 mmol/L    FIO2 0     Oxyhemoglobin Venous 52 (L) 70 - 75 %    O2 Sat, Venous 54.5 (L) 70.0 - 75.0 %    Narrative    In healthy individuals, oxyhemoglobin (O2Hb) and oxygen saturation (SO2) are approximately equal. In the presence of dyshemoglobins, oxyhemoglobin can be considerably lower than oxygen saturation.
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
## 2024-12-12 ENCOUNTER — APPOINTMENT (OUTPATIENT)
Dept: PHYSICAL THERAPY | Facility: CLINIC | Age: 79
DRG: 441 | End: 2024-12-12
Attending: INTERNAL MEDICINE
Payer: MEDICARE

## 2024-12-12 ENCOUNTER — ORDERS ONLY (AUTO-RELEASED) (OUTPATIENT)
Dept: ONCOLOGY | Facility: CLINIC | Age: 79
End: 2024-12-12

## 2024-12-12 ENCOUNTER — APPOINTMENT (OUTPATIENT)
Dept: CARDIOLOGY | Facility: CLINIC | Age: 79
DRG: 441 | End: 2024-12-12
Attending: PHYSICIAN ASSISTANT
Payer: MEDICARE

## 2024-12-12 VITALS
WEIGHT: 195.5 LBS | DIASTOLIC BLOOD PRESSURE: 63 MMHG | OXYGEN SATURATION: 95 % | TEMPERATURE: 99.1 F | RESPIRATION RATE: 18 BRPM | SYSTOLIC BLOOD PRESSURE: 129 MMHG | BODY MASS INDEX: 26.51 KG/M2 | HEART RATE: 95 BPM

## 2024-12-12 DIAGNOSIS — C22.0 HCC (HEPATOCELLULAR CARCINOMA) (H): ICD-10-CM

## 2024-12-12 LAB
% LINING CELLS, BODY FLUID: 2 %
ABSOLUTE NEUTROPHILS, BODY FLUID: 255.6 /UL
ALBUMIN SERPL BCG-MCNC: 3 G/DL (ref 3.5–5.2)
ALP SERPL-CCNC: 97 U/L (ref 40–150)
ALT SERPL W P-5'-P-CCNC: 96 U/L (ref 0–70)
ANION GAP SERPL CALCULATED.3IONS-SCNC: 9 MMOL/L (ref 7–15)
AST SERPL W P-5'-P-CCNC: 81 U/L (ref 0–45)
ATRIAL RATE - MUSE: 340 BPM
ATRIAL RATE - MUSE: 79 BPM
BACTERIA BLD CULT: NORMAL
BACTERIA FLD CULT: NORMAL
BILIRUB SERPL-MCNC: 6.3 MG/DL
BUN SERPL-MCNC: 22.3 MG/DL (ref 8–23)
CALCIUM SERPL-MCNC: 8.3 MG/DL (ref 8.8–10.4)
CHLORIDE SERPL-SCNC: 100 MMOL/L (ref 98–107)
CREAT SERPL-MCNC: 0.85 MG/DL (ref 0.67–1.17)
DIASTOLIC BLOOD PRESSURE - MUSE: NORMAL MMHG
DIASTOLIC BLOOD PRESSURE - MUSE: NORMAL MMHG
EGFRCR SERPLBLD CKD-EPI 2021: 89 ML/MIN/1.73M2
ERYTHROCYTE [DISTWIDTH] IN BLOOD BY AUTOMATED COUNT: 20.7 % (ref 10–15)
GLUCOSE SERPL-MCNC: 136 MG/DL (ref 70–99)
GRAM STAIN RESULT: NORMAL
GRAM STAIN RESULT: NORMAL
HCO3 SERPL-SCNC: 24 MMOL/L (ref 22–29)
HCT VFR BLD AUTO: 28.3 % (ref 40–53)
HGB BLD-MCNC: 9.4 G/DL (ref 13.3–17.7)
INR PPP: 2.23 (ref 0.85–1.15)
INTERPRETATION ECG - MUSE: NORMAL
INTERPRETATION ECG - MUSE: NORMAL
LVEF ECHO: NORMAL
LYMPHOCYTES NFR FLD MANUAL: 24 %
MAGNESIUM SERPL-MCNC: 1.8 MG/DL (ref 1.7–2.3)
MAGNESIUM SERPL-MCNC: 2 MG/DL (ref 1.7–2.3)
MCH RBC QN AUTO: 30.2 PG (ref 26.5–33)
MCHC RBC AUTO-ENTMCNC: 33.2 G/DL (ref 31.5–36.5)
MCV RBC AUTO: 91 FL (ref 78–100)
MONOS+MACROS NFR FLD MANUAL: 14 %
NEUTS BAND NFR FLD MANUAL: 60 %
P AXIS - MUSE: NORMAL DEGREES
P AXIS - MUSE: NORMAL DEGREES
PATH REV: ABNORMAL
PLATELET # BLD AUTO: 55 10E3/UL (ref 150–450)
POTASSIUM SERPL-SCNC: 4.2 MMOL/L (ref 3.4–5.3)
PR INTERVAL - MUSE: NORMAL MS
PR INTERVAL - MUSE: NORMAL MS
PROT SERPL-MCNC: 5.2 G/DL (ref 6.4–8.3)
QRS DURATION - MUSE: 100 MS
QRS DURATION - MUSE: 94 MS
QT - MUSE: 334 MS
QT - MUSE: 366 MS
QTC - MUSE: 462 MS
QTC - MUSE: 464 MS
R AXIS - MUSE: 113 DEGREES
R AXIS - MUSE: 18 DEGREES
RBC # BLD AUTO: 3.11 10E6/UL (ref 4.4–5.9)
SODIUM SERPL-SCNC: 133 MMOL/L (ref 135–145)
SYSTOLIC BLOOD PRESSURE - MUSE: NORMAL MMHG
SYSTOLIC BLOOD PRESSURE - MUSE: NORMAL MMHG
T AXIS - MUSE: -11 DEGREES
T AXIS - MUSE: -12 DEGREES
VENTRICULAR RATE- MUSE: 115 BPM
VENTRICULAR RATE- MUSE: 97 BPM
WBC # BLD AUTO: 6.9 10E3/UL (ref 4–11)

## 2024-12-12 PROCEDURE — 250N000011 HC RX IP 250 OP 636: Performed by: PHYSICIAN ASSISTANT

## 2024-12-12 PROCEDURE — 999N000208 ECHOCARDIOGRAM COMPLETE

## 2024-12-12 PROCEDURE — 120N000001 HC R&B MED SURG/OB

## 2024-12-12 PROCEDURE — 82310 ASSAY OF CALCIUM: CPT | Performed by: PHYSICIAN ASSISTANT

## 2024-12-12 PROCEDURE — 250N000013 HC RX MED GY IP 250 OP 250 PS 637: Performed by: PHYSICIAN ASSISTANT

## 2024-12-12 PROCEDURE — 97116 GAIT TRAINING THERAPY: CPT | Mod: GP

## 2024-12-12 PROCEDURE — 85610 PROTHROMBIN TIME: CPT | Performed by: PHYSICIAN ASSISTANT

## 2024-12-12 PROCEDURE — 93306 TTE W/DOPPLER COMPLETE: CPT | Mod: 26 | Performed by: INTERNAL MEDICINE

## 2024-12-12 PROCEDURE — 99231 SBSQ HOSP IP/OBS SF/LOW 25: CPT | Mod: 25 | Performed by: INTERNAL MEDICINE

## 2024-12-12 PROCEDURE — 93010 ELECTROCARDIOGRAM REPORT: CPT | Performed by: INTERNAL MEDICINE

## 2024-12-12 PROCEDURE — 36415 COLL VENOUS BLD VENIPUNCTURE: CPT | Performed by: PHYSICIAN ASSISTANT

## 2024-12-12 PROCEDURE — 85027 COMPLETE CBC AUTOMATED: CPT | Performed by: PHYSICIAN ASSISTANT

## 2024-12-12 PROCEDURE — 36415 COLL VENOUS BLD VENIPUNCTURE: CPT | Performed by: INTERNAL MEDICINE

## 2024-12-12 PROCEDURE — 93005 ELECTROCARDIOGRAM TRACING: CPT

## 2024-12-12 PROCEDURE — 250N000013 HC RX MED GY IP 250 OP 250 PS 637: Performed by: INTERNAL MEDICINE

## 2024-12-12 PROCEDURE — 99233 SBSQ HOSP IP/OBS HIGH 50: CPT | Performed by: INTERNAL MEDICINE

## 2024-12-12 PROCEDURE — 99232 SBSQ HOSP IP/OBS MODERATE 35: CPT

## 2024-12-12 PROCEDURE — 83735 ASSAY OF MAGNESIUM: CPT | Performed by: INTERNAL MEDICINE

## 2024-12-12 PROCEDURE — 999N000111 HC STATISTIC OT IP EVAL DEFER

## 2024-12-12 PROCEDURE — 99207 PR NO BILLABLE SERVICE THIS VISIT: CPT | Performed by: INTERNAL MEDICINE

## 2024-12-12 PROCEDURE — 255N000002 HC RX 255 OP 636: Performed by: PHYSICIAN ASSISTANT

## 2024-12-12 PROCEDURE — 99232 SBSQ HOSP IP/OBS MODERATE 35: CPT | Performed by: PHYSICIAN ASSISTANT

## 2024-12-12 RX ADMIN — HYDROCORTISONE: 0.5 CREAM TOPICAL at 08:29

## 2024-12-12 RX ADMIN — BUPROPION HYDROCHLORIDE 300 MG: 300 TABLET, EXTENDED RELEASE ORAL at 08:25

## 2024-12-12 RX ADMIN — METOPROLOL SUCCINATE 25 MG: 25 TABLET, EXTENDED RELEASE ORAL at 20:39

## 2024-12-12 RX ADMIN — FOLIC ACID 1 MG: 1 TABLET ORAL at 08:25

## 2024-12-12 RX ADMIN — CEFTRIAXONE SODIUM 2 G: 2 INJECTION, POWDER, FOR SOLUTION INTRAMUSCULAR; INTRAVENOUS at 18:03

## 2024-12-12 RX ADMIN — HYDROCORTISONE: 0.5 CREAM TOPICAL at 20:39

## 2024-12-12 RX ADMIN — TORSEMIDE 10 MG: 5 TABLET ORAL at 08:25

## 2024-12-12 RX ADMIN — Medication 1 MG: at 20:39

## 2024-12-12 RX ADMIN — OMEPRAZOLE 40 MG: 20 CAPSULE, DELAYED RELEASE ORAL at 08:25

## 2024-12-12 RX ADMIN — SPIRONOLACTONE 50 MG: 25 TABLET ORAL at 08:24

## 2024-12-12 RX ADMIN — PERFLUTREN 10 ML: 6.52 INJECTION, SUSPENSION INTRAVENOUS at 08:57

## 2024-12-12 RX ADMIN — METOPROLOL SUCCINATE 25 MG: 25 TABLET, EXTENDED RELEASE ORAL at 08:25

## 2024-12-12 ASSESSMENT — ACTIVITIES OF DAILY LIVING (ADL)
ADLS_ACUITY_SCORE: 42

## 2024-12-12 NOTE — PROGRESS NOTES
[facility] [place]     Patient is anticipated to discharge 12/13/24.  Patient agrees to have [facility] provide SN PT services. Patient will receive a phone call from [facility] to schedule an initial home care visit post discharge from the hospital. Anticipated start of care date is [within 24-48 hours of discharge].     Please note: SOC date is based on availability of the day referral was received. If patients discharge date changes, please reach out to Clinical Liaison or the HUB to ensure SOC date is still appropriate for a safe discharge plan.     Thank you for the referral.     MARCAIL Page, LPN  [facility] Liaison   Cell: [phone]

## 2024-12-12 NOTE — PLAN OF CARE
Goal Outcome Evaluation:  Orientation: A/O x4; forgetfulness  Aggression Stop Light: Green  Activity: SBA GBW   Diet/BS Checks: Reg   Tele: NSR   IV Access/Drains: R PIV SL  Pain Management: denies  Abnormal VS/Results: VSS RA  Bowel/Bladder: cont b/b  Skin/Wounds: scattered bruising and scabs  Consults: Cards, SW, Hem/Onc, ot/pt  D/C Disposition: pending   Other Info:   -given PRN melatonin

## 2024-12-12 NOTE — PROGRESS NOTES
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 GI consulted for \"Worsening liver failure with ascites, treated for SBP, H/O HCC, follows Dr. Neves\"     # Elevated LFTs   # Decompensated alcohol related liver cirrhosis   # SBP, Ascites  # Jaundice, hyperbilirubinemia   # hx of esophageal varices  # Portal hypertension  Patient follows in hepatology clinic with Dr. Laura Neves, last seen 10/29/24. He has history of heavy alcohol drinking, but now is sober. He has hx of ascites requiring paracentesis in 2022 with 4.7L removed. Also has history of SBP in 9/2022, is on cipro for SBP ppx PTA (though is not listed in med rec). Hx of small EV on EGD in 2021. Last EGD 4/2024 for anemia showed PHG.      Labs with Cr 0.83, LFTs with normal alk phos 106, >119, >140, T bili 7.1 > 8.3 (3.5 on 11/25), direct bilirubin 3.37, INR 2.29, lactic acid 2.3 >4.0 >2.8, WBC 7.1. UA without e/o infection. CXR with no pleural effusion. Complete US abd 12/11 with e/o underling liver cirrhosis, splenomegaly and ascites c/w portal hypertension, no liver lesions, no bile duct dilation, sludge and small stones in gallbladder without evidence of acute cholecystitis. Bcx 12/11 NGTD.   Started on ceftriaxone 2g for suspected SBP on 12/10. Paracentesis 12/11 with 40ml clear fluid removed, cell count with 255.6 PMNs consistent with SBP, cultures pending, SAAG 2.6 (portal hypertension likely cause of ascites). PTA torsemide 10mg and spironolactone 50mg daily " continued.    Decompensation likely due to SBP. If LFTs do not improve in next 48 hours will obtain US complete with dopplers. Also would consider etiology of check point inhibitor hepatitis if LFTs do not improve with treatment of SBP.      MELD 3.0: 24 at 12/10/2024 11:53 PM  MELD-Na: 25 at 12/10/2024 11:53 PM  Calculated from:  Serum Creatinine: 0.83 mg/dL (Using min of 1 mg/dL) at 12/10/2024 10:22 AM  Serum Sodium: 133 mmol/L at 12/10/2024 10:22 AM  Total Bilirubin: 7.1 mg/dL at 12/10/2024 10:22 AM  Serum Albumin: 3.3 g/dL at 12/10/2024 10:22 AM  INR(ratio): 2.29 at 12/10/2024 11:53 PM  Age at listing (hypothetical): 79 years  Sex: Male at 12/10/2024 11:53 PM     - Await labs for today. If LFTs do not improve in next 48 hours will obtain US complete with dopplers to evaluate for thrombosis   - Continue ceftriaxone 2g daily with plan to transition to oral cipro BID for total of 7 days. He will then need daily cipro for SBP ppx   - Follow blood cultures   - 2g sodium diet   - Monitor MELD labs daily  - Diuretics per primary team        # Multifocal HCC   Found to have 4.6cm liver lesion, s/p liver biopsy 2/23 with HCC moderately differentiated. S/p Y-90 embolization of the mass in 3/2023 with follow up imaging showing increase in size s/p repeat Y-90 embolization in 8/30/24 with repeat MRI showing progression again, started on systemic treatment with atezolizumab and bevacizumab 11/25/24. Follows with Dr. Ellis.      - Appreciate oncology consult       # Chronic anemia  # Chronic thrombocytopenia  Admit labs with hgb 9.9 (BL 9-10), plts 58 (BL ).   Last EGD 4/2024 for anemia showed PHG. Colonoscopy 4/24 showed 3 1-2mm polyps - tubular adenomas, as well as colonic AVMs s/p APC and congested mucosa from portal hypertensive colopathy.      - Monitor labs, transfuse for hgb < 7        Thank you for involving us in this patient's care. Please do not hesitate to contact the GI service with any questions or  concerns.     Pt care plan discussed with Dr. Leventhal, GI staff physician, and Dr. Rasmussen, primary team.    Overall time spent on the date of this encounter preparing to see the patient (including chart review of available notes, clinical status events, imaging and labs); obtaining and/or reviewing separately obtained history; ordering medications, tests or procedures; communicating with other health care professionals; and documenting the above clinical information in the electronic medical record was 45 minutes.    Laura Josue PA-C  GI Service  [facility]  Text Page (Monday-Friday, 8am-4pm)    To page the [facility] GI provider 24 hours a day, please click [facility] and select GASTROENTEROLOGY MEDICINE ADULT / [facility] in the drop-down menu.   _______________________________________________________________      Subjective: Nursing notes and 24hr events reviewed. Patient reports that he is feeling alright. He denies abdominal pain. No fevers, chills. Tolerating diet though reports food is bland.     ROS:   4 pt ROS negative unless noted in subjective.     Medications:  Current Facility-Administered Medications   Medication Dose Route Frequency Provider Last Rate Last Admin    bisacodyl (DULCOLAX) suppository 10 mg  10 mg Rectal Daily PRN Jorgito Prater PA-C        buPROPion (WELLBUTRIN XL) 24 hr tablet 300 mg  300 mg Oral QAM Jorgito Prater PA-C   300 mg at 12/12/24 0825    calcium carbonate (TUMS) chewable tablet 1,000 mg  1,000 mg Oral 4x Daily PRN Jorgito Prater PA-C        cefTRIAXone (ROCEPHIN) 2 g vial to attach to  ml bag for ADULTS or NS 50 ml bag for PEDS  2 g Intravenous Q24H Jorgito Prater PA-C   2 g at 12/11/24 1821    folic acid (FOLVITE) tablet 1 mg  1 mg Oral Daily Jorgito Prater PA-C   1 mg at 12/12/24 0825    hydrocortisone (CORTAID) 0.5 % cream   Topical BID Nicole Rasmussen MD   Given at 12/12/24 0856     HYDROmorphone (DILAUDID) injection 0.2 mg  0.2 mg Intravenous Q2H PRN Jorgito Prater PA-C        HYDROmorphone (DILAUDID) injection 0.4 mg  0.4 mg Intravenous Q2H PRN Jorgito Prater PA-C        lidocaine (LMX4) cream   Topical Q1H PRN Jorgito Prater PA-C        lidocaine 1 % 0.1-1 mL  0.1-1 mL Other Q1H PRN Jorgito Prater PA-C        melatonin tablet 1 mg  1 mg Oral At Bedtime PRN Jorgito Prater PA-C   1 mg at 12/11/24 2037    metoprolol (LOPRESSOR) injection 2.5 mg  2.5 mg Intravenous Q4H PRN Jorgito Prater PA-C   2.5 mg at 12/10/24 1918    metoprolol succinate ER (TOPROL XL) 24 hr tablet 25 mg  25 mg Oral BID Salas Rogers MD   25 mg at 12/12/24 0825    naloxone (NARCAN) injection 0.2 mg  0.2 mg Intravenous Q2 Min PRN KingJuanito avilez RPH        Or    naloxone (NARCAN) injection 0.4 mg  0.4 mg Intravenous Q2 Min PRN Juanito King RPH        Or    naloxone (NARCAN) injection 0.2 mg  0.2 mg Intramuscular Q2 Min PRN Juanito King RPH        Or    naloxone (NARCAN) injection 0.4 mg  0.4 mg Intramuscular Q2 Min PRN KingJuanito avilez RPH        omeprazole (PriLOSEC) CR capsule 40 mg  40 mg Oral Daily Jorgito Prater PA-C   40 mg at 12/12/24 0825    ondansetron (ZOFRAN ODT) ODT tab 4 mg  4 mg Oral Q6H PRN Jorgito Prater PA-C        Or    ondansetron (ZOFRAN) injection 4 mg  4 mg Intravenous Q6H PRN Jorgito Prater PA-C        oxyCODONE (ROXICODONE) tablet 5 mg  5 mg Oral Q4H PRN Jorgito Prater PA-C        oxyCODONE IR (ROXICODONE) half-tab 2.5 mg  2.5 mg Oral Q4H PRN Jorgito Prater PA-C        polyethylene glycol (MIRALAX) Packet 17 g  17 g Oral BID PRN Jorgito Prater PA-C        senna-docusate (SENOKOT-S/PERICOLACE) 8.6-50 MG per tablet 1 tablet  1 tablet Oral BID PRN Jorgito Prater PA-C        Or    senna-docusate (SENOKOT-S/PERICOLACE) 8.6-50 MG per tablet 2 tablet  2  tablet Oral BID PRN Jorgito Prater PA-C        sodium chloride (PF) 0.9% PF flush 3 mL  3 mL Intracatheter Q8H Jorgito Prater PA-C   3 mL at 12/12/24 0604    sodium chloride (PF) 0.9% PF flush 3 mL  3 mL Intracatheter q1 min prn Jorgito Prater PA-C        spironolactone (ALDACTONE) tablet 50 mg  50 mg Oral Daily Jorgito Prater PA-C   50 mg at 12/12/24 0824    torsemide (DEMADEX) tablet 10 mg  10 mg Oral Daily Jorgito Prater PA-C   10 mg at 12/12/24 0825       Objective:  Blood pressure 123/62, pulse 91, temperature 98.2  F (36.8  C), temperature source Oral, resp. rate 16, weight 88.7 kg (195 lb 8 oz), SpO2 97%.  Gen: Sitting in chair. Appears comfortable  HEENT: NCAT. Scleral icterus.   CV: Regular rate   Resp: Non-labored breathing  Abd: Soft, non tender, distended, no guarding or rebound  Ext: 2-3+ edema, warm, well perfused    Neuro: grossly normal  Mental status/Psych: A&O. Asks/answers questions appropriately     PROCEDURES:  No GI procedures this admission     LABS:  BMP  Recent Labs   Lab 12/11/24  1125 12/10/24  2120 12/10/24  1022   *  --  133*   POTASSIUM 4.5  --  4.7   CHLORIDE 100  --  100   ALIA 8.5*  --  8.5*   CO2 22  --  23   BUN 19.8  --  16.6   CR 0.77  --  0.83   * 105* 101*     CBC  Recent Labs   Lab 12/11/24  1125 12/10/24  1022   WBC 7.0 7.1   RBC 3.34* 3.31*   HGB 10.1* 9.9*   HCT 29.9* 30.0*   MCV 90 91   MCH 30.2 29.9   MCHC 33.8 33.0   RDW 20.9* 20.9*   PLT 59* 58*     INR  Recent Labs   Lab 12/10/24  2353   INR 2.29*     LFTs  Recent Labs   Lab 12/11/24  1125 12/10/24  1022   ALKPHOS 106 106   * 124*   * 102*   BILITOTAL 8.3* 7.1*   PROTTOTAL 5.5* 5.5*   ALBUMIN 3.2* 3.3*      PANCNo lab results found in last 7 days.  CULTURES:   7-Day Micro Results       Collected Updated Procedure Result Status      12/11/2024 1328 12/12/2024 0331 Blood Culture Arm, Left [[id number]]   Blood from Arm, Left    Preliminary  result Component Value   Culture No growth after 12 hours  [P]                12/11/2024 0920 12/11/2024 1357 Cell count with differential fluid [[id number]]    (Abnormal)   Ascites Fluid from Paracentesis    In process Component Value   No component results            12/11/2024 0920 12/11/2024 0950 Albumin fluid [[id number]]    Ascites Fluid from Paracentesis    Final result Component Value Units   Albumin Fluid Source Abdomen    Albumin fluid 0.7 g/dL            12/11/2024 0920 12/11/2024 0950 Protein fluid [[id number]]    Ascites Fluid from Paracentesis    Final result Component Value Units   Protein Fluid Source Abdomen    Protein Total Fluid 0.9 g/dL            12/11/2024 0920 12/12/2024 0736 Ascites Fluid Aerobic Bacterial Culture Routine With Gram Stain [[id number]]   Ascites Fluid from Peritoneum    Preliminary result Component Value   Culture No growth, less than 1 day  [P]    Gram Stain Result No organisms seen  [P]     2+ WBC seen  [P]                12/11/2024 0920 12/11/2024 1357 Cell Count Body Fluid [[id number]]    (Abnormal)   Ascites Fluid from Paracentesis    Final result Component Value Units   Color Orange    Clarity Cloudy    Cell Count Fluid Source Abdomen    Total Nucleated Cells 426 /uL            12/11/2024 0920 12/11/2024 1048 Differential Body Fluid [[id number]]   (Abnormal)   Ascites Fluid from Paracentesis    Preliminary result Component Value Units   % Neutrophils 60  [P]  %   % Lymphocytes 24  [P]  %   % Monocyte/Macrophages 14  [P]  %   % Lining Cells 2  [P]  %   Absolute Neutrophils, Body Fluid 255.6  [P]  /uL            12/10/2024 1022 12/10/2024 1552 Influenza A/B, RSV and SARS-CoV2 PCR (COVID-19) Nasopharyngeal [[id number]]    Swab from Nasopharyngeal    Final result Component Value   Influenza A PCR Negative   Influenza B PCR Negative   RSV PCR Negative   SARS CoV2 PCR Negative   NEGATIVE: SARS-CoV-2 (COVID-19) RNA not detected, presumed negative.                       IMAGING:    US Abdomen Complete    Narrative  US ABDOMEN COMPLETE 12/11/2024 12:23 PM    CLINICAL HISTORY: Suspected worsening liver failure needing further  imaging of the liver and spleen and assessment for ascites.    TECHNIQUE: Complete abdominal ultrasound.    COMPARISON: 12/11/2024    FINDINGS:    GALLBLADDER: Sludge or small stones in an otherwise normal  gallbladder. No wall thickening, or pericholecystic fluid. Negative  sonographic Squires's sign.    BILE DUCTS: No biliary dilatation. The common duct measures 4 mm.    LIVER: Coarsened echotexture, suggesting underlying fibrosis. Nodular  contour. No focal mass. A few hepatic cysts are present.    RIGHT KIDNEY: Normal size. Normal echogenicity with no hydronephrosis  or mass.    LEFT KIDNEY: Normal size. Normal echogenicity with no hydronephrosis  or mass.    SPLEEN: Enlarged.    PANCREAS: The visualized portions are normal.    AORTA: Normal in caliber.    IVC: Normal where visualized.    Ascites is present.    Impression  IMPRESSION:  1.  Hepatic parenchymal disease. Splenomegaly and ascites suggests  portal hypertension. No liver lesions.  2.  No bile duct dilatation.  3.  Sludge or small stones in the gallbladder. No ultrasound evidence  of acute cholecystitis.    LESTER J FAHRNER, MD      SYSTEM ID:  [id number]

## 2024-12-12 NOTE — CONSULTS
"BRIEF NUTRITION ASSESSMENT      REASON FOR ASSESSMENT:  Oskar Wilks is a 79 year old male seen by Registered Dietitian for Admission Nutrition Risk Screen:  Have you recently lost weight without trying? \"UNSURE\"  Have you been eating poorly because of a decreased appetite? \"NO\"      CURRENT DIET AND INTAKE:  Diet:  2gm Na              Chart reviewed  Visited with pt and wife this afternoon  Pt reports a good appetite - ate 100% of his lunch while we were visiting  Pt has not consumed nutrition supplements at home, but wife has thought about it - \"would be good for the times when he isn't that hungry with his chemo\"  Wife cooks mainly from scratch - \"there is so much Na in processed foods\"  Pt has no sense of smell, so salty and sweet foods are most appetizing  Pt admits he uses the salt shaker - \"he needs to stop doing that when we get home\"  Wife will make fruit smoothies for pt    ANTHROPOMETRICS:  Height: 6'0\"  Weight: (12/12) 88.7 kg / 195 lbs 8 oz  Body mass index is 26.51 kg/m .   Weight Status: Overweight BMI 25-29.9  IBW:  80.9 kg  %IBW: 110%  Weight History:   Wt Readings from Last 10 Encounters:   12/12/24 88.7 kg (195 lb 8 oz)   12/10/24 93.6 kg (206 lb 6.4 oz)   11/25/24 89.4 kg (197 lb 1.5 oz)   11/25/24 89.4 kg (197 lb)   10/29/24 89.3 kg (196 lb 14.4 oz)   10/22/24 89.4 kg (197 lb)   10/04/24 87.1 kg (192 lb)   08/30/24 90.7 kg (200 lb)   08/26/24 89.5 kg (197 lb 4.8 oz)   05/01/24 92.2 kg (203 lb 3.2 oz)         LABS:  Labs noted    MALNUTRITION:  Patient does not meet two of the following criteria necessary for diagnosing malnutrition.     % Weight Loss:  None noted  % Intake:  No decreased intake noted  Subcutaneous Fat Loss:  None observed  Muscle Loss:  Clavicle bone region mild depletion  Fluid Retention:  3+ edema of the lower extremities as well as abdominal distention     NUTRITION INTERVENTION:  Nutrition Diagnosis:  No nutrition diagnosis at this time.    Implementation:  Nutrition " Education:  Discussed following a low Na diet.  Reviewed protein foods and supplements to try at home.  Pt plans to sample Ensure while here.    FOLLOW UP/MONITORING:   Will re-evaluate in 7 - 10 days, or sooner, if re-consulted.

## 2024-12-12 NOTE — PROGRESS NOTES
Cardiology Progress Note          Assessment and Plan:        79-year-old gentleman with a history of alcoholic liver disease/cirrhosis, hypertension and paroxysmal atrial fibrillation for which she has undergone ETHEL guided cardioversion in the past who was admitted to [facility] on 12/10/2024 for decompensating hepatic failure.     Here he has been noted to be in atrial fibrillation with rapid ventricular response.  This was associated with fever and chills.  Labs are remarkable for anemia, thrombocytopenia and a therapeutic INR.     IMPRESSION:     Paroxysmal atrial fibrillation with rapid ventricular response.    Status post ETHEL guided cardioversion in .  Normal left ventricular systolic function on echocardiography dated 8/15/2022.  Moderate aortic stenosis.  Alcoholic cirrhosis with decompensation.    PLAN    -Heart rate has improved with metoprolol and IV digoxin x 1.  -Anticoagulation is contraindicated given his coagulopathy.  -I personally reviewed his echocardiogram and biventricular systolic function appears to be within normal limits.  Moderate aortic stenosis is again noted.  -No further cardiac recommendations at this time.  Will order Zio patch monitor upon discharge.  -Please call with questions.        Interval History:     Feels well.  No palpitations or chest discomfort.               Review of Systems:   As per subjective, otherwise 5 systems reviewed and negative.           Physical Exam:   Blood pressure 123/62, pulse 91, temperature 98.2  F (36.8  C), temperature source Oral, resp. rate 16, weight 88.7 kg (195 lb 8 oz), SpO2 97%.      Vital Sign Ranges  Temperature Temp  Av.3  F (36.8  C)  Min: 97.9  F (36.6  C)  Max: 98.7  F (37.1  C)   Blood pressure Systolic (24hrs), Av , Min:116 , Max:135        Diastolic (24hrs), Av, Min:56, Max:87      Pulse Pulse  Av.6  Min: 88  Max: 124   Respirations Resp  Av.4  Min: 16  Max: 18   Pulse oximetry SpO2  Av.2 %   Min: 97 %  Max: 98 %       No intake or output data in the 24 hours ending 12/12/24 1052    Constitutional: NAD  Skin: Warm and dry  Neck: No JVD  Lungs: CTA  Cardiovascular: Irregular, 2 or 6 systolic ejection murmur at right upper sternal border  Abdomen: Soft, non tender.  Extremities and Back: No LE edema  Neurological: Nonfocal           Medications:     I have reviewed this patient's current medications.              Data:     Labs reviewed.     Tele: Atrial fibrillation        Salas Rogers MD, MASHWINI.
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
## 2024-12-12 NOTE — DISCHARGE SUMMARY
"[facility]  Hospitalist Discharge Summary      Date of Admission:  12/10/2024  Date of Discharge:  {DISCHARGE DATE:130586}  Discharging Provider: Nicole Rasmussen MD  Discharge Service: Hospitalist Service    Discharge Diagnoses   ***    Clinically Significant Risk Factors     # Overweight: Estimated body mass index is 26.51 kg/m  as calculated from the following:    Height as of an earlier encounter on 12/10/24: 1.829 m (6').    Weight as of this encounter: 88.7 kg (195 lb 8 oz).       Follow-ups Needed After Discharge   {Additional follow-up instructions/to-do's for PCP    :***}    Unresulted Labs Ordered in the Past 30 Days of this Admission       Date and Time Order Name Status Description    12/11/2024  1:10 PM Blood Culture Arm, Left Preliminary     12/11/2024  7:41 AM Ascites Fluid Aerobic Bacterial Culture Routine With Gram Stain Preliminary         These results will be followed up by ***    Discharge Disposition   {Discharge to:0487212::\"Discharged to home\"}  Condition at discharge: {CONDITION:977217::\"Stable\"}    Hospital Course   {OPTIONAL -- use to select A&P section   :274663}    Consultations This Hospital Stay   PHYSICAL THERAPY ADULT IP CONSULT  OCCUPATIONAL THERAPY ADULT IP CONSULT  CARE MANAGEMENT / SOCIAL WORK IP CONSULT  HEMATOLOGY & ONCOLOGY IP CONSULT  CARE MANAGEMENT / SOCIAL WORK IP CONSULT  CARDIOLOGY IP CONSULT  GI LUMINAL ADULT IP CONSULT    Code Status   Full Code    Time Spent on this Encounter   {How much time did you spend on discharge?:70085865}       Nicole Rasmussen MD  [place] [address] ONCOLOGY  [address]  [facility] [address]  Phone: [phone]  ______________________________________________________________________    Physical Exam   Vital Signs: Temp: 98.2  F (36.8  C) Temp src: Oral BP: 123/71 Pulse: 85   Resp: 16 SpO2: 98 % O2 Device: None (Room air)    Weight: 195 lbs 8 oz  {Recommend personal SmartPhrase or Notewriter for " exam (OPTIONAL)   :446483}       Primary Care Physician   Viet López    Discharge Orders      Follow-Up with Cardiology REFUGIO      Zio Patch Mail Out       Significant Results and Procedures   {Data for Discharge Summary:098901}    Discharge Medications   Current Discharge Medication List        CONTINUE these medications which have NOT CHANGED    Details   ammonium lactate (AMLACTIN) 12 % external cream Apply topically daily as needed.      buPROPion (WELLBUTRIN XL) 300 MG 24 hr tablet Take 300 mg by mouth every morning      cholecalciferol 25 MCG (1000 UT) TABS Take 1 tablet by mouth daily      fluticasone (FLONASE) 50 MCG/ACT nasal spray Spray 2 sprays into both nostrils daily      folic acid (FOLVITE) 1 MG tablet Take 1 mg by mouth daily      ketoconazole (NIZORAL) 2 % external cream Apply topically daily as needed for irritation.      losartan (COZAAR) 25 MG tablet Take 25 mg by mouth daily.      metoprolol succinate ER (TOPROL XL) 25 MG 24 hr tablet Take 1 tablet by mouth daily at 2 pm      multivitamin w/minerals (THERA-VIT-M) tablet Take 1 tablet by mouth daily      omeprazole (PRILOSEC) 40 MG DR capsule Take 40 mg by mouth daily.      ondansetron (ZOFRAN ODT) 4 MG ODT tab Take 1 tablet (4 mg) by mouth every 8 hours as needed for nausea.  Qty: 30 tablet, Refills: 1    Associated Diagnoses: Chemotherapy-induced nausea      prochlorperazine (COMPAZINE) 10 MG tablet Take 1 tablet (10 mg) by mouth every 6 hours as needed for nausea or vomiting.  Qty: 30 tablet, Refills: 1    Associated Diagnoses: Chemotherapy-induced nausea      spironolactone (ALDACTONE) 50 MG tablet Take 50 mg by mouth daily.      torsemide (DEMADEX) 10 MG tablet Take 10 mg by mouth daily. 1/2 x 20 mg tab           Allergies   Allergies   Allergen Reactions    Penicillins      PN: LW Reaction: Itching, Pruritis    Adhesive Tape Rash      IV ceftriaxone.  Patient follows up with Dr. Jean from hepatology at [facility].  Last seen on 10/29/2024     Patient had similar episode requiring paracentesis in 2022 with 4.7 L of ascites fluid removed and SBP in September 2022.  Patient is on Cipro for SBP prophylaxis, though not listed in med reconciliation.         --Patient was admitted for further evaluation and management.  --Limited abdominal ultrasound completed with diagnostic and therapeutic paracentesis.  --Discussed with radiology, small fluid present, obtained for diagnostic studies  --Initial absolute neutrophil count is 255 consisted of 60% neutrophils, culture in process , negative so far.  --Albumin is 0.7 and protein in the fluid is 0.9.  SAAG 2.6, most likely portal hypertension causing ascites  --Follow-up on culture results,so far not growing any thing , continued ceftriaxone 2 g IV every 24 hours.  --2 g sodium diet  --Continue PTA Aldactone 50 mg daily  --Continue PTA Torsemide 10 mg p.o. daily  --Consultation requested for [facility] gastroenterology, highly appreciate their help.  -- Transaminitis started to improve slowly after 48 hours   -- Procalcitonin came back 0.28  -- Considering rigors, blood culture were drawn, so far remains negative ( patient was already on Ceftriaxone )   -- Plan to transition to oral Cipro twice daily for total of 7 days ( 4 more days at home ).  He will then take daily Cipro 500 mg po daily for SBP prophylaxis.      Multifocal unresectable hepatocellular carcinoma:  Follows up with [place] oncology Dr. Ellis  S/p cycle 1 Atezolizumab and Bevacizumab on 11/25/2024, per patient next dose due coming Monday     -- [place] oncology consultation was requested  -- Oncology team will follow up with patient after discharge and will discuss further regarding timings of chemotherapy.     Paroxysmal atrial fibrillation with RVR  S/p ETHEL guided cardioversion in 2021  Moderate aortic stenosis;  Essential hypertension     --  Patient was monitored on telemetry  -- Patient developed mild contact dermatitis from telemetry patches, will order triamcinolone ointment  -- Echocardiogram completed showing moderate concentric LVH, normal LV systolic function, EF of 60 to 65%, there is mild mitral stenosis and moderate aortic stenosis.  Left atrium is severely dilated no pericardial effusion.  Normal left ventricular wall motion  -- Toprol-XL increased to 25 mg twice daily from daily, discharging on increased dose  -- Patient will also be continued on PTA Aldactone and torsemide as above on discharge, discontinuing losartan at this time to avoid hypotension  -- Anticoagulation is contraindicated given his coagulopathy  -- Cardiology is recommending Zio patch monitoring upon discharge, ordered      Worsening bilateral lower extremity edema:  -- Bilateral lower extremity duplex venous scan negative for DVT, discussed the results with patient and wife.     Chronic anemia  Chronic thrombocytopenia  Most likely related to cirrhosis.  -- Continue to monitor intermittently  -- Hematology/oncology following.      Mild hypervolemic hyponatremia  -- BMP ordered for 12/11.       MDD with anxiety:   Insomnia:  -- Continue PTA Wellbutrin extended release 300 mg daily     Psoriasis  -- PTA Amlactin cream and Nizoral cream and resume on discharge.       GERD  -- Continue PTA omeprazole 40 mg/d.      History of compression fractures (L1 and L4)  -- Noted on chart review.     Physical deconditioning  -- PT/OT consults requested.  Recommending discharge to home with [facility] and physical therapy, ordered on discharge     Patient was seen and examined on the day of discharge , he is feeling well, does not have any complaints , I did review the discharge medications and instructions with the patient and plan for him to follow up with the PCP after the hospitalization .patient was in agreement , he is discharged in stable condition back to his home.    Consultations This  Hospital Stay   PHYSICAL THERAPY ADULT IP CONSULT  OCCUPATIONAL THERAPY ADULT IP CONSULT  CARE MANAGEMENT / SOCIAL WORK IP CONSULT  HEMATOLOGY & ONCOLOGY IP CONSULT  CARE MANAGEMENT / SOCIAL WORK IP CONSULT  CARDIOLOGY IP CONSULT  GI LUMINAL ADULT IP CONSULT    Code Status   Full Code    Time Spent on this Encounter   I, Nicole Rasmussen MD, personally saw the patient today and spent greater than 30 minutes discharging this patient.     Nicole Rasmussen MD  [place] [address] ONCOLOGY  [address] [place] [address], [address]  [facility] [address]  Phone: [phone]  ______________________________________________________________________    Physical Exam   Vital Signs: Temp: 97.5  F (36.4  C) Temp src: Oral BP: 117/75 Pulse: 92   Resp: 16 SpO2: 95 % O2 Device: None (Room air)    Weight: 195 lbs 8 oz    Physical Exam  Vitals and nursing note reviewed.   Constitutional:       Appearance: He is well-developed.   HENT:      Head: Normocephalic and atraumatic.   Eyes:      Pupils: Pupils are equal, round, and reactive to light.   Neck:      Thyroid: No thyromegaly.   Cardiovascular:      Rate and Rhythm: Normal rate and regular rhythm.      Heart sounds: Normal heart sounds.   Pulmonary:      Effort: Pulmonary effort is normal. No respiratory distress.      Breath sounds: Normal breath sounds.   Abdominal:      General: Bowel sounds are normal. There is no distension.      Palpations: Abdomen is soft.   Musculoskeletal:         General: No tenderness. Normal range of motion.      Cervical back: Normal range of motion and neck supple.   Skin:     General: Skin is warm and dry.   Neurological:      Mental Status: He is alert and oriented to person, place, and time.   Psychiatric:         Behavior: Behavior normal.          Primary Care Physician   Viet López    Discharge Orders      Follow-Up with Cardiology REFUGIO      Home Care Referral      Reason for your hospital stay    You were admitted to the hospital secondary to  spontaneous bacterial peritonitis, you have been treated with IV antibiotics, you were also evaluated by gastroenterology and oncology team.     Follow-up and recommended labs and tests     Follow up with primary care provider, Viet López, within 7 days for hospital follow- up.  The following labs/tests are recommended: CMP in 1 week.    Follow-up with [facility] gastroenterology and [place] oncology as recommended after discharge     Activity    Your activity upon discharge: activity as tolerated and no driving for today     Discharge Instructions    You were admitted to the hospital secondary to spontaneous bacterial peritonitis associated with hepatocellular carcinoma.  You have been treated with IV antibiotics.  Please take ciprofloxacin 500 mg p.o. twice daily for next 4 days to complete antibiotic course for current infection.  Once you have done with current course.  Please take ciprofloxacin 500 mg p.o. daily to prevent risk of future infections.  You are also discharged with hydrocortisone cream to apply to affected areas for the next few days.  During the hospital you were also evaluated by cardiology due to the concern for atrial fibrillation with RVR.  Your metoprolol has been increased to twice a day.  As you will be taking metoprolol 25 mg p.o. twice daily along with spironolactone 50 mg daily along with torsemide 10 mg daily, your losartan is stopped currently to avoid hypotension.  Please continue to monitor your blood pressure closely after discharge and if needed your PCP will start you back on losartan in future.  Please continue taking all your other home medications as you were taking before coming to the hospital.  Please keep your follow-up with [place] oncology and [facility] gastroenterology after the discharge as recommended.  Please follow-up with your primary care physician and get your labs repeated in 1 week to follow-up on your liver function test.     Full Code     Diet    Follow  this diet upon discharge: Orders Placed This Encounter      2 Gram Sodium Diet       Zio Patch Mail Out       Significant Results and Procedures   Results for orders placed or performed during the hospital encounter of 12/10/24   US Lower Extremity Venous Duplex Bilateral    Narrative    VENOUS ULTRASOUND BILATERAL LOWER EXTREMITIES  12/11/2024 10:55 AM     HISTORY: Worsening bilateral lower extremity edema.    COMPARISON: None.    TECHNIQUE: Color Doppler and spectral waveform analysis performed  throughout the deep veins of both lower extremities.    FINDINGS: Both common femoral, proximal great saphenous, femoral, and  popliteal veins demonstrate normal blood flow, compression, and  augmentation. Posterior tibial and peroneal veins are compressible.      Impression    IMPRESSION: Negative for deep venous thrombosis in both lower  extremities.    JOHANN SHEPARD MD         SYSTEM ID:  [id number]   US Paracentesis without Albumin    Narrative    US PARACENTESIS WITHOUT ALBUMIN 12/11/2024 9:32 AM    CLINICAL HISTORY: worsening ascites , concern for SBP    PROCEDURE: Informed consent obtained. Time out performed. The abdomen  was prepped and draped in a sterile fashion. 4 mL of 1% lidocaine was  infused into local soft tissues. A 5 [place] catheter system was  introduced into the abdominal ascites under ultrasound guidance.    Approximately 40 mL of clear fluid were removed and sent to lab if  requested.    Patient tolerated procedure well.    Ultrasound imaging was obtained and placed in the patient's permanent  medical record.      Impression    IMPRESSION:  1.  Status post ultrasound-guided paracentesis.    LESTER J FAHRNER, MD         SYSTEM ID:  [id number]   US Abdomen Complete    Narrative    US ABDOMEN COMPLETE 12/11/2024 12:23 PM    CLINICAL HISTORY: Suspected worsening liver failure needing further  imaging of the liver and spleen and assessment for ascites.    TECHNIQUE: Complete abdominal  ultrasound.    COMPARISON: 2024    FINDINGS:    GALLBLADDER: Sludge or small stones in an otherwise normal  gallbladder. No wall thickening, or pericholecystic fluid. Negative  sonographic Squires's sign.    BILE DUCTS: No biliary dilatation. The common duct measures 4 mm.    LIVER: Coarsened echotexture, suggesting underlying fibrosis. Nodular  contour. No focal mass. A few hepatic cysts are present.    RIGHT KIDNEY: Normal size. Normal echogenicity with no hydronephrosis  or mass.     LEFT KIDNEY: Normal size. Normal echogenicity with no hydronephrosis  or mass.     SPLEEN: Enlarged.    PANCREAS: The visualized portions are normal.    AORTA: Normal in caliber.     IVC: Normal where visualized.    Ascites is present.      Impression    IMPRESSION:  1.  Hepatic parenchymal disease. Splenomegaly and ascites suggests  portal hypertension. No liver lesions.  2.  No bile duct dilatation.  3.  Sludge or small stones in the gallbladder. No ultrasound evidence  of acute cholecystitis.    LESTER J FAHRNER, MD         SYSTEM ID:  [id number]   Echocardiogram Complete     Value    LVEF  60-65%    Narrative    [id number]  [id number]  [id number]^NIKHIL^KAREN^KJ     [facility]  Echocardiography Laboratory  [address] [place] [address]     Name: JEREMY SILVA  MRN: [id number]  : 1945  Study Date: 2024 08:36 AM  Age: 79 yrs  Gender: Male  Patient Location: [facility]  Reason For Study: Tachycardia  Ordering Physician: KAREN TEJEDA  Referring Physician: Viet López  Performed By: Paty Lloyd     BSA: 2.1 m2  Height: 72 in  Weight: 195 lb  HR: 97  BP: 126/85 mmHg  ______________________________________________________________________________  Procedure  Complete Portable Echo Adult. Definity (NDC #78727-908) given intravenously.  ______________________________________________________________________________  Interpretation Summary     There is moderate  concentric left ventricular hypertrophy.  Left ventricular systolic function is normal.  The visual ejection fraction is 60-65%.  The right ventricle is normal in structure, function and size.  There is mild mitral stenosis from mitral annular calcification. Mean gradient  5.4 mmHg at HR of 106 bpm.  Moderate valvular aortic stenosis. Mean gradient 30 mmHg. SHARMIN 1.1 cm2 by  continuity equation. Low normal stroke volume index.  The left atrium is severely dilated.  The inferior vena cava was normal in size with preserved respiratory  variability.  There is no pericardial effusion.  The rhythm was atrial fibrillation.     No prior study for comparison.     ______________________________________________________________________________  Left Ventricle  The left ventricle is normal in structure, function and size. There is  moderate concentric left ventricular hypertrophy. Left ventricular systolic  function is normal. The visual ejection fraction is 60-65%. Left ventricular  diastolic function is indeterminate. Normal left ventricular wall motion.     Right Ventricle  The right ventricle is normal in structure, function and size.     Atria  The left atrium is severely dilated. Right atrial size is normal.     Mitral Valve  There is severe mitral annular calcification. There is mild (1+) mitral  regurgitation. The mean mitral valve gradient is 5.4 mmHg. There is mild  mitral stenosis.     Tricuspid Valve  The tricuspid valve is normal in structure and function. Right ventricular  systolic pressure could not be approximated due to inadequate tricuspid  regurgitation.     Aortic Valve  The aortic valve is trileaflet with aortic valve sclerosis. The peak AoV  pressure gradient is 53.0 mmHg. The mean AoV pressure gradient is 30.5 mmHg.  The calculated aortic valve are is 1.1 cm^2. Moderate valvular aortic  stenosis.     Pulmonic Valve  The pulmonic valve is normal in structure and function.     Vessels  The aortic root is  normal size. Normal size ascending aorta. The inferior vena  cava was normal in size with preserved respiratory variability.     Pericardium  There is no pericardial effusion.     Rhythm  The rhythm was atrial fibrillation.  ______________________________________________________________________________  MMode/2D Measurements & Calculations  IVSd: 1.4 cm     LVIDd: 4.5 cm  LVIDs: 2.6 cm  LVPWd: 1.3 cm  FS: 42.4 %  LV mass(C)d: 235.3 grams  LV mass(C)dI: 111.7 grams/m2  Ao root diam: 3.8 cm  LA dimension: 5.3 cm  asc Aorta Diam: 3.8 cm  LA/Ao: 1.4  LVOT diam: 2.1 cm  LVOT area: 3.5 cm2  Ao root diam index Ht(cm/m): 2.1  Ao root diam index BSA (cm/m2): 1.8  Asc Ao diam index BSA (cm/m2): 1.8  Asc Ao diam index Ht(cm/m): 2.1  LA Volume (BP): 176.0 ml     LA Volume Index (BP): 83.4 ml/m2  RWT: 0.58  TAPSE: 2.5 cm     Doppler Measurements & Calculations  MV E max alcides: 170.4 cm/sec  MV max P.0 mmHg  MV mean P.4 mmHg  MV V2 VTI: 40.0 cm  MVA(VTI): 1.9 cm2  MV dec time: 0.27 sec  Ao V2 max: 364.0 cm/sec  Ao max P.0 mmHg  Ao V2 mean: 267.0 cm/sec  Ao mean P.5 mmHg  Ao V2 VTI: 72.3 cm  SHARMIN(I,D): 1.1 cm2  SHARMIN(V,D): 1.2 cm2  LV V1 max P.0 mmHg  LV V1 max: 122.0 cm/sec  LV V1 VTI: 22.4 cm  SV(LVOT): 77.1 ml  SI(LVOT): 36.6 ml/m2  AV Alcides Ratio (DI): 0.34  SHARMIN Index (cm2/m2): 0.51  E/E' av.6  Lateral E/e': 16.4     Medial E/e': 22.8  RV S Alcides: 17.0 cm/sec     ______________________________________________________________________________  Report approved by: Allyson Gaytan MD on 2024 03:20 PM             Discharge Medications   Current Discharge Medication List        START taking these medications    Details   !! ciprofloxacin (CIPRO) 500 MG tablet Take 1 tablet (500 mg) by mouth 2 times daily for 4 days.  Qty: 8 tablet, Refills: 0    Associated Diagnoses: Spontaneous bacterial peritonitis (H)      !! ciprofloxacin (CIPRO) 500 MG tablet Take 1 tablet (500 mg) by mouth daily.  Qty: 90 tablet,  Refills: 1    Associated Diagnoses: Spontaneous bacterial peritonitis (H)      hydrocortisone (CORTAID) 0.5 % external cream Apply topically 2 times daily. Apply topically to affected areas for next 3 to 4 days.  Qty: 28.35 g, Refills: 0    Associated Diagnoses: Spontaneous bacterial peritonitis (H)       !! - Potential duplicate medications found. Please discuss with provider.        CONTINUE these medications which have CHANGED    Details   metoprolol succinate ER (TOPROL XL) 25 MG 24 hr tablet Take 1 tablet (25 mg) by mouth 2 times daily.  Qty: 60 tablet, Refills: 0    Comments: Future refills by PCP Dr. Viet López with phone number [phone].  Associated Diagnoses: Spontaneous bacterial peritonitis (H)           CONTINUE these medications which have NOT CHANGED    Details   ammonium lactate (AMLACTIN) 12 % external cream Apply topically daily as needed.      buPROPion (WELLBUTRIN XL) 300 MG 24 hr tablet Take 300 mg by mouth every morning      cholecalciferol 25 MCG (1000 UT) TABS Take 1 tablet by mouth daily      fluticasone (FLONASE) 50 MCG/ACT nasal spray Spray 2 sprays into both nostrils daily      folic acid (FOLVITE) 1 MG tablet Take 1 mg by mouth daily      ketoconazole (NIZORAL) 2 % external cream Apply topically daily as needed for irritation.      multivitamin w/minerals (THERA-VIT-M) tablet Take 1 tablet by mouth daily      omeprazole (PRILOSEC) 40 MG DR capsule Take 40 mg by mouth daily.      ondansetron (ZOFRAN ODT) 4 MG ODT tab Take 1 tablet (4 mg) by mouth every 8 hours as needed for nausea.  Qty: 30 tablet, Refills: 1    Associated Diagnoses: Chemotherapy-induced nausea      prochlorperazine (COMPAZINE) 10 MG tablet Take 1 tablet (10 mg) by mouth every 6 hours as needed for nausea or vomiting.  Qty: 30 tablet, Refills: 1    Associated Diagnoses: Chemotherapy-induced nausea      spironolactone (ALDACTONE) 50 MG tablet Take 50 mg by mouth daily.      torsemide (DEMADEX) 10 MG tablet Take 10  mg by mouth daily. 1/2 x 20 mg tab           STOP taking these medications       losartan (COZAAR) 25 MG tablet Comments:   Reason for Stopping:             Allergies   Allergies   Allergen Reactions    Penicillins      PN: LW Reaction: Itching, Pruritis    Adhesive Tape Rash

## 2024-12-12 NOTE — PROGRESS NOTES
Occupational Therapy: Orders received. Chart reviewed and discussed with care team.? Occupational Therapy not indicated due to pt declined need for OT services. Seen walking with PT and reports he has no concerns for ADL. Went back to check with patient regarding ADL and IADL at home, home safety etc. Pt reports no concerns and that he had home care OT earlier this year and he has installed bars, has a shower chair and reports he does not think he needs OT while IP or at home again.  No skilled formal eval.? Defer discharge recommendations to care team.? Will complete orders.

## 2024-12-12 NOTE — PROGRESS NOTES
Hematology/Oncology Follow-up Note  [facility]    Date of Admission:  12/10/2024   Reason for Consult: HCC, worsening labs   Primary Oncologist: Dr. Ellis    ASSESSMENT/ PLAN : Oskar Wilks is a 79 year old year old male who presented this hospitalization as a direct admit from the oncology clinic for concerns of worsening liver failure in the setting of hepatocellular carcinoma.  Pertinent medical history of liver cirrhosis, esophageal varices, splenomegaly.     PLAN:  No chemotherapy while inpatient.   Will see Dr. Ellis 12/16/2024 to discuss treatment. Likely need to delay.  Discussed with our pharmacists in clinic, atezolimab can cause elevated LFTs (30%). Risk of hepatitis is 2%. No correlation with elevated bilirubin.   LFTs and bilirubin are lower today. Appreciate GI's recommendations  No further recommendations from oncology, will sign off      Ascites/ Hyperbilirubinemia   Admission Bilirubin: 7.1  Admission LFTs: /   12/10/2024 Abdominal US showing Hepatic parenchymal disease. Splenomegaly and ascites suggests portal hypertension. No liver lesions. No bile duct dilatation. Sludge or small stones in the gallbladder. No ultrasound evidence of acute cholecystitis  12/10/2024 Paracentesis for diagnostic testing only - 40 ml removed     Thrombocytopenia   Likely related to acute on chronic liver disease  Admission PLT 58K  Baseline 70-100K  Not on anticoagulation     BLE Edema  Acute on chronic   12/10/2024 BLE US showing  Negative for deep venous thrombosis in both lower  extremities.    Hepatocellular Carcinoma   Diagnosed via liver biopsy on 02/02/2023 Hepatocellular carcinoma, moderately differentiated.   Not a transplant candidate  03/16/2023 Y-90 emobolization of left hepatic lesion  07/31/2024 MRI of the liver revealed  interval increased in size segment VII observation (1.9 cm, previously 1 cm) now with definite arterial phase hyperenhancement and threshold growth (LR-5).    08/30/2024 Y-90 emobolization   09/30/2024 MRI of the liver showed progression  Case discussed at tumor board, no further liver directed therapies recommended   10/31/2024 CT CAP did not show disease outside of the liver   10/31/2024 Bone scan showing diffuse radiotracer uptake throughout osseous structures   11/19/2024 PET CT showing no bone metastasis   Treatment history   11/25/2024 C1D1 OP atezolimab and bevacizumab   12/16/2024 C2D1 planned     DISCUSSION:  Patient is seen with wife at the bedside. There was question if the treatment could have caused his acute illness. Discussed with our pharmacy group in clinic. Discussed with GI.       PHYSICAL EXAM:  Vital signs:  Temp: 98.2  F (36.8  C) Temp src: Oral BP: 123/62 Pulse: 91   Resp: 16 SpO2: 97 % O2 Device: None (Room air)     Weight: 88.7 kg (195 lb 8 oz)  Estimated body mass index is 26.51 kg/m  as calculated from the following:    Height as of an earlier encounter on 12/10/24: 1.829 m (6').    Weight as of this encounter: 88.7 kg (195 lb 8 oz).    GENERAL/CONSTITUTIONAL: No acute distress.  NEUROLOGIC: Alert, oriented, answers questions appropriately.   INTEGUMENTARY: Jaundice, ecchymosis       Labs Reviewed: CBC, CMP, labs as above         45 minutes spent on the date of the encounter doing review of outside records, review of test results, interpretation of tests, patient visit, coordinating care, and documentation     Sulma INFANTE CNP  Hematology/Oncology  [facility]     Securely message with [employer]   Pager #[phone]

## 2024-12-12 NOTE — PLAN OF CARE
Orientation: A&O x4, forgetful  Aggression Stop Light: Green  Activity: A1 GBW  Diet/BS Checks: 2g Na diet  Tele: Afib-RVR  IV Access/Drains: R PIV SL  Pain Management: Denies pain this shift  Abnormal VS/Results: VSS on RA ex Tachycardia- On scheduled metoprolol, 1 time dose of scheduled IV digoxin also given. Absolute Neutrophils from abdominal fluid = 255.6-MD aware. Bilirubin = 8.3  Bowel/Bladder: Continent of B/B, No BM this sihft  Skin/Wounds: Scattered bruising and scabs, +2 edema on BLE, mani BUE, rash on chest d/t allergic reaction to regular tele patches- hydrocortisone cream applied.  Consults: GI, Cardiology, Hem/Onc  D/C Disposition: Pending  Other Info:   - Pt had US abdomen and paracentesis with removal of only 40 mL this shift- see reslts

## 2024-12-13 ENCOUNTER — MYC MEDICAL ADVICE (OUTPATIENT)
Dept: GASTROENTEROLOGY | Facility: CLINIC | Age: 79
End: 2024-12-13
Payer: MEDICARE

## 2024-12-13 VITALS
RESPIRATION RATE: 16 BRPM | OXYGEN SATURATION: 95 % | TEMPERATURE: 97.5 F | WEIGHT: 195.5 LBS | DIASTOLIC BLOOD PRESSURE: 75 MMHG | HEART RATE: 92 BPM | BODY MASS INDEX: 26.51 KG/M2 | SYSTOLIC BLOOD PRESSURE: 117 MMHG

## 2024-12-13 LAB
ALBUMIN SERPL BCG-MCNC: 3.2 G/DL (ref 3.5–5.2)
ALP SERPL-CCNC: 104 U/L (ref 40–150)
ALT SERPL W P-5'-P-CCNC: 94 U/L (ref 0–70)
ANION GAP SERPL CALCULATED.3IONS-SCNC: 10 MMOL/L (ref 7–15)
AST SERPL W P-5'-P-CCNC: 93 U/L (ref 0–45)
ATRIAL RATE - MUSE: 79 BPM
BILIRUB SERPL-MCNC: 5.5 MG/DL
BUN SERPL-MCNC: 23.4 MG/DL (ref 8–23)
CALCIUM SERPL-MCNC: 8.2 MG/DL (ref 8.8–10.4)
CHLORIDE SERPL-SCNC: 100 MMOL/L (ref 98–107)
CREAT SERPL-MCNC: 0.75 MG/DL (ref 0.67–1.17)
DIASTOLIC BLOOD PRESSURE - MUSE: NORMAL MMHG
EGFRCR SERPLBLD CKD-EPI 2021: >90 ML/MIN/1.73M2
GLUCOSE SERPL-MCNC: 131 MG/DL (ref 70–99)
HCO3 SERPL-SCNC: 24 MMOL/L (ref 22–29)
INTERPRETATION ECG - MUSE: NORMAL
MAGNESIUM SERPL-MCNC: 1.9 MG/DL (ref 1.7–2.3)
P AXIS - MUSE: NORMAL DEGREES
POTASSIUM SERPL-SCNC: 4.3 MMOL/L (ref 3.4–5.3)
PR INTERVAL - MUSE: NORMAL MS
PROT SERPL-MCNC: 5.4 G/DL (ref 6.4–8.3)
QRS DURATION - MUSE: 100 MS
QT - MUSE: 366 MS
QTC - MUSE: 464 MS
R AXIS - MUSE: 18 DEGREES
SODIUM SERPL-SCNC: 134 MMOL/L (ref 135–145)
SYSTOLIC BLOOD PRESSURE - MUSE: NORMAL MMHG
T AXIS - MUSE: -12 DEGREES
VENTRICULAR RATE- MUSE: 97 BPM

## 2024-12-13 PROCEDURE — 82374 ASSAY BLOOD CARBON DIOXIDE: CPT | Performed by: PHYSICIAN ASSISTANT

## 2024-12-13 PROCEDURE — 250N000013 HC RX MED GY IP 250 OP 250 PS 637: Performed by: INTERNAL MEDICINE

## 2024-12-13 PROCEDURE — 99232 SBSQ HOSP IP/OBS MODERATE 35: CPT | Performed by: PHYSICIAN ASSISTANT

## 2024-12-13 PROCEDURE — 83735 ASSAY OF MAGNESIUM: CPT | Performed by: INTERNAL MEDICINE

## 2024-12-13 PROCEDURE — 250N000013 HC RX MED GY IP 250 OP 250 PS 637: Performed by: PHYSICIAN ASSISTANT

## 2024-12-13 PROCEDURE — 36415 COLL VENOUS BLD VENIPUNCTURE: CPT | Performed by: PHYSICIAN ASSISTANT

## 2024-12-13 PROCEDURE — 99239 HOSP IP/OBS DSCHRG MGMT >30: CPT | Performed by: INTERNAL MEDICINE

## 2024-12-13 PROCEDURE — 82247 BILIRUBIN TOTAL: CPT | Performed by: PHYSICIAN ASSISTANT

## 2024-12-13 RX ORDER — MAGNESIUM OXIDE 400 MG/1
400 TABLET ORAL EVERY 4 HOURS
Status: COMPLETED | OUTPATIENT
Start: 2024-12-13 | End: 2024-12-13

## 2024-12-13 RX ORDER — CIPROFLOXACIN 500 MG/1
500 TABLET, FILM COATED ORAL DAILY
Qty: 90 TABLET | Refills: 1 | Status: SHIPPED | OUTPATIENT
Start: 2024-12-17

## 2024-12-13 RX ORDER — METOPROLOL SUCCINATE 25 MG/1
25 TABLET, EXTENDED RELEASE ORAL 2 TIMES DAILY
Qty: 60 TABLET | Refills: 0 | Status: SHIPPED | OUTPATIENT
Start: 2024-12-13 | End: 2024-12-27

## 2024-12-13 RX ORDER — CIPROFLOXACIN 500 MG/1
500 TABLET, FILM COATED ORAL 2 TIMES DAILY
Qty: 8 TABLET | Refills: 0 | Status: SHIPPED | OUTPATIENT
Start: 2024-12-13 | End: 2024-12-17

## 2024-12-13 RX ORDER — MAGNESIUM OXIDE 400 MG/1
400 TABLET ORAL EVERY 4 HOURS
Status: DISCONTINUED | OUTPATIENT
Start: 2024-12-13 | End: 2024-12-13

## 2024-12-13 RX ORDER — DIAPER,BRIEF,INFANT-TODD,DISP
EACH MISCELLANEOUS 2 TIMES DAILY
Qty: 28.35 G | Refills: 0 | Status: SHIPPED | OUTPATIENT
Start: 2024-12-13 | End: 2025-01-02

## 2024-12-13 RX ADMIN — METOPROLOL SUCCINATE 25 MG: 25 TABLET, EXTENDED RELEASE ORAL at 08:27

## 2024-12-13 RX ADMIN — SPIRONOLACTONE 50 MG: 25 TABLET ORAL at 08:28

## 2024-12-13 RX ADMIN — HYDROCORTISONE: 0.5 CREAM TOPICAL at 08:35

## 2024-12-13 RX ADMIN — BUPROPION HYDROCHLORIDE 300 MG: 300 TABLET, EXTENDED RELEASE ORAL at 08:27

## 2024-12-13 RX ADMIN — OMEPRAZOLE 40 MG: 20 CAPSULE, DELAYED RELEASE ORAL at 08:37

## 2024-12-13 RX ADMIN — Medication 400 MG: at 08:28

## 2024-12-13 RX ADMIN — Medication 400 MG: at 04:16

## 2024-12-13 RX ADMIN — FOLIC ACID 1 MG: 1 TABLET ORAL at 08:28

## 2024-12-13 RX ADMIN — TORSEMIDE 10 MG: 5 TABLET ORAL at 08:29

## 2024-12-13 ASSESSMENT — ACTIVITIES OF DAILY LIVING (ADL)
ADLS_ACUITY_SCORE: 48
ADLS_ACUITY_SCORE: 48
ADLS_ACUITY_SCORE: 42
ADLS_ACUITY_SCORE: 42
ADLS_ACUITY_SCORE: 48
ADLS_ACUITY_SCORE: 42
ADLS_ACUITY_SCORE: 48
ADLS_ACUITY_SCORE: 42
ADLS_ACUITY_SCORE: 48
ADLS_ACUITY_SCORE: 42

## 2024-12-13 NOTE — PLAN OF CARE
Orientation: A&Ox3 - disoriented to place. Forgetful  Aggression Stop Light: Green  Activity: A1/GBW  Diet/BS Checks: 2g Na diet  Tele:  Afib CVR  IV Access/Drains: R PIV SL  Pain Management: denies pain this shift  Abnormal VS/Results: VSS on RA.   - on Mg high replacement protocol. - not replaced this AM. Lab to come collect new level. Replace if needed  - Elevated LFTs - improved  Bowel/Bladder: Continent of B/B. No BM this shift.   Skin/Wounds: Scattered bruising/scabbing. Jaundiced. Blanchable redness to coccyx. Rash to chest from tele patches - scheduled hydrocortisone cream applied. Hypoallergenic tele patches in place - extras placed in pts med bin.   Consults: PT, nutrition, CC/SW, cardiology, GI. Hem/onc signed off.   D/C Disposition: likely home with home care today  Other Info:   - PRN melatonin given at HS   - +2 BLE edema

## 2024-12-13 NOTE — PLAN OF CARE
Orientation: A&O x2, disoriented to time and place, forgetful  Aggression Stop Light: Green  Activity: A1 GBW  Diet/BS Checks: 2g Na diet  Tele: Afib- CVR  IV Access/Drains: R PIV SL  Pain Management: Denies pain this shift  Abnormal VS/Results: VSS on RA.   Bowel/Bladder: Continent of B/B, No BM this sihft  Skin/Wounds: Scattered bruising and scabs, +2 edema on BLE, mani BUE, rash on chest-hydrocortisone cream applied  Consults: GI, Cardiology, Hem/Onc  D/C Disposition: Pending  Other Info:   - Pt allergic to tele patches, please use hypoallergenic tele patches

## 2024-12-13 NOTE — PLAN OF CARE
Physical Therapy Discharge Summary    Reason for therapy discharge:    Discharged to home with home therapy.    Progress towards therapy goal(s). See goals on Care Plan in [facility] electronic health record for goal details.  Goals partially met.  Barriers to achieving goals:   discharge from facility.    Therapy recommendation(s):    Patient appears to be below baseline mobility levels at this time, but mobilizing safely with use of FWW. Patient lives with supportive spouse at home. Currently, patient appears most stable with use of FWW for transfers and ambulation. Pt already owns FWW and a cane at home. Recommend discharge with HHPT to continue increasing safety and independence with functional mobility to return to PLOF. Pt and spouse reporting that they have a home gym and would appreciate HHPT to assist in designing a home exercise program involving their exercise equipment.

## 2024-12-13 NOTE — CONSULTS
"Care Management Discharge Note    Discharge Date: 12/13/2024       Discharge Disposition: Home with [facility] RN/PT     Discharge Services: None    Discharge DME: n/a  Discharge Transportation: car, drives self, family or friend will provide    Private pay costs discussed: Not applicable    Does the patient's insurance plan have a 3 day qualifying hospital stay waiver?  No    PAS Confirmation Code:  n/a  Patient/family educated on Medicare website which has current facility and service quality ratings:  no    Education Provided on the Discharge Plan: Yes  Persons Notified of Discharge Plans: patient  Patient/Family in Agreement with the Plan: yes    Handoff Referral Completed: No, handoff not indicated or clinically appropriate    Additional Information:  Patient most likely stable for discharge to home with Homecare RN/PT and follow up's as recommended. Patient has scheduled follow up 12/15 and 12/16 with [facility] patient noted he was told he may NOT need to see Dr. Ellis will send a message to CCRN for Dr. Ellis to address further.  Patient noted his Spouse will be coming soon and wanted to know when his \"discharge time\" is. Writer noted that bedside RN would be able to give him more information once orders are placed.  Patient had no further questions at this time.  Addendum: 12/13 10:06  Per communication with [facility] CCRN they will update the patient if there are any changes to follow up's but they will most likely keep the patient scheduled for his prior to admission appointments. Updated discharge instructions regarding this.        Nancy Alves RN, BSN, ACM   Care Transitions Specialist  [facility]  Care Transitions Specialist  [phone] [place]. [address]  [email or web address]  Office: [phone] Fax: [phone]  [facility]          "

## 2024-12-13 NOTE — PROGRESS NOTES
"    GASTROENTEROLOGY PROGRESS NOTE    Date of Admission: 12/10/2024  Reason for Admission: decompensated liver failure      ASSESSMENT:  79 year old male with a history of alcohol related liver disease with hx of esophageal varices, portal hypertension, multifocal HCC currently on atezolizumab and bevacizumab (11/25/24), hypertension, chronic anemia, chronic thrombocytopenia, MDD with anxiety, psoriasis, GERD who presented to [facility] on 12/10/24 from  Cancer Center with fevers and concern for decompensated liver failure. GI consulted for \"Worsening liver failure with ascites, treated for SBP, H/O HCC, follows Dr. Neves\"     # Elevated LFTs   # Decompensated alcohol related liver cirrhosis   # SBP, Ascites  # Jaundice, hyperbilirubinemia   # hx of esophageal varices  # Portal hypertension  Patient follows in hepatology clinic with Dr. Laura Neves, last seen 10/29/24. He has history of heavy alcohol drinking, but now is sober. He has hx of ascites requiring paracentesis in 2022 with 4.7L removed. Also has history of SBP in 9/2022, is on cipro for SBP ppx PTA (though is not listed in med rec). Hx of small EV on EGD in 2021. Last EGD 4/2024 for anemia showed PHG.      Liver labs have improved throughout admission. Infectious workup with UA without e/o infection. CXR with no pleural effusion. Complete US abd 12/11 with e/o underling liver cirrhosis, splenomegaly and ascites c/w portal hypertension, no liver lesions, no bile duct dilation, sludge and small stones in gallbladder without evidence of acute cholecystitis. Bcx 12/11 NGTD.   Started on ceftriaxone 2g for suspected SBP on 12/10. Paracentesis 12/11 with 40ml clear fluid removed, cell count with 255.6 PMNs consistent with SBP, cultures pending, SAAG 2.6 (portal hypertension likely cause of ascites). PTA torsemide 10mg and spironolactone 50mg daily continued.    Decompensation likely due to SBP.     MELD 3.0: 23 at 12/13/2024 11:02 AM  MELD-Na: 24 at 12/13/2024 " 11:02 AM  Calculated from:  Serum Creatinine: 0.75 mg/dL (Using min of 1 mg/dL) at 12/13/2024 11:02 AM  Serum Sodium: 134 mmol/L at 12/13/2024 11:02 AM  Total Bilirubin: 5.5 mg/dL at 12/13/2024 11:02 AM  Serum Albumin: 3.2 g/dL at 12/13/2024 11:02 AM  INR(ratio): 2.23 at 12/12/2024 11:33 AM  Age at listing (hypothetical): 79 years  Sex: Male at 12/13/2024 11:02 AM    - Continue ceftriaxone 2g daily with plan to transition to oral cipro BID for total of 7 days. He will then need daily cipro for SBP ppx   - Follow blood cultures   - 2g sodium diet, high protein. Agree with dietary supplements.   - Monitor MELD labs daily  - Diuretics per primary team        # Multifocal HCC   Found to have 4.6cm liver lesion, s/p liver biopsy 2/23 with HCC moderately differentiated. S/p Y-90 embolization of the mass in 3/2023 with follow up imaging showing increase in size s/p repeat Y-90 embolization in 8/30/24 with repeat MRI showing progression again, started on systemic treatment with atezolizumab and bevacizumab 11/25/24. Follows with Dr. Ellis.      - Appreciate oncology consult       # Chronic anemia  # Chronic thrombocytopenia  Admit labs with hgb 9.9 (BL 9-10), plts 58 (BL ).   Last EGD 4/2024 for anemia showed PHG. Colonoscopy 4/24 showed 3 1-2mm polyps - tubular adenomas, as well as colonic AVMs s/p APC and congested mucosa from portal hypertensive colopathy.      - Monitor labs, transfuse for hgb < 7     Thank you for involving us in this patient's care. Please do not hesitate to contact the GI service with any questions or concerns.     Pt care plan discussed with Dr. Leventhal, GI staff physician, and Dr. Rasmussen, primary team.    Overall time spent on the date of this encounter preparing to see the patient (including chart review of available notes, clinical status events, imaging and labs); obtaining and/or reviewing separately obtained history; ordering medications, tests or procedures; communicating with other  health care professionals; and documenting the above clinical information in the electronic medical record was 45 minutes.    Laura Josue PA-C  GI Service  [facility]  Text Page (Monday-Friday, 8am-4pm)    To page the On-Call [facility] GI provider 24 hours a day, please click [facility] and select GASTROENTEROLOGY MEDICINE ADULT / [facility] in the drop-down menu.   _______________________________________________________________      Subjective: Nursing notes and 24hr events reviewed. Patient reports that he feels well. He denies abdominal pain. He is having bowel movements, no black or bloody stools. No nausea, vomiting. He is tolerating oral diet.    ROS:   4 pt ROS negative unless noted in subjective.     Medications:  Current Facility-Administered Medications   Medication Dose Route Frequency Provider Last Rate Last Admin    bisacodyl (DULCOLAX) suppository 10 mg  10 mg Rectal Daily PRN Jorgito Prater PA-C        buPROPion (WELLBUTRIN XL) 24 hr tablet 300 mg  300 mg Oral QAM Jorgito Prater PA-C   300 mg at 12/13/24 0827    calcium carbonate (TUMS) chewable tablet 1,000 mg  1,000 mg Oral 4x Daily PRN Jorgito Prater PA-C        cefTRIAXone (ROCEPHIN) 2 g vial to attach to  ml bag for ADULTS or NS 50 ml bag for PEDS  2 g Intravenous Q24H Jorgito Prater PA-C   2 g at 12/12/24 1803    folic acid (FOLVITE) tablet 1 mg  1 mg Oral Daily Jorgito Prater PA-C   1 mg at 12/13/24 0828    hydrocortisone (CORTAID) 0.5 % cream   Topical BID Nicole Rasmussen MD   Given at 12/13/24 0835    HYDROmorphone (DILAUDID) injection 0.2 mg  0.2 mg Intravenous Q2H PRN Jorgito Prater PA-C        HYDROmorphone (DILAUDID) injection 0.4 mg  0.4 mg Intravenous Q2H PRN Jorgito Prater PA-C        lidocaine (LMX4) cream   Topical Q1H PRN Jorgito Prater PA-C        lidocaine 1 % 0.1-1 mL  0.1-1 mL Other Q1H PRN Jorgito Prater  YOSELYN Whipple        melatonin tablet 1 mg  1 mg Oral At Bedtime PRN Jorgito Prater PA-C   1 mg at 12/12/24 2039    metoprolol (LOPRESSOR) injection 2.5 mg  2.5 mg Intravenous Q4H PRN Jorgito Prater PA-C   2.5 mg at 12/10/24 1918    metoprolol succinate ER (TOPROL XL) 24 hr tablet 25 mg  25 mg Oral BID Salas Rogers MD   25 mg at 12/13/24 0827    naloxone (NARCAN) injection 0.2 mg  0.2 mg Intravenous Q2 Min PRN King, Juanito SOLARES [facility]        Or    naloxone (NARCAN) injection 0.4 mg  0.4 mg Intravenous Q2 Min PRN King, Juanito SOLARES RP        Or    naloxone (NARCAN) injection 0.2 mg  0.2 mg Intramuscular Q2 Min PRN King, Juanito SOLARES RP        Or    naloxone (NARCAN) injection 0.4 mg  0.4 mg Intramuscular Q2 Min PRN King, Juanito SOLARES, [facility]        omeprazole (PriLOSEC) CR capsule 40 mg  40 mg Oral Daily Jorgito Prater PA-C   40 mg at 12/13/24 0837    ondansetron (ZOFRAN ODT) ODT tab 4 mg  4 mg Oral Q6H PRN Jorgito Prater PA-C        Or    ondansetron (ZOFRAN) injection 4 mg  4 mg Intravenous Q6H PRN Jorgito Prater PA-C        oxyCODONE (ROXICODONE) tablet 5 mg  5 mg Oral Q4H PRN Jorgito Prater PA-C        oxyCODONE IR (ROXICODONE) half-tab 2.5 mg  2.5 mg Oral Q4H PRN Jorgito Prater PA-C        polyethylene glycol (MIRALAX) Packet 17 g  17 g Oral BID PRN Jorgito Prater PA-C        senna-docusate (SENOKOT-S/PERICOLACE) 8.6-50 MG per tablet 1 tablet  1 tablet Oral BID PRN Jorgito Prater PA-C        Or    senna-docusate (SENOKOT-S/PERICOLACE) 8.6-50 MG per tablet 2 tablet  2 tablet Oral BID PRN Jorgito Prater PA-C        sodium chloride (PF) 0.9% PF flush 3 mL  3 mL Intracatheter Q8H Jorgito Prater PA-C   3 mL at 12/13/24 0416    sodium chloride (PF) 0.9% PF flush 3 mL  3 mL Intracatheter q1 min prn Jorgito Prater PA-C        spironolactone (ALDACTONE) tablet 50 mg  50 mg Oral Daily Jorgito Prater  YOSELYN Whipple   50 mg at 12/13/24 0828    torsemide (DEMADEX) tablet 10 mg  10 mg Oral Daily Jorgito Prater PA-C   10 mg at 12/13/24 0829       Objective:  Blood pressure 124/65, pulse 94, temperature 97.6  F (36.4  C), temperature source Oral, resp. rate 16, weight 88.7 kg (195 lb 8 oz), SpO2 95%.  Gen: Sitting in bed. Appears comfortable  HEENT: NCAT. Conjunctiva clear   CV: Regular rate   Resp: Non-labored breathing  Abd: Soft, non tender, non distended, no guarding or rebound     Skin: No jaundice  Ext: warm, well perfused, LE edema improved   Neuro: grossly normal  Mental status/Psych: A&O. Asks/answers questions appropriately     PROCEDURES:  Paracentesis 12/11/24 with 40ml clear fluid removed     LABS:  BMP  Recent Labs   Lab 12/12/24  1125 12/11/24  1125 12/10/24  2120 12/10/24  1022   * 133*  --  133*   POTASSIUM 4.2 4.5  --  4.7   CHLORIDE 100 100  --  100   ALIA 8.3* 8.5*  --  8.5*   CO2 24 22  --  23   BUN 22.3 19.8  --  16.6   CR 0.85 0.77  --  0.83   * 102* 105* 101*     CBC  Recent Labs   Lab 12/12/24  1125 12/11/24  1125 12/10/24  1022   WBC 6.9 7.0 7.1   RBC 3.11* 3.34* 3.31*   HGB 9.4* 10.1* 9.9*   HCT 28.3* 29.9* 30.0*   MCV 91 90 91   MCH 30.2 30.2 29.9   MCHC 33.2 33.8 33.0   RDW 20.7* 20.9* 20.9*   PLT 55* 59* 58*     INR  Recent Labs   Lab 12/12/24  1133 12/10/24  2353   INR 2.23* 2.29*     LFTs  Recent Labs   Lab 12/12/24  1125 12/11/24  1125 12/10/24  1022   ALKPHOS 97 106 106   AST 81* 140* 124*   ALT 96* 119* 102*   BILITOTAL 6.3* 8.3* 7.1*   PROTTOTAL 5.2* 5.5* 5.5*   ALBUMIN 3.0* 3.2* 3.3*      PANCNo lab results found in last 7 days.  CULTURES:   7-Day Micro Results       Collected Updated Procedure Result Status      12/11/2024 1328 12/12/2024 1531 Blood Culture Arm, Left [[id number]]   Blood from Arm, Left    Preliminary result Component Value   Culture No growth after 1 day  [P]                12/11/2024 0920 12/12/2024 1939 Cell count with differential fluid  [[id number]]    (Abnormal)   Ascites Fluid from Paracentesis    Final result Component Value   No component results            12/11/2024 0920 12/11/2024 0950 Albumin fluid [[id number]]    Ascites Fluid from Paracentesis    Final result Component Value Units   Albumin Fluid Source Abdomen    Albumin fluid 0.7 g/dL            12/11/2024 0920 12/11/2024 0950 Protein fluid [[id number]]    Ascites Fluid from Paracentesis    Final result Component Value Units   Protein Fluid Source Abdomen    Protein Total Fluid 0.9 g/dL            12/11/2024 0920 12/12/2024 0736 Ascites Fluid Aerobic Bacterial Culture Routine With Gram Stain [[id number]]   Ascites Fluid from Peritoneum    Preliminary result Component Value   Culture No growth, less than 1 day  [P]    Gram Stain Result No organisms seen  [P]     2+ WBC seen  [P]                12/11/2024 0920 12/11/2024 1357 Cell Count Body Fluid [[id number]]    (Abnormal)   Ascites Fluid from Paracentesis    Final result Component Value Units   Color Orange    Clarity Cloudy    Cell Count Fluid Source Abdomen    Total Nucleated Cells 426 /uL            12/11/2024 0920 12/12/2024 1939 Differential Body Fluid [[id number]]    (Abnormal)   Ascites Fluid from Paracentesis    Final result Component Value Units   % Neutrophils 60 %   % Lymphocytes 24 %   % Monocyte/Macrophages 14 %   % Lining Cells 2 %   Absolute Neutrophils, Body Fluid 255.6 /uL   Pathologist Review Comments (Body Fluid Diff) Findings consistent with reactive mesothelial cells.  No malignant cells identified.             12/10/2024 1022 12/10/2024 1552 Influenza A/B, RSV and SARS-CoV2 PCR (COVID-19) Nasopharyngeal [[id number]]    Swab from Nasopharyngeal    Final result Component Value   Influenza A PCR Negative   Influenza B PCR Negative   RSV PCR Negative   SARS CoV2 PCR Negative   NEGATIVE: SARS-CoV-2 (COVID-19) RNA not detected, presumed negative.                    IMAGING:  US Abdomen  Complete    Narrative  US ABDOMEN COMPLETE 12/11/2024 12:23 PM    CLINICAL HISTORY: Suspected worsening liver failure needing further  imaging of the liver and spleen and assessment for ascites.    TECHNIQUE: Complete abdominal ultrasound.    COMPARISON: 12/11/2024    FINDINGS:    GALLBLADDER: Sludge or small stones in an otherwise normal  gallbladder. No wall thickening, or pericholecystic fluid. Negative  sonographic Squires's sign.    BILE DUCTS: No biliary dilatation. The common duct measures 4 mm.    LIVER: Coarsened echotexture, suggesting underlying fibrosis. Nodular  contour. No focal mass. A few hepatic cysts are present.    RIGHT KIDNEY: Normal size. Normal echogenicity with no hydronephrosis  or mass.    LEFT KIDNEY: Normal size. Normal echogenicity with no hydronephrosis  or mass.    SPLEEN: Enlarged.    PANCREAS: The visualized portions are normal.    AORTA: Normal in caliber.    IVC: Normal where visualized.    Ascites is present.    Impression  IMPRESSION:  1.  Hepatic parenchymal disease. Splenomegaly and ascites suggests  portal hypertension. No liver lesions.  2.  No bile duct dilatation.  3.  Sludge or small stones in the gallbladder. No ultrasound evidence  of acute cholecystitis.    LESTER J FAHRNER, MD      SYSTEM ID:  [id number]

## 2024-12-13 NOTE — DISCHARGE INSTRUCTIONS
Please keep your scheduled appointment with Dr. Ellis for next week. Please call the clinic if you have further questions.  [phone]

## 2024-12-13 NOTE — PLAN OF CARE
Orientation: A&Ox4 but intermittent confusion.   Aggression Stop Light: Green  Activity: A1/GBW  Diet/BS Checks: 2g Na diet  Tele:  Afib- rate in the 80s  IV Access/Drains: R PIV SL  Pain Management: Denies pain  Abnormal VS/Results: VSS on RA.   Bowel/Bladder: Episode of urinary incontinence/ urgency this shift- pt provided with bedside urinal. No BM this shift.   Skin/Wounds: Scattered bruising/scabbing. Jaundiced. Blanchable redness to coccyx. Rash to chest from original tele patches hypoallergenic tele patches now in place - pt has extra patches in his med bin.  Consults: PT,  CC/SW, Cardiology, GI.    D/C Disposition: Likely discharge on 12/13 with home care and outpatient follow-up with Oncology and Maria E patch.

## 2024-12-13 NOTE — PLAN OF CARE
Discharge Note    Patient discharged to home via private vehicle  accompanied by significant other .  IV: Discontinued  Prescriptions faxed to pharmacy.   Belongings reviewed and sent with patient.   Home medications returned to patient: NA  Equipment sent with: NA  patient and family verbalizes understanding of discharge instructions. AVS given to patient and family.

## 2024-12-16 ENCOUNTER — TELEPHONE (OUTPATIENT)
Dept: CARDIOLOGY | Facility: CLINIC | Age: 79
End: 2024-12-16

## 2024-12-16 ENCOUNTER — LAB (OUTPATIENT)
Dept: INFUSION THERAPY | Facility: CLINIC | Age: 79
End: 2024-12-16
Attending: INTERNAL MEDICINE
Payer: MEDICARE

## 2024-12-16 ENCOUNTER — ONCOLOGY VISIT (OUTPATIENT)
Dept: ONCOLOGY | Facility: CLINIC | Age: 79
End: 2024-12-16
Attending: INTERNAL MEDICINE
Payer: MEDICARE

## 2024-12-16 VITALS
TEMPERATURE: 97.8 F | OXYGEN SATURATION: 96 % | SYSTOLIC BLOOD PRESSURE: 122 MMHG | WEIGHT: 204 LBS | DIASTOLIC BLOOD PRESSURE: 71 MMHG | HEART RATE: 91 BPM | RESPIRATION RATE: 16 BRPM | BODY MASS INDEX: 27.67 KG/M2

## 2024-12-16 DIAGNOSIS — C22.0 HCC (HEPATOCELLULAR CARCINOMA) (H): Primary | ICD-10-CM

## 2024-12-16 LAB
ALBUMIN SERPL BCG-MCNC: 3.2 G/DL (ref 3.5–5.2)
ALP SERPL-CCNC: 107 U/L (ref 40–150)
ALT SERPL W P-5'-P-CCNC: 86 U/L (ref 0–70)
ANION GAP SERPL CALCULATED.3IONS-SCNC: 9 MMOL/L (ref 7–15)
AST SERPL W P-5'-P-CCNC: 66 U/L (ref 0–45)
BACTERIA BLD CULT: NO GROWTH
BACTERIA FLD CULT: NO GROWTH
BASOPHILS # BLD AUTO: 0 10E3/UL (ref 0–0.2)
BASOPHILS NFR BLD AUTO: 0 %
BILIRUB SERPL-MCNC: 7.7 MG/DL
BUN SERPL-MCNC: 29.8 MG/DL (ref 8–23)
CALCIUM SERPL-MCNC: 8.3 MG/DL (ref 8.8–10.4)
CHLORIDE SERPL-SCNC: 98 MMOL/L (ref 98–107)
CREAT SERPL-MCNC: 0.88 MG/DL (ref 0.67–1.17)
EGFRCR SERPLBLD CKD-EPI 2021: 88 ML/MIN/1.73M2
EOSINOPHIL # BLD AUTO: 0 10E3/UL (ref 0–0.7)
EOSINOPHIL NFR BLD AUTO: 0 %
ERYTHROCYTE [DISTWIDTH] IN BLOOD BY AUTOMATED COUNT: 21 % (ref 10–15)
GLUCOSE SERPL-MCNC: 142 MG/DL (ref 70–99)
GRAM STAIN RESULT: NORMAL
GRAM STAIN RESULT: NORMAL
HCO3 SERPL-SCNC: 23 MMOL/L (ref 22–29)
HCT VFR BLD AUTO: 26.8 % (ref 40–53)
HGB BLD-MCNC: 9.2 G/DL (ref 13.3–17.7)
IMM GRANULOCYTES # BLD: 0.2 10E3/UL
IMM GRANULOCYTES NFR BLD: 2 %
LYMPHOCYTES # BLD AUTO: 0.6 10E3/UL (ref 0.8–5.3)
LYMPHOCYTES NFR BLD AUTO: 8 %
MCH RBC QN AUTO: 30.5 PG (ref 26.5–33)
MCHC RBC AUTO-ENTMCNC: 34.3 G/DL (ref 31.5–36.5)
MCV RBC AUTO: 89 FL (ref 78–100)
MONOCYTES # BLD AUTO: 0.5 10E3/UL (ref 0–1.3)
MONOCYTES NFR BLD AUTO: 7 %
NEUTROPHILS # BLD AUTO: 6.3 10E3/UL (ref 1.6–8.3)
NEUTROPHILS NFR BLD AUTO: 83 %
NRBC # BLD AUTO: 0 10E3/UL
NRBC BLD AUTO-RTO: 0 /100
PLATELET # BLD AUTO: 69 10E3/UL (ref 150–450)
POTASSIUM SERPL-SCNC: 5 MMOL/L (ref 3.4–5.3)
PROT SERPL-MCNC: 5.5 G/DL (ref 6.4–8.3)
RBC # BLD AUTO: 3.02 10E6/UL (ref 4.4–5.9)
SODIUM SERPL-SCNC: 130 MMOL/L (ref 135–145)
WBC # BLD AUTO: 7.6 10E3/UL (ref 4–11)

## 2024-12-16 PROCEDURE — 99215 OFFICE O/P EST HI 40 MIN: CPT | Performed by: INTERNAL MEDICINE

## 2024-12-16 PROCEDURE — 36415 COLL VENOUS BLD VENIPUNCTURE: CPT | Performed by: INTERNAL MEDICINE

## 2024-12-16 PROCEDURE — 85004 AUTOMATED DIFF WBC COUNT: CPT | Performed by: INTERNAL MEDICINE

## 2024-12-16 PROCEDURE — G0463 HOSPITAL OUTPT CLINIC VISIT: HCPCS | Performed by: INTERNAL MEDICINE

## 2024-12-16 PROCEDURE — 80053 COMPREHEN METABOLIC PANEL: CPT | Performed by: INTERNAL MEDICINE

## 2024-12-16 ASSESSMENT — PAIN SCALES - GENERAL: PAINLEVEL_OUTOF10: NO PAIN (0)

## 2024-12-16 NOTE — LETTER
12/16/2024      Oskar Wilks  [id number]  [facility] [id number]      Dear Colleague,    Thank you for referring your patient, Oskar Wilks, to the [facility]. Please see a copy of my visit note below.    ONCOLOGY HISTORY: Mr. Wilks is a gentleman with hepatocellular carcinoma.    1.  On 08/11/2001, CT abdomen and pelvis revealed mild hepatosplenomegaly.  -Ultrasound abdomen on 06/14/2010 revealed liver cirrhosis and splenomegaly.  -MRI abdomen in 05/08/2015 revealed liver cirrhosis and splenomegaly.  -EGD on 08/13/2021 revealed small esophageal varices and mild portal hypertensive gastropathy.    2.  On 09/07/2022, CT abdomen and pelvis revealed liver cirrhosis, moderate splenomegaly, ascites and 8 mm hypodense lesion in tail of the pancreas.  -Paracentesis of 4700 mL was done on 09/08/2022.  -MRCP on 10/20/2022 revealed branch intraductal papillary mucinous neoplasm.  It also revealed 3.2 cm nodular area in the liver.    3.  MRI abdomen on 01/10/2023 revealed liver cirrhosis with diffuse hepatic iron deposition. A 4.6 x 2.7 cm LI-RADS 4 lesion within the left hepatic lobe.  There is liver cirrhosis, small volume ascites and periesophageal varices.  -EGD on 01/23/2023 revealed portal hypertensive gastropathy.  -On 01/20/2023, AFP of 3.2.  -CT chest on 01/26/2023 did not reveal any metastatic disease.    4.  Ultrasound-guided liver biopsy was done on 02/02/2023. Hepatocellular carcinoma, moderately differentiated.    5.  Evaluated at [facility]. Not a transplant candidate.  -On 03/16/2023, he had Y-90 embolization of left hepatic lobe lesion.  -On 07/31/2024, MRI of the liver revealed  interval increased in size segment VII observation.  -On 08/30/2024, Y-90 embolization done.  -On 09/30/2024, MRI liver reveals progression.    -Case was discussed at tumor board at [facility].  No further liver directed therapy recommended.    6.  EGD on 04/25/2024 did  not reveal any esophageal varices.  There is portal hypertensive gastropathy.  -Colonoscopy on 04/25/2024 revealed multiple nonbleeding colonic angioectasias which was treated with argon plasma coagulation..  There was congested mucosa in entire colon consistent with portal hypertensive colopathy.    7.  CT chest, abdomen and pelvis on 10/31/2024 did not reveal any metastatic disease.  -Bone scan on 10/31/2024 revealed nonspecific diffuse radiotracer uptake throughout the osseous structures.  -PET scan on 11/19/2024 did not reveal any evidence of metastatic disease.    7.  Atezolizumab and bevacizumab started on 11/25/2024.     Subjective:  Wife came with the patient.    Mr. Wilks is an 79-year-old gentleman with hepatocellular carcinoma.  Patient was started on atezolizumab and bevacizumab on 11/25/2024.  He tolerated cycle 1 well.    Patient was hospitalized between 12/10/2024 and 12/13/2024 for multiple problems including spontaneous bacterial peritonitis and worsening liver failure.  Patient currently on ciprofloxacin.  Patient has been recommended ongoing prophylactic ciprofloxacin.    Patient says that he is feeling better.  He is getting stronger.  No headache.  Occasional dizziness.  No chest pain.  No shortness of breath at rest.  Some nausea.  No vomiting.  No abdominal pain.  Appetite is better.  No urinary complaints.  No bowel problem.  No bleeding.  No fever or chills. As per the wife also, he is better.    Exam:  He is alert and oriented x 3.  Not in distress.  ECOG PS of 1.  Vitals: Reviewed.  Rest of system not examined.    Labs: CBC and CMP done today reviewed.     Assessment:  1.  A 79-year-old gentleman with hepatocellular carcinoma on atezolizumab and bevacizumab.   2.  Liver cirrhosis.   3.  Normocytic anemia from liver cirrhosis  4.  Chronic thrombocytopenia from liver cirrhosis.  5.  Recent hospitalization for spontaneous bacterial peritonitis and decompensated liver failure.  6.   Hyponatremia.     Plan:  -Continue atezolizumab and bevacizumab.  -Continue ciprofloxacin  -See me or REFUGIO with chemo in January.     Discussion:  1.  Discussed with the patient and his wife regarding hepatocellular carcinoma.  Patient so far has received 1 cycle of atezolizumab and bevacizumab.  He tolerated it well.    Patient is scheduled for treatment today.  Patient was just discharged over the weekend from the hospital for spontaneous bacterial peritonitis and other problem.  Patient is recovering from recent hospitalization. Explained to the patient that I would like to delay treatment by few days.    Because of upcoming holidays, patient wants to resume treatment in early January.  That is fine with me.  I am hoping he will continue to tolerate treatment well.    After he has received about 4 cycles of treatment, will get restaging workup.    2.  Patient has liver cirrhosis.  He has ascites.  Advised him to let us know if he starts having symptoms from ascites.  Will arrange for paracentesis.    3.  He will continue on ciprofloxacin as recommended by GI.    4.  Labs done today reveals multiple abnormalities.  He is anemic and thrombocytopenic from liver cirrhosis.  LFTs are elevated from HCC.  He has mild hyponatremia.  Will continue to monitor these labs.    5.  He and his wife had few questions which were all answered.  He will be seen in January with treatment.  Advised him to call us with any questions or concerns.     Total visit time of 40 minutes.  Time spent in today's visit, review of chart/investigations today, monitoring for toxicity of high risk drugs and documentation today.      Again, thank you for allowing me to participate in the care of your patient.        Sincerely,        Saad Ellis MD

## 2024-12-16 NOTE — PROGRESS NOTES
ONCOLOGY HISTORY: Mr. Wilks is a gentleman with hepatocellular carcinoma.    1.  On 08/11/2001, CT abdomen and pelvis revealed mild hepatosplenomegaly.  -Ultrasound abdomen on 06/14/2010 revealed liver cirrhosis and splenomegaly.  -MRI abdomen in 05/08/2015 revealed liver cirrhosis and splenomegaly.  -EGD on 08/13/2021 revealed small esophageal varices and mild portal hypertensive gastropathy.    2.  On 09/07/2022, CT abdomen and pelvis revealed liver cirrhosis, moderate splenomegaly, ascites and 8 mm hypodense lesion in tail of the pancreas.  -Paracentesis of 4700 mL was done on 09/08/2022.  -MRCP on 10/20/2022 revealed branch intraductal papillary mucinous neoplasm.  It also revealed 3.2 cm nodular area in the liver.    3.  MRI abdomen on 01/10/2023 revealed liver cirrhosis with diffuse hepatic iron deposition. A 4.6 x 2.7 cm LI-RADS 4 lesion within the left hepatic lobe.  There is liver cirrhosis, small volume ascites and periesophageal varices.  -EGD on 01/23/2023 revealed portal hypertensive gastropathy.  -On 01/20/2023, AFP of 3.2.  -CT chest on 01/26/2023 did not reveal any metastatic disease.    4.  Ultrasound-guided liver biopsy was done on 02/02/2023. Hepatocellular carcinoma, moderately differentiated.    5.  Evaluated at [facility]. Not a transplant candidate.  -On 03/16/2023, he had Y-90 embolization of left hepatic lobe lesion.  -On 07/31/2024, MRI of the liver revealed  interval increased in size segment VII observation.  -On 08/30/2024, Y-90 embolization done.  -On 09/30/2024, MRI liver reveals progression.    -Case was discussed at tumor board at [facility].  No further liver directed therapy recommended.    6.  EGD on 04/25/2024 did not reveal any esophageal varices.  There is portal hypertensive gastropathy.  -Colonoscopy on 04/25/2024 revealed multiple nonbleeding colonic angioectasias which was treated with argon plasma coagulation..  There was congested mucosa in entire  colon consistent with portal hypertensive colopathy.    7.  CT chest, abdomen and pelvis on 10/31/2024 did not reveal any metastatic disease.  -Bone scan on 10/31/2024 revealed nonspecific diffuse radiotracer uptake throughout the osseous structures.  -PET scan on 11/19/2024 did not reveal any evidence of metastatic disease.    7.  Atezolizumab and bevacizumab started on 11/25/2024.     Subjective:  Wife came with the patient.    Mr. Wilks is an 79-year-old gentleman with hepatocellular carcinoma.  Patient was started on atezolizumab and bevacizumab on 11/25/2024.  He tolerated cycle 1 well.    Patient was hospitalized between 12/10/2024 and 12/13/2024 for multiple problems including spontaneous bacterial peritonitis and worsening liver failure.  Patient currently on ciprofloxacin.  Patient has been recommended ongoing prophylactic ciprofloxacin.    Patient says that he is feeling better.  He is getting stronger.  No headache.  Occasional dizziness.  No chest pain.  No shortness of breath at rest.  Some nausea.  No vomiting.  No abdominal pain.  Appetite is better.  No urinary complaints.  No bowel problem.  No bleeding.  No fever or chills. As per the wife also, he is better.    Exam:  He is alert and oriented x 3.  Not in distress.  ECOG PS of 1.  Vitals: Reviewed.  Rest of system not examined.    Labs: CBC and CMP done today reviewed.     Assessment:  1.  A 79-year-old gentleman with hepatocellular carcinoma on atezolizumab and bevacizumab.   2.  Liver cirrhosis.   3.  Normocytic anemia from liver cirrhosis  4.  Chronic thrombocytopenia from liver cirrhosis.  5.  Recent hospitalization for spontaneous bacterial peritonitis and decompensated liver failure.  6.  Hyponatremia.     Plan:  -Continue atezolizumab and bevacizumab.  -Continue ciprofloxacin  -See me or REFUGIO with chemo in January.     Discussion:  1.  Discussed with the patient and his wife regarding hepatocellular carcinoma.  Patient so far has received  1 cycle of atezolizumab and bevacizumab.  He tolerated it well.    Patient is scheduled for treatment today.  Patient was just discharged over the weekend from the hospital for spontaneous bacterial peritonitis and other problem.  Patient is recovering from recent hospitalization. Explained to the patient that I would like to delay treatment by few days.    Because of upcoming holidays, patient wants to resume treatment in early January.  That is fine with me.  I am hoping he will continue to tolerate treatment well.    After he has received about 4 cycles of treatment, will get restaging workup.    2.  Patient has liver cirrhosis.  He has ascites.  Advised him to let us know if he starts having symptoms from ascites.  Will arrange for paracentesis.    3.  He will continue on ciprofloxacin as recommended by GI.    4.  Labs done today reveals multiple abnormalities.  He is anemic and thrombocytopenic from liver cirrhosis.  LFTs are elevated from HCC.  He has mild hyponatremia.  Will continue to monitor these labs.    5.  He and his wife had few questions which were all answered.  He will be seen in January with treatment.  Advised him to call us with any questions or concerns.     Total visit time of 40 minutes.  Time spent in today's visit, review of chart/investigations today, monitoring for toxicity of high risk drugs and documentation today.

## 2024-12-16 NOTE — TELEPHONE ENCOUNTER
Patient was admitted to [facility] on 12/10/24 who was requested to be admitted as a direct admit from [facility] in [place] due to the concern for decompensating liver failure and possible SBP. Here he has been noted to be in atrial fibrillation with rapid ventricular response.     PMH: hepatocellular carcinoma, alcohol induced liver cirrhosis, esophageal varices, portal hypertension, essential hypertension, paroxysmal atrial fibrillation, chronic normocytic anemia, chronic thrombocytopenia, MDD with anxiety, insomnia, moderate aortic stenosis, psoriasis, GERD, history of compression fractures of L1 and L4 and history of alcohol use disorder in remission.     12/12/24: Echo showed EF of 60-65%. The right ventricle is normal in structure, function and size. There is mild mitral stenosis from mitral annular calcification. Mean gradient 5.4 mmHg at HR of 106 bpm. Moderate valvular aortic stenosis. Mean gradient 30 mmHg. SHARMIN 1.1 cm2 by continuity equation. Low normal stroke volume index.  The left atrium is severely dilated. The inferior vena cava was normal in size with preserved respiratory variability.    Heart rate has improved with Metoprolol and IV Digoxin x 1. Anticoagulation is contraindicated given his coagulopathy.    Pt was started on ABX. PTA Metoprolol dosage was increased. Losartan was discontinued at time of discharge. 14 day Zio Patch monitor to be mailed out.    Called patient to discuss any post hospital d/c questions he may have, review medication changes, and confirm f/u appts. Patient denied any questions regarding new medications or changes to PTA medications.     Patient denied any SOB, chest pain, palpitations or light headedness.    RN confirmed with patient that he needs to be scheduled for a cardiology MELVA OV as ordered. Scheduling and Dr. Rogers's Team RN phone numbers provided.    Patient advised to call clinic with any cardiac related questions or concerns prior to this melva't. Patient  verbalized understanding and agreed with plan. JOLENE Nicole RN.

## 2024-12-17 ENCOUNTER — TELEPHONE (OUTPATIENT)
Dept: GASTROENTEROLOGY | Facility: CLINIC | Age: 79
End: 2024-12-17
Payer: MEDICARE

## 2024-12-17 DIAGNOSIS — K70.30 ALCOHOLIC CIRRHOSIS OF LIVER WITHOUT ASCITES (H): Primary | ICD-10-CM

## 2024-12-17 NOTE — RESULT ENCOUNTER NOTE
Dear Mr. Wilks,    Blood test reveals multiple abnormalities.   -Liver blood test are elevated.  Some of the liver blood tests including AST and ALT are better.  Bilirubin is higher at 7.7.  -Hemoglobin is stable at 9.2.  -Platelet is better at 69.    Will continue to monitor these labs.  Please, call me with any questions.    Saad Ellis MD

## 2024-12-17 NOTE — TELEPHONE ENCOUNTER
----- Message from Ashley BELL sent at 12/17/2024 10:17 AM CST -----  Regarding: FW: Pt admitted to [facility], just FYI    ----- Message -----  From: Laura Josue PA-C  Sent: 12/13/2024   1:18 PM CST  To: Ashley Crisostomo, RN; Laura Neves MD; #  Subject: RE: Pt admitted to [facility], just FYI                  He has been on his PTA diuretics of torsemide 10mg and spironolactone 50mg while admitted. His legs had 3+ edema on admission, which has improved since. But no, he did not have a lot of ascites (para was ordered as diagnostic and therapeutic and only 40ml was removed).  ----- Message -----  From: Laura Neves MD  Sent: 12/13/2024  12:38 PM CST  To: Laura Josue PA-C; Ashley Crisostomo, RN; #  Subject: RE: Pt admitted to [facility], just FYI                  Oh shoot - it sounds like not a lot of ascites and he doesn't need to go home on diuretics?    Ashley - can you call this trish next week and he how he is doing and if he is re accumulating fluid? Please also have him get MELD labs next week    Schedulers - can you give him the 1/6 KEVIN slot? And cancel visit in february  ----- Message -----  From: Laura Josue PA-C  Sent: 12/13/2024  12:23 PM CST  To: Laura Neves MD  Subject: Pt admitted to [facility], just FYI                      Hi Parag Jasmine [facility] with decompensated liver failure (ascites, LE edema, jaundice). He was found to have SBP - he had not been taking cipro for sbp ppx PTA. He was treated with ceftriaxone and is planned to discharge today on cipro BID For total 7 days then cipro daily for ppx.     He was recently started on immunotherapy 11/25 and is supposed to have second dose on Monday, which will be delayed.     Sounds like he was consuming significant amounts of table salt due to inability to taste foods with his loss of smell. I discussed low sodium and high protein diet with him and the RD saw him inpatient.     I just wanted to send you a message as his wife had a LOT of  questions- I did my best to answer them, but just wanted to give you a heads up that he was admitted and what our plan is since it looks like his next appt with you is in February.     Thanks!  Laura

## 2024-12-17 NOTE — TELEPHONE ENCOUNTER
Called pt and wife for follow up after recent discharge from hospital.    Pt denies any abdominal distention, tightness or fullness.  Denies any shortness of breath.    Pt does continue to have lower leg pitting edema.    Pt did not take his diuretics yesterday, but will take them this morning.    Confirmed pt will continue to take 50 mg of Aldactone and 1 mg of Torsemide daily.    Advised pt of the importance of taking these daily.    Pt and wife would also like to know if labs should be repeated sooner than his appointment in January.    Will route to provider to review recent labs and any recommendations.

## 2024-12-23 ENCOUNTER — TELEPHONE (OUTPATIENT)
Dept: GASTROENTEROLOGY | Facility: CLINIC | Age: 79
End: 2024-12-23

## 2024-12-23 ENCOUNTER — LAB (OUTPATIENT)
Dept: LAB | Facility: CLINIC | Age: 79
End: 2024-12-23
Payer: MEDICARE

## 2024-12-23 DIAGNOSIS — K70.30 ALCOHOLIC CIRRHOSIS OF LIVER WITHOUT ASCITES (H): ICD-10-CM

## 2024-12-23 DIAGNOSIS — C22.0 HCC (HEPATOCELLULAR CARCINOMA) (H): ICD-10-CM

## 2024-12-23 LAB
ALBUMIN SERPL BCG-MCNC: 3.2 G/DL (ref 3.5–5.2)
ALP SERPL-CCNC: 113 U/L (ref 40–150)
ALT SERPL W P-5'-P-CCNC: 52 U/L (ref 0–70)
ANION GAP SERPL CALCULATED.3IONS-SCNC: 10 MMOL/L (ref 7–15)
AST SERPL W P-5'-P-CCNC: 45 U/L (ref 0–45)
BASOPHILS # BLD AUTO: 0 10E3/UL (ref 0–0.2)
BASOPHILS NFR BLD AUTO: 0 %
BILIRUB DIRECT SERPL-MCNC: 4.59 MG/DL (ref 0–0.3)
BILIRUB SERPL-MCNC: 9.8 MG/DL
BUN SERPL-MCNC: 25.1 MG/DL (ref 8–23)
CALCIUM SERPL-MCNC: 8.8 MG/DL (ref 8.8–10.4)
CHLORIDE SERPL-SCNC: 96 MMOL/L (ref 98–107)
CREAT SERPL-MCNC: 1.04 MG/DL (ref 0.67–1.17)
EGFRCR SERPLBLD CKD-EPI 2021: 73 ML/MIN/1.73M2
EOSINOPHIL # BLD AUTO: 0 10E3/UL (ref 0–0.7)
EOSINOPHIL NFR BLD AUTO: 0 %
ERYTHROCYTE [DISTWIDTH] IN BLOOD BY AUTOMATED COUNT: 23.1 % (ref 10–15)
GLUCOSE SERPL-MCNC: 174 MG/DL (ref 70–99)
HCO3 SERPL-SCNC: 27 MMOL/L (ref 22–29)
HCT VFR BLD AUTO: 29.8 % (ref 40–53)
HGB BLD-MCNC: 10 G/DL (ref 13.3–17.7)
IMM GRANULOCYTES # BLD: 0.2 10E3/UL
IMM GRANULOCYTES NFR BLD: 2 %
INR PPP: 2.05 (ref 0.85–1.15)
LYMPHOCYTES # BLD AUTO: 0.6 10E3/UL (ref 0.8–5.3)
LYMPHOCYTES NFR BLD AUTO: 6 %
MCH RBC QN AUTO: 31.1 PG (ref 26.5–33)
MCHC RBC AUTO-ENTMCNC: 33.6 G/DL (ref 31.5–36.5)
MCV RBC AUTO: 93 FL (ref 78–100)
MONOCYTES # BLD AUTO: 0.6 10E3/UL (ref 0–1.3)
MONOCYTES NFR BLD AUTO: 6 %
NEUTROPHILS # BLD AUTO: 8.3 10E3/UL (ref 1.6–8.3)
NEUTROPHILS NFR BLD AUTO: 85 %
NRBC # BLD AUTO: 0 10E3/UL
NRBC BLD AUTO-RTO: 0 /100
PLATELET # BLD AUTO: 69 10E3/UL (ref 150–450)
POTASSIUM SERPL-SCNC: 4.7 MMOL/L (ref 3.4–5.3)
PROT SERPL-MCNC: 5.8 G/DL (ref 6.4–8.3)
RBC # BLD AUTO: 3.22 10E6/UL (ref 4.4–5.9)
SODIUM SERPL-SCNC: 133 MMOL/L (ref 135–145)
WBC # BLD AUTO: 9.7 10E3/UL (ref 4–11)

## 2024-12-23 PROCEDURE — 85610 PROTHROMBIN TIME: CPT

## 2024-12-23 PROCEDURE — 36415 COLL VENOUS BLD VENIPUNCTURE: CPT

## 2024-12-23 PROCEDURE — 85025 COMPLETE CBC W/AUTO DIFF WBC: CPT

## 2024-12-23 PROCEDURE — 80053 COMPREHEN METABOLIC PANEL: CPT

## 2024-12-23 PROCEDURE — 82248 BILIRUBIN DIRECT: CPT

## 2024-12-24 NOTE — PROGRESS NOTES
Called Mr. Wilks to check and see how he is doing.  His bilirubin is slightly up, rest of his labs are stable on his diuretics.  He is not having any swelling in his legs and does not think he has much ascites.  Generally feeling okay.  Recommend continuing current dose of diuretics, went over symptoms for which to call us or seek care.

## 2024-12-26 ENCOUNTER — TELEPHONE (OUTPATIENT)
Dept: CARDIOLOGY | Facility: CLINIC | Age: 79
End: 2024-12-26
Payer: MEDICARE

## 2024-12-26 ENCOUNTER — HOSPITAL ENCOUNTER (EMERGENCY)
Facility: CLINIC | Age: 79
Discharge: HOME | End: 2024-12-26
Attending: EMERGENCY MEDICINE
Payer: MEDICARE

## 2024-12-26 ENCOUNTER — APPOINTMENT (OUTPATIENT)
Dept: ULTRASOUND IMAGING | Facility: CLINIC | Age: 79
End: 2024-12-26
Attending: EMERGENCY MEDICINE
Payer: MEDICARE

## 2024-12-26 VITALS
DIASTOLIC BLOOD PRESSURE: 83 MMHG | TEMPERATURE: 97.8 F | SYSTOLIC BLOOD PRESSURE: 105 MMHG | HEART RATE: 101 BPM | OXYGEN SATURATION: 98 % | RESPIRATION RATE: 25 BRPM

## 2024-12-26 DIAGNOSIS — C22.0 HEPATOCELLULAR CARCINOMA (H): ICD-10-CM

## 2024-12-26 DIAGNOSIS — I48.91 ATRIAL FIBRILLATION WITH RAPID VENTRICULAR RESPONSE (H): ICD-10-CM

## 2024-12-26 DIAGNOSIS — K70.31 ALCOHOLIC CIRRHOSIS OF LIVER WITH ASCITES (H): ICD-10-CM

## 2024-12-26 LAB
ABO + RH BLD: NORMAL
ALBUMIN SERPL BCG-MCNC: 3.2 G/DL (ref 3.5–5.2)
ALP SERPL-CCNC: 110 U/L (ref 40–150)
ALT SERPL W P-5'-P-CCNC: 75 U/L (ref 0–70)
ANION GAP SERPL CALCULATED.3IONS-SCNC: 10 MMOL/L (ref 7–15)
APPEARANCE FLD: ABNORMAL
AST SERPL W P-5'-P-CCNC: 71 U/L (ref 0–45)
ATRIAL RATE - MUSE: NORMAL BPM
BASOPHILS # BLD AUTO: 0 10E3/UL (ref 0–0.2)
BASOPHILS NFR BLD AUTO: 0 %
BILIRUB DIRECT SERPL-MCNC: 4.12 MG/DL (ref 0–0.3)
BILIRUB SERPL-MCNC: 8.9 MG/DL
BILIRUB SERPL-MCNC: 8.9 MG/DL
BLD GP AB SCN SERPL QL: NEGATIVE
BUN SERPL-MCNC: 21.9 MG/DL (ref 8–23)
CALCIUM SERPL-MCNC: 8.5 MG/DL (ref 8.8–10.4)
CELL COUNT BODY FLUID SOURCE: ABNORMAL
CHLORIDE SERPL-SCNC: 96 MMOL/L (ref 98–107)
COLOR FLD: YELLOW
CREAT SERPL-MCNC: 0.95 MG/DL (ref 0.67–1.17)
DIASTOLIC BLOOD PRESSURE - MUSE: NORMAL MMHG
EGFRCR SERPLBLD CKD-EPI 2021: 82 ML/MIN/1.73M2
EOSINOPHIL # BLD AUTO: 0 10E3/UL (ref 0–0.7)
EOSINOPHIL NFR BLD AUTO: 0 %
ERYTHROCYTE [DISTWIDTH] IN BLOOD BY AUTOMATED COUNT: 23.3 % (ref 10–15)
GLUCOSE SERPL-MCNC: 114 MG/DL (ref 70–99)
GRAM STAIN RESULT: NORMAL
GRAM STAIN RESULT: NORMAL
HCO3 SERPL-SCNC: 25 MMOL/L (ref 22–29)
HCT VFR BLD AUTO: 28.3 % (ref 40–53)
HGB BLD-MCNC: 9.7 G/DL (ref 13.3–17.7)
IMM GRANULOCYTES # BLD: 0.2 10E3/UL
IMM GRANULOCYTES NFR BLD: 2 %
INTERPRETATION ECG - MUSE: NORMAL
LACTATE SERPL-SCNC: 3.3 MMOL/L (ref 0.7–2)
LACTATE SERPL-SCNC: 3.3 MMOL/L (ref 0.7–2)
LYMPHOCYTES # BLD AUTO: 0.5 10E3/UL (ref 0.8–5.3)
LYMPHOCYTES NFR BLD AUTO: 5 %
MAGNESIUM SERPL-MCNC: 1.8 MG/DL (ref 1.7–2.3)
MCH RBC QN AUTO: 31.4 PG (ref 26.5–33)
MCHC RBC AUTO-ENTMCNC: 34.3 G/DL (ref 31.5–36.5)
MCV RBC AUTO: 92 FL (ref 78–100)
MONOCYTES # BLD AUTO: 0.9 10E3/UL (ref 0–1.3)
MONOCYTES NFR BLD AUTO: 9 %
NEUTROPHILS # BLD AUTO: 8.5 10E3/UL (ref 1.6–8.3)
NEUTROPHILS NFR BLD AUTO: 84 %
NRBC # BLD AUTO: 0 10E3/UL
NRBC BLD AUTO-RTO: 0 /100
P AXIS - MUSE: NORMAL DEGREES
PHOSPHATE SERPL-MCNC: 3.2 MG/DL (ref 2.5–4.5)
PLATELET # BLD AUTO: 54 10E3/UL (ref 150–450)
POTASSIUM SERPL-SCNC: 5 MMOL/L (ref 3.4–5.3)
PR INTERVAL - MUSE: NORMAL MS
PROT SERPL-MCNC: 5.9 G/DL (ref 6.4–8.3)
QRS DURATION - MUSE: 92 MS
QT - MUSE: 326 MS
QTC - MUSE: 479 MS
R AXIS - MUSE: 67 DEGREES
RBC # BLD AUTO: 3.09 10E6/UL (ref 4.4–5.9)
SODIUM SERPL-SCNC: 131 MMOL/L (ref 135–145)
SPECIMEN EXP DATE BLD: NORMAL
SYSTOLIC BLOOD PRESSURE - MUSE: NORMAL MMHG
T AXIS - MUSE: 112 DEGREES
VENTRICULAR RATE- MUSE: 130 BPM
WBC # BLD AUTO: 10 10E3/UL (ref 4–11)
WBC # FLD AUTO: 145 /UL

## 2024-12-26 PROCEDURE — 250N000013 HC RX MED GY IP 250 OP 250 PS 637: Performed by: EMERGENCY MEDICINE

## 2024-12-26 PROCEDURE — 36415 COLL VENOUS BLD VENIPUNCTURE: CPT | Performed by: EMERGENCY MEDICINE

## 2024-12-26 PROCEDURE — 86900 BLOOD TYPING SEROLOGIC ABO: CPT | Performed by: EMERGENCY MEDICINE

## 2024-12-26 PROCEDURE — 250N000009 HC RX 250: Performed by: EMERGENCY MEDICINE

## 2024-12-26 PROCEDURE — 85004 AUTOMATED DIFF WBC COUNT: CPT | Performed by: EMERGENCY MEDICINE

## 2024-12-26 PROCEDURE — 84100 ASSAY OF PHOSPHORUS: CPT | Performed by: EMERGENCY MEDICINE

## 2024-12-26 PROCEDURE — 250N000009 HC RX 250: Performed by: RADIOLOGY

## 2024-12-26 PROCEDURE — 87070 CULTURE OTHR SPECIMN AEROBIC: CPT | Performed by: EMERGENCY MEDICINE

## 2024-12-26 PROCEDURE — 272N000706 US PARACENTESIS WITHOUT ALBUMIN

## 2024-12-26 PROCEDURE — 89050 BODY FLUID CELL COUNT: CPT | Performed by: EMERGENCY MEDICINE

## 2024-12-26 PROCEDURE — 83605 ASSAY OF LACTIC ACID: CPT | Performed by: EMERGENCY MEDICINE

## 2024-12-26 PROCEDURE — 87205 SMEAR GRAM STAIN: CPT | Performed by: EMERGENCY MEDICINE

## 2024-12-26 PROCEDURE — 80053 COMPREHEN METABOLIC PANEL: CPT | Performed by: EMERGENCY MEDICINE

## 2024-12-26 PROCEDURE — 85041 AUTOMATED RBC COUNT: CPT | Performed by: EMERGENCY MEDICINE

## 2024-12-26 PROCEDURE — 83735 ASSAY OF MAGNESIUM: CPT | Performed by: EMERGENCY MEDICINE

## 2024-12-26 PROCEDURE — 82248 BILIRUBIN DIRECT: CPT | Performed by: EMERGENCY MEDICINE

## 2024-12-26 PROCEDURE — 96372 THER/PROPH/DIAG INJ SC/IM: CPT | Performed by: RADIOLOGY

## 2024-12-26 RX ORDER — METOPROLOL TARTRATE 1 MG/ML
2.5 INJECTION, SOLUTION INTRAVENOUS ONCE
Status: COMPLETED | OUTPATIENT
Start: 2024-12-26 | End: 2024-12-26

## 2024-12-26 RX ORDER — LIDOCAINE HYDROCHLORIDE 10 MG/ML
10 INJECTION, SOLUTION EPIDURAL; INFILTRATION; INTRACAUDAL; PERINEURAL ONCE
Status: COMPLETED | OUTPATIENT
Start: 2024-12-26 | End: 2024-12-26

## 2024-12-26 RX ORDER — METOPROLOL SUCCINATE 25 MG/1
25 TABLET, EXTENDED RELEASE ORAL DAILY
Status: DISCONTINUED | OUTPATIENT
Start: 2024-12-26 | End: 2024-12-26 | Stop reason: HOSPADM

## 2024-12-26 RX ADMIN — METOPROLOL TARTRATE 2.5 MG: 5 INJECTION INTRAVENOUS at 17:50

## 2024-12-26 RX ADMIN — METOPROLOL SUCCINATE 25 MG: 25 TABLET, EXTENDED RELEASE ORAL at 18:29

## 2024-12-26 RX ADMIN — METOPROLOL TARTRATE 2.5 MG: 5 INJECTION INTRAVENOUS at 16:18

## 2024-12-26 RX ADMIN — LIDOCAINE HYDROCHLORIDE 10 ML: 10 INJECTION, SOLUTION EPIDURAL; INFILTRATION; INTRACAUDAL; PERINEURAL at 15:38

## 2024-12-26 ASSESSMENT — ACTIVITIES OF DAILY LIVING (ADL)
ADLS_ACUITY_SCORE: 60

## 2024-12-26 NOTE — ED TRIAGE NOTES
Patient sent in from [facility] for a-fib and increased bilirubin.   Ascities.   Lost 7 lbs since leaving the hospital 2 weeks ago.

## 2024-12-26 NOTE — TELEPHONE ENCOUNTER
"Discharge summary 12/13/24-   Paroxysmal atrial fibrillation with RVR  S/p ETHEL guided cardioversion in 2021  Moderate aortic stenosis;  Essential hypertension     -- Patient was monitored on telemetry  -- Patient developed mild contact dermatitis from telemetry patches, will order triamcinolone ointment  -- Echocardiogram completed showing moderate concentric LVH, normal LV systolic function, EF of 60 to 65%, there is mild mitral stenosis and moderate aortic stenosis.  Left atrium is severely dilated no pericardial effusion.  Normal left ventricular wall motion  -- Toprol-XL increased to 25 mg twice daily from daily, discharging on increased dose  -- Patient will also be continued on PTA Aldactone and torsemide as above on discharge, discontinuing losartan at this time to avoid hypotension  -- Anticoagulation is contraindicated given his coagulopathy  -- Cardiology is recommending Zio patch monitoring upon discharge, ordered       Called patient/wife back, says he is taking the toprol XL 25mg BID but his HR is still fast at times. They said it is >100bpm \"pretty much all the time;\" they monitor it on his Apple Watch. He is wearing a ziopatch currently. They saw hepatology recently who noted his bilirubin is rising which they told them could be from worsening liver disease but also could be from his afib. He is very fatigued. Denies any chest pain or shortness of breath. No edema. Weight is down 7lbs since discharge, no appetite. They think he does have worsening ascites for which they have a call out to hepatology. Patients wife was tearful on the call. Will message EDWARD Ball to review. ED precautions reviewed   " The most recent creatinine has risen likely from anti-inflammatory medication use which she has since discontinued  I have reviewed her chart and noted plan for repeated chemistry and given that she is having labs done anyway I have asked for a comprehensive metabolic panel to check on the kidney function since she stopped taking anti-inflammatory medication    Stages of CKD discussed  Deleterious effects NSAID's , Beneficial effects of Hydration discussed   Tylenol ( If not intolerant ) as needed for pain     I  suggest monitoring renal function, in put and out put status [place]-operatively. I  suggest avoiding nephrotoxic medication including NSAIDs, COX2 inhibitors, intravenous contrast agent,avoiding hypotension to prevent further renal impairment.   Care with intravenous contrast discussed     She wants to see a nephrologists prior to her surgery and I made a referral for that  It is likely that we might be able to get her to see an nephrologists prior to surgery but if we are unable to, she wants to proceed with the surgery

## 2024-12-26 NOTE — TELEPHONE ENCOUNTER
[facility] Call Center    Phone Message    May a detailed message be left on voicemail: yes     Reason for Call: Other: Patient was given a shot to help his a fib in the hospital. Spouse wants to know if there is a pill the patient can take that will get his heart rate down.      Action Taken: Other: cardio    Travel Screening: Not Applicable     Date of Service:

## 2024-12-26 NOTE — ED PROVIDER NOTES
Emergency Department Note      History of Present Illness     Chief Complaint   Chest Pain and Abnormal Labs    HPI   Oskar Wilks is a 79 year old male with a history of atrial fibrillation and hepatocellular carcinoma  and  who presents for evaluation of chest pain and abnormal labs. His wife states that he was fine upon discharge on 12/15. He was getting better. However over past couple days, he has been feeling more restless and fatigued. He also has been having moderate abdominal pain. He was sent here for further evaluation of increased in his bilirubin and being in the atrial fibrilation currently. He also reports having lost 7 lbs over past 2 weeks.   Feels not in his usual health since discharge 12/15. He denies fever or chills. He is on chemotherapy for HCC and next infusion is on January 6th. He also has Cardiology appointment in January.     Independent Historian   Wife and  as detailed above.    Review of External Notes   Discharge summary 12/13/2024, admission for SBP with culture negative, A-fib with RVR.  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    Past Medical History     Medical History and Problem List   Alcohol abuse  Cerebral infarction  Essential hypertension  Portal hypertensive gastropathy  atrial fibrillation  HCC    Medications   Bupropion    metoprolol succinate   omeprazole   ondansetron   prochlorperazine   spironolactone   Sildenafil   torsemide     Surgical History   EGD  Liver biopsy   Paracentesis  Visceral angiogram    ORIF fracture  Physical Exam     Patient Vitals for the past 24 hrs:   BP Temp Temp src Pulse Resp SpO2   12/26/24 1900 121/82 -- -- 95 14 --   12/26/24 1829 120/70 -- -- 105 13 --   12/26/24 1800 104/71 -- -- 113 15 --   12/26/24 1700 125/84 -- -- 104 13 98 %   12/26/24 1618 118/69 -- -- (!) 121 -- --   12/26/24 1600 (!) 125/99 -- -- 114 10 98 %   12/26/24 1349 115/78 97.8  F (36.6  C) Temporal (!) 129 14 98 %      Physical Exam  Constitutional: Alert, attentive, GCS 15  HENT:    Nose: Nose normal.    Mouth/Throat: Oropharynx is clear, mucous membranes are moist  Eyes: EOM are normal, anicteric, conjugate gaze  CV:  tachycardic, irregularly irregular  Chest: Effort normal and breath sounds clear without wheezing or rales, symmetric bilaterally   GI:  non tender. No distension. No guarding or rebound.    MSK: No LE edema, no tenderness to palpation of BLE.  Neurological: Alert, attentive, moving all extremities equally.   Skin: Skin is warm and dry.   Diagnostics     Lab Results   Labs Ordered and Resulted from Time of ED Arrival to Time of ED Departure   COMPREHENSIVE METABOLIC PANEL - Abnormal       Result Value    Sodium 131 (*)     Potassium 5.0      Carbon Dioxide (CO2) 25      Anion Gap 10      Urea Nitrogen 21.9      Creatinine 0.95      GFR Estimate 82      Calcium 8.5 (*)     Chloride 96 (*)     Glucose 114 (*)     Alkaline Phosphatase 110      AST 71 (*)     ALT 75 (*)     Protein Total 5.9 (*)     Albumin 3.2 (*)     Bilirubin Total 8.9 (*)    BILIRUBIN DIRECT AND TOTAL - Abnormal    Bilirubin Direct 4.12 (*)     Bilirubin Total 8.9 (*)    LACTIC ACID WHOLE BLOOD WITH 1X REPEAT IN 2 HR WHEN >2 - Abnormal    Lactic Acid, Initial 3.3 (*)    CBC WITH PLATELETS AND DIFFERENTIAL - Abnormal    WBC Count 10.0      RBC Count 3.09 (*)     Hemoglobin 9.7 (*)     Hematocrit 28.3 (*)     MCV 92      MCH 31.4      MCHC 34.3      RDW 23.3 (*)     Platelet Count 54 (*)     % Neutrophils 84      % Lymphocytes 5      % Monocytes 9      % Eosinophils 0      % Basophils 0      % Immature Granulocytes 2      NRBCs per 100 WBC 0      Absolute Neutrophils 8.5 (*)     Absolute Lymphocytes 0.5 (*)     Absolute Monocytes 0.9      Absolute Eosinophils 0.0      Absolute Basophils 0.0      Absolute Immature Granulocytes 0.2      Absolute NRBCs 0.0     CELL COUNT BODY FLUID - Abnormal    Color Yellow      Clarity Hazy (*)     Cell Count  Fluid Source Abdomen      Total Nucleated Cells 145     LACTIC ACID WHOLE BLOOD - Abnormal    Lactic Acid 3.3 (*)    MAGNESIUM - Normal    Magnesium 1.8     PHOSPHORUS - Normal    Phosphorus 3.2     DIFERENTIAL BODY FLUID   TYPE AND SCREEN, ADULT    ABO/RH(D) A POS      Antibody Screen Negative      SPECIMEN EXPIRATION DATE [id number]     AEROBIC BACTERIAL CULTURE ROUTINE    Gram Stain Result No organisms seen      Gram Stain Result 4+ WBC seen     ABO/RH TYPE AND SCREEN   CELL COUNT WITH DIFFERENTIAL FLUID       Imaging   US Paracentesis without Albumin   Final Result   IMPRESSION:   1.  Status post ultrasound-guided paracentesis.      ISHAAN REYNAGA MD            SYSTEM ID:  [id number]          EKG   ECG results from 12/26/24   EKG 12 lead     Value    Systolic Blood Pressure     Diastolic Blood Pressure     Ventricular Rate 130    Atrial Rate     MT Interval     QRS Duration 92        QTc 479    P Axis     R AXIS 67    T Axis 112    Interpretation ECG      Atrial fibrillation with rapid ventricular response  Nonspecific ST and T wave abnormality  Abnormal ECG  When compared with ECG of 12-Dec-2024 10:59,  Nonspecific T wave abnormality, improved in Inferior leads  T wave inversion now evident in Anterior leads  Iban Velazquez MD  [employer]  7:30 PM 12/26/24            Independent Interpretation   None    ED Course      Medications Administered   Medications   metoprolol succinate ER (TOPROL XL) 24 hr tablet 25 mg (25 mg Oral $Given 12/26/24 1829)   metoprolol (LOPRESSOR) injection 2.5 mg (2.5 mg Intravenous $Given 12/26/24 1618)   lidocaine (PF) (XYLOCAINE) 1 % injection 10 mL (10 mLs Subcutaneous $Given by Other 12/26/24 1538)   metoprolol (LOPRESSOR) injection 2.5 mg (2.5 mg Intravenous $Given 12/26/24 1750)       Procedures   Procedures     Discussion of Management   None      Additional Documentation  None    Medical Decision Making / Diagnosis     LORI ISSA  Efe is a 79 year old male past medical history seen for hepatocellular carcinoma, alcohol cirrhosis with alcoholism in remission, ascites, paroxysmal A-fib presenting for evaluation of A-fib.  He was seen in clinic, sent in due to elevated bilirubin though it is improved since it was rechecked several days ago, remainder of his labs are stable.  He does have elevated lactate however do not suspect this is from sepsis get has no fever here, no white count, no abdominal pain complaints of shortness of breath, I suspect this is likely secondary to liver dysfunction.  It is chronically elevated and was stable on recheck he was never hypotensive, no indication for empiric antibiotics or fluid bolus.  He was sent for therapeutic paracentesis was just about 1-1/2 L of fluid removed, there was 145 nucleated cells not consistent with SBP nor does he have any abdominal pain tenderness or fever.  He was noted to be in A-fib with RVR with ventricular rates in the 130s, he was given 2 doses of IV metoprolol with improvement in his heart rate into the upper 90s where he has been, he is following with cardiology due to be rechecked with them later next month I will have him increase his metoprolol to 50 mg in the morning up from 25 and continue the 20 5 in the evening.  I recommended follow-up with his PCP, to touch base with his cardiologist.  Return precautions were reviewed.  We had discussed potential observation admission for continued rate control however with second dose of IV metoprolol, he felt improved.  We will plan for discharge home.    Disposition   The patient was discharged.     Diagnosis     ICD-10-CM    1. Atrial fibrillation with rapid ventricular response (H)  I48.91       2. Hepatocellular carcinoma (H)  C22.0       3. Alcoholic cirrhosis of liver with ascites (H)  K70.31            Iban Velazquez MD  [employer]  7:33 PM 12/26/24           Scribe Disclosure:  Ruth AYERS  Komal, kary serving as a scribe at 2:23 PM on 12/26/2024 to document services personally performed by Iban Velazquez MD based on my observations and the provider's statements to me.        Iban Velazquez MD  12/26/24 1933

## 2024-12-26 NOTE — TELEPHONE ENCOUNTER
Delmar Ball MD  You11 minutes ago (11:15 AM)     I would recommend that he come to the emergency room for possible up titration of medications.  If he does not want to come to the emergency room and his blood pressure is above 140 systolic consistently, recommend increasing Toprol-XL to 50 twice a day for better rate control.       Spoke to patients wife, she does not want to take patient to the ED. She is not at home right now, will be back in 1/2 hour. She will check the patient's BP at that time (cannot do it himself) and will call back with an update. Team 2 number provided.     Addendum 1200: Patient's wife called back, says his BP is 124/74. She will bring him in to [facility] ED at this time.

## 2024-12-27 NOTE — DISCHARGE INSTRUCTIONS
You should increase your metoprolol to 50 mg in the morning and 25 mg in the evening.  -If you are able to, I would continue to check your blood pressure once in the morning, once in the evening if this is below 100/60, I would hold your increased metoprolol dosing.      I would touch base with your cardiologist and your primary care doctor.  Certainly if develop worsening abdominal pain, fever, rapid heart rate become dizzy, lightheaded or passout you should return here to the emergency department.

## 2024-12-31 ENCOUNTER — PATIENT OUTREACH (OUTPATIENT)
Dept: ONCOLOGY | Facility: CLINIC | Age: 79
End: 2024-12-31
Payer: MEDICARE

## 2024-12-31 DIAGNOSIS — C22.0 HCC (HEPATOCELLULAR CARCINOMA) (H): Primary | ICD-10-CM

## 2024-12-31 LAB
BACTERIA FLD CULT: NO GROWTH
GRAM STAIN RESULT: NORMAL
GRAM STAIN RESULT: NORMAL

## 2024-12-31 NOTE — PROGRESS NOTES
Writer called and spoke with patient's wife, Ericka.  She states he gurdeep been about the same no appetite and fatigue.  Pat verbalizes that patient's afib is more under control with a hr of . He has a cardiology appointment this Thursday to discuss medication and management.     With the struggle of fatigue and decrease appetite . Writer placed a palliative care referral with Pat's approval.    Writer will update Dr. Ellis.    Ct Caraballo RN

## 2024-12-31 NOTE — PROGRESS NOTES
~Cardiology Clinic Visit~    Primary Cardiologist: Dr. Rogers  Reason for visit: Afib check    History of Present Illness    Oskar Wilks is a very pleasant 79 year old male with a past medical history notable for paroxysmal atrial fibrillation for which he has undergone ETHEL guided cardioversion (2021), alcoholic liver disease/cirrhosis, hepatocellular carcinoma, esophageal varices, portal hypertension and essential hypertension.    In brief summary, patient was admitted to [facility] on 12/9/2024-12/13/24 for decompensated hepatic failure.  At that time, he had been noted to be in atrial fibrillation with rapid ventricular response.  This was associated with fever and chills.  His labs were remarkable for anemia, thrombocytopenia with a therapeutic INR.  Fortunately, he was minimally symptomatic at the time due to his rapid ventricular rate.  Anticoagulation is contraindicated given his coagulopathy and liver disease.    We pursued a rate control approach by increasing metoprolol and giving IV digoxin.  Inpatient echocardiogram showed biventricular systolic function was within normal limits, moderate aortic stenosis noted.  This is not new.  Zio patch monitor placed at discharge.  For his atrial fibrillation, he was discharged on metoprolol succinate 25 mg twice daily.  He is also on spironolactone 50 mg daily and torsemide 10 mg daily.      On 12/26/2024, we were notified by the patient's wife that his heart rate still was fast at greater than 100 bpm.  Patient was very fatigued.  He denied chest pain or shortness of breath, had no edema.  He eventually came to the ER for further evaluation of his atrial fibrillation, as well as increasing bilirubin levels.  He was given additional PO and IV metoprolol in the ER.  He had US guided paracentesis there with removal of 1.5L fluid.  He was discharged to home.    Echocardiogram 12/12/24: EF 60-65% with moderate concentric LV, mild mitral stenosis,  moderate aortic stenosis and severely dilated left atrium.    EKG in clinic today : AF, rates 99    Interval 01/02/25    Patient is here to day with support of his wife.  She is emotional in the context of Parag's rapid decline since October of this year.  She is sadly facing caregiver burnout and exhaustion.  I applaud her dedication to closely monitoring Parag and tracking his symptoms closely for our appointment today.  On review, his HR trend does look to be improved at home.  Borderline soft BP with associated dizziness/LH, though this could be multifactorial from many health issues he is currently facing.  Patient alert and oriented, no gross focal deficits noted.  __________________________________________________________________         Assessment and Impression:     Persistent atrial fibrillation with RVR  Alcoholic cirrhosis of the liver  Esophageal varices in alcoholic cirrhosis  Hepatocellular carcinoma  Portal hypertension  Essential hypertension         Recommendations and Plan:     Start Diltiazem  mg daily.  Reduce Toprol XL to 25 mg BID.  Pt and wife counseled on medication change and symptoms to monitor.  Can discuss with Oncology/Hepatology current diuretic regimen as this may also be lowering BP.  I am not adjusting these doses today and only making one medication change.  We will have to be very cautious in adjusting his diuretics as to not throw off his fluid balance - he is in a tenuous state.  Agree with Palliative care meeting.  Zio patch incomplete today (patient still wearing it).  Discuss at follow up.  Cardiology follow up is scheduled.  If for any reason patient does not feel better on the changes we made today, I have asked them to reach out to me to discuss further.  __________________________________________________________________    Thank you for the opportunity to participate in this pleasant patient's care.    We would be happy to see this patient sooner for any concerns in  the meantime.    NARESH Dacosta, CNP   Nurse Practitioner  [facility]    Today's clinic visit entailed:  The following tests were independently interpreted by me as noted in my documentation: echo, labs  Prescription drug management  The level of medical decision making during this visit was of moderate complexity.  Review of the result(s) of each unique test - cardiac testing, cardiac imaging, labs  Care everywhere reviewed for additional records to facilitate comprehensive patient care.    Orders this Visit:  No orders of the defined types were placed in this encounter.    Orders Placed This Encounter   Medications    diltiazem ER (DILT-XR) 120 MG 24 hr capsule     Sig: Take 1 capsule (120 mg) by mouth daily.     Dispense:  30 capsule     Refill:  2    metoprolol succinate ER (TOPROL XL) 25 MG 24 hr tablet     Sig: Take 1 tablet (25 mg) by mouth 2 times daily.     Dispense:  60 tablet     Refill:  3     Medications Discontinued During This Encounter   Medication Reason    hydrocortisone (CORTAID) 0.5 % external cream Therapy completed (No AVS)    prochlorperazine (COMPAZINE) 10 MG tablet Stopped by Patient (No AVS)    metoprolol succinate ER (TOPROL XL) 25 MG 24 hr tablet Reorder (No AVS)     Encounter Diagnoses   Name Primary?    Atrial fibrillation with rapid ventricular response (H)     Alcoholic cirrhosis of liver without ascites (H) Yes    Esophageal varices in alcoholic cirrhosis (H)     Portal hypertensive gastropathy (H)     HCC (hepatocellular carcinoma) (H)     Essential hypertension      CURRENT MEDICATIONS:  Current Outpatient Medications   Medication Sig Dispense Refill    ammonium lactate (AMLACTIN) 12 % external cream Apply topically daily as needed.      buPROPion (WELLBUTRIN XL) 300 MG 24 hr tablet Take 300 mg by mouth every morning      cholecalciferol 25 MCG (1000 UT) TABS Take 1 tablet by mouth daily      ciprofloxacin (CIPRO) 500 MG tablet Take 1 tablet (500 mg)  "by mouth daily. 90 tablet 1    diltiazem ER (DILT-XR) 120 MG 24 hr capsule Take 1 capsule (120 mg) by mouth daily. 30 capsule 2    fluticasone (FLONASE) 50 MCG/ACT nasal spray Spray 2 sprays into both nostrils daily      folic acid (FOLVITE) 1 MG tablet Take 1 mg by mouth daily      ketoconazole (NIZORAL) 2 % external cream Apply topically daily as needed for irritation.      metoprolol succinate ER (TOPROL XL) 25 MG 24 hr tablet Take 1 tablet (25 mg) by mouth 2 times daily. 60 tablet 3    multivitamin w/minerals (THERA-VIT-M) tablet Take 1 tablet by mouth daily      omeprazole (PRILOSEC) 40 MG DR capsule Take 40 mg by mouth daily.      ondansetron (ZOFRAN ODT) 4 MG ODT tab Take 1 tablet (4 mg) by mouth every 8 hours as needed for nausea. 30 tablet 1    spironolactone (ALDACTONE) 50 MG tablet Take 50 mg by mouth daily.      torsemide (DEMADEX) 10 MG tablet Take 10 mg by mouth daily. 1/2 x 20 mg tab       ALLERGIES     Allergies   Allergen Reactions    Compazine [Prochlorperazine] Anxiety    Penicillins      PN: LW Reaction: Itching, Pruritis    Adhesive Tape Rash     PAST MEDICAL, SURGICAL, FAMILY, SOCIAL HISTORY:  History was reviewed and updated as needed, see medical record.    Review of Systems:  A 10-point Review Of Systems is otherwise normal except for that which is noted in the HPI and interval summary.    Physical Exam:    Vitals: /68   Pulse 93   Ht 1.829 m (6' 0.01\")   Wt 84.7 kg (186 lb 12.8 oz)   SpO2 98%   BMI 25.33 kg/m    Constitutional: Appears stated age, well nourished, NAD.  Neck: Supple. Carotid pulses full and equal.   Respiratory: Non-labored. Lungs CTAB.  Cardiovascular: IRR, normal S1 and S2. No M/G/R.  abdominal edema.  GI: Soft, semi-distended  Skin: Warm and dry.   Musculoskeletal/Extremities: Symmetrical movement.  Neurologic: No gross focal deficits. Alert, awake.  Psychiatric: Affect appropriate. Mentation normal.    Recent Lab Results:  LIPID RESULTS:  No results found for: " "\"CHOL\", \"HDL\", \"LDL\", \"TRIG\", \"CHOLHDLRATIO\"  LIVER ENZYME RESULTS:  Lab Results   Component Value Date    AST 71 (H) 12/26/2024    ALT 75 (H) 12/26/2024     CBC RESULTS:  Lab Results   Component Value Date    WBC 10.0 12/26/2024    RBC 3.09 (L) 12/26/2024    HGB 9.7 (L) 12/26/2024    HCT 28.3 (L) 12/26/2024    MCV 92 12/26/2024    MCH 31.4 12/26/2024    MCHC 34.3 12/26/2024    RDW 23.3 (H) 12/26/2024    PLT 54 (L) 12/26/2024     BMP RESULTS:  Lab Results   Component Value Date     (L) 12/26/2024    POTASSIUM 5.0 12/26/2024    CHLORIDE 96 (L) 12/26/2024    CO2 25 12/26/2024    ANIONGAP 10 12/26/2024     (H) 12/26/2024     (H) 12/10/2024    BUN 21.9 12/26/2024    CR 0.95 12/26/2024    GFRESTIMATED 82 12/26/2024    ALIA 8.5 (L) 12/26/2024      A1C RESULTS:  No results found for: \"A1C\"  INR RESULTS:  Lab Results   Component Value Date    INR 2.05 (H) 12/23/2024    INR 2.23 (H) 12/12/2024                     "

## 2025-01-02 ENCOUNTER — OFFICE VISIT (OUTPATIENT)
Dept: CARDIOLOGY | Facility: CLINIC | Age: 80
End: 2025-01-02
Payer: MEDICARE

## 2025-01-02 VITALS
SYSTOLIC BLOOD PRESSURE: 108 MMHG | DIASTOLIC BLOOD PRESSURE: 68 MMHG | OXYGEN SATURATION: 98 % | HEART RATE: 93 BPM | HEIGHT: 72 IN | BODY MASS INDEX: 25.3 KG/M2 | WEIGHT: 186.8 LBS

## 2025-01-02 DIAGNOSIS — K31.89 PORTAL HYPERTENSIVE GASTROPATHY (H): ICD-10-CM

## 2025-01-02 DIAGNOSIS — I85.10 ESOPHAGEAL VARICES IN ALCOHOLIC CIRRHOSIS (H): ICD-10-CM

## 2025-01-02 DIAGNOSIS — I48.91 ATRIAL FIBRILLATION WITH RAPID VENTRICULAR RESPONSE (H): ICD-10-CM

## 2025-01-02 DIAGNOSIS — C22.0 HCC (HEPATOCELLULAR CARCINOMA) (H): ICD-10-CM

## 2025-01-02 DIAGNOSIS — K70.30 ESOPHAGEAL VARICES IN ALCOHOLIC CIRRHOSIS (H): ICD-10-CM

## 2025-01-02 DIAGNOSIS — K70.30 ALCOHOLIC CIRRHOSIS OF LIVER WITHOUT ASCITES (H): Primary | ICD-10-CM

## 2025-01-02 DIAGNOSIS — I10 ESSENTIAL HYPERTENSION: ICD-10-CM

## 2025-01-02 DIAGNOSIS — K76.6 PORTAL HYPERTENSIVE GASTROPATHY (H): ICD-10-CM

## 2025-01-02 RX ORDER — METOPROLOL SUCCINATE 25 MG/1
25 TABLET, EXTENDED RELEASE ORAL 2 TIMES DAILY
Qty: 60 TABLET | Refills: 3 | Status: SHIPPED | OUTPATIENT
Start: 2025-01-02

## 2025-01-02 RX ORDER — DILTIAZEM HYDROCHLORIDE 120 MG/1
120 CAPSULE, EXTENDED RELEASE ORAL DAILY
Qty: 30 CAPSULE | Refills: 2 | Status: SHIPPED | OUTPATIENT
Start: 2025-01-02

## 2025-01-02 NOTE — LETTER
1/2/2025    Viet López MD  [address] [place] [address]    RE: Oskar Wilks       Dear Colleague,     I had the pleasure of seeing Oskar Wilks in the [facility].              ~Cardiology Clinic Visit~    Primary Cardiologist: Dr. Rogers  Reason for visit: Afib check    History of Present Illness    Oskar Wilks is a very pleasant 79 year old male with a past medical history notable for paroxysmal atrial fibrillation for which he has undergone ETHEL guided cardioversion (2021), alcoholic liver disease/cirrhosis, hepatocellular carcinoma, esophageal varices, portal hypertension and essential hypertension.    In brief summary, patient was admitted to [facility] on 12/9/2024-12/13/24 for decompensated hepatic failure.  At that time, he had been noted to be in atrial fibrillation with rapid ventricular response.  This was associated with fever and chills.  His labs were remarkable for anemia, thrombocytopenia with a therapeutic INR.  Fortunately, he was minimally symptomatic at the time due to his rapid ventricular rate.  Anticoagulation is contraindicated given his coagulopathy and liver disease.    We pursued a rate control approach by increasing metoprolol and giving IV digoxin.  Inpatient echocardiogram showed biventricular systolic function was within normal limits, moderate aortic stenosis noted.  This is not new.  Zio patch monitor placed at discharge.  For his atrial fibrillation, he was discharged on metoprolol succinate 25 mg twice daily.  He is also on spironolactone 50 mg daily and torsemide 10 mg daily.      On 12/26/2024, we were notified by the patient's wife that his heart rate still was fast at greater than 100 bpm.  Patient was very fatigued.  He denied chest pain or shortness of breath, had no edema.  He eventually came to the ER for further evaluation of his atrial fibrillation, as well as increasing bilirubin levels.  He was given additional PO and IV  metoprolol in the ER.  He had US guided paracentesis there with removal of 1.5L fluid.  He was discharged to home.    Echocardiogram 12/12/24: EF 60-65% with moderate concentric LV, mild mitral stenosis, moderate aortic stenosis and severely dilated left atrium.    EKG in clinic today : AF, rates 99    Interval 01/02/25    Patient is here to day with support of his wife.  She is emotional in the context of Parag's rapid decline since October of this year.  She is sadly facing caregiver burnout and exhaustion.  I applaud her dedication to closely monitoring Parag and tracking his symptoms closely for our appointment today.  On review, his HR trend does look to be improved at home.  Borderline soft BP with associated dizziness/LH, though this could be multifactorial from many health issues he is currently facing.  Patient alert and oriented, no gross focal deficits noted.  __________________________________________________________________         Assessment and Impression:     Persistent atrial fibrillation with RVR  Alcoholic cirrhosis of the liver  Esophageal varices in alcoholic cirrhosis  Hepatocellular carcinoma  Portal hypertension  Essential hypertension         Recommendations and Plan:     Start Diltiazem  mg daily.  Reduce Toprol XL to 25 mg BID.  Pt and wife counseled on medication change and symptoms to monitor.  Can discuss with Oncology/Hepatology current diuretic regimen as this may also be lowering BP.  I am not adjusting these doses today and only making one medication change.  We will have to be very cautious in adjusting his diuretics as to not throw off his fluid balance - he is in a tenuous state.  Agree with Palliative care meeting.  Zio patch incomplete today (patient still wearing it).  Discuss at follow up.  Cardiology follow up is scheduled.  If for any reason patient does not feel better on the changes we made today, I have asked them to reach out to me to discuss  further.  __________________________________________________________________    Thank you for the opportunity to participate in this pleasant patient's care.    We would be happy to see this patient sooner for any concerns in the meantime.    NARESH Dacosta, CNP   Nurse Practitioner  [facility]    Today's clinic visit entailed:  The following tests were independently interpreted by me as noted in my documentation: echo, labs  Prescription drug management  The level of medical decision making during this visit was of moderate complexity.  Review of the result(s) of each unique test - cardiac testing, cardiac imaging, labs  Care everywhere reviewed for additional records to facilitate comprehensive patient care.    Orders this Visit:  No orders of the defined types were placed in this encounter.    Orders Placed This Encounter   Medications     diltiazem ER (DILT-XR) 120 MG 24 hr capsule     Sig: Take 1 capsule (120 mg) by mouth daily.     Dispense:  30 capsule     Refill:  2     metoprolol succinate ER (TOPROL XL) 25 MG 24 hr tablet     Sig: Take 1 tablet (25 mg) by mouth 2 times daily.     Dispense:  60 tablet     Refill:  3     Medications Discontinued During This Encounter   Medication Reason     hydrocortisone (CORTAID) 0.5 % external cream Therapy completed (No AVS)     prochlorperazine (COMPAZINE) 10 MG tablet Stopped by Patient (No AVS)     metoprolol succinate ER (TOPROL XL) 25 MG 24 hr tablet Reorder (No AVS)     Encounter Diagnoses   Name Primary?     Atrial fibrillation with rapid ventricular response (H)      Alcoholic cirrhosis of liver without ascites (H) Yes     Esophageal varices in alcoholic cirrhosis (H)      Portal hypertensive gastropathy (H)      HCC (hepatocellular carcinoma) (H)      Essential hypertension      CURRENT MEDICATIONS:  Current Outpatient Medications   Medication Sig Dispense Refill     ammonium lactate (AMLACTIN) 12 % external cream Apply topically  "daily as needed.       buPROPion (WELLBUTRIN XL) 300 MG 24 hr tablet Take 300 mg by mouth every morning       cholecalciferol 25 MCG (1000 UT) TABS Take 1 tablet by mouth daily       ciprofloxacin (CIPRO) 500 MG tablet Take 1 tablet (500 mg) by mouth daily. 90 tablet 1     diltiazem ER (DILT-XR) 120 MG 24 hr capsule Take 1 capsule (120 mg) by mouth daily. 30 capsule 2     fluticasone (FLONASE) 50 MCG/ACT nasal spray Spray 2 sprays into both nostrils daily       folic acid (FOLVITE) 1 MG tablet Take 1 mg by mouth daily       ketoconazole (NIZORAL) 2 % external cream Apply topically daily as needed for irritation.       metoprolol succinate ER (TOPROL XL) 25 MG 24 hr tablet Take 1 tablet (25 mg) by mouth 2 times daily. 60 tablet 3     multivitamin w/minerals (THERA-VIT-M) tablet Take 1 tablet by mouth daily       omeprazole (PRILOSEC) 40 MG DR capsule Take 40 mg by mouth daily.       ondansetron (ZOFRAN ODT) 4 MG ODT tab Take 1 tablet (4 mg) by mouth every 8 hours as needed for nausea. 30 tablet 1     spironolactone (ALDACTONE) 50 MG tablet Take 50 mg by mouth daily.       torsemide (DEMADEX) 10 MG tablet Take 10 mg by mouth daily. 1/2 x 20 mg tab       ALLERGIES     Allergies   Allergen Reactions     Compazine [Prochlorperazine] Anxiety     Penicillins      PN: LW Reaction: Itching, Pruritis     Adhesive Tape Rash     PAST MEDICAL, SURGICAL, FAMILY, SOCIAL HISTORY:  History was reviewed and updated as needed, see medical record.    Review of Systems:  A 10-point Review Of Systems is otherwise normal except for that which is noted in the HPI and interval summary.    Physical Exam:    Vitals: /68   Pulse 93   Ht 1.829 m (6' 0.01\")   Wt 84.7 kg (186 lb 12.8 oz)   SpO2 98%   BMI 25.33 kg/m    Constitutional: Appears stated age, well nourished, NAD.  Neck: Supple. Carotid pulses full and equal.   Respiratory: Non-labored. Lungs CTAB.  Cardiovascular: IRR, normal S1 and S2. No M/G/R.  abdominal edema.  GI: Soft, " "semi-distended  Skin: Warm and dry.   Musculoskeletal/Extremities: Symmetrical movement.  Neurologic: No gross focal deficits. Alert, awake.  Psychiatric: Affect appropriate. Mentation normal.    Recent Lab Results:  LIPID RESULTS:  No results found for: \"CHOL\", \"HDL\", \"LDL\", \"TRIG\", \"CHOLHDLRATIO\"  LIVER ENZYME RESULTS:  Lab Results   Component Value Date    AST 71 (H) 12/26/2024    ALT 75 (H) 12/26/2024     CBC RESULTS:  Lab Results   Component Value Date    WBC 10.0 12/26/2024    RBC 3.09 (L) 12/26/2024    HGB 9.7 (L) 12/26/2024    HCT 28.3 (L) 12/26/2024    MCV 92 12/26/2024    MCH 31.4 12/26/2024    MCHC 34.3 12/26/2024    RDW 23.3 (H) 12/26/2024    PLT 54 (L) 12/26/2024     BMP RESULTS:  Lab Results   Component Value Date     (L) 12/26/2024    POTASSIUM 5.0 12/26/2024    CHLORIDE 96 (L) 12/26/2024    CO2 25 12/26/2024    ANIONGAP 10 12/26/2024     (H) 12/26/2024     (H) 12/10/2024    BUN 21.9 12/26/2024    CR 0.95 12/26/2024    GFRESTIMATED 82 12/26/2024    ALIA 8.5 (L) 12/26/2024      A1C RESULTS:  No results found for: \"A1C\"  INR RESULTS:  Lab Results   Component Value Date    INR 2.05 (H) 12/23/2024    INR 2.23 (H) 12/12/2024                     Thank you for allowing me to participate in the care of your patient.      Sincerely,     NARESH Dacosta [facility]  cc:   Salas Rogers MD  [address]  [place] [address]      "

## 2025-01-02 NOTE — PATIENT INSTRUCTIONS
Thank you for your visit with the [facility] today.    Today's Summary:    For your atrial fibrillation, we will adjust your regimen to the following:  Take Toprol XL 25 mg twice daily.  Take Diltiazem  mg daily in the morning.  Continue to check blood pressure twice daily.  If the blood pressure is less than 100/60 and/or if there are symptoms of dizziness or lightheadedness, it is ok to hold the evening dose of Metoprolol.  If the blood pressure is over 100 and the heart rate is hovering around 100 bpm then it is ok to take the evening metoprolol.  Keep in mind that it does have a small blood pressure lowering effect and to take extra precautions at night if you are up.    Follow-up:  Cardiology follow up at [facility]: next appointment is already scheduled.     Cardiology Scheduling ~[phone]  Cardiology Clinic RN ~ [phone]    It was a pleasure seeing you today.     NARESH Dacosta, CNP  Certified Nurse Practitioner  [facility]  January 2, 2025  ________________________________________________________

## 2025-01-06 ENCOUNTER — DOCUMENTATION ONLY (OUTPATIENT)
Dept: FAMILY MEDICINE | Facility: CLINIC | Age: 80
End: 2025-01-06

## 2025-01-06 ENCOUNTER — APPOINTMENT (OUTPATIENT)
Dept: CT IMAGING | Facility: CLINIC | Age: 80
DRG: 391 | End: 2025-01-06
Payer: MEDICARE

## 2025-01-06 ENCOUNTER — APPOINTMENT (OUTPATIENT)
Dept: CT IMAGING | Facility: CLINIC | Age: 80
DRG: 391 | End: 2025-01-06
Attending: PHYSICIAN ASSISTANT
Payer: MEDICARE

## 2025-01-06 ENCOUNTER — LAB (OUTPATIENT)
Dept: INFUSION THERAPY | Facility: CLINIC | Age: 80
End: 2025-01-06
Attending: INTERNAL MEDICINE
Payer: MEDICARE

## 2025-01-06 ENCOUNTER — ONCOLOGY VISIT (OUTPATIENT)
Dept: ONCOLOGY | Facility: CLINIC | Age: 80
End: 2025-01-06
Attending: INTERNAL MEDICINE
Payer: MEDICARE

## 2025-01-06 ENCOUNTER — HOSPITAL ENCOUNTER (INPATIENT)
Facility: CLINIC | Age: 80
DRG: 391 | End: 2025-01-06
Attending: STUDENT IN AN ORGANIZED HEALTH CARE EDUCATION/TRAINING PROGRAM | Admitting: INTERNAL MEDICINE
Payer: MEDICARE

## 2025-01-06 VITALS
BODY MASS INDEX: 23.84 KG/M2 | DIASTOLIC BLOOD PRESSURE: 65 MMHG | WEIGHT: 175.8 LBS | SYSTOLIC BLOOD PRESSURE: 108 MMHG | HEART RATE: 62 BPM | OXYGEN SATURATION: 94 % | TEMPERATURE: 97.7 F | RESPIRATION RATE: 16 BRPM

## 2025-01-06 DIAGNOSIS — C22.0 HCC (HEPATOCELLULAR CARCINOMA) (H): Primary | ICD-10-CM

## 2025-01-06 DIAGNOSIS — C22.0 HCC (HEPATOCELLULAR CARCINOMA) (H): ICD-10-CM

## 2025-01-06 DIAGNOSIS — E86.0 DEHYDRATION: Primary | ICD-10-CM

## 2025-01-06 DIAGNOSIS — R13.10 DYSPHAGIA, UNSPECIFIED TYPE: Primary | ICD-10-CM

## 2025-01-06 DIAGNOSIS — E86.0 DEHYDRATION: ICD-10-CM

## 2025-01-06 DIAGNOSIS — Z85.828 HISTORY OF BASAL CELL CARCINOMA: Primary | ICD-10-CM

## 2025-01-06 DIAGNOSIS — Z51.5 COMFORT MEASURES ONLY STATUS: ICD-10-CM

## 2025-01-06 PROBLEM — R10.9 ABDOMINAL PAIN: Status: ACTIVE | Noted: 2025-01-06

## 2025-01-06 LAB
ALBUMIN SERPL BCG-MCNC: 3.1 G/DL (ref 3.5–5.2)
ALP SERPL-CCNC: 90 U/L (ref 40–150)
ALT SERPL W P-5'-P-CCNC: 41 U/L (ref 0–70)
ANION GAP SERPL CALCULATED.3IONS-SCNC: 11 MMOL/L (ref 7–15)
AST SERPL W P-5'-P-CCNC: 45 U/L (ref 0–45)
BILIRUB SERPL-MCNC: 10.8 MG/DL
BUN SERPL-MCNC: 33.5 MG/DL (ref 8–23)
CALCIUM SERPL-MCNC: 8.8 MG/DL (ref 8.8–10.4)
CHLORIDE SERPL-SCNC: 95 MMOL/L (ref 98–107)
CREAT SERPL-MCNC: 1.15 MG/DL (ref 0.67–1.17)
EGFRCR SERPLBLD CKD-EPI 2021: 65 ML/MIN/1.73M2
ERYTHROCYTE [DISTWIDTH] IN BLOOD BY AUTOMATED COUNT: 22.7 % (ref 10–15)
GLUCOSE SERPL-MCNC: 104 MG/DL (ref 70–99)
HCO3 SERPL-SCNC: 26 MMOL/L (ref 22–29)
HCT VFR BLD AUTO: 28.8 % (ref 40–53)
HGB BLD-MCNC: 9.3 G/DL (ref 13.3–17.7)
MCH RBC QN AUTO: 31.6 PG (ref 26.5–33)
MCHC RBC AUTO-ENTMCNC: 32.3 G/DL (ref 31.5–36.5)
MCV RBC AUTO: 98 FL (ref 78–100)
PLATELET # BLD AUTO: 73 10E3/UL (ref 150–450)
POTASSIUM SERPL-SCNC: 4.6 MMOL/L (ref 3.4–5.3)
PROT SERPL-MCNC: 5.5 G/DL (ref 6.4–8.3)
RBC # BLD AUTO: 2.94 10E6/UL (ref 4.4–5.9)
SODIUM SERPL-SCNC: 132 MMOL/L (ref 135–145)
TSH SERPL DL<=0.005 MIU/L-ACNC: 1.47 UIU/ML (ref 0.3–4.2)
WBC # BLD AUTO: 8.4 10E3/UL (ref 4–11)

## 2025-01-06 PROCEDURE — 99223 1ST HOSP IP/OBS HIGH 75: CPT | Performed by: PHYSICIAN ASSISTANT

## 2025-01-06 PROCEDURE — 250N000013 HC RX MED GY IP 250 OP 250 PS 637: Performed by: PHYSICIAN ASSISTANT

## 2025-01-06 PROCEDURE — 85041 AUTOMATED RBC COUNT: CPT | Performed by: INTERNAL MEDICINE

## 2025-01-06 PROCEDURE — 74178 CT ABD&PLV WO CNTR FLWD CNTR: CPT

## 2025-01-06 PROCEDURE — 86301 IMMUNOASSAY TUMOR CA 19-9: CPT | Performed by: INTERNAL MEDICINE

## 2025-01-06 PROCEDURE — 258N000003 HC RX IP 258 OP 636: Performed by: PHYSICIAN ASSISTANT

## 2025-01-06 PROCEDURE — 250N000009 HC RX 250: Performed by: PHYSICIAN ASSISTANT

## 2025-01-06 PROCEDURE — 80053 COMPREHEN METABOLIC PANEL: CPT | Performed by: INTERNAL MEDICINE

## 2025-01-06 PROCEDURE — 71250 CT THORAX DX C-: CPT

## 2025-01-06 PROCEDURE — 36415 COLL VENOUS BLD VENIPUNCTURE: CPT | Performed by: INTERNAL MEDICINE

## 2025-01-06 PROCEDURE — 258N000003 HC RX IP 258 OP 636: Performed by: INTERNAL MEDICINE

## 2025-01-06 PROCEDURE — 84443 ASSAY THYROID STIM HORMONE: CPT | Performed by: INTERNAL MEDICINE

## 2025-01-06 PROCEDURE — 85018 HEMOGLOBIN: CPT | Performed by: INTERNAL MEDICINE

## 2025-01-06 PROCEDURE — 99207 PR APP CREDIT; MD BILLING SHARED VISIT: CPT | Performed by: PHYSICIAN ASSISTANT

## 2025-01-06 PROCEDURE — 250N000011 HC RX IP 250 OP 636: Performed by: PHYSICIAN ASSISTANT

## 2025-01-06 PROCEDURE — G0463 HOSPITAL OUTPT CLINIC VISIT: HCPCS | Performed by: INTERNAL MEDICINE

## 2025-01-06 PROCEDURE — 84132 ASSAY OF SERUM POTASSIUM: CPT | Performed by: INTERNAL MEDICINE

## 2025-01-06 PROCEDURE — 120N000001 HC R&B MED SURG/OB

## 2025-01-06 RX ORDER — DILTIAZEM HYDROCHLORIDE 120 MG/1
120 CAPSULE, COATED, EXTENDED RELEASE ORAL DAILY
Status: DISCONTINUED | OUTPATIENT
Start: 2025-01-06 | End: 2025-01-10 | Stop reason: HOSPADM

## 2025-01-06 RX ORDER — ONDANSETRON 4 MG/1
4 TABLET, ORALLY DISINTEGRATING ORAL EVERY 6 HOURS PRN
Status: DISCONTINUED | OUTPATIENT
Start: 2025-01-06 | End: 2025-01-10 | Stop reason: HOSPADM

## 2025-01-06 RX ORDER — HEPARIN SODIUM,PORCINE 10 UNIT/ML
5-20 VIAL (ML) INTRAVENOUS DAILY PRN
Status: CANCELLED | OUTPATIENT
Start: 2025-01-06

## 2025-01-06 RX ORDER — IOPAMIDOL 755 MG/ML
85 INJECTION, SOLUTION INTRAVASCULAR ONCE
Status: COMPLETED | OUTPATIENT
Start: 2025-01-06 | End: 2025-01-06

## 2025-01-06 RX ORDER — EPINEPHRINE 1 MG/ML
0.3 INJECTION, SOLUTION INTRAMUSCULAR; SUBCUTANEOUS EVERY 5 MIN PRN
OUTPATIENT
Start: 2025-01-06

## 2025-01-06 RX ORDER — SODIUM CHLORIDE 9 MG/ML
INJECTION, SOLUTION INTRAVENOUS CONTINUOUS
Status: DISCONTINUED | OUTPATIENT
Start: 2025-01-06 | End: 2025-01-10 | Stop reason: HOSPADM

## 2025-01-06 RX ORDER — HYDROMORPHONE HCL IN WATER/PF 6 MG/30 ML
0.2 PATIENT CONTROLLED ANALGESIA SYRINGE INTRAVENOUS
Status: DISCONTINUED | OUTPATIENT
Start: 2025-01-06 | End: 2025-01-10 | Stop reason: HOSPADM

## 2025-01-06 RX ORDER — EPINEPHRINE 1 MG/ML
0.3 INJECTION, SOLUTION INTRAMUSCULAR; SUBCUTANEOUS EVERY 5 MIN PRN
Status: CANCELLED | OUTPATIENT
Start: 2025-01-06

## 2025-01-06 RX ORDER — METOPROLOL SUCCINATE 25 MG/1
25 TABLET, EXTENDED RELEASE ORAL 2 TIMES DAILY
Status: DISCONTINUED | OUTPATIENT
Start: 2025-01-06 | End: 2025-01-10 | Stop reason: HOSPADM

## 2025-01-06 RX ORDER — CEFTRIAXONE 1 G/1
1 INJECTION, POWDER, FOR SOLUTION INTRAMUSCULAR; INTRAVENOUS EVERY 24 HOURS
Status: DISCONTINUED | OUTPATIENT
Start: 2025-01-06 | End: 2025-01-10 | Stop reason: HOSPADM

## 2025-01-06 RX ORDER — ALBUTEROL SULFATE 0.83 MG/ML
2.5 SOLUTION RESPIRATORY (INHALATION)
OUTPATIENT
Start: 2025-01-06

## 2025-01-06 RX ORDER — SPIRONOLACTONE 25 MG/1
50 TABLET ORAL DAILY
Status: DISCONTINUED | OUTPATIENT
Start: 2025-01-06 | End: 2025-01-10 | Stop reason: HOSPADM

## 2025-01-06 RX ORDER — PANTOPRAZOLE SODIUM 40 MG/1
40 TABLET, DELAYED RELEASE ORAL
Status: DISCONTINUED | OUTPATIENT
Start: 2025-01-06 | End: 2025-01-06

## 2025-01-06 RX ORDER — ONDANSETRON 2 MG/ML
4 INJECTION INTRAMUSCULAR; INTRAVENOUS EVERY 6 HOURS PRN
Status: DISCONTINUED | OUTPATIENT
Start: 2025-01-06 | End: 2025-01-10 | Stop reason: HOSPADM

## 2025-01-06 RX ORDER — FOLIC ACID 1 MG/1
1 TABLET ORAL DAILY
Status: DISCONTINUED | OUTPATIENT
Start: 2025-01-06 | End: 2025-01-10 | Stop reason: HOSPADM

## 2025-01-06 RX ORDER — BUPROPION HYDROCHLORIDE 300 MG/1
300 TABLET ORAL EVERY MORNING
Status: DISCONTINUED | OUTPATIENT
Start: 2025-01-06 | End: 2025-01-10 | Stop reason: HOSPADM

## 2025-01-06 RX ORDER — MEPERIDINE HYDROCHLORIDE 25 MG/ML
25 INJECTION INTRAMUSCULAR; INTRAVENOUS; SUBCUTANEOUS
OUTPATIENT
Start: 2025-01-06

## 2025-01-06 RX ORDER — ALBUTEROL SULFATE 90 UG/1
1-2 INHALANT RESPIRATORY (INHALATION)
Status: CANCELLED
Start: 2025-01-06

## 2025-01-06 RX ORDER — HYDRALAZINE HYDROCHLORIDE 10 MG/1
10 TABLET, FILM COATED ORAL EVERY 4 HOURS PRN
Status: DISCONTINUED | OUTPATIENT
Start: 2025-01-06 | End: 2025-01-10 | Stop reason: HOSPADM

## 2025-01-06 RX ORDER — MULTIPLE VITAMINS W/ MINERALS TAB 9MG-400MCG
1 TAB ORAL DAILY
Status: DISCONTINUED | OUTPATIENT
Start: 2025-01-06 | End: 2025-01-06

## 2025-01-06 RX ORDER — AMOXICILLIN 250 MG
2 CAPSULE ORAL 2 TIMES DAILY
Status: DISCONTINUED | OUTPATIENT
Start: 2025-01-06 | End: 2025-01-06

## 2025-01-06 RX ORDER — CIPROFLOXACIN 500 MG/1
500 TABLET, FILM COATED ORAL DAILY
Status: DISCONTINUED | OUTPATIENT
Start: 2025-01-06 | End: 2025-01-10 | Stop reason: HOSPADM

## 2025-01-06 RX ORDER — TORSEMIDE 10 MG/1
10 TABLET ORAL DAILY
Status: DISCONTINUED | OUTPATIENT
Start: 2025-01-06 | End: 2025-01-10 | Stop reason: HOSPADM

## 2025-01-06 RX ORDER — MEPERIDINE HYDROCHLORIDE 25 MG/ML
25 INJECTION INTRAMUSCULAR; INTRAVENOUS; SUBCUTANEOUS
Status: CANCELLED | OUTPATIENT
Start: 2025-01-06

## 2025-01-06 RX ORDER — OXYCODONE HYDROCHLORIDE 5 MG/1
5 TABLET ORAL EVERY 4 HOURS PRN
Status: DISCONTINUED | OUTPATIENT
Start: 2025-01-06 | End: 2025-01-10 | Stop reason: ALTCHOICE

## 2025-01-06 RX ORDER — AMOXICILLIN 250 MG
1 CAPSULE ORAL 2 TIMES DAILY PRN
Status: DISCONTINUED | OUTPATIENT
Start: 2025-01-06 | End: 2025-01-10 | Stop reason: HOSPADM

## 2025-01-06 RX ORDER — DIPHENHYDRAMINE HYDROCHLORIDE 50 MG/ML
50 INJECTION INTRAMUSCULAR; INTRAVENOUS
Start: 2025-01-06

## 2025-01-06 RX ORDER — POLYETHYLENE GLYCOL 3350 17 G/17G
17 POWDER, FOR SOLUTION ORAL 2 TIMES DAILY PRN
Status: DISCONTINUED | OUTPATIENT
Start: 2025-01-06 | End: 2025-01-10 | Stop reason: HOSPADM

## 2025-01-06 RX ORDER — METOPROLOL TARTRATE 1 MG/ML
2.5 INJECTION, SOLUTION INTRAVENOUS EVERY 5 MIN PRN
Status: COMPLETED | OUTPATIENT
Start: 2025-01-06 | End: 2025-01-08

## 2025-01-06 RX ORDER — METOPROLOL TARTRATE 1 MG/ML
2.5 INJECTION, SOLUTION INTRAVENOUS EVERY 6 HOURS
Status: COMPLETED | OUTPATIENT
Start: 2025-01-06 | End: 2025-01-07

## 2025-01-06 RX ORDER — METHYLPREDNISOLONE SODIUM SUCCINATE 40 MG/ML
40 INJECTION INTRAMUSCULAR; INTRAVENOUS
Status: CANCELLED
Start: 2025-01-06

## 2025-01-06 RX ORDER — METHYLPREDNISOLONE SODIUM SUCCINATE 40 MG/ML
40 INJECTION INTRAMUSCULAR; INTRAVENOUS
Start: 2025-01-06

## 2025-01-06 RX ORDER — HYDROMORPHONE HCL IN WATER/PF 6 MG/30 ML
0.4 PATIENT CONTROLLED ANALGESIA SYRINGE INTRAVENOUS
Status: DISCONTINUED | OUTPATIENT
Start: 2025-01-06 | End: 2025-01-10 | Stop reason: HOSPADM

## 2025-01-06 RX ORDER — HEPARIN SODIUM (PORCINE) LOCK FLUSH IV SOLN 100 UNIT/ML 100 UNIT/ML
5 SOLUTION INTRAVENOUS
Status: CANCELLED | OUTPATIENT
Start: 2025-01-06

## 2025-01-06 RX ORDER — ALBUTEROL SULFATE 90 UG/1
1-2 INHALANT RESPIRATORY (INHALATION)
Start: 2025-01-06

## 2025-01-06 RX ORDER — DIPHENHYDRAMINE HYDROCHLORIDE 50 MG/ML
25 INJECTION INTRAMUSCULAR; INTRAVENOUS
Status: CANCELLED
Start: 2025-01-06

## 2025-01-06 RX ORDER — NALOXONE HYDROCHLORIDE 0.4 MG/ML
0.4 INJECTION, SOLUTION INTRAMUSCULAR; INTRAVENOUS; SUBCUTANEOUS
Status: DISCONTINUED | OUTPATIENT
Start: 2025-01-06 | End: 2025-01-10 | Stop reason: HOSPADM

## 2025-01-06 RX ORDER — FLUTICASONE PROPIONATE 50 MCG
2 SPRAY, SUSPENSION (ML) NASAL DAILY
Status: DISCONTINUED | OUTPATIENT
Start: 2025-01-06 | End: 2025-01-10 | Stop reason: HOSPADM

## 2025-01-06 RX ORDER — VITAMIN B COMPLEX
25 TABLET ORAL DAILY
Status: DISCONTINUED | OUTPATIENT
Start: 2025-01-06 | End: 2025-01-06

## 2025-01-06 RX ORDER — HYDRALAZINE HYDROCHLORIDE 20 MG/ML
10 INJECTION INTRAMUSCULAR; INTRAVENOUS EVERY 4 HOURS PRN
Status: DISCONTINUED | OUTPATIENT
Start: 2025-01-06 | End: 2025-01-10 | Stop reason: HOSPADM

## 2025-01-06 RX ORDER — AMOXICILLIN 250 MG
2 CAPSULE ORAL 2 TIMES DAILY PRN
Status: DISCONTINUED | OUTPATIENT
Start: 2025-01-06 | End: 2025-01-10 | Stop reason: HOSPADM

## 2025-01-06 RX ORDER — DIPHENHYDRAMINE HYDROCHLORIDE 50 MG/ML
50 INJECTION INTRAMUSCULAR; INTRAVENOUS
Status: CANCELLED
Start: 2025-01-06

## 2025-01-06 RX ORDER — NALOXONE HYDROCHLORIDE 0.4 MG/ML
0.2 INJECTION, SOLUTION INTRAMUSCULAR; INTRAVENOUS; SUBCUTANEOUS
Status: DISCONTINUED | OUTPATIENT
Start: 2025-01-06 | End: 2025-01-10 | Stop reason: HOSPADM

## 2025-01-06 RX ORDER — ALBUTEROL SULFATE 0.83 MG/ML
2.5 SOLUTION RESPIRATORY (INHALATION)
Status: CANCELLED | OUTPATIENT
Start: 2025-01-06

## 2025-01-06 RX ORDER — LIDOCAINE 40 MG/G
CREAM TOPICAL
Status: DISCONTINUED | OUTPATIENT
Start: 2025-01-06 | End: 2025-01-10 | Stop reason: HOSPADM

## 2025-01-06 RX ORDER — PROCHLORPERAZINE MALEATE 5 MG/1
5 TABLET ORAL EVERY 6 HOURS PRN
Status: DISCONTINUED | OUTPATIENT
Start: 2025-01-06 | End: 2025-01-10 | Stop reason: HOSPADM

## 2025-01-06 RX ORDER — HEPARIN SODIUM (PORCINE) LOCK FLUSH IV SOLN 100 UNIT/ML 100 UNIT/ML
5 SOLUTION INTRAVENOUS
OUTPATIENT
Start: 2025-01-06

## 2025-01-06 RX ORDER — AMOXICILLIN 250 MG
1 CAPSULE ORAL 2 TIMES DAILY
Status: DISCONTINUED | OUTPATIENT
Start: 2025-01-06 | End: 2025-01-06

## 2025-01-06 RX ORDER — DIPHENHYDRAMINE HYDROCHLORIDE 50 MG/ML
25 INJECTION INTRAMUSCULAR; INTRAVENOUS
Start: 2025-01-06

## 2025-01-06 RX ORDER — CALCIUM CARBONATE 500 MG/1
1000 TABLET, CHEWABLE ORAL 4 TIMES DAILY PRN
Status: DISCONTINUED | OUTPATIENT
Start: 2025-01-06 | End: 2025-01-10 | Stop reason: HOSPADM

## 2025-01-06 RX ORDER — IOPAMIDOL 755 MG/ML
107 INJECTION, SOLUTION INTRAVASCULAR ONCE
Status: COMPLETED | OUTPATIENT
Start: 2025-01-06 | End: 2025-01-06

## 2025-01-06 RX ORDER — HEPARIN SODIUM,PORCINE 10 UNIT/ML
5-20 VIAL (ML) INTRAVENOUS DAILY PRN
OUTPATIENT
Start: 2025-01-06

## 2025-01-06 RX ADMIN — IOPAMIDOL 107 ML: 755 INJECTION, SOLUTION INTRAVENOUS at 22:48

## 2025-01-06 RX ADMIN — SODIUM CHLORIDE 76 ML: 9 INJECTION, SOLUTION INTRAVENOUS at 22:49

## 2025-01-06 RX ADMIN — OXYCODONE HYDROCHLORIDE 5 MG: 5 TABLET ORAL at 15:03

## 2025-01-06 RX ADMIN — HYDROMORPHONE HYDROCHLORIDE 0.4 MG: 0.2 INJECTION, SOLUTION INTRAMUSCULAR; INTRAVENOUS; SUBCUTANEOUS at 16:10

## 2025-01-06 RX ADMIN — METOPROLOL TARTRATE 2.5 MG: 5 INJECTION INTRAVENOUS at 17:02

## 2025-01-06 RX ADMIN — HYDROMORPHONE HYDROCHLORIDE 0.2 MG: 0.2 INJECTION, SOLUTION INTRAMUSCULAR; INTRAVENOUS; SUBCUTANEOUS at 13:50

## 2025-01-06 RX ADMIN — SODIUM CHLORIDE 1000 ML: 9 INJECTION, SOLUTION INTRAVENOUS at 10:25

## 2025-01-06 RX ADMIN — SODIUM CHLORIDE: 9 INJECTION, SOLUTION INTRAVENOUS at 14:35

## 2025-01-06 RX ADMIN — METOPROLOL TARTRATE 2.5 MG: 5 INJECTION INTRAVENOUS at 23:36

## 2025-01-06 RX ADMIN — CEFTRIAXONE SODIUM 1 G: 1 INJECTION, POWDER, FOR SOLUTION INTRAMUSCULAR; INTRAVENOUS at 17:02

## 2025-01-06 ASSESSMENT — ACTIVITIES OF DAILY LIVING (ADL)
ADLS_ACUITY_SCORE: 60
ADLS_ACUITY_SCORE: 59
ADLS_ACUITY_SCORE: 60
ADLS_ACUITY_SCORE: 61
ADLS_ACUITY_SCORE: 60
ADLS_ACUITY_SCORE: 61

## 2025-01-06 ASSESSMENT — PAIN SCALES - GENERAL: PAINLEVEL_OUTOF10: SEVERE PAIN (7)

## 2025-01-06 NOTE — PROGRESS NOTES
Infusion Nursing Note:  Oskar Wilks presents today for 1 L Bolus of IV fluids.   Patient seen by provider today: Yes: Dr. Ellis   present during visit today: Not Applicable.    Note: Pt seen and assessed by Dr. Ellis prior to infusion: pt does not qualify for treatment today. The plan is to given pt 1 L Bolus of fluids, then have pt transferred to the hospital, per Dr. Ellis's order.  Pt and his wife notified, aware, and agree with treatment plan.    Intravenous Access:  Peripheral IV placed.    Treatment Conditions:  Not Applicable.      Post Infusion Assessment:  Patient tolerated infusion without incident.  Blood return noted pre and post infusion.  Site patent and intact, free from redness, edema or discomfort.  No evidence of extravasations.  Access discontinued per protocol.       Discharge Plan:   Discharge instructions reviewed with: Patient and Family.  Patient and/or family verbalized understanding of discharge instructions and all questions answered.  AVS to patient via MYCHART.     Patient discharged from clinic: transferred to the hospital by Ilana MORAN CMA via wheelchair.  Departure Mode: Ambulatory.      Lina Arenas RN

## 2025-01-06 NOTE — PROGRESS NOTES
Transfer Type: Scheduled Admission     Oskar Wilks is a 79 year old male who is referred with  for direct admission expected on  at  for .  Department Request Comments:   Estimated Length of Stay:   Level of Care:     Triage Call occurred: 01/06/25, 10:01 AM  Referring Provider:    Referring Contact:   Accepting Provider: Jose Luis Han MD    Will Hospitalist be admitting:   Primary Team covering this patient: Hospital Medicine    Diagnosis: Cancer  Procedures/Interventions: ? Possible EGD for dysphagia  Cardiac Monitoring Needed? No  Special Needs: none  Consults Needed: Onc, GI, palliative    Handoff Notes:   Additional Comments: 78 yo with HCC in clinic with jaundice/weakness, abdominal pain, back pain, FTT.  Oncology recommending stopping cancer treatment and transitioning to hospice.  However, he has severe pain and dysphagia that make it such that home hospice to start is not possible.  Onc ? Whether he needs endoscopy prior to discharge to hospice facility vs home hospice.    Jose Luis Han MD

## 2025-01-06 NOTE — PROGRESS NOTES
ONCOLOGY HISTORY: Mr. Wilks is a gentleman with hepatocellular carcinoma.     1.  On 08/11/2001, CT abdomen and pelvis revealed mild hepatosplenomegaly.  -Ultrasound abdomen on 06/14/2010 revealed liver cirrhosis and splenomegaly.  -MRI abdomen in 05/08/2015 revealed liver cirrhosis and splenomegaly.  -EGD on 08/13/2021 revealed small esophageal varices and mild portal hypertensive gastropathy.     2.  On 09/07/2022, CT abdomen and pelvis revealed liver cirrhosis, moderate splenomegaly, ascites and 8 mm hypodense lesion in tail of the pancreas.  -Paracentesis of 4700 mL was done on 09/08/2022.  -MRCP on 10/20/2022 revealed branch intraductal papillary mucinous neoplasm.  It also revealed 3.2 cm nodular area in the liver.     3.  MRI abdomen on 01/10/2023 revealed liver cirrhosis with diffuse hepatic iron deposition. A 4.6 x 2.7 cm LI-RADS 4 lesion within the left hepatic lobe.  There is liver cirrhosis, small volume ascites and periesophageal varices.  -EGD on 01/23/2023 revealed portal hypertensive gastropathy.  -On 01/20/2023, AFP of 3.2.  -CT chest on 01/26/2023 did not reveal any metastatic disease.     4.  Ultrasound-guided liver biopsy was done on 02/02/2023. Hepatocellular carcinoma, moderately differentiated.     5.  Evaluated at [facility]. Not a transplant candidate.  -On 03/16/2023, he had Y-90 embolization of left hepatic lobe lesion.  -On 07/31/2024, MRI of the liver revealed  interval increased in size segment VII observation.  -On 08/30/2024, Y-90 embolization done.  -On 09/30/2024, MRI liver reveals progression.    -Case was discussed at tumor board at [facility].  No further liver directed therapy recommended.     6.  EGD on 04/25/2024 did not reveal any esophageal varices.  There is portal hypertensive gastropathy.  -Colonoscopy on 04/25/2024 revealed multiple nonbleeding colonic angioectasias which was treated with argon plasma coagulation..  There was congested mucosa in  entire colon consistent with portal hypertensive colopathy.     7.  CT chest, abdomen and pelvis on 10/31/2024 did not reveal any metastatic disease.  -Bone scan on 10/31/2024 revealed nonspecific diffuse radiotracer uptake throughout the osseous structures.  -PET scan on 11/19/2024 did not reveal any evidence of metastatic disease.     7.  Atezolizumab and bevacizumab started on 11/25/2024.     Subjective:  Wife came with the patient.     Mr. Wilks is an 79-year-old gentleman with hepatocellular carcinoma.  Patient was started on atezolizumab and bevacizumab on 11/25/2024.  He has received only one cycle.  He was hospitalized between 12/10/2024 and 12/13/2024 for multiple problems including spontaneous bacterial peritonitis and worsening liver failure.    Patient is not doing well.  He is getting weaker.  Wife has to help him with activities of daily living.  Patient also has been having epigastric pain.  He also has dysphagia.  He feels that food is getting stuck.  He also has been having some mid back pain and stiffness.  As per the wife, patient's overall condition is deteriorating.  He eats very little.    No headache.  Some lightheadedness.  No chest pain.  Sometimes feels some tightness in the chest.  He has ascites.  He gets paracentesis.  Some nausea.  No recurrent vomiting.  No bleeding.  No fever.      Patient says that upper abdominal pain and back pain is bothering him.      Exam:  He is alert and oriented x 3.  He looked very weak.  He is jaundiced.  Vitals: Reviewed.  Rest of system not examined.     Labs: CBC and CMP reviewed.     Assessment:  1.  A 79-year-old gentleman with hepatocellular carcinoma.  Patient has received 1 cycle of atezolizumab and bevacizumab on 11/25/2024.  2.  Failure to thrive.  3.  Upper abdominal pain.  4.  Mid back pain  5.  Dysphagia.  6.  Liver cirrhosis.   7.  Normocytic anemia from liver cirrhosis  8.  Chronic thrombocytopenia from liver cirrhosis.  9.  Hyponatremia.      Plan:  -Discontinue atezolizumab and bevacizumab.  -Admit to hospital for further management and hospice transition.     Discussion:  1.  Patient's overall condition is deteriorating.  He is getting weaker.  He needs help with activities of daily living. Appetite is markedly decreased.  He has failure to thrive.    Given deterioration in his overall condition, I discussed regarding stopping treatment for hepatocellular carcinoma and focus on comfort care.  After discussion, patient and wife agreeable for stopping treatment for hepatocellular carcinoma.    I discussed regarding hospice.  Patient and wife agreeable for hospice once we have taken care of abdominal and back pain.    2.  Patient has abdominal pain and back pain.  I would recommend that we get CT scan.  He also has dysphagia.  I would recommend that we get EGD.    Wife mentioned that it is hard for her to take care of him at home as he requires a lot of assistance.    Taking all this into account, I discussed regarding hospitalization.  That way we can get CT scan and EGD done soon and also come up with pain management regimen.  After that, we can transition him to hospice.  Patient and wife agreeable for this plan.    3.  While awaiting admission, will start him on IV fluid in the clinic.    Total visit time of 40 minutes.  Time spent in today's visit, review of chart/investigations today and documentation today.

## 2025-01-06 NOTE — LETTER
1/6/2025      Oskar Wilks  [address]  [place] [address]      Dear Colleague,    Thank you for referring your patient, Oskar Wilks, to the [facility]. Please see a copy of my visit note below.    ONCOLOGY HISTORY: Mr. Wilks is a gentleman with hepatocellular carcinoma.     1.  On 08/11/2001, CT abdomen and pelvis revealed mild hepatosplenomegaly.  -Ultrasound abdomen on 06/14/2010 revealed liver cirrhosis and splenomegaly.  -MRI abdomen in 05/08/2015 revealed liver cirrhosis and splenomegaly.  -EGD on 08/13/2021 revealed small esophageal varices and mild portal hypertensive gastropathy.     2.  On 09/07/2022, CT abdomen and pelvis revealed liver cirrhosis, moderate splenomegaly, ascites and 8 mm hypodense lesion in tail of the pancreas.  -Paracentesis of 4700 mL was done on 09/08/2022.  -MRCP on 10/20/2022 revealed branch intraductal papillary mucinous neoplasm.  It also revealed 3.2 cm nodular area in the liver.     3.  MRI abdomen on 01/10/2023 revealed liver cirrhosis with diffuse hepatic iron deposition. A 4.6 x 2.7 cm LI-RADS 4 lesion within the left hepatic lobe.  There is liver cirrhosis, small volume ascites and periesophageal varices.  -EGD on 01/23/2023 revealed portal hypertensive gastropathy.  -On 01/20/2023, AFP of 3.2.  -CT chest on 01/26/2023 did not reveal any metastatic disease.     4.  Ultrasound-guided liver biopsy was done on 02/02/2023. Hepatocellular carcinoma, moderately differentiated.     5.  Evaluated at [facility]. Not a transplant candidate.  -On 03/16/2023, he had Y-90 embolization of left hepatic lobe lesion.  -On 07/31/2024, MRI of the liver revealed  interval increased in size segment VII observation.  -On 08/30/2024, Y-90 embolization done.  -On 09/30/2024, MRI liver reveals progression.    -Case was discussed at tumor board at [facility].  No further liver directed therapy recommended.     6.  EGD on 04/25/2024  did not reveal any esophageal varices.  There is portal hypertensive gastropathy.  -Colonoscopy on 04/25/2024 revealed multiple nonbleeding colonic angioectasias which was treated with argon plasma coagulation..  There was congested mucosa in entire colon consistent with portal hypertensive colopathy.     7.  CT chest, abdomen and pelvis on 10/31/2024 did not reveal any metastatic disease.  -Bone scan on 10/31/2024 revealed nonspecific diffuse radiotracer uptake throughout the osseous structures.  -PET scan on 11/19/2024 did not reveal any evidence of metastatic disease.     7.  Atezolizumab and bevacizumab started on 11/25/2024.     Subjective:  Wife came with the patient.     Mr. Wilks is an 79-year-old gentleman with hepatocellular carcinoma.  Patient was started on atezolizumab and bevacizumab on 11/25/2024.  He has received only one cycle.  He was hospitalized between 12/10/2024 and 12/13/2024 for multiple problems including spontaneous bacterial peritonitis and worsening liver failure.    Patient is not doing well.  He is getting weaker.  Wife has to help him with activities of daily living.  Patient also has been having epigastric pain.  He also has dysphagia.  He feels that food is getting stuck.  He also has been having some mid back pain and stiffness.  As per the wife, patient's overall condition is deteriorating.  He eats very little.    No headache.  Some lightheadedness.  No chest pain.  Sometimes feels some tightness in the chest.  He has ascites.  He gets paracentesis.  Some nausea.  No recurrent vomiting.  No bleeding.  No fever.      Patient says that upper abdominal pain and back pain is bothering him.      Exam:  He is alert and oriented x 3.  He looked very weak.  He is jaundiced.  Vitals: Reviewed.  Rest of system not examined.     Labs: CBC and CMP reviewed.     Assessment:  1.  A 79-year-old gentleman with hepatocellular carcinoma.  Patient has received 1 cycle of atezolizumab and  bevacizumab on 11/25/2024.  2.  Failure to thrive.  3.  Upper abdominal pain.  4.  Mid back pain  5.  Dysphagia.  6.  Liver cirrhosis.   7.  Normocytic anemia from liver cirrhosis  8.  Chronic thrombocytopenia from liver cirrhosis.  9.  Hyponatremia.     Plan:  -Discontinue atezolizumab and bevacizumab.  -Admit to hospital for further management and hospice transition.     Discussion:  1.  Patient's overall condition is deteriorating.  He is getting weaker.  He needs help with activities of daily living. Appetite is markedly decreased.  He has failure to thrive.    Given deterioration in his overall condition, I discussed regarding stopping treatment for hepatocellular carcinoma and focus on comfort care.  After discussion, patient and wife agreeable for stopping treatment for hepatocellular carcinoma.    I discussed regarding hospice.  Patient and wife agreeable for hospice once we have taken care of abdominal and back pain.    2.  Patient has abdominal pain and back pain.  I would recommend that we get CT scan.  He also has dysphagia.  I would recommend that we get EGD.    Wife mentioned that it is hard for her to take care of him at home as he requires a lot of assistance.    Taking all this into account, I discussed regarding hospitalization.  That way we can get CT scan and EGD done soon and also come up with pain management regimen.  After that, we can transition him to hospice.  Patient and wife agreeable for this plan.    3.  While awaiting admission, will start him on IV fluid in the clinic.    Total visit time of 40 minutes.  Time spent in today's visit, review of chart/investigations today and documentation today.         Again, thank you for allowing me to participate in the care of your patient.        Sincerely,        Saad Ellis MD    Electronically signed

## 2025-01-06 NOTE — H&P
[facility]    History and Physical - Hospitalist Service       Date of Admission:  1/6/2025    Assessment & Plan   Patient is a 79-year-old male with past medical history significant for hepatocellular carcinoma, alcohol induced liver cirrhosis, esophageal varices, portal hypertension, essential hypertension, paroxysmal atrial fibrillation, chronic normocytic anemia, chronic thrombocytopenia, MDD with anxiety, insomnia, moderate aortic stenosis, psoriasis, GERD, history of compression fractures of L1 and L4 and history of alcohol use disorder in remission who was requested to be admitted as a direct admit from [facility] in [place] due to the concern for dysphagia, odynophagia and epigastric pain, unable to eat/take pills, and physical deconditioning with failure to thrive. Of note, recent hospitalization last month for acute on chronic decompensated liver failure with elevated transaminases secondary to infection, thought to be spontaneous bacterial peritonitis with ascites causing liver decompensation.      Dysphagia, Odynophagia with substernal chest pain  Dysphagia, odynophagia x1 week, with sensation food is stuck after he swallows.  He states he can chew and swallow just fine however it feels stuck substernally and he develops pain and discomfort.  This only happens when he attempts to swallow.  No nausea or vomiting.  No significant swelling to his abdomen.  Dr. Ellis discussed with him that he is been weaker and more jaundiced, recommending stopping cancer treatment and transitioning to hospice however given his severe pain dysphagia is unable to take anything by mouth currently.  Oncology recommended tract admission for GI consultation for possible endoscopy and a CT scan for further evaluation.  At the time of admission after long discussion with patient and wife, they do not want to transition to hospice quite yet but would like evaluation for the above pain and to go  from there.  Patient would like to be full code after discussion on admission.  Also discussed case with GI recommend CT scan, hopefully able to drink CT contrast to further evaluate esophagus and hopefully avoid endoscopy.  -Inpatient status  -[facility] GI - ? Need for EGD, appreciate assistance   -Await CT C/A/P results if able to drink oral contrast that would help evaluate esophagus better, adding three-phase study of liver to evaluate HCC   -IV PPI  -[facility] consultation  -CT chest/abd/pelvis w/wo to further evaluate pain  -Pain regimen - PRN oxycodone and IV Dilaudid   -Will avoid APAP in setting of HCC  -IV PPI  -MIVF (Given 1L IV fluids at oncology clinic)  -Hold PTA diuretics for now, reassess daily  -Check Ammonia level  -HOLD on SLP consult, may need involved pending above  -Supportive care  -Trend basic labs  -Patient not presently ready to transition to Hospice, would like to workup this acute problem to gain more insight before transitioning to palliative/hospice approach and determine if he can eat again  -Consider palliative vs hospice consultation pending workup above     ADDENDUM  Unable to swallow pills. Changed meds to IV as able. Can increase BB if needed or add diltiazem gtt. Will add SLP consultation in the interim to evaluate, though likely GI cause.    Failure to thrive  Physical deconditioning  Generalized weakness  Denies issue with chewing/swallowing the food but feels significant pain with sensation of food getting stuck.  On admission patient having difficulty swallowing small pills therefore meds changed to IV  -No acute need for SLP at this time, may need pending above as patient is having difficulty swallowing pills on admission  -Try clear liquid diet for now  -MIVF for now while monitoring closely  -Consider RD consult when able to take PO     History of recent liver failure with elevated transaminases with culture Negative Spontaneous bacterial peritonitis with ascites. December  2024  Hyperbilirubinemia with jaundice  Alcohol-related liver cirrhosis  History of esophageal varices  Portal hypertension  Follows with [facility] at [facility]. Last paracentesis in ED 12/26/24. Had SBP in September 2022. Patient is on Cipro for SBP prophylaxis.  Recent admission for liver failure with elevated transaminases and culture-negative SBP with ascites.  *No significant abd distention on admission, low suspicion for need for paracentesis.   -[facility] GI on board, see their note   -Ceftriaxone 1 g daily while unable to swallow pills and holding PTA Cipro for SBP PPx   -Monitor MELD labs daily  -Hold PTA Aldactone and torsemide while unable to take p.o.  -FTT and appears dry, in favor of IV fluids while n.p.o., monitor fluid status closely  -Intake/output  -Daily weights  -Trend INR, liver tests, bilirubin     Multifocal unresectable hepatocellular carcinoma:  Follows up with [facility] oncology Dr. Ellis. S/p Atezolizumab and Bevacizumab, which are now discontinued.  Patient is weaker not a current candidate for more therapy, per visit day of admission was considering transitioning to hospice however now patient and wife would like to hold off on initiating hospice/palliative care.  Patient is clear he wants to be full code on admission.  -[facility] oncology consultation   -Consid     Paroxysmal atrial fibrillation with RVR  S/p ETHEL guided cardioversion in 2021  Moderate aortic stenosis  Essential hypertension  Recent ECHO showing moderate concentric LVH, normal LV systolic function, EF of 60 to 65%, there is mild mitral stenosis and moderate aortic stenosis.  Left atrium is severely dilated no pericardial effusion.  Normal left ventricular wall motion  -HOLD PTA diltiazem and Toprol XL given unable to swallow pills  -Start low dose scheduled IV metoprolol, IV PRN available, and titrate  -Consider starting diltiazem gtt if goes into Afib with RVR  -Anticoagulation is contraindicated given his  coagulopathy     Chronic anemia  Chronic thrombocytopenia  Most likely related to cirrhosis. Platelets on admission 73, Hgb 9.3 which are at recent baseline.  -Continue to monitor intermittently  -Hematology/oncology following      Mild hyponatremia, chronic  Recent  Na+ 130-134.  -Holding PTA diuretics in favor of IV fluids for now     Chronic stable diagnoses and other pertinent medical history: Appropriate PTA medications will be resumed.   MDD with anxiety  Insomnia  Psoriasis  GERD  History of compression fractures (L1 and L4)      Diet: Clear Liquid Diet as tolerated  DVT Prophylaxis: Pneumatic Compression Devices  Purvis Catheter: Not present  Lines: None     Cardiac Monitoring: None  Code Status: Full Code    Clinically Significant Risk Factors Present on Admission         # Hyponatremia: Lowest Na = 132 mmol/L in last 2 days, will monitor as appropriate  # Hypochloremia: Lowest Cl = 95 mmol/L in last 2 days, will monitor as appropriate      # Hypoalbuminemia: Lowest albumin = 3.1 g/dL at 1/6/2025  8:52 AM, will monitor as appropriate   # Thrombocytopenia: Lowest platelets = 73 in last 2 days, will monitor for bleeding   # Hypertension: Noted on problem list        # Anemia: based on hgb <11           # Financial/Environmental Concerns:           Disposition Plan     Medically Ready for Discharge: Anticipated in 2-4 Days         The patient's care was discussed with the Attending Physician, Dr. Young, Bedside Nurse, Patient, Patient's Family, and [facility] GI Consultant(s).    Mary Jo Medeiros PA-C  Hospitalist Service  [facility]  Securely message with [employer] (more info)  Text page via [employer] Paging/Directory     ______________________________________________________________________    Chief Complaint   Pain with swallowing    History is obtained from the patient, electronic health record, and patient's spouse    History of Present Illness   Patient is a 79-year-old male with past  medical history significant for hepatocellular carcinoma, alcohol induced liver cirrhosis, esophageal varices, portal hypertension, essential hypertension, paroxysmal atrial fibrillation, chronic normocytic anemia, chronic thrombocytopenia, MDD with anxiety, insomnia, moderate aortic stenosis, psoriasis, GERD, history of compression fractures of L1 and L4 and history of alcohol use disorder in remission who was requested to be admitted as a direct admit from [facility] in [place] due to the concern for dysphagia, odynophagia and epigastric pain, unable to eat/take pills, and physical deconditioning with failure to thrive.  Patient has extensive past medical history including hepatocellular cancer and follows closely with Dr. Ellis.  Recent hospitalization last month for acute on chronic decompensated liver failure with elevated transaminases secondary to infection, thought to be spontaneous bacterial peritonitis with ascites causing liver decompensation.  He had recurrent paracentesis on 12/26 in the ED.    On admission the patient and wife report for the past week he has had difficulty with feeling food is stuck after he swallows.  He states he can chew and swallow just fine however it feels stuck substernally and he develops pain and discomfort.  This only happens when he attempts to swallow.  No nausea or vomiting.  No significant swelling to his abdomen.  Dr. Ellis discussed with him that he is been weaker and more jaundiced, recommending stopping cancer treatment and transitioning to hospice however given his severe pain dysphagia is unable to take anything by mouth currently.  Oncology recommended tract admission for GI consultation for possible endoscopy and a CT scan for further evaluation.  At the time of admission after long discussion with patient and wife, they do not want to transition to hospice quite yet but would like evaluation for the above pain and to go from there.  Patient would like  to be full code after discussion on admission.  Also discussed case with GI recommend CT scan, hopefully able to drink CT contrast to further evaluate esophagus and hopefully avoid endoscopy.      Past Medical History    Past Medical History:   Diagnosis Date    Cancer (H)     liver    Cirrhosis of liver (H)     Hip fracture (H)        Past Surgical History   Past Surgical History:   Procedure Laterality Date    ESOPHAGOSCOPY, GASTROSCOPY, DUODENOSCOPY (EGD), COMBINED N/A 4/25/2024    Procedure: Esophagoscopy, gastroscopy, duodenoscopy (EGD), combined;  Surgeon: Moses Bhatt MD;  Location: [facility]    IR LIVER BIOPSY PERCUTANEOUS  02/02/2023    IR PARACENTESIS  9/8/2022    IR SIRT (SELECTIVE INTERNAL RADIO THERAPY)  4/14/2023    IR SIRT (SELECTIVE INTERNAL RADIO THERAPY)  8/26/2024    IR VISCERAL ANGIOGRAM  3/14/2023    IR VISCERAL ANGIOGRAM  8/26/2024    IR VISCERAL EMBOLIZATION  3/16/2023    IR VISCERAL EMBOLIZATION  8/30/2024    ORIF HIP FRACTURE         Prior to Admission Medications   Prior to Admission Medications   Prescriptions Last Dose Informant Patient Reported? Taking?   ammonium lactate (AMLACTIN) 12 % external cream  Spouse/Significant Other Yes Yes   Sig: Apply topically daily as needed.   buPROPion (WELLBUTRIN XL) 300 MG 24 hr tablet 1/5/2025 Spouse/Significant Other Yes Yes   Sig: Take 300 mg by mouth every morning   cholecalciferol 25 MCG (1000 UT) TABS 1/5/2025 Spouse/Significant Other Yes Yes   Sig: Take 1 tablet by mouth daily   ciprofloxacin (CIPRO) 500 MG tablet 1/5/2025 Spouse/Significant Other No Yes   Sig: Take 1 tablet (500 mg) by mouth daily.   diltiazem ER (DILT-XR) 120 MG 24 hr capsule 1/5/2025 Spouse/Significant Other No Yes   Sig: Take 1 capsule (120 mg) by mouth daily.   fluticasone (FLONASE) 50 MCG/ACT nasal spray 1/5/2025 Spouse/Significant Other Yes Yes   Sig: Spray 2 sprays into both nostrils daily   folic acid (FOLVITE) 1 MG tablet 1/5/2025 Spouse/Significant Other Yes  Yes   Sig: Take 1 mg by mouth daily   ketoconazole (NIZORAL) 2 % external cream  Spouse/Significant Other Yes Yes   Sig: Apply topically daily as needed for irritation.   metoprolol succinate ER (TOPROL XL) 25 MG 24 hr tablet 2025 Spouse/Significant Other No Yes   Sig: Take 1 tablet (25 mg) by mouth 2 times daily.   multivitamin w/minerals (THERA-VIT-M) tablet 2025 Spouse/Significant Other Yes Yes   Sig: Take 1 tablet by mouth daily   omeprazole (PRILOSEC) 40 MG DR capsule 2025 Spouse/Significant Other Yes Yes   Sig: Take 40 mg by mouth daily.   ondansetron (ZOFRAN ODT) 4 MG ODT tab  Spouse/Significant Other No Yes   Sig: Take 1 tablet (4 mg) by mouth every 8 hours as needed for nausea.   spironolactone (ALDACTONE) 50 MG tablet 2025 Spouse/Significant Other Yes Yes   Sig: Take 50 mg by mouth daily.   torsemide (DEMADEX) 10 MG tablet 2025 Spouse/Significant Other Yes Yes   Sig: Take 10 mg by mouth daily. 1/2 x 20 mg tab      Facility-Administered Medications: None        Review of Systems    The 10 point Review of Systems is negative other than noted in the HPI or here.     Social History   I have reviewed this patient's social history and updated it with pertinent information if needed.  Social History     Tobacco Use    Smoking status: Former     Current packs/day: 0.00     Types: Cigarettes     Start date: 1977     Quit date: 1985     Years since quittin.0    Smokeless tobacco: Never   Vaping Use    Vaping status: Never Used   Substance Use Topics    Alcohol use: Not Currently     Comment: sober since 2022    Drug use: Never         Allergies   Allergies   Allergen Reactions    Compazine [Prochlorperazine] Anxiety    Penicillins      PN: LW Reaction: Itching, Pruritis    Adhesive Tape Rash        Physical Exam   Vital Signs: Temp: 97.9  F (36.6  C) Temp src: Oral BP: 126/67 Pulse: 111   Resp: 16 SpO2: 100 % O2 Device: None (Room air)    Weight: 0 lbs 0 oz    Physical  Exam    General: Awake, alert, chronically ill-appearing gentleman who appears stated age. Looks comfortable laying in bed. No acute distress.  HEENT: Normocephalic, atraumatic. Extraocular movements intact.  Respiratory: Clear to auscultation bilaterally, no rales, wheezing, or rhonchi.  Cardiovascular: Irregular rate and rhythm, +S1 and S2, systolic murmur auscultated, loudest at right upper sternal border. No peripheral edema.   Gastrointestinal: Soft, non-tender, non-distended. Bowel sounds present.  Skin: Warm, dry. No obvious rashes or lesions on exposed skin. Dorsalis pedis pulses palpable bilaterally.  Musculoskeletal: No joint swelling, erythema or tenderness. Moves all extremities equally.  Neurologic: AAO x3.   Psychiatric: Appropriate mood and affect. No obvious anxiety or depression.      Medical Decision Making       >75 MINUTES SPENT BY ME on the date of service doing chart review, history, exam, documentation & further activities per the note.      Data     I have personally reviewed the following data over the past 24 hrs:    8.4  \   9.3 (L)   / 73 (L)     132 (L) 95 (L) 33.5 (H) /  104 (H)   4.6 26 1.15 \     ALT: 41 AST: 45 AP: 90 TBILI: 10.8 (H)   ALB: 3.1 (L) TOT PROTEIN: 5.5 (L) LIPASE: N/A     TSH: 1.47 T4: N/A A1C: N/A       Imaging results reviewed over the past 24 hrs:   No results found for this or any previous visit (from the past 24 hours).

## 2025-01-06 NOTE — NURSING NOTE
"Oncology Rooming Note    January 6, 2025 9:16 AM   Oskar Wilks is a 79 year old male who presents for:    Chief Complaint   Patient presents with    Oncology Clinic Visit     Initial Vitals: /65   Pulse 62   Temp 97.7  F (36.5  C) (Oral)   Resp 16   Wt 79.7 kg (175 lb 12.8 oz)   SpO2 94%   BMI 23.84 kg/m   Estimated body mass index is 23.84 kg/m  as calculated from the following:    Height as of 1/2/25: 1.829 m (6' 0.01\").    Weight as of this encounter: 79.7 kg (175 lb 12.8 oz). Body surface area is 2.01 meters squared.  Severe Pain (7) Comment: Data Unavailable   No LMP for male patient.  Allergies reviewed: Yes  Medications reviewed: Yes    Medications: Medication refills not needed today.  Pharmacy [facility] entered into [facility]: [id number] - [place], [place] - [address] AT [facility] [address] [place] [address]    Frailty Screening:   Is the patient here for a new oncology consult visit in cancer care? 2. No      Clinical concerns: follow up        Ilana Caban            "

## 2025-01-06 NOTE — PHARMACY-ADMISSION MEDICATION HISTORY
Pharmacist Admission Medication History    Admission medication history is complete. The information provided in this note is only as accurate as the sources available at the time of the update.    Information Source(s): Patient, Family member, and CareEverywhere/SureScripts via in-person and phone    Pertinent Information: Wife states the patient did not have any medications today and the only medication change from last admission was the addition of diltiazem.     Changes made to PTA medication list:  Added: None  Deleted: None  Changed: None    Allergies reviewed with patient and updates made in EHR: no    Medication History Completed By: Zabrina Stover RPH 1/6/2025 2:08 PM    PTA Med List   Medication Sig Last Dose/Taking    ammonium lactate (AMLACTIN) 12 % external cream Apply topically daily as needed. Taking As Needed    buPROPion (WELLBUTRIN XL) 300 MG 24 hr tablet Take 300 mg by mouth every morning 1/5/2025    cholecalciferol 25 MCG (1000 UT) TABS Take 1 tablet by mouth daily 1/5/2025    ciprofloxacin (CIPRO) 500 MG tablet Take 1 tablet (500 mg) by mouth daily. 1/5/2025    diltiazem ER (DILT-XR) 120 MG 24 hr capsule Take 1 capsule (120 mg) by mouth daily. 1/5/2025    fluticasone (FLONASE) 50 MCG/ACT nasal spray Spray 2 sprays into both nostrils daily 1/5/2025    folic acid (FOLVITE) 1 MG tablet Take 1 mg by mouth daily 1/5/2025    ketoconazole (NIZORAL) 2 % external cream Apply topically daily as needed for irritation. Taking As Needed    metoprolol succinate ER (TOPROL XL) 25 MG 24 hr tablet Take 1 tablet (25 mg) by mouth 2 times daily. 1/5/2025    multivitamin w/minerals (THERA-VIT-M) tablet Take 1 tablet by mouth daily 1/5/2025    omeprazole (PRILOSEC) 40 MG DR capsule Take 40 mg by mouth daily. 1/5/2025    ondansetron (ZOFRAN ODT) 4 MG ODT tab Take 1 tablet (4 mg) by mouth every 8 hours as needed for nausea. Taking As Needed    spironolactone (ALDACTONE) 50 MG tablet Take 50 mg by mouth daily. 1/5/2025     torsemide (DEMADEX) 10 MG tablet Take 10 mg by mouth daily. 1/2 x 20 mg tab 1/5/2025
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
## 2025-01-06 NOTE — PROGRESS NOTES
Medical Assistant Note:  Oskar Wilks presents today for blood draw.    Patient seen by provider today: Yes: felix.   present during visit today: Not Applicable.    Concerns: No Concerns.    Procedure:  Lab draw site: lac, Needle type: bf, Gauge: 23.    Post Assessment:  Labs drawn without difficulty: Yes.    Discharge Plan:  Departure Mode: Ambulatory.    Face to Face Time: 5 min.    Josee Cantrell, CMA

## 2025-01-07 ENCOUNTER — APPOINTMENT (OUTPATIENT)
Dept: SPEECH THERAPY | Facility: CLINIC | Age: 80
DRG: 391 | End: 2025-01-07
Attending: PHYSICIAN ASSISTANT
Payer: MEDICARE

## 2025-01-07 ENCOUNTER — APPOINTMENT (OUTPATIENT)
Dept: MRI IMAGING | Facility: CLINIC | Age: 80
DRG: 391 | End: 2025-01-07
Attending: PHYSICIAN ASSISTANT
Payer: MEDICARE

## 2025-01-07 LAB
ALBUMIN SERPL BCG-MCNC: 2.9 G/DL (ref 3.5–5.2)
ALP SERPL-CCNC: 84 U/L (ref 40–150)
ALT SERPL W P-5'-P-CCNC: 39 U/L (ref 0–70)
AMMONIA PLAS-SCNC: 40 UMOL/L (ref 16–60)
ANION GAP SERPL CALCULATED.3IONS-SCNC: 10 MMOL/L (ref 7–15)
AST SERPL W P-5'-P-CCNC: 38 U/L (ref 0–45)
BILIRUB SERPL-MCNC: 11 MG/DL
BUN SERPL-MCNC: 30.5 MG/DL (ref 8–23)
CALCIUM SERPL-MCNC: 8.4 MG/DL (ref 8.8–10.4)
CANCER AG19-9 SERPL IA-ACNC: 52 U/ML
CHLORIDE SERPL-SCNC: 100 MMOL/L (ref 98–107)
CREAT SERPL-MCNC: 0.85 MG/DL (ref 0.67–1.17)
EGFRCR SERPLBLD CKD-EPI 2021: 89 ML/MIN/1.73M2
ERYTHROCYTE [DISTWIDTH] IN BLOOD BY AUTOMATED COUNT: 22.8 % (ref 10–15)
GLUCOSE SERPL-MCNC: 101 MG/DL (ref 70–99)
HCO3 SERPL-SCNC: 23 MMOL/L (ref 22–29)
HCT VFR BLD AUTO: 27.8 % (ref 40–53)
HGB BLD-MCNC: 8.9 G/DL (ref 13.3–17.7)
INR PPP: 2.4 (ref 0.85–1.15)
MCH RBC QN AUTO: 31.6 PG (ref 26.5–33)
MCHC RBC AUTO-ENTMCNC: 32 G/DL (ref 31.5–36.5)
MCV RBC AUTO: 99 FL (ref 78–100)
PLATELET # BLD AUTO: 55 10E3/UL (ref 150–450)
POTASSIUM SERPL-SCNC: 4.5 MMOL/L (ref 3.4–5.3)
PROT SERPL-MCNC: 5.1 G/DL (ref 6.4–8.3)
RBC # BLD AUTO: 2.82 10E6/UL (ref 4.4–5.9)
SODIUM SERPL-SCNC: 133 MMOL/L (ref 135–145)
WBC # BLD AUTO: 7.7 10E3/UL (ref 4–11)

## 2025-01-07 PROCEDURE — 250N000011 HC RX IP 250 OP 636: Performed by: PHYSICIAN ASSISTANT

## 2025-01-07 PROCEDURE — 250N000009 HC RX 250: Performed by: PHYSICIAN ASSISTANT

## 2025-01-07 PROCEDURE — 82140 ASSAY OF AMMONIA: CPT | Performed by: PHYSICIAN ASSISTANT

## 2025-01-07 PROCEDURE — 99233 SBSQ HOSP IP/OBS HIGH 50: CPT | Performed by: PHYSICIAN ASSISTANT

## 2025-01-07 PROCEDURE — A9585 GADOBUTROL INJECTION: HCPCS | Performed by: PHYSICIAN ASSISTANT

## 2025-01-07 PROCEDURE — 250N000013 HC RX MED GY IP 250 OP 250 PS 637: Performed by: PHYSICIAN ASSISTANT

## 2025-01-07 PROCEDURE — 74183 MRI ABD W/O CNTR FLWD CNTR: CPT

## 2025-01-07 PROCEDURE — 85027 COMPLETE CBC AUTOMATED: CPT | Performed by: PHYSICIAN ASSISTANT

## 2025-01-07 PROCEDURE — 92526 ORAL FUNCTION THERAPY: CPT | Mod: GN | Performed by: SPEECH-LANGUAGE PATHOLOGIST

## 2025-01-07 PROCEDURE — 255N000002 HC RX 255 OP 636: Performed by: PHYSICIAN ASSISTANT

## 2025-01-07 PROCEDURE — 258N000003 HC RX IP 258 OP 636: Performed by: PHYSICIAN ASSISTANT

## 2025-01-07 PROCEDURE — 85610 PROTHROMBIN TIME: CPT | Performed by: PHYSICIAN ASSISTANT

## 2025-01-07 PROCEDURE — 92610 EVALUATE SWALLOWING FUNCTION: CPT | Mod: GN | Performed by: SPEECH-LANGUAGE PATHOLOGIST

## 2025-01-07 PROCEDURE — 36415 COLL VENOUS BLD VENIPUNCTURE: CPT | Performed by: PHYSICIAN ASSISTANT

## 2025-01-07 PROCEDURE — 84295 ASSAY OF SERUM SODIUM: CPT | Performed by: PHYSICIAN ASSISTANT

## 2025-01-07 PROCEDURE — 99233 SBSQ HOSP IP/OBS HIGH 50: CPT | Performed by: INTERNAL MEDICINE

## 2025-01-07 PROCEDURE — 120N000001 HC R&B MED SURG/OB

## 2025-01-07 PROCEDURE — 84450 TRANSFERASE (AST) (SGOT): CPT | Performed by: PHYSICIAN ASSISTANT

## 2025-01-07 PROCEDURE — 99222 1ST HOSP IP/OBS MODERATE 55: CPT | Performed by: INTERNAL MEDICINE

## 2025-01-07 RX ORDER — GADOBUTROL 604.72 MG/ML
8 INJECTION INTRAVENOUS ONCE
Status: COMPLETED | OUTPATIENT
Start: 2025-01-07 | End: 2025-01-07

## 2025-01-07 RX ADMIN — CEFTRIAXONE SODIUM 1 G: 1 INJECTION, POWDER, FOR SOLUTION INTRAMUSCULAR; INTRAVENOUS at 17:46

## 2025-01-07 RX ADMIN — HYDROMORPHONE HYDROCHLORIDE 0.4 MG: 0.2 INJECTION, SOLUTION INTRAMUSCULAR; INTRAVENOUS; SUBCUTANEOUS at 17:47

## 2025-01-07 RX ADMIN — SODIUM CHLORIDE: 9 INJECTION, SOLUTION INTRAVENOUS at 04:20

## 2025-01-07 RX ADMIN — HYDROMORPHONE HYDROCHLORIDE 0.4 MG: 0.2 INJECTION, SOLUTION INTRAMUSCULAR; INTRAVENOUS; SUBCUTANEOUS at 11:47

## 2025-01-07 RX ADMIN — ONDANSETRON 4 MG: 2 INJECTION, SOLUTION INTRAMUSCULAR; INTRAVENOUS at 11:47

## 2025-01-07 RX ADMIN — PANTOPRAZOLE SODIUM 40 MG: 40 INJECTION, POWDER, FOR SOLUTION INTRAVENOUS at 10:33

## 2025-01-07 RX ADMIN — METOPROLOL TARTRATE 2.5 MG: 5 INJECTION INTRAVENOUS at 22:29

## 2025-01-07 RX ADMIN — FLUTICASONE PROPIONATE 2 SPRAY: 50 SPRAY, METERED NASAL at 10:37

## 2025-01-07 RX ADMIN — METOPROLOL TARTRATE 2.5 MG: 5 INJECTION INTRAVENOUS at 20:42

## 2025-01-07 RX ADMIN — GADOBUTROL 8 ML: 604.72 INJECTION INTRAVENOUS at 22:06

## 2025-01-07 RX ADMIN — METOPROLOL TARTRATE 2.5 MG: 5 INJECTION INTRAVENOUS at 04:36

## 2025-01-07 ASSESSMENT — ACTIVITIES OF DAILY LIVING (ADL)
ADLS_ACUITY_SCORE: 64
ADLS_ACUITY_SCORE: 61
ADLS_ACUITY_SCORE: 64
ADLS_ACUITY_SCORE: 64
ADLS_ACUITY_SCORE: 61
ADLS_ACUITY_SCORE: 61
ADLS_ACUITY_SCORE: 64
ADLS_ACUITY_SCORE: 61
ADLS_ACUITY_SCORE: 61
ADLS_ACUITY_SCORE: 64
ADLS_ACUITY_SCORE: 61
ADLS_ACUITY_SCORE: 61
ADLS_ACUITY_SCORE: 64
ADLS_ACUITY_SCORE: 64
ADLS_ACUITY_SCORE: 61
ADLS_ACUITY_SCORE: 64

## 2025-01-07 NOTE — PROGRESS NOTES
[facility]    Hospitalist Progress Note    Assessment & Plan   Patient is a 79-year-old male with past medical history significant for hepatocellular carcinoma, alcohol induced liver cirrhosis, esophageal varices, portal hypertension, essential hypertension, paroxysmal atrial fibrillation, chronic normocytic anemia, chronic thrombocytopenia, MDD with anxiety, insomnia, moderate aortic stenosis, psoriasis, GERD, history of compression fractures of L1 and L4 and history of alcohol use disorder in remission who was requested to be admitted as a direct admit from [facility] in [place] due to the concern for dysphagia, odynophagia and epigastric pain, unable to eat/take pills, and physical deconditioning with failure to thrive. Of note, recent hospitalization last month for acute on chronic decompensated liver failure with elevated transaminases secondary to infection, thought to be spontaneous bacterial peritonitis with ascites causing liver decompensation.       Dysphagia, Odynophagia with substernal chest pain  Dysphagia, odynophagia x1 week, with sensation food is stuck after he swallows.  He states he can chew and swallow just fine however it feels stuck substernally and he develops pain and discomfort.  This only happens when he attempts to swallow.  No nausea or vomiting.  No significant swelling to his abdomen.  Dr. Ellis discussed with him that he is been weaker and more jaundiced, recommending stopping cancer treatment and transitioning to hospice however given his severe pain dysphagia is unable to take anything by mouth currently.  Oncology recommended tract admission for GI consultation for possible endoscopy and a CT scan for further evaluation.  At the time of admission after long discussion with patient and wife, they do not want to transition to hospice quite yet but would like evaluation for the above pain and to go from there.  Patient would like to be full code  after discussion on admission.  Also discussed case with GI recommend CT scan, hopefully able to drink CT contrast to further evaluate esophagus and hopefully avoid endoscopy.  Patient is on Cipro for SBP prophylaxis, no signs of spontaneous bacterial peritonitis noted, continue ceftriaxone 1 g while patient is having trouble swallowing prophylactic medications.  -Currently Aldactone and torsemide is on hold, continue IV PPI, plan noted for esophagogram, appreciate input from GI team.  -CT abdomen results noted, possibility of gastritis seen.  Multifocal unresectable hepatocellular carcinoma:  Follows up with [facility] Dr. Ellis. S/p Atezolizumab and Bevacizumab, which are now discontinued.  Patient is weaker not a current candidate for more therapy, per visit day of admission was considering transitioning to hospice however now patient and wife would like to hold off on initiating hospice/palliative care.  Patient is clear he wants to be full code on admission.  -Discussed with [facility], and they suggested that patient possibly should transition to hospice in case he does not feel well after current management.  -mri liver ordered by Gastroenterology  to see progress of the illness.     Paroxysmal atrial fibrillation with RVR  S/p ETHEL guided cardioversion in 2021  Moderate aortic stenosis  Essential hypertension  Recent ECHO showing moderate concentric LVH, normal LV systolic function, EF of 60 to 65%, there is mild mitral stenosis and moderate aortic stenosis.  Left atrium is severely dilated no pericardial effusion.  Normal left ventricular wall motion  -HOLD PTA diltiazem and Toprol XL given unable to swallow pills  -Start low dose scheduled IV metoprolol, IV PRN available, and titrate  -Consider starting diltiazem gtt if goes into Afib with RVR  -Anticoagulation is contraindicated given his coagulopathy     Chronic anemia  Chronic thrombocytopenia  Most likely related to cirrhosis. Platelets on  admission 73, Hgb 9.3 which are at recent baseline.  -Continue to monitor intermittently  -Hematology/oncology following      Mild hyponatremia, chronic  Recent  Na+ 130-134.  -Holding PTA diuretics in favor of IV fluids for now     Chronic stable diagnoses and other pertinent medical history: Appropriate PTA medications will be resumed.   MDD with anxiety  Insomnia  Psoriasis  GERD  History of compression fractures (L1 and L4)  Diet :Clear Liquid Diet  DVT Prophylaxis: Pneumatic Compression Devices  Code Status: Full Code     51 MINUTES SPENT BY ME on the date of service doing chart review, history, exam, documentation & further activities per the note.  Medically Ready for Discharge: Anticipated Tomorrow    Clinically Significant Risk Factors         # Hyponatremia: Lowest Na = 132 mmol/L in last 2 days, will monitor as appropriate  # Hypochloremia: Lowest Cl = 95 mmol/L in last 2 days, will monitor as appropriate      # Hypoalbuminemia: Lowest albumin = 2.9 g/dL at 1/7/2025 11:44 AM, will monitor as appropriate  # Coagulation Defect: INR = 2.40 (Ref range: 0.85 - 1.15) and/or PTT = N/A, will monitor for bleeding  # Thrombocytopenia: Lowest platelets = 55 in last 2 days, will monitor for bleeding   # Hypertension: Noted on problem list                # Financial/Environmental Concerns:           Jackie Pryor MD  Text Page   (7am to 6pm)    Interval History   Patient is able to tolerate clear liquid diet, there is a possibility of gastritis noted in the CT, the liver studies have not changed much.    -Data reviewed today: I reviewed all new labs and imaging results over the last 24 hours. .    Physical Exam     Vital Signs with Ranges  Temp:  [97.3  F (36.3  C)-98.9  F (37.2  C)] 98.9  F (37.2  C)  Pulse:  [113-128] 122  Resp:  [15-18] 18  BP: (114-130)/(66-78) 125/78  SpO2:  [97 %-99 %] 99 %  I/O last 3 completed shifts:  In: 255 [I.V.:255]  Out: -     Constitutional: Awake, alert, cooperative, jaundice  present  Respiratory: Clear to auscultation bilaterally, no crackles or wheezing  Cardiovascular: Regular rate and rhythm, normal S1 and S2, and no murmur noted  GI: Normal bowel sounds, soft, non-distended, non-tender  Skin/Integumen: 2+ edema noted lower extremity  Neuro : moving all 4 extremities, no focal deficit noted     Medications   Current Facility-Administered Medications   Medication Dose Route Frequency Provider Last Rate Last Admin    sodium chloride 0.9 % infusion   Intravenous Continuous Mary Jo Medeiros PA-C 75 mL/hr at 01/07/25 0420 New Bag at 01/07/25 0420     Current Facility-Administered Medications   Medication Dose Route Frequency Provider Last Rate Last Admin    [Held by provider] buPROPion (WELLBUTRIN XL) 24 hr tablet 300 mg  300 mg Oral QAM Mary Jo Medeiros PA-C        cefTRIAXone (ROCEPHIN) 1 g vial to attach to  mL bag for ADULTS or NS 50 mL bag for PEDS  1 g Intravenous Q24H Mary Jo Medeiros PA-C   1 g at 01/06/25 1702    [Held by provider] ciprofloxacin (CIPRO) tablet 500 mg  500 mg Oral Daily Mary Jo Medeiros PA-C        [Held by provider] diltiazem ER COATED BEADS (CARDIZEM CD/CARTIA XT) 24 hr capsule 120 mg  120 mg Oral Daily Mary Jo Medeiros PA-C        fluticasone (FLONASE) 50 MCG/ACT spray 2 spray  2 spray Both Nostrils Daily Mary Jo Medeiros PA-C   2 spray at 01/07/25 1037    [Held by provider] folic acid (FOLVITE) tablet 1 mg  1 mg Oral Daily Mary Jo Medeiros PA-C        [Held by provider] metoprolol succinate ER (TOPROL XL) 24 hr tablet 25 mg  25 mg Oral BID Mary Jo Medeiros PA-C        pantoprazole (PROTONIX) IV push injection 40 mg  40 mg Intravenous Daily with breakfast Laura Josue PA-C   40 mg at 01/07/25 1033    sodium chloride (PF) 0.9% PF flush 3 mL  3 mL Intracatheter Q8H Mary Jo Medeiros PA-C   3 mL at 01/07/25 0439    [Held by provider] spironolactone (ALDACTONE) tablet 50 mg   50 mg Oral Daily Mary Jo Medeiros PA-C        [Held by provider] torsemide (DEMADEX) tablet 10 mg  10 mg Oral Daily Mary Jo Medeiros PA-C           Data   Recent Labs   Lab 01/07/25  1144 01/06/25  0852   WBC 7.7 8.4   HGB 8.9* 9.3*   MCV 99 98   PLT 55* 73*   INR 2.40*  --    * 132*   POTASSIUM 4.5 4.6   CHLORIDE 100 95*   CO2 23 26   BUN 30.5* 33.5*   CR 0.85 1.15   ANIONGAP 10 11   ALIA 8.4* 8.8   * 104*   ALBUMIN 2.9* 3.1*   PROTTOTAL 5.1* 5.5*   BILITOTAL 11.0* 10.8*   ALKPHOS 84 90   ALT 39 41   AST 38 45     Recent Labs   Lab 01/07/25  1144 01/06/25  0852   * 104*       Imaging:   Recent Results (from the past 24 hours)   CT Chest w/o Contrast    Narrative    EXAM: CT CHEST W/O CONTRAST, CT LIVER WO and W CONTRAST  LOCATION: [facility]  DATE: 1/6/2025    INDICATION: Dysphagia  COMPARISON: PET/CT 11/19/2024, CT chest/abdomen/pelvis with contrast 10/31/2024, MRI liver with and without contrast 09/30/2024  TECHNIQUE: CT scan of the chest and abdomen was initially performed without intravenous contrast. Next, CT scan of the abdomen, and pelvis was performed following injection of IV contrast (liver protocol). Multiplanar reformats were obtained. Dose   reduction techniques were used.   CONTRAST: 107 mL Isovue 370    FINDINGS:     LUNGS AND PLEURA: Dependent atelectasis.    MEDIASTINUM/AXILLAE: Periesophageal collaterals. No lymphadenopathy or pericardial effusion. Atherosclerotic calcifications of the aortic arch.    CORONARY ARTERY CALCIFICATION: Moderate.    HEPATOBILIARY: Cirrhotic liver morphology. Similar heterogeneous iron deposition. Similar patchy left hepatic lobe geographic hyperenhancement and hypoenhancing treatment cavity.   ?  Similar appearance of segment 7 hypoenhancing treatment cavity measuring with patchy surrounding parenchymal enhancement.  ?  Previously noted segment IVb arterially enhancing observation measuring 2 cm  with  evidence of washout on the portal venous phase, is less well visualized by CT.   ?  Other small arterially hyperenhancing lesions of the liver better visualized on prior MRI. Scattered hepatic cysts.    Cholelithiasis.    PANCREAS: Somewhat atrophic, otherwise unremarkable.    SPLEEN: Splenomegaly.    ADRENAL GLANDS: Normal.    KIDNEYS/BLADDER: No hydronephrosis. Unremarkable urinary bladder.    BOWEL: Gastric wall thickening with perigastric stranding and fluid, findings which are nonspecific but may be seen with gastritis. Small abdominopelvic ascites.    LYMPH NODES: Normal.    VASCULATURE: Atherosclerotic calcifications of the aortoiliac vessels without evidence of aneurysmal dilatation. Recanalized periumbilical veins. Periesophageal and abdominal portosystemic collaterals.    PELVIC ORGANS: Prominent prostate with central calcifications.    MUSCULOSKELETAL: Small fat-containing right inguinal hernia. Stable compression deformities of L1 and L4. No acute osseous abnormality or suspicious bony lesion.      Impression    IMPRESSION:    1.  Gastric wall thickening with perigastric stranding and fluid, findings which are nonspecific but may be seen with gastritis.  2.  Cirrhosis with portal hypertension as evidenced by splenomegaly, small abdominopelvic ascites, recanalized periumbilical veins, and periesophageal/abdominal portosystemic collaterals.  3.  Previously noted liver lesions including segment 4 LI-RADS 5 lesion, better evaluated by prior MRI. Recommend attention on followup MRI.  4.  Cholelithiasis.       CT Liver wo & w Contrast    Narrative    EXAM: CT CHEST W/O CONTRAST, CT LIVER WO and W CONTRAST  LOCATION: [facility]  DATE: 1/6/2025    INDICATION: Dysphagia  COMPARISON: PET/CT 11/19/2024, CT chest/abdomen/pelvis with contrast 10/31/2024, MRI liver with and without contrast 09/30/2024  TECHNIQUE: CT scan of the chest and abdomen was initially performed without  intravenous contrast. Next, CT scan of the abdomen, and pelvis was performed following injection of IV contrast (liver protocol). Multiplanar reformats were obtained. Dose   reduction techniques were used.   CONTRAST: 107 mL Isovue 370    FINDINGS:     LUNGS AND PLEURA: Dependent atelectasis.    MEDIASTINUM/AXILLAE: Periesophageal collaterals. No lymphadenopathy or pericardial effusion. Atherosclerotic calcifications of the aortic arch.    CORONARY ARTERY CALCIFICATION: Moderate.    HEPATOBILIARY: Cirrhotic liver morphology. Similar heterogeneous iron deposition. Similar patchy left hepatic lobe geographic hyperenhancement and hypoenhancing treatment cavity.   ?  Similar appearance of segment 7 hypoenhancing treatment cavity measuring with patchy surrounding parenchymal enhancement.  ?  Previously noted segment IVb arterially enhancing observation measuring 2 cm with  evidence of washout on the portal venous phase, is less well visualized by CT.   ?  Other small arterially hyperenhancing lesions of the liver better visualized on prior MRI. Scattered hepatic cysts.    Cholelithiasis.    PANCREAS: Somewhat atrophic, otherwise unremarkable.    SPLEEN: Splenomegaly.    ADRENAL GLANDS: Normal.    KIDNEYS/BLADDER: No hydronephrosis. Unremarkable urinary bladder.    BOWEL: Gastric wall thickening with perigastric stranding and fluid, findings which are nonspecific but may be seen with gastritis. Small abdominopelvic ascites.    LYMPH NODES: Normal.    VASCULATURE: Atherosclerotic calcifications of the aortoiliac vessels without evidence of aneurysmal dilatation. Recanalized periumbilical veins. Periesophageal and abdominal portosystemic collaterals.    PELVIC ORGANS: Prominent prostate with central calcifications.    MUSCULOSKELETAL: Small fat-containing right inguinal hernia. Stable compression deformities of L1 and L4. No acute osseous abnormality or suspicious bony lesion.      Impression    IMPRESSION:    1.  Gastric  wall thickening with perigastric stranding and fluid, findings which are nonspecific but may be seen with gastritis.  2.  Cirrhosis with portal hypertension as evidenced by splenomegaly, small abdominopelvic ascites, recanalized periumbilical veins, and periesophageal/abdominal portosystemic collaterals.  3.  Previously noted liver lesions including segment 4 LI-RADS 5 lesion, better evaluated by prior MRI. Recommend attention on followup MRI.  4.  Cholelithiasis.

## 2025-01-07 NOTE — PROGRESS NOTES
"    GASTROENTEROLOGY PROGRESS NOTE    Date of Admission: 1/6/2025  Reason for Admission: dysphagia       ASSESSMENT:  79 year old male with a history of alcohol related cirrhosis with hx of esophageal varices, portal hypertension, multifocal HCC recently started on atezolizumab and bevacizumab (11/25/24), hypertension, chronic anemia, chronic thrombocytopenia, MDD with anxiety, psoriasis, GERD, recent hospitalization 12/10-12/13 for decompensated liver failure and SBP, who presented to [facility] on 1/6/2025 from  Cancer Center with dysphagia, substernal chest pain, and failure to thrive. GI consulted for \"abd pain and dysphagia, ? EGD, HCC, sent from Onc clinic\".      # Dysphagia   # Imaging findings c/f gastritis   # Acid reflux symptoms  # Substernal chest pain  Patient reports progressive dysphagia to solids, liquids, and pills over the last week. He has had worsening acid reflux as well despite taking omeprazole 40mg daily. The dysphagia has caused him to stop eating and drinking water and he has had difficulty keeping his pills down. Last EGD 4/2024 with normal esophagus, portal hypertensive gastropathy, normal duodenum.   CT Chest 1/6 with findings concerning for gastritis. Appreciate SLP evaluation; patient had mild oral and pharyngeal dysphagia at bedside with painful swallowing and globus sensation.      - Obtain esophagram    - IV PPI daily       # Alcohol related liver cirrhosis   # Ascites  # Jaundice, hyperbilirubinemia   # hx of esophageal varices  # Portal hypertension  Patient follows in hepatology clinic with Dr. Laura Neves, last seen 10/29/24, had appt scheduled for day of admission. He has hx of heavy alcohol drinking, but now is sober. He has hx of ascites requiring paracentesis in 2022 with 4.7L removed and recently was admitted to [facility] for SBP 12/11 treated with IV ceftriaxone inpatient and transitioned to oral cipro daily for SBP ppx. Last paracentesis 12/26 with 1.5L removed, cell count not " consistent with SBP and culture with no growth. PTA on torsemide 10mg and spironolactone 50mg daily. CT 1/6 with e/o cirrhosis and portal hypertension (small amount of ascites, splenomegaly, portosystemic collaterals).      MELD 3.0: 27 at 1/7/2025 11:44 AM  MELD-Na: 27 at 1/7/2025 11:44 AM  Calculated from:  Serum Creatinine: 0.85 mg/dL (Using min of 1 mg/dL) at 1/7/2025 11:44 AM  Serum Sodium: 133 mmol/L at 1/7/2025 11:44 AM  Total Bilirubin: 11 mg/dL at 1/7/2025 11:44 AM  Serum Albumin: 2.9 g/dL at 1/7/2025 11:44 AM  INR(ratio): 2.4 at 1/7/2025 11:44 AM  Age at listing (hypothetical): 79 years  Sex: Male at 1/7/2025 11:44 AM    - Obtain MRI liver     - Monitor MELD labs daily   - Continue IV ceftriaxone 1g for SBP ppx   - Diuretics per primary team       # Multifocal HCC   Found to have 4.6cm liver lesion, s/p liver biopsy 2/23 with HCC moderately differentiated. S/p Y-90 embolization of the mass in 3/2023 with follow up imaging showing increase in size s/p repeat Y-90 embolization in 8/30/24 with repeat MRI showing progression again, started on systemic treatment with atezolizumab and bevacizumab 11/25/24, which was stopped at clinic appt on 1/6/25. Follows with Dr. Ellis, last seen day of admission with recommendation to transition to hospice given overall deterioration.      - Appreciate oncology consult   - Noted palliative care consult, appreciate assistance   - Patient and his wife would like to have acute issues evaluated/resolved prior to transitioning to hospice      # Chronic anemia  # Chronic thrombocytopenia   Admit hgb 9.3, around baseline 9-10. Admit plts 73, around baseline .   Last EGD 4/2024 for anemia showed PHG. Colonoscopy 4/24 showed 3 1-2mm polyps - tubular adenomas, as well as colonic AVMs s/p APC and congested mucosa from portal hypertensive colopathy.      - Monitor     Thank you for involving us in this patient's care. Please do not hesitate to contact the GI service with any  "questions or concerns.     Pt care plan discussed with Dr. Santos and Dr. Wu, GI staff physicians     Overall time spent on the date of this encounter preparing to see the patient (including chart review of available notes, clinical status events, imaging and labs); obtaining and/or reviewing separately obtained history; ordering medications, tests or procedures; communicating with other health care professionals; and documenting the above clinical information in the electronic medical record was 50 minutes.    Laura Josue PA-C  GI Service  [facility]  Text Page (Monday-Friday, 8am-4pm)    To page the On-Call [facility] GI provider 24 hours a day, please click [facility] and select GASTROENTEROLOGY MEDICINE ADULT / [facility] in the drop-down menu.   _______________________________________________________________      Subjective: Nursing notes and 24hr events reviewed. Patient reports that his acid reflux symptoms are still there but thinks it is better. He denies abdominal pain, nausea, vomiting. He reports that he has been able to swallow some water, otherwise swishes it and spits it out. Per RN notes overnight, \"patient has difficulty swallowing, mostly swishes water in mouth, then spits it out\". He is tolerating his secretions.     ROS:   4 pt ROS negative unless noted in subjective.     Medications:  Current Facility-Administered Medications   Medication Dose Route Frequency Provider Last Rate Last Admin    [Held by provider] buPROPion (WELLBUTRIN XL) 24 hr tablet 300 mg  300 mg Oral QAM Mary Jo Medeiros PA-C        calcium carbonate (TUMS) chewable tablet 1,000 mg  1,000 mg Oral 4x Daily PRN Mary Jo Medeiros PA-C        cefTRIAXone (ROCEPHIN) 1 g vial to attach to  mL bag for ADULTS or NS 50 mL bag for PEDS  1 g Intravenous Q24H Mary Jo Medeiros PA-C   1 g at 01/06/25 1702    [Held by provider] ciprofloxacin (CIPRO) tablet 500 mg  500 mg Oral " Daily Mary Jo Medeiros PA-C        [Held by provider] diltiazem ER COATED BEADS (CARDIZEM CD/CARTIA XT) 24 hr capsule 120 mg  120 mg Oral Daily Mary Jo Medeiros PA-C        fluticasone (FLONASE) 50 MCG/ACT spray 2 spray  2 spray Both Nostrils Daily Mary Jo Medeiros PA-C        [Held by provider] folic acid (FOLVITE) tablet 1 mg  1 mg Oral Daily Mary Jo Medeiros PA-C        hydrALAZINE (APRESOLINE) tablet 10 mg  10 mg Oral Q4H PRN Mary Jo Medeiros PA-C        Or    hydrALAZINE (APRESOLINE) injection 10 mg  10 mg Intravenous Q4H PRN Mary Jo Medeiros PA-C        HYDROmorphone (DILAUDID) injection 0.2 mg  0.2 mg Intravenous Q2H PRN Mary Jo Medeiros PA-C   0.2 mg at 01/06/25 1350    HYDROmorphone (DILAUDID) injection 0.4 mg  0.4 mg Intravenous Q2H PRN Mary Jo Medeiros PA-C   0.4 mg at 01/06/25 1610    lidocaine (LMX4) cream   Topical Q1H PRN Mary Jo Medeiros PA-C        lidocaine 1 % 0.1-1 mL  0.1-1 mL Other Q1H PRN Mary Jo Medeirso PA-C        metoprolol (LOPRESSOR) injection 2.5 mg  2.5 mg Intravenous Q5 Min PRN Mary Jo Medeiros PA-C        [Held by provider] metoprolol succinate ER (TOPROL XL) 24 hr tablet 25 mg  25 mg Oral BID Mary Jo Medeiros PA-C        naloxone (NARCAN) injection 0.2 mg  0.2 mg Intravenous Q2 Min PRN Jose Luis Han MD        Or    naloxone (NARCAN) injection 0.4 mg  0.4 mg Intravenous Q2 Min PRN Jose Luis Han MD        Or    naloxone (NARCAN) injection 0.2 mg  0.2 mg Intramuscular Q2 Min PRN Jose Luis Han MD        Or    naloxone (NARCAN) injection 0.4 mg  0.4 mg Intramuscular Q2 Min PRN Jose Luis Han MD        ondansetron (ZOFRAN ODT) ODT tab 4 mg  4 mg Oral Q6H PRN Mary Jo Medeiros PA-C        Or    ondansetron (ZOFRAN) injection 4 mg  4 mg Intravenous Q6H PRN Mary Jo Medeiros PA-C        oxyCODONE (ROXICODONE) tablet 5 mg  5 mg  Oral Q4H PRN Mary Jo Medeiros PA-C   5 mg at 01/06/25 1503    oxyCODONE IR (ROXICODONE) half-tab 2.5 mg  2.5 mg Oral Q4H PRN Mary Jo Medeiros PA-C        pantoprazole (PROTONIX) IV push injection 40 mg  40 mg Intravenous Daily with breakfast Laura Jouse PA-C        polyethylene glycol (MIRALAX) Packet 17 g  17 g Oral BID PRN Mary Jo Medeiros PA-C        prochlorperazine (COMPAZINE) injection 5 mg  5 mg Intravenous Q6H PRN Mary Jo Medeiros PA-C        Or    prochlorperazine (COMPAZINE) tablet 5 mg  5 mg Oral Q6H PRN Mary Jo Medeiros PA-C        senna-docusate (SENOKOT-S/PERICOLACE) 8.6-50 MG per tablet 1 tablet  1 tablet Oral BID PRN Mary Jo Medeiros PA-C        Or    senna-docusate (SENOKOT-S/PERICOLACE) 8.6-50 MG per tablet 2 tablet  2 tablet Oral BID PRN Mary Jo Medeiros PA-C        sodium chloride (PF) 0.9% PF flush 3 mL  3 mL Intracatheter Q8H Mary Jo Medeiros PA-C   3 mL at 01/07/25 0439    sodium chloride (PF) 0.9% PF flush 3 mL  3 mL Intracatheter q1 min prn Mary Jo Medeiros PA-C        sodium chloride 0.9 % infusion   Intravenous Continuous Mary Jo Medeiros PA-C 75 mL/hr at 01/07/25 0420 New Bag at 01/07/25 0420    [Held by provider] spironolactone (ALDACTONE) tablet 50 mg  50 mg Oral Daily Mary Jo Medeiros PA-C        [Held by provider] torsemide (DEMADEX) tablet 10 mg  10 mg Oral Daily Mary Jo Medeiros PA-C           Objective:  Blood pressure 125/78, pulse (!) 122, temperature 98.9  F (37.2  C), temperature source Axillary, resp. rate 18, SpO2 99%.  Gen: Lying in bed. Appears lethargic   HEENT: NCAT. Conjunctiva clear   CV: Tachycardic   Resp: Non-labored breathing on room air   Abd: Soft, non tender, no guarding   Skin: Jaundice  Ext: 3+ BLE edema     PROCEDURES:  None    LABS:  [facility]  Recent Labs   Lab 01/06/25  0852   *   POTASSIUM 4.6   CHLORIDE 95*   ALIA 8.8   CO2 26   BUN 33.5*   CR  1.15   *     CBC  Recent Labs   Lab 01/06/25  0852   WBC 8.4   RBC 2.94*   HGB 9.3*   HCT 28.8*   MCV 98   MCH 31.6   MCHC 32.3   RDW 22.7*   PLT 73*     INRNo lab results found in last 7 days.  LFTs  Recent Labs   Lab 01/06/25  0852   ALKPHOS 90   AST 45   ALT 41   BILITOTAL 10.8*   PROTTOTAL 5.5*   ALBUMIN 3.1*      PANCNo lab results found in last 7 days.  CULTURES:   7-Day Micro Results       No results found for the last 168 hours.          IMAGING:  CT C/A/P on 1/6/25   IMPRESSION:  1.  Gastric wall thickening with perigastric stranding and fluid, findings which are nonspecific but may be seen with gastritis.  2.  Cirrhosis with portal hypertension as evidenced by splenomegaly, small abdominopelvic ascites, recanalized periumbilical veins, and periesophageal/abdominal portosystemic collaterals.  3.  Previously noted liver lesions including segment 4 LI-RADS 5 lesion, better evaluated by prior MRI. Recommend attention on followup MRI.  4.  Cholelithiasis.    CT liver w/o and w/ on 1/6/25  IMPRESSION:  1.  Gastric wall thickening with perigastric stranding and fluid, findings which are nonspecific but may be seen with gastritis.  2.  Cirrhosis with portal hypertension as evidenced by splenomegaly, small abdominopelvic ascites, recanalized periumbilical veins, and periesophageal/abdominal portosystemic collaterals.  3.  Previously noted liver lesions including segment 4 LI-RADS 5 lesion, better evaluated by prior MRI. Recommend attention on followup MRI.  4.  Cholelithiasis.

## 2025-01-07 NOTE — CONSULTS
This consult has been requested by Mary Jo Medeiros PA-C for hepatocellular carcinoma.    Wife was at the bedside.    Mr. Wilks is a 79-year-old gentleman with alcoholic liver cirrhosis.  Patient was found to have hepatocellular carcinoma in January 2023.  He was treated with Y-90 embolization x 2.  MRI in September 2024 revealed progression of disease.  He was started on atezolizumab and bevacizumab on 11/25/2024.  He has just received 1 cycle    Patient was scheduled for atezolizumab and bevacizumab yesterday.  I had seen him in the clinic.  Patient was not feeling well.  He was very weak.  He was having epigastric and back pain.  He was also having dysphagia.  He was dehydrated.  Patient was admitted for further management.    Multiple labs done today:  -Bilirubin is 11.0 today.  -CBC reveals anemia with hemoglobin of 8.9 and thrombocytopenia with platelet of 55.  -INR is elevated at 2.4.    CT chest and liver was done yesterday.  1.  Gastric wall thickening with perigastric stranding and fluid, findings which are nonspecific but may be seen with gastritis.  2.  Cirrhosis with portal hypertension as evidenced by splenomegaly, small abdominopelvic ascites, recanalized periumbilical veins, and periesophageal/abdominal portosystemic collaterals.  3.  Previously noted liver lesions including segment 4 LI-RADS 5 lesion, better evaluated by prior MRI. Recommend attention on followup MRI.  4.  Cholelithiasis.     Patient is getting IV fluid.  He is on IV Protonix.  He is also on IV antibiotics.    GI has seen the patient.  They will decide regarding EGD.    Patient feels little better with hydration.  His weakness is less.  No headache.  Some dizziness.  No chest pain.  He continues to have upper abdominal pain.  He also some back pain.  He continues to have dysphagia.  Some nausea.  No recurrent vomiting.  Appetite is decreased.  No bleeding.    Allergies: Reviewed     Medications: Reviewed     Past medical  history:  -Liver cirrhosis  -Hepatocellular carcinoma.  -Right acetabular fracture status post ORIF  -Brain hematoma evacuation     Social history:  -He quit alcohol in July 2022.   -He quit smoking many years ago.     Exam:  He is alert and oriented x 3.  Not in distress.  He looked weak.  Vitals: Reviewed.  ECOG PS of 2.  Rest of system not examined.     Labs: Reviewed.    Assessment:  1.  A 79-year-old gentleman with hepatocellular carcinoma.  Patient received first cycle of atezolizumab and bevacizumab on 11/25/2024.  2.  Liver cirrhosis.  Child Naqvi class C.  3.  Generalized weakness  4.  Dysphagia  5.  Epigastric pain and back pain  6.  Anemia  7.  Thrombocytopenia    Recommendation:  -GI to decide regarding EGD.  -Palliative care consult.  -Given Child-Naqvi class C liver disease, I would not recommend any further treatment for hepatocellular carcinoma.    Discussion:  1.  Discussed regarding hepatocellular carcinoma.  Explained to the patient and wife that I would not recommend any further treatment for hepatocellular carcinoma given Child Naqvi class C liver disease.  Any treatment will cause more harm than benefit.  I would recommend comfort care with hospice.    Patient/wife has some hesitation regarding transitioning to hospice.  After discussion, plan is to get palliative care consult.  They will help with goals of care.    2.  Patient has dysphagia.  He has epigastric pain.  GI has seen the patient.  They will decide regarding EGD.    CT scan reveals gallstones.  I do not think gallstone is causing the epigastric pain.  Will wait for GI evaluation.    Patient will be continued on IV fluid.      3.  Patient has anemia and thrombocytopenia from liver cirrhosis.  Transfusion support will be given as needed.    4.  Patient/wife had few questions which were all answered.  Oncology will continue to follow.  Case discussed with Dr. Pryor.    Thanks for the consult.    Total time spent 45 minutes.  Time spent  in today's visit, review of chart/investigations today, and communicating with other providers and documentation today.

## 2025-01-07 NOTE — PROGRESS NOTES
01/07/25 1006   Appointment Info   Signing Clinician's Name / Credentials (SLP) Mary Jo Fenton MS CCC SLP   General Information   Onset of Illness/Injury or Date of Surgery 01/06/25   Referring Physician Mary Jo Medeiros PA-C   Patient/Family Therapy Goal Statement (SLP) patient reports a few weeks of difficulty swallowing.   Pertinent History of Current Problem Per notes; Oskar Wilks is a 79 year old male with a PMH significant for alcohol related liver disease with hx of esophageal varices, portal hypertension, multifocal HCC recently started on atezolizumab and bevacizumab (11/25/24), hypertension, chronic anemia, chronic thrombocytopenia, MDD with anxiety, psoriasis, GERD, recent hospitalization 12/10-12/13 for decompensated liver failure and SBP who presented to [facility] on 1/6/2025 from  Cancer Center with dysphagia, substernal chest pain and failure to thrive.       Patient reports that for the last week he has had worsening acid reflux symptoms and dysphagia to solids, liquids, and pills. He reports that any time he is lying flat he has a burning in his mid esophagus and substernal chest pain. He is on omeprazole 40mg in the morning and states in the past this has helped control his acid reflux. Initially he would feel like something was getting stuck in his esophagus but would eventually pass. Now he is having significant enough dysphagia that he regurgitates his pills and has to spit them out, he specifically notes that the ciprofloxacin is hard to swallow. His wife reports that he has not been able to eat solid food for several days and has had difficulty swallowing even water recently.   General Observations Alert, pleasant, cooperative and yellow.   Type of Evaluation   Type of Evaluation Swallow Evaluation   Oral Motor   Oral Musculature generally intact   Structural Abnormalities none present   Mucosal Quality adequate   Dentition (Oral Motor)   Dentition (Oral Motor) natural  dentition;adequate dentition   Facial Symmetry (Oral Motor)   Facial Symmetry (Oral Motor) WNL   Lip Function (Oral Motor)   Lip Range of Motion (Oral Motor) WNL   Lip Strength (Oral Motor) WFL   Tongue Function (Oral Motor)   Tongue Strength (Oral Motor) WFL   Tongue Coordination/Speed (Oral Motor) reduced rate   Tongue ROM (Oral Motor) WNL   Jaw Function (Oral Motor)   Jaw Function (Oral Motor) WNL   Cough/Swallow/Gag Reflex (Oral Motor)   Volitional Throat Clear/Cough (Oral Motor) impaired;reduced strength   Volitional Swallow (Oral Motor) mildly delayed;effortful   Vocal Quality/Secretion Management (Oral Motor)   Vocal Quality (Oral Motor) WFL   General Swallowing Observations   Past History of Dysphagia No documented history found.   Respiratory Support room air   Current Diet/Method of Nutritional Intake (General Swallowing Observations, NIS) clear liquid diet   Swallowing Evaluation Clinical swallow evaluation   Clinical Swallow Evaluation   Feeding Assistance set up only required   Clinical Swallow Evaluation Textures Trialed thin liquids;pureed   Clinical Swallow Eval: Thin Liquid Texture Trial   Mode of Presentation, Thin Liquids cup;self-fed   Volume of Liquid or Food Presented 5 swallows of water   Oral Phase of Swallow premature pharyngeal entry   Pharyngeal Phase of Swallow impaired;repeated swallows;coughing/choking;feeling of something stuck in throat   Diagnostic Statement Patient with mild holding of the bolus and delayed swallow no overt sx of aspiration. Coughed x1 when he spit out the liquid and then coughed. No coughing when swallowing. Spit 2 swalllows out.   Clinical Swallow Evaluation: Puree Solid Texture Trial   Mode of Presentation, Puree spoon;self-fed   Volume of Puree Presented 2 teaspoons of pudding   Oral Phase, Puree delayed AP movement;premature pharyngeal entry   Pharyngeal Phase, Puree impaired;repeated swallows;feeling of something stuck in throat   Diagnostic Statement Delayed  AP movement and slight delay with an effortful swallow. Complaints of it sticking in his chest with discomfort.   Esophageal Phase of Swallow   Patient reports or presents with symptoms of esophageal dysphagia Yes   Esophageal comments Food liquids sticking in his chest. last EGD 4/24  was normal.   Swallowing Recommendations   Diet Consistency Recommendations clear liquid diet   Supervision Level for Intake close supervision needed   Mode of Delivery Recommendations bolus size, small;no straws;slow rate of intake   Postural Recommendations none   Medication Administration Recommendations, Swallowing (SLP) Crushed medications as tolerated in small amount of apple sauce or pudding.   Comment, Swallowing Recommendations Patient would benefit from an esophagram.   General Therapy Interventions   Planned Therapy Interventions Dysphagia Treatment   Dysphagia treatment Modified diet education;Instruction of safe swallow strategies   Clinical Impression   Criteria for Skilled Therapeutic Interventions Met (SLP Eval) Yes, treatment indicated   SLP Diagnosis Mild oral and pharyngeal dysphagia with sx of esophageal dysfunction.   Risks & Benefits of therapy have been explained evaluation/treatment results reviewed;care plan/treatment goals reviewed;risks/benefits reviewed;participants voiced agreement with care plan;current/potential barriers reviewed;participants included;patient;spouse/significant other   Clinical Impression Comments Patient presents with mild oral and pharyngeal dysphagia oral motor function was intact. He had mild bolus holding and effortful swallows. He was able to tolerate thin liquids without overt sx of aspiration via the cup. However, did have a cough response when unable to swallow and spit it out and then had a cough response. He was able to tolerate 2 teaspoons of pudding with good oral clearance, but reported globus sensation and pain in his chest region. No vocal changes or other sx of  aspiration.     Recommend: 1. clear liquids for today as tolerated. 2. Patient would benefit from an esophagram verses a video given most complaints are in his chest region. Suspect narrowing, stricture or dealyed emptying.     SLP Total Evaluation Time   Eval: oral/pharyngeal swallow function, clinical swallow Minutes (84528) 25   SLP Goals   Therapy Frequency (SLP Eval) 4 times/week   SLP Predicted Duration/Target Date for Goal Attainment 01/21/25   SLP Goals Swallow   SLP: Safely tolerate diet without signs/symptoms of aspiration Pureed diet;Thin liquids;With use of swallow precautions;With assistance/supervision   Interventions   Interventions Quick Adds Swallowing Dysfunction   Swallowing Intervention   Treatment of Swallowing Dysfunction &/or Oral Function for Feeding Minutes (11309) 10   Symptoms Noted During/After Treatment None   Treatment Detail/Skilled Intervention Patient and his wife provided education on the sx of aspiration and rationale for an esophagram as tolerated. They verbalized understanding.   SLP Discharge Planning   SLP Plan PO trials   SLP Discharge Recommendation home with assist   SLP Rationale for DC Rec Patient's swallow function is below his baseline.   SLP Brief overview of current status  Mild oral and pharyngeal dysphagia at bedside with esophageal component likely causing his painful swallowing with globus sensation in the chest.   SLP Time and Intention   Total Session Time (sum of timed and untimed services) 35

## 2025-01-08 ENCOUNTER — ANESTHESIA EVENT (OUTPATIENT)
Dept: SURGERY | Facility: CLINIC | Age: 80
End: 2025-01-08
Payer: MEDICARE

## 2025-01-08 ENCOUNTER — APPOINTMENT (OUTPATIENT)
Dept: SPEECH THERAPY | Facility: CLINIC | Age: 80
DRG: 391 | End: 2025-01-08
Attending: STUDENT IN AN ORGANIZED HEALTH CARE EDUCATION/TRAINING PROGRAM
Payer: MEDICARE

## 2025-01-08 ENCOUNTER — ANESTHESIA (OUTPATIENT)
Dept: SURGERY | Facility: CLINIC | Age: 80
End: 2025-01-08
Payer: MEDICARE

## 2025-01-08 ENCOUNTER — APPOINTMENT (OUTPATIENT)
Dept: GENERAL RADIOLOGY | Facility: CLINIC | Age: 80
DRG: 391 | End: 2025-01-08
Attending: PHYSICIAN ASSISTANT
Payer: MEDICARE

## 2025-01-08 ENCOUNTER — APPOINTMENT (OUTPATIENT)
Dept: GENERAL RADIOLOGY | Facility: CLINIC | Age: 80
DRG: 391 | End: 2025-01-08
Attending: INTERNAL MEDICINE
Payer: MEDICARE

## 2025-01-08 LAB
ALBUMIN SERPL BCG-MCNC: 2.7 G/DL (ref 3.5–5.2)
ALP SERPL-CCNC: 82 U/L (ref 40–150)
ALT SERPL W P-5'-P-CCNC: 42 U/L (ref 0–70)
ANION GAP SERPL CALCULATED.3IONS-SCNC: 10 MMOL/L (ref 7–15)
AST SERPL W P-5'-P-CCNC: 46 U/L (ref 0–45)
BILIRUB DIRECT SERPL-MCNC: 8.26 MG/DL (ref 0–0.3)
BILIRUB SERPL-MCNC: 11.7 MG/DL
BUN SERPL-MCNC: 29.1 MG/DL (ref 8–23)
CALCIUM SERPL-MCNC: 8.1 MG/DL (ref 8.8–10.4)
CHLORIDE SERPL-SCNC: 99 MMOL/L (ref 98–107)
CREAT SERPL-MCNC: 0.77 MG/DL (ref 0.67–1.17)
EGFRCR SERPLBLD CKD-EPI 2021: >90 ML/MIN/1.73M2
ERYTHROCYTE [DISTWIDTH] IN BLOOD BY AUTOMATED COUNT: 22.6 % (ref 10–15)
GLUCOSE BLDC GLUCOMTR-MCNC: 88 MG/DL (ref 70–99)
GLUCOSE SERPL-MCNC: 127 MG/DL (ref 70–99)
HCO3 SERPL-SCNC: 21 MMOL/L (ref 22–29)
HCT VFR BLD AUTO: 28 % (ref 40–53)
HGB BLD-MCNC: 9 G/DL (ref 13.3–17.7)
INR PPP: 2.57 (ref 0.85–1.15)
MCH RBC QN AUTO: 31.9 PG (ref 26.5–33)
MCHC RBC AUTO-ENTMCNC: 32.1 G/DL (ref 31.5–36.5)
MCV RBC AUTO: 99 FL (ref 78–100)
PLATELET # BLD AUTO: 60 10E3/UL (ref 150–450)
POTASSIUM SERPL-SCNC: 4.5 MMOL/L (ref 3.4–5.3)
PROT SERPL-MCNC: 5 G/DL (ref 6.4–8.3)
RBC # BLD AUTO: 2.82 10E6/UL (ref 4.4–5.9)
SODIUM SERPL-SCNC: 130 MMOL/L (ref 135–145)
UPPER GI ENDOSCOPY: NORMAL
WBC # BLD AUTO: 8.1 10E3/UL (ref 4–11)

## 2025-01-08 PROCEDURE — 999N000141 HC STATISTIC PRE-PROCEDURE NURSING ASSESSMENT: Performed by: INTERNAL MEDICINE

## 2025-01-08 PROCEDURE — 82248 BILIRUBIN DIRECT: CPT | Performed by: PHYSICIAN ASSISTANT

## 2025-01-08 PROCEDURE — 120N000001 HC R&B MED SURG/OB

## 2025-01-08 PROCEDURE — 250N000009 HC RX 250: Performed by: PHYSICIAN ASSISTANT

## 2025-01-08 PROCEDURE — 99233 SBSQ HOSP IP/OBS HIGH 50: CPT | Performed by: INTERNAL MEDICINE

## 2025-01-08 PROCEDURE — 258N000003 HC RX IP 258 OP 636: Performed by: NURSE ANESTHETIST, CERTIFIED REGISTERED

## 2025-01-08 PROCEDURE — 250N000013 HC RX MED GY IP 250 OP 250 PS 637: Performed by: PHYSICIAN ASSISTANT

## 2025-01-08 PROCEDURE — 250N000011 HC RX IP 250 OP 636: Performed by: NURSE ANESTHETIST, CERTIFIED REGISTERED

## 2025-01-08 PROCEDURE — 82565 ASSAY OF CREATININE: CPT | Performed by: INTERNAL MEDICINE

## 2025-01-08 PROCEDURE — 258N000003 HC RX IP 258 OP 636: Performed by: INTERNAL MEDICINE

## 2025-01-08 PROCEDURE — 80076 HEPATIC FUNCTION PANEL: CPT | Performed by: PHYSICIAN ASSISTANT

## 2025-01-08 PROCEDURE — 999N000179 XR SURGERY CARM FLUORO LESS THAN 5 MIN W STILLS

## 2025-01-08 PROCEDURE — 74220 X-RAY XM ESOPHAGUS 1CNTRST: CPT

## 2025-01-08 PROCEDURE — 99418 PROLNG IP/OBS E/M EA 15 MIN: CPT | Performed by: INTERNAL MEDICINE

## 2025-01-08 PROCEDURE — 360N000075 HC SURGERY LEVEL 2, PER MIN: Performed by: INTERNAL MEDICINE

## 2025-01-08 PROCEDURE — 36415 COLL VENOUS BLD VENIPUNCTURE: CPT | Performed by: PHYSICIAN ASSISTANT

## 2025-01-08 PROCEDURE — 99223 1ST HOSP IP/OBS HIGH 75: CPT | Performed by: NURSE PRACTITIONER

## 2025-01-08 PROCEDURE — 370N000017 HC ANESTHESIA TECHNICAL FEE, PER MIN: Performed by: INTERNAL MEDICINE

## 2025-01-08 PROCEDURE — 99232 SBSQ HOSP IP/OBS MODERATE 35: CPT | Performed by: INTERNAL MEDICINE

## 2025-01-08 PROCEDURE — 710N000009 HC RECOVERY PHASE 1, LEVEL 1, PER MIN: Performed by: INTERNAL MEDICINE

## 2025-01-08 PROCEDURE — 250N000009 HC RX 250: Performed by: ANESTHESIOLOGY

## 2025-01-08 PROCEDURE — 99418 PROLNG IP/OBS E/M EA 15 MIN: CPT | Performed by: NURSE PRACTITIONER

## 2025-01-08 PROCEDURE — 250N000025 HC SEVOFLURANE, PER MIN: Performed by: INTERNAL MEDICINE

## 2025-01-08 PROCEDURE — 250N000009 HC RX 250: Performed by: NURSE ANESTHETIST, CERTIFIED REGISTERED

## 2025-01-08 PROCEDURE — 250N000009 HC RX 250: Performed by: INTERNAL MEDICINE

## 2025-01-08 PROCEDURE — 82947 ASSAY GLUCOSE BLOOD QUANT: CPT | Performed by: INTERNAL MEDICINE

## 2025-01-08 PROCEDURE — 250N000011 HC RX IP 250 OP 636: Performed by: PHYSICIAN ASSISTANT

## 2025-01-08 PROCEDURE — 0DJ08ZZ INSPECTION OF UPPER INTESTINAL TRACT, VIA NATURAL OR ARTIFICIAL OPENING ENDOSCOPIC: ICD-10-PCS | Performed by: INTERNAL MEDICINE

## 2025-01-08 PROCEDURE — 85610 PROTHROMBIN TIME: CPT | Performed by: PHYSICIAN ASSISTANT

## 2025-01-08 PROCEDURE — 85027 COMPLETE CBC AUTOMATED: CPT | Performed by: INTERNAL MEDICINE

## 2025-01-08 PROCEDURE — 92526 ORAL FUNCTION THERAPY: CPT | Mod: GN

## 2025-01-08 PROCEDURE — 99232 SBSQ HOSP IP/OBS MODERATE 35: CPT | Performed by: PHYSICIAN ASSISTANT

## 2025-01-08 PROCEDURE — 99207 PR APP CREDIT; MD BILLING SHARED VISIT: CPT | Performed by: NURSE PRACTITIONER

## 2025-01-08 RX ORDER — FENTANYL CITRATE 50 UG/ML
INJECTION, SOLUTION INTRAMUSCULAR; INTRAVENOUS PRN
Status: DISCONTINUED | OUTPATIENT
Start: 2025-01-08 | End: 2025-01-08

## 2025-01-08 RX ORDER — SODIUM CHLORIDE, SODIUM LACTATE, POTASSIUM CHLORIDE, CALCIUM CHLORIDE 600; 310; 30; 20 MG/100ML; MG/100ML; MG/100ML; MG/100ML
INJECTION, SOLUTION INTRAVENOUS CONTINUOUS PRN
Status: DISCONTINUED | OUTPATIENT
Start: 2025-01-08 | End: 2025-01-08

## 2025-01-08 RX ORDER — LIDOCAINE HYDROCHLORIDE 20 MG/ML
INJECTION, SOLUTION INFILTRATION; PERINEURAL PRN
Status: DISCONTINUED | OUTPATIENT
Start: 2025-01-08 | End: 2025-01-08

## 2025-01-08 RX ORDER — PROPOFOL 10 MG/ML
INJECTION, EMULSION INTRAVENOUS PRN
Status: DISCONTINUED | OUTPATIENT
Start: 2025-01-08 | End: 2025-01-08

## 2025-01-08 RX ORDER — METOPROLOL TARTRATE 1 MG/ML
5 INJECTION, SOLUTION INTRAVENOUS EVERY 6 HOURS
Status: DISCONTINUED | OUTPATIENT
Start: 2025-01-08 | End: 2025-01-10 | Stop reason: HOSPADM

## 2025-01-08 RX ORDER — LIDOCAINE 40 MG/G
CREAM TOPICAL
Status: DISCONTINUED | OUTPATIENT
Start: 2025-01-08 | End: 2025-01-08 | Stop reason: HOSPADM

## 2025-01-08 RX ORDER — ONDANSETRON 2 MG/ML
INJECTION INTRAMUSCULAR; INTRAVENOUS PRN
Status: DISCONTINUED | OUTPATIENT
Start: 2025-01-08 | End: 2025-01-08

## 2025-01-08 RX ORDER — METOPROLOL TARTRATE 1 MG/ML
5 INJECTION, SOLUTION INTRAVENOUS ONCE
Status: COMPLETED | OUTPATIENT
Start: 2025-01-08 | End: 2025-01-08

## 2025-01-08 RX ADMIN — FENTANYL CITRATE 100 MCG: 50 INJECTION INTRAMUSCULAR; INTRAVENOUS at 10:50

## 2025-01-08 RX ADMIN — ONDANSETRON 4 MG: 2 INJECTION INTRAMUSCULAR; INTRAVENOUS at 11:13

## 2025-01-08 RX ADMIN — SODIUM CHLORIDE: 9 INJECTION, SOLUTION INTRAVENOUS at 13:16

## 2025-01-08 RX ADMIN — SUCCINYLCHOLINE CHLORIDE 100 MG: 20 INJECTION, SOLUTION INTRAMUSCULAR; INTRAVENOUS; PARENTERAL at 10:50

## 2025-01-08 RX ADMIN — PROPOFOL 150 MG: 10 INJECTION, EMULSION INTRAVENOUS at 10:50

## 2025-01-08 RX ADMIN — METOPROLOL TARTRATE 5 MG: 5 INJECTION INTRAVENOUS at 22:04

## 2025-01-08 RX ADMIN — METOPROLOL TARTRATE 5 MG: 5 INJECTION INTRAVENOUS at 11:38

## 2025-01-08 RX ADMIN — HYDROMORPHONE HYDROCHLORIDE 0.2 MG: 0.2 INJECTION, SOLUTION INTRAMUSCULAR; INTRAVENOUS; SUBCUTANEOUS at 04:55

## 2025-01-08 RX ADMIN — PHENYLEPHRINE HYDROCHLORIDE 200 MCG: 10 INJECTION INTRAVENOUS at 10:50

## 2025-01-08 RX ADMIN — ONDANSETRON 4 MG: 4 TABLET, ORALLY DISINTEGRATING ORAL at 15:51

## 2025-01-08 RX ADMIN — PANTOPRAZOLE SODIUM 40 MG: 40 INJECTION, POWDER, FOR SOLUTION INTRAVENOUS at 21:07

## 2025-01-08 RX ADMIN — METOPROLOL TARTRATE 2.5 MG: 5 INJECTION INTRAVENOUS at 10:09

## 2025-01-08 RX ADMIN — CEFTRIAXONE SODIUM 1 G: 1 INJECTION, POWDER, FOR SOLUTION INTRAMUSCULAR; INTRAVENOUS at 17:41

## 2025-01-08 RX ADMIN — PHENYLEPHRINE HYDROCHLORIDE 200 MCG: 10 INJECTION INTRAVENOUS at 10:53

## 2025-01-08 RX ADMIN — POLYETHYLENE GLYCOL 3350 17 G: 17 POWDER, FOR SOLUTION ORAL at 15:51

## 2025-01-08 RX ADMIN — METOPROLOL TARTRATE 5 MG: 5 INJECTION INTRAVENOUS at 16:51

## 2025-01-08 RX ADMIN — LIDOCAINE HYDROCHLORIDE 100 MG: 20 INJECTION, SOLUTION INFILTRATION; PERINEURAL at 10:50

## 2025-01-08 ASSESSMENT — ACTIVITIES OF DAILY LIVING (ADL)
ADLS_ACUITY_SCORE: 61
ADLS_ACUITY_SCORE: 64
ADLS_ACUITY_SCORE: 62
ADLS_ACUITY_SCORE: 62
ADLS_ACUITY_SCORE: 64
ADLS_ACUITY_SCORE: 64
ADLS_ACUITY_SCORE: 61
ADLS_ACUITY_SCORE: 64
ADLS_ACUITY_SCORE: 61
ADLS_ACUITY_SCORE: 64
ADLS_ACUITY_SCORE: 62
ADLS_ACUITY_SCORE: 61
DEPENDENT_IADLS:: INDEPENDENT
ADLS_ACUITY_SCORE: 62
ADLS_ACUITY_SCORE: 64
ADLS_ACUITY_SCORE: 62
ADLS_ACUITY_SCORE: 61
ADLS_ACUITY_SCORE: 64
ADLS_ACUITY_SCORE: 61
ADLS_ACUITY_SCORE: 61
ADLS_ACUITY_SCORE: 64
ADLS_ACUITY_SCORE: 64

## 2025-01-08 ASSESSMENT — LIFESTYLE VARIABLES: TOBACCO_USE: 1

## 2025-01-08 ASSESSMENT — ENCOUNTER SYMPTOMS: DYSRHYTHMIAS: 1

## 2025-01-08 NOTE — PROGRESS NOTES
Hematology/Oncology Follow-up Note  [facility]    Date of Admission:  12/10/2024   Reason for Consult: failure to thrive, HCC  Primary Oncologist: Dr. Ellis    ASSESSMENT/ PLAN : Oskar Wilks is a 79 year old year old male who presented this hospitalization as a direct admit from the oncology clinic for concerns with failure to thrive.  Dysphagia  Weakness  Pain, epigastric and back   HCC  Hyperbilirubinemia   Thrombocytopenia      PLAN:  CT liver does not show progression of malignancy, but his bilirubin continues to be elevated and performance status poor.   Appreciate GI recommendations on dysphagia evaluation   CBC and CMP daily   Palliative meeting to help with goals of care and symptom management   Will continue to follow     Ascites/ Hyperbilirubinemia   Admission Bilirubin: 10.8  Jaundiced   01/06/2025 CT Liver showing Gastric wall thickening with perigastric stranding and fluid, findings which are nonspecific but may be seen with gastritis. Cirrhosis with portal hypertension as evidenced by splenomegaly, small abdominopelvic ascites, recanalized periumbilical veins, and periesophageal/abdominal portosystemic collaterals. Previously noted liver lesions including segment 4 LI-RADS 5 lesion. Cholelithiasis.    Dysphagia   GI and SLP following   01/06/2025 CT Chest and Liver showing gastritis   01/08/2025 XR esophagram showing normal caliber visualized esophagus with significant abnormal esophageal motility.   01/08/2024 EGD showed LA Grade B reflux esophagitis with no bleeding, remainder of exam normal.     Thrombocytopenia   Secondary to acute on chronic liver disease  Admission PLT 73K  Baseline 70-100K  Not on anticoagulation     Hepatocellular Carcinoma   Diagnosed via liver biopsy on 02/02/2023 Hepatocellular carcinoma, moderately differentiated.   Not a transplant candidate  03/16/2023 Y-90 emobolization of left hepatic lesion  07/31/2024 MRI of the liver revealed  interval increased in size  segment VII observation (1.9 cm, previously 1 cm) now with definite arterial phase hyperenhancement and threshold growth (LR-5).   08/30/2024 Y-90 emobolization   09/30/2024 MRI of the liver showed progression  Case discussed at tumor board, no further liver directed therapies recommended   10/31/2024 CT CAP did not show disease outside of the liver   10/31/2024 Bone scan showing diffuse radiotracer uptake throughout osseous structures   11/19/2024 PET CT showing no bone metastasis   Treatment history   11/25/2024 C1D1 OP atezolimab and bevacizumab       DISCUSSION:  Wife and patient at bedside. We discussed the results of the liver imaging completed yesterday. Though his cancer looks stable, Parag continues to decline with his performance status and other lab values like bilirubin. He is not a good candidate for treatment at this time. We can see how he improves over the next day or two and discuss with palliative care on how patient and family would like to proceed.       PHYSICAL EXAM:  Vital signs:  Temp: 98  F (36.7  C) Temp src: Oral BP: 118/70 Pulse: (!) 123   Resp: 16 SpO2: 97 % O2 Device: None (Room air)          GENERAL/CONSTITUTIONAL: No acute distress.  NEUROLOGIC: Alert, oriented, answers questions appropriately.   INTEGUMENTARY: Jaundice, ecchymosis       Labs Reviewed: CBC, CMP, labs as above         40 minutes spent on the date of the encounter doing review of outside records, review of test results, interpretation of tests, patient visit, coordinating care, and documentation     Sulma INFANTE, CNP  Hematology/Oncology  [facility]     Securely message with Desktime   Pager #[phone]

## 2025-01-08 NOTE — ANESTHESIA POSTPROCEDURE EVALUATION
Patient: Oskar Wilks    Procedure: Procedure(s):  ESOPHAGOGASTRODUODENOSCOPY       Anesthesia Type:  General    Note:  Disposition: Inpatient   Postop Pain Control: Uneventful            Sign Out: Well controlled pain   PONV: No   Neuro/Psych: Uneventful            Sign Out: Acceptable/Baseline neuro status   Airway/Respiratory: Uneventful            Sign Out: Acceptable/Baseline resp. status   CV/Hemodynamics: Uneventful            Sign Out: Acceptable CV status   Other NRE: NONE   DID A NON-ROUTINE EVENT OCCUR? No           Last vitals:  Vitals Value Taken Time   /64 01/08/25 1200   Temp 36.6  C (97.8  F) 01/08/25 1127   Pulse 112 01/08/25 1211   Resp 11 01/08/25 1211   SpO2 98 % 01/08/25 1211   Vitals shown include unfiled device data.    Electronically Signed By: Iban Alvarez MD  January 8, 2025  12:38 PM
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
## 2025-01-08 NOTE — PROGRESS NOTES
"    GASTROENTEROLOGY PROGRESS NOTE    Date of Admission: 1/6/2025  Reason for Admission: Dysphagia       ASSESSMENT:  79 year old male with a history of alcohol related cirrhosis with hx of esophageal varices, portal hypertension, multifocal HCC recently started on atezolizumab and bevacizumab (11/25/24), hypertension, chronic anemia, chronic thrombocytopenia, MDD with anxiety, psoriasis, GERD, recent hospitalization 12/10-12/13 for decompensated liver failure and SBP, who presented to [facility] on 1/6/2025 from  Cancer Center with dysphagia, substernal chest pain, and failure to thrive. GI consulted for \"abd pain and dysphagia, ? EGD, HCC, sent from Onc clinic\".      # Dysphagia / odynophagia   # LA Grade B esophagitis   # Acid reflux symptoms  # Substernal chest pain  Patient reports progressive dysphagia and odynophagia to solids, liquids, and pills over the last week. He has had worsening acid reflux as well despite taking omeprazole 40mg daily. The dysphagia has caused him to stop eating and drinking water and he has had difficulty keeping his pills down. Last EGD 4/2024 with normal esophagus, portal hypertensive gastropathy, normal duodenum.   CT Chest 1/6 with findings concerning for gastritis. Appreciate SLP evaluation; patient had mild oral and pharyngeal dysphagia at bedside with painful swallowing and globus sensation. Esophagram 1/8 with normal caliber visualized esophagus with significant abnormal esophageal motility. EGD 1/8/2025 showed LA Grade B reflux esophagitis with no bleeding, remainder of exam normal. Dysphagia likely from esophagitis and motility related factors.      - FLD, slowly ADAT   - Continue IV PPI BID, transition to PO 20mg BID when able   - Continue SLP therapy      # Alcohol related liver cirrhosis   # Ascites  # Jaundice, hyperbilirubinemia   # hx of esophageal varices  # Portal hypertension  Patient follows in hepatology clinic with Dr. Laura Neves, last seen 10/29/24, had appt " scheduled for day of admission. He has hx of heavy alcohol drinking, but now is sober. He has hx of ascites requiring paracentesis in 2022 with 4.7L removed and recently was admitted to [facility] for SBP 12/11 treated with IV ceftriaxone inpatient and transitioned to oral cipro daily for SBP ppx. Last paracentesis 12/26 with 1.5L removed, cell count not consistent with SBP and culture with no growth. PTA on torsemide 10mg and spironolactone 50mg daily. CT 1/6 with e/o cirrhosis and portal hypertension (small amount of ascites, splenomegaly, portosystemic collaterals). MRI liver 1/7 shows similar/stable findings when compared to MRI 9/2024 and PET scan 11/2024.      Labs pending for today.         - Monitor MELD labs daily   - Continue IV ceftriaxone 1g for SBP ppx, transition back to oral cipro when able   - Diuretics per primary team       # Multifocal HCC   Found to have 4.6cm liver lesion, s/p liver biopsy 2/23 with HCC moderately differentiated. S/p Y-90 embolization of the mass in 3/2023 with follow up imaging showing increase in size s/p repeat Y-90 embolization in 8/30/24 with repeat MRI showing progression again, started on systemic treatment with atezolizumab and bevacizumab 11/25/24, which was stopped at clinic appt on 1/6/25. Follows with Dr. Ellis, last seen day of admission with recommendation to transition to hospice given overall deterioration. Oncology consulted, per their note 1/7, given Child-Naqvi class C liver disease, no further treatment recommended.      - Appreciate oncology recommendations   - Noted palliative care consult, appreciate assistance   - Patient and his wife would like to have acute issues evaluated/resolved prior to transitioning to hospice      # Chronic anemia  # Chronic thrombocytopenia   Admit hgb 9.3, around baseline 9-10. Admit plts 73, around baseline .   Last EGD 4/2024 for anemia showed PHG. Colonoscopy 4/24 showed 3 1-2mm polyps - tubular adenomas, as well as colonic  AVMs s/p APC and congested mucosa from portal hypertensive colopathy.         - Monitor and transfuse as needed     # Malnutrition   Patient with no oral intake for roughly 1 week due to dysphagia, concern for malnutrition in the setting of cirrhosis and HCC.     - RD consult, appreciate input       Thank you for involving us in this patient's care. Please do not hesitate to contact the GI service with any questions or concerns.     Pt care plan discussed with Dr. Martins and Dr. Santos, GI staff physician, and Dr. Pryor, primary team.    Overall time spent on the date of this encounter preparing to see the patient (including chart review of available notes, clinical status events, imaging and labs); obtaining and/or reviewing separately obtained history; ordering medications, tests or procedures; communicating with other health care professionals; and documenting the above clinical information in the electronic medical record was 45 minutes.    Laura Josue PA-C  GI Service  [facility]  Text Page (Monday-Friday, 8am-4pm)    To page the On-Call [facility] GI provider 24 hours a day, please click [facility] and select GASTROENTEROLOGY MEDICINE ADULT / [facility] in the drop-down menu.   _______________________________________________________________      Subjective: Nursing notes and 24hr events reviewed. Per RN note overnight, patient has not had anything by mouth due to oral intake being unsafe. Pt is chewing and swishing ice cubes and spitting them out.     Patient reports ongoing substernal discomfort and acid reflux symptoms. Overall thinks he feels better since admission. No new abdominal pain, nausea, vomiting.     ROS:   4 pt ROS negative unless noted in subjective.     Medications:  Current Facility-Administered Medications   Medication Dose Route Frequency Provider Last Rate Last Admin    barium sulfate (E-Z-PAQUE) oral suspension 60%   Oral Once Laura Josue PA-C        [Held  by provider] buPROPion (WELLBUTRIN XL) 24 hr tablet 300 mg  300 mg Oral QAM Mary Jo Medeiros PA-C        calcium carbonate (TUMS) chewable tablet 1,000 mg  1,000 mg Oral 4x Daily PRN Mary Jo Medeiros PA-C        cefTRIAXone (ROCEPHIN) 1 g vial to attach to  mL bag for ADULTS or NS 50 mL bag for PEDS  1 g Intravenous Q24H Mary Jo Medeiros PA-C   1 g at 01/07/25 1746    [Held by provider] ciprofloxacin (CIPRO) tablet 500 mg  500 mg Oral Daily Mary Jo Medeiros PA-C        [Held by provider] diltiazem ER COATED BEADS (CARDIZEM CD/CARTIA XT) 24 hr capsule 120 mg  120 mg Oral Daily Mary Jo Medeiros PA-C        fluticasone (FLONASE) 50 MCG/ACT spray 2 spray  2 spray Both Nostrils Daily Mary Jo Medeiros PA-C   2 spray at 01/07/25 1037    [Held by provider] folic acid (FOLVITE) tablet 1 mg  1 mg Oral Daily Mary Jo Medeiros PA-C        hydrALAZINE (APRESOLINE) tablet 10 mg  10 mg Oral Q4H PRN Mary Jo Medeiros PA-C        Or    hydrALAZINE (APRESOLINE) injection 10 mg  10 mg Intravenous Q4H PRN Mary Jo Medeiros PA-C        HYDROmorphone (DILAUDID) injection 0.2 mg  0.2 mg Intravenous Q2H PRN Mary Jo Medeiros PA-C   0.2 mg at 01/08/25 0455    HYDROmorphone (DILAUDID) injection 0.4 mg  0.4 mg Intravenous Q2H PRN Mary Jo Medeiros PA-C   0.4 mg at 01/07/25 1747    lidocaine (LMX4) cream   Topical Q1H PRN Mary Jo Medeiros PA-C        lidocaine 1 % 0.1-1 mL  0.1-1 mL Other Q1H PRN Mary Jo Medeiros PA-C        metoprolol (LOPRESSOR) injection 2.5 mg  2.5 mg Intravenous Q5 Min PRN Mary Jo Medeiros PA-C   2.5 mg at 01/07/25 7992    [Held by provider] metoprolol succinate ER (TOPROL XL) 24 hr tablet 25 mg  25 mg Oral BID Mary Jo Medeiros PA-C        naloxone (NARCAN) injection 0.2 mg  0.2 mg Intravenous Q2 Min PRN Jose Luis Han MD        Or    naloxone (NARCAN) injection 0.4 mg  0.4 mg  Intravenous Q2 Min PRN Jose Luis Han MD        Or    naloxone (NARCAN) injection 0.2 mg  0.2 mg Intramuscular Q2 Min PRN Jose Luis Han MD        Or    naloxone (NARCAN) injection 0.4 mg  0.4 mg Intramuscular Q2 Min PRN Jose Luis Han MD        ondansetron (ZOFRAN ODT) ODT tab 4 mg  4 mg Oral Q6H PRN Mary Jo Medeiros PA-C        Or    ondansetron (ZOFRAN) injection 4 mg  4 mg Intravenous Q6H PRN Mary Jo Medeiros PA-C   4 mg at 01/07/25 1147    oxyCODONE (ROXICODONE) tablet 5 mg  5 mg Oral Q4H PRN Mary Jo Medeiros PA-C   5 mg at 01/06/25 1503    oxyCODONE IR (ROXICODONE) half-tab 2.5 mg  2.5 mg Oral Q4H PRN Mary Jo Medeiros PA-C        pantoprazole (PROTONIX) IV push injection 40 mg  40 mg Intravenous Daily with breakfast Laura Josue PA-C   40 mg at 01/07/25 1033    polyethylene glycol (MIRALAX) Packet 17 g  17 g Oral BID PRN Mary Jo Medeiros PA-C        prochlorperazine (COMPAZINE) injection 5 mg  5 mg Intravenous Q6H PRN Mary Jo Medeiros PA-C        Or    prochlorperazine (COMPAZINE) tablet 5 mg  5 mg Oral Q6H PRN Mary Jo Medeiros PA-C        senna-docusate (SENOKOT-S/PERICOLACE) 8.6-50 MG per tablet 1 tablet  1 tablet Oral BID PRN Mary Jo Medeiros PA-C        Or    senna-docusate (SENOKOT-S/PERICOLACE) 8.6-50 MG per tablet 2 tablet  2 tablet Oral BID PRN Mary Jo Medeiros PA-C        sodium chloride (PF) 0.9% PF flush 3 mL  3 mL Intracatheter Q8H Mary Jo Medeiros PA-C   3 mL at 01/08/25 0459    sodium chloride (PF) 0.9% PF flush 3 mL  3 mL Intracatheter q1 min prn Mary Jo Medeiros PA-C        sodium chloride 0.9 % infusion   Intravenous Continuous Jackie Pryor MD 50 mL/hr at 01/07/25 1554 Rate Change at 01/07/25 1554    [Held by provider] spironolactone (ALDACTONE) tablet 50 mg  50 mg Oral Daily Mary Jo Medeiros PA-C        [Held by provider] torsemide (DEMADEX) tablet  10 mg  10 mg Oral Daily Mary Jo Medeiros PA-C           Objective:  Blood pressure 118/70, pulse (!) 123, temperature 98  F (36.7  C), temperature source Oral, resp. rate 16, SpO2 97%.  Gen: Sitting in chair. Appears tired but comfortable  HEENT: NCAT. Conjunctiva clear. Scleral icterus   CV: Tachycardic   Resp: Non-labored breathing  Skin: Jaundice  Mental status/Psych: More A&O today. Asks/answers questions appropriately     PROCEDURES:  EGD on 1/8/2025 with Dr. Martins for dysphagia  Impression:               - LA Grade B reflux esophagitis with no bleeding.                             - Foregut endsocopy otherwise normal. Suspect                             dysphagia from inflammed distal esophagus and                             motility related factors.       LABS:  BMP  Recent Labs   Lab 01/07/25  1144 01/06/25  0852   * 132*   POTASSIUM 4.5 4.6   CHLORIDE 100 95*   ALIA 8.4* 8.8   CO2 23 26   BUN 30.5* 33.5*   CR 0.85 1.15   * 104*     CBC  Recent Labs   Lab 01/07/25  1144 01/06/25  0852   WBC 7.7 8.4   RBC 2.82* 2.94*   HGB 8.9* 9.3*   HCT 27.8* 28.8*   MCV 99 98   MCH 31.6 31.6   MCHC 32.0 32.3   RDW 22.8* 22.7*   PLT 55* 73*     INR  Recent Labs   Lab 01/07/25  1144   INR 2.40*     LFTs  Recent Labs   Lab 01/07/25  1144 01/06/25  0852   ALKPHOS 84 90   AST 38 45   ALT 39 41   BILITOTAL 11.0* 10.8*   PROTTOTAL 5.1* 5.5*   ALBUMIN 2.9* 3.1*      PANCNo lab results found in last 7 days.  CULTURES:   7-Day Micro Results       No results found for the last 168 hours.          IMAGING:  MRI Liver on 1/7/25  IMPRESSION:  1. Similar post-treatment changes of the segment 7 observation, without definite residual viable tumor.  2. Stable size of segment IVb 2 cm observation with arterial phase  hyperenhancement and washout (LR 5).  3. Stable size of arterially hyperenhancing observation at the dome measuring 1.5 cm. Probable washout is present on the current MRI (LR 5).  4. Similar additional  LR 3 observations in segments 5 and 6.  5. Similar left hepatic lobe treatment change without definite viable tumor (LR TR nonviable).  6. Small ascites. Trace right pleural effusion.  7. Cholelithiasis.  8. Mild nonspecific gastric wall thickening reidentified, gastritis is possible.

## 2025-01-08 NOTE — CODE/RAPID RESPONSE
"[facility]    RRT Note  1/8/2025   Time Called: 5:07 PM    RRT called for: Confusion    HPI:    Oskar Wilks is a 79 year old male w/ PMH of hepatocellular carcinoma, alcohol induced cirrhosis with esophageal varices, portal hypertension, essential hypertension, paroxysmal atrial fibrillation, chronic normocytic anemia, chronic thrombocytopenia, MDD with anxiety, insomnia, moderate aortic stenosis, psoriasis, GERD, history of compression fractures of L1 and L4 and history of alcohol use disorder in remission who was admitted on 1/6/2025 as a direct admit from [facility] in [place] due to the concern for dysphagia, odynophagia and epigastric pain, unable to eat/take pills, and physical deconditioning with failure to thrive. Of note, recent hospitalization last month for acute on chronic decompensated liver failure with elevated transaminases secondary to infection.     Pt underwent egd on the a.m. of 01/08/25.   evening RN evaluated pt for the first time, found pt to be confused, slow to answer questions and more \"out of it\" than was signed out from prior RN.Pt had just received 5mg IV metoprolol for A-fib with RVR in the setting of inability to take pills p.o.  He had also been NPO for EGD, hasn't eaten since procedure.  RRT activated because RN found patient to be some slow to respond, could only give his name and date of birth.  there was no loss of consciousness or abnormal movements.    On my arrival patient is supine in bed responding to questions appropriately.  SpO2 97% on room air, /81, rate 116.  He is jaundiced as a 3/6 systolic ejection murmur heard diffusely across his chest and +2 bilateral LE edema.  He is moving all extremities answering questions appropriately and per RN is now as was signed out  In terms of his level of consciousness and level of interaction improved and was consistent w/ details provided at RN handoff.     Assessment & Plan   Acute, " transient confusion  Suspect presyncope, orthostasis, had been NPO for EGD, hasn't eaten since procedure, coupled w/ medication effects of IV metoprolol.  No evidence of bleeding, hgb 2h prior unchanged from prior studies.  Pt already on IV atbx for SBP prophylaxis (has been afebrile). 100mcg fentanyl and propofol for EGD sedation    INTERVENTIONS:  -gluc 88mg/dL  -orthostatic BPs, delta SBP 11 from laying 120/81 to sitting but symptomatic when sitting edge of bed though not hypotensive, 109/79  -stat 1/2 bottle gatorade if able  -encourage po as able if odynophagia, dysphagia allow  -slow position changes  -continue maintenance IVF  -Patient hopes to avoid compression socks, politely declined when I offered    Last 24H PRN:     HYDROmorphone (DILAUDID) injection 0.2 mg, 0.2 mg at 01/08/25 0455    HYDROmorphone (DILAUDID) injection 0.4 mg, 0.4 mg at 01/07/25 1747    ondansetron (ZOFRAN ODT) ODT tab 4 mg, 4 mg at 01/08/25 1551 **OR** ondansetron (ZOFRAN) injection 4 mg, 4 mg at 01/07/25 1147    polyethylene glycol (MIRALAX) Packet 17 g, 17 g at 01/08/25 1551    Working diagnosis: Early orthostasis without hypotension likely from hypovolemia from limited p.o. intake in the setting of odynophagia, dysphagia but symptomatic when in an upright position medication effects from IV metoprolol    At the end of the RRT patient was returned to semirecumbent position standing blood pressures deferred since he was symptomatic while upright sitting.  Level of consciousness now consistent with what was signed out at shift change    disposition continue current level of care    Discussed with and defer further cares to hospitalist attending physician Dr. Jackie Pryor    Code Status: Full Code    Physical Exam   Vital Signs with Ranges:  Temp:  [96.8  F (36  C)-98.5  F (36.9  C)] 97.3  F (36.3  C)  Pulse:  [] 145  Resp:  [1-18] 16  BP: (108-140)/(61-83) 110/61  SpO2:  [95 %-100 %] 97 %  I/O last 3 completed shifts:  In: 1000  [P.O.:300; I.V.:700]  Out: 0     Constitutional: vs as above and/or per EMR  General:  adult pt lying in bed without acute distress   GCS:   Motor 6=Obeys commands   Verbal 5=Oriented   Eye Opening 4=Spontaneous   Total: 15     Neuro: +follows commands wiggle toes and show 2 fingers bilat, face symmetric, tongue midline, speech fluent, able to lift all extremities off the bed  Eyes pupils equal round 3mm briskly reactive bilat, sclera icteric, noninjected, conjunctiva pink,  Head, ENT & mouth: NC/AT, dry oral mucosa  Neck: supple  CV S1S2 3/6 HOMAR heard diffusely across the chest  resp: CTAB upper lobes  gi:normoactive bowel sounds, soft, nontender, nondisteded  Ext: +2 bilateral LE edema, no mottling  Skin: no rashes on exposed skin  Musculoskeletal no bony joint deformities      Data       IMAGING: (X-ray/CT/MRI)   Recent Results (from the past 24 hours)   MR Liver wo & w Contrast    Narrative    EXAM: MR LIVER W/O and W CONTRAST  LOCATION: [facility]  DATE: 1/7/2025    INDICATION: Patient has liver cirrhosis and HCC. Please evaluate if there is progression of HCC  COMPARISON: Liver protocol CT with and without contrast 01/06/2025, PET scan 11/19/2024, CT chest/abdomen/pelvis with contrast 10/31/2024, MRI liver with and without contrast 09/30/2024.  TECHNIQUE: Routine MRI liver protocol including T1 in/out phase, diffusion, multiplane T2, and dynamic T1 with IV contrast.    CONTRAST: 8 mL Gadavist    FINDINGS:     LIVER: Cirrhotic liver morphology. Similar heterogeneous iron deposition. Portal hypertension as evidenced by splenomegaly, small abdominopelvic ascites, recanalized periumbilical veins, and multiple additional portosystemic collaterals.    Similar patchy left hepatic lobe geographic hyperenhancement and hypoenhancing 1.6 cm treatment cavity.   ?  Grossly stable size of segment 7 hypoenhancing treatment cavity measuring 1.5 cm.  ?  Stable size of segment IVb arterially  hyperenhancing observation measuring 2 cm with evidence of washout on the portal venous phase (arterial phase series image 56).   ?  Stable size of hepatic dome observation measuring 1.5 cm with arterial hyperenhancement and probable washout on the current exam (arterial phase series image 78).    Similar faint arterially hyperenhancing observations in segment 5 measuring 1 cm and segment 6 measuring 0.7 cm, again without definite washout.  ?  Scattered hepatic cysts.     ADDITIONAL FINDINGS: Elevated right hemidiaphragm, similar. Small abdominopelvic ascites. Splenomegaly to 19.0 cm (series 3 image 30). Gynecomastia. Trace right pleural effusion. Cholelithiasis. Prominent extrahepatic duct, similar to prior. Also similar   is left hepatic lobe intrahepatic bile duct dilatation. Mild nonspecific gastric wall thickening reidentified, gastritis is possible. Pancreas is somewhat atrophic. Unremarkable adrenal glands. No hydronephrosis. Tiny left renal cyst which requires no   dedicated follow-up. Colonic diverticulosis. No bowel obstruction. No AAA. No lymphadenopathy. Stable compression deformities of L1 and L4. No acute osseous abnormality or suspicious bony lesion.      Impression    IMPRESSION:  1. Similar post-treatment changes of the segment 7 observation, without definite residual viable tumor.  2. Stable size of segment IVb 2 cm observation with arterial phase  hyperenhancement and washout (LR 5).  3. Stable size of arterially hyperenhancing observation at the dome measuring 1.5 cm. Probable washout is present on the current MRI (LR 5).  4. Similar additional LR 3 observations in segments 5 and 6.  5. Similar left hepatic lobe treatment change without definite viable tumor (LR TR nonviable).  6. Small ascites. Trace right pleural effusion.  7. Cholelithiasis.  8. Mild nonspecific gastric wall thickening reidentified, gastritis is possible.     XR Esophagram    Narrative    EXAM: XR ESOPHAGRAM SINGLE  CONTRAST  LOCATION: [facility]  DATE: 1/8/2025    INDICATION: Patient with hx of HCC now with progressive dysphagia to solids and liquids.  COMPARISON: None.  TECHNIQUE: Limited patient mobility. Single contrast barium esophagram performed.    RADIATION DOSE: Total Air Kerma 11.7 mGy    FINDINGS:   ESOPHAGUS: Limited visualization of the upper esophagus due to patient position. Normal caliber esophagus. No strictures. No fixed filling defects. Abnormal esophageal motility with markedly decreased primary and secondary peristalsis. Increased tertiary   peristalsis. This produces significant delayed esophageal barium clearance.      Impression    IMPRESSION:   1.  Limited esophagram secondary to limited patient mobility.  2.  Normal caliber visualized esophagus. Significant abnormal esophageal motility with delayed barium clearance.   XR Surgery ELIAS Fluoro Less Than 5 Min w Stills    Narrative    EXAM: XR SURGERY ELIAS FLUORO LESS THAN 5 MIN W STILLS  LOCATION: [facility]  DATE: 1/8/2025    INDICATION: Upper Endoscopy with Fluoroscopy  COMPARISON: None.    TECHNIQUE: Intraoperative fluoroscopy performed during the patient's procedure.    RADIATION DOSE: Total Air Kerma 1.3413 mGy    FINDINGS: Fluoroscopy used during upper endoscopy. See the operative notes for details.       CBC with Diff:  Recent Labs   Lab Test 01/08/25  1541   WBC 8.1   HGB 9.0*   MCV 99   PLT 60*   INR 2.57*      Comprehensive Metabolic Panel:  Recent Labs   Lab 01/08/25  1708 01/08/25  1541   NA  --  130*   POTASSIUM  --  4.5   CHLORIDE  --  99   CO2  --  21*   ANIONGAP  --  10   GLC 88 127*   BUN  --  29.1*   CR  --  0.77   GFRESTIMATED  --  >90   ALIA  --  8.1*   PROTTOTAL  --  5.0*   ALBUMIN  --  2.7*   BILITOTAL  --  11.7*   ALKPHOS  --  82   AST  --  46*   ALT  --  42       INR:    Recent Labs   Lab Test 01/08/25  1541   INR 2.57*     Time Spent on this Encounter   I spent 30 minutes  (510-540pm) on the unit/floor managing the care of Oskar Wilks. Over 50% of my time was spent counseling the patient and/or coordinating care regarding services listed in this note.    NARESH Benedict [facility]  Hospitalist Service  [facility]  Securely message with [employer] (more info)  Text page via [employer] Paging/Directory

## 2025-01-08 NOTE — PROGRESS NOTES
[facility]    Hospitalist Progress Note    Assessment & Plan   Patient is a 79-year-old male with past medical history significant for hepatocellular carcinoma, alcohol induced liver cirrhosis, esophageal varices, portal hypertension, essential hypertension, paroxysmal atrial fibrillation, chronic normocytic anemia, chronic thrombocytopenia, MDD with anxiety, insomnia, moderate aortic stenosis, psoriasis, GERD, history of compression fractures of L1 and L4 and history of alcohol use disorder in remission who was requested to be admitted as a direct admit from [facility] in [place] due to the concern for dysphagia, odynophagia and epigastric pain, unable to eat/take pills, and physical deconditioning with failure to thrive. Of note, recent hospitalization last month for acute on chronic decompensated liver failure with elevated transaminases secondary to infection.     Dysphagia, Odynophagia with substernal chest pain  Dysphagia, odynophagia x1 week, with sensation food is stuck after he swallows.  He states he can chew and swallow just fine however it feels stuck substernally and he develops pain and discomfort.  This only happens when he attempts to swallow.  No nausea or vomiting.  No significant swelling to his abdomen.  Dr. Ellis discussed with him that he is been weaker and more jaundiced, recommending stopping cancer treatment and transitioning to hospice however given his severe pain dysphagia is unable to take anything by mouth currently.  Oncology recommended tract admission for GI consultation for possible endoscopy and a CT scan for further evaluation.  At the time of admission after long discussion with patient and wife, they do not want to transition to hospice quite yet but would like evaluation for the above pain and to go from there.  Patient would like to be full code after discussion on admission.  Also discussed case with GI recommend CT scan, hopefully able to  drink CT contrast to further evaluate esophagus and hopefully avoid endoscopy.  Patient is on Cipro for SBP prophylaxis, no signs of spontaneous bacterial peritonitis noted, continue ceftriaxone 1 g while patient is having trouble swallowing prophylactic medications.  -Currently Aldactone and torsemide is on hold, continue IV PPI, plan noted for esophagogram, appreciate input from GI team.  1/8 patient had esophagogram, GI team noted possibility of stenosis, patient underwent EGD following that, EGD did not indicate any stenosis, mostly esophagitis, they increase the PPI frequency.  -CT abdomen results noted, possibility of gastritis seen.  -Patient is currently tolerating full liquid diet, nutrition, palliative care to assist patient if patient can tolerate full liquid diet and meets his nutritional requirements he can be discharged as early as 1/8  Multifocal unresectable hepatocellular carcinoma:  Follows up with [place] oncology Dr. Ellis. S/p Atezolizumab and Bevacizumab, which are now discontinued.  Patient is weaker not a current candidate for more therapy, per visit day of admission was considering transitioning to hospice however now patient and wife would like to hold off on initiating hospice/palliative care.  Patient is clear he wants to be full code on admission.  -Discussed with [place] oncology, and they suggested that patient possibly should transition to hospice in case he does not feel well after current management.  -mri liver ordered by Gastroenterology, findings seems to be stable, family is thinking to look for a second opinion.     Paroxysmal atrial fibrillation with RVR  S/p ETHEL guided cardioversion in 2021  Moderate aortic stenosis  Essential hypertension  Recent ECHO showing moderate concentric LVH, normal LV systolic function, EF of 60 to 65%, there is mild mitral stenosis and moderate aortic stenosis.  Left atrium is severely dilated no pericardial effusion.  Normal left ventricular  wall motion  -HOLD PTA diltiazem and Toprol XL given unable to swallow pills  -Start low dose scheduled IV metoprolol, IV PRN available, and titrate  -Consider starting diltiazem gtt if goes into Afib with RVR  -Anticoagulation is contraindicated given his coagulopathy     Chronic anemia  Chronic thrombocytopenia  Most likely related to cirrhosis. Platelets on admission 73, Hgb 9.3 which are at recent baseline.  -Continue to monitor intermittently  -Hematology/oncology following      Mild hyponatremia, chronic  Recent  Na+ 130-134.  -Holding PTA diuretics in favor of IV fluids for now     Chronic stable diagnoses and other pertinent medical history: Appropriate PTA medications will be resumed.   MDD with anxiety  Insomnia  Psoriasis  GERD  History of compression fractures (L1 and L4)  Diet :Full Liquid Diet  DVT Prophylaxis: Pneumatic Compression Devices  Code Status: Full Code     50 MINUTES SPENT BY ME on the date of service doing chart review, history, exam, documentation & further activities per the note.  Medically Ready for Discharge: Anticipated Tomorrow    Clinically Significant Risk Factors         # Hyponatremia: Lowest Na = 133 mmol/L in last 2 days, will monitor as appropriate       # Hypoalbuminemia: Lowest albumin = 2.9 g/dL at 1/7/2025 11:44 AM, will monitor as appropriate    # Coagulation Defect: INR = 2.40 (Ref range: 0.85 - 1.15) and/or PTT = N/A, will monitor for bleeding  # Thrombocytopenia: Lowest platelets = 55 in last 2 days, will monitor for bleeding   # Hypertension: Noted on problem list                # Financial/Environmental Concerns:           Jackie Pryor MD  Text Page   (7am to 6pm)    Interval History   Patient is resting, he had esophagogram done earlier today, GI team is discussing with family about his nutritional requirements.  Will ask dietitian about his intake requirements.  Diet advanced to full liquid diet.    -Data reviewed today: I reviewed all new labs and imaging  results over the last 24 hours. .    Physical Exam     Vital Signs with Ranges  Temp:  [96.8  F (36  C)-98.5  F (36.9  C)] 97.3  F (36.3  C)  Pulse:  [] 137  Resp:  [1-18] 16  BP: (112-140)/(64-83) 126/83  SpO2:  [95 %-100 %] 98 %  No intake/output data recorded.    Constitutional: Awake, alert, cooperative, jaundice present  Respiratory: Clear to auscultation bilaterally, no crackles or wheezing  Cardiovascular: Regular rate and rhythm, normal S1 and S2, and no murmur noted  GI: Normal bowel sounds, soft, non-distended, non-tender  Skin/Integumen: 2+ edema noted lower extremity  Neuro : moving all 4 extremities, no focal deficit noted     Medications   Current Facility-Administered Medications   Medication Dose Route Frequency Provider Last Rate Last Admin    sodium chloride 0.9 % infusion   Intravenous Continuous Jackie Pryor MD 50 mL/hr at 01/08/25 1316 New Bag at 01/08/25 1316     Current Facility-Administered Medications   Medication Dose Route Frequency Provider Last Rate Last Admin    [Held by provider] buPROPion (WELLBUTRIN XL) 24 hr tablet 300 mg  300 mg Oral QAM Mary Jo Medeiros PA-C        cefTRIAXone (ROCEPHIN) 1 g vial to attach to  mL bag for ADULTS or NS 50 mL bag for PEDS  1 g Intravenous Q24H Mary Jo Medeiros PA-C   1 g at 01/07/25 1746    [Held by provider] ciprofloxacin (CIPRO) tablet 500 mg  500 mg Oral Daily Mary Jo Medeiros PA-C        [Held by provider] diltiazem ER COATED BEADS (CARDIZEM CD/CARTIA XT) 24 hr capsule 120 mg  120 mg Oral Daily Mary Jo Medeiros PA-C        fluticasone (FLONASE) 50 MCG/ACT spray 2 spray  2 spray Both Nostrils Daily Mary Jo Medeiros PA-C   2 spray at 01/07/25 1037    [Held by provider] folic acid (FOLVITE) tablet 1 mg  1 mg Oral Daily Mary Jo Medeiros PA-C        [Held by provider] metoprolol succinate ER (TOPROL XL) 24 hr tablet 25 mg  25 mg Oral BID Mary Jo Medeiros PA-C         pantoprazole (PROTONIX) IV push injection 40 mg  40 mg Intravenous BID Laura Josue PA-C        sodium chloride (PF) 0.9% PF flush 3 mL  3 mL Intracatheter Q8H Mary Jo Medeiros PA-C   3 mL at 01/08/25 1316    [Held by provider] spironolactone (ALDACTONE) tablet 50 mg  50 mg Oral Daily Mary Jo Medeiros PA-C        [Held by provider] torsemide (DEMADEX) tablet 10 mg  10 mg Oral Daily Mary Jo Medeiros PA-C           Data   Recent Labs   Lab 01/07/25  1144 01/06/25  0852   WBC 7.7 8.4   HGB 8.9* 9.3*   MCV 99 98   PLT 55* 73*   INR 2.40*  --    * 132*   POTASSIUM 4.5 4.6   CHLORIDE 100 95*   CO2 23 26   BUN 30.5* 33.5*   CR 0.85 1.15   ANIONGAP 10 11   ALIA 8.4* 8.8   * 104*   ALBUMIN 2.9* 3.1*   PROTTOTAL 5.1* 5.5*   BILITOTAL 11.0* 10.8*   ALKPHOS 84 90   ALT 39 41   AST 38 45     Recent Labs   Lab 01/07/25  1144 01/06/25  0852   * 104*       Imaging:   Recent Results (from the past 24 hours)   MR Liver wo & w Contrast    Narrative    EXAM: MR LIVER W/O and W CONTRAST  LOCATION: [facility]  DATE: 1/7/2025    INDICATION: Patient has liver cirrhosis and HCC. Please evaluate if there is progression of HCC  COMPARISON: Liver protocol CT with and without contrast 01/06/2025, PET scan 11/19/2024, CT chest/abdomen/pelvis with contrast 10/31/2024, MRI liver with and without contrast 09/30/2024.  TECHNIQUE: Routine MRI liver protocol including T1 in/out phase, diffusion, multiplane T2, and dynamic T1 with IV contrast.    CONTRAST: 8 mL Gadavist    FINDINGS:     LIVER: Cirrhotic liver morphology. Similar heterogeneous iron deposition. Portal hypertension as evidenced by splenomegaly, small abdominopelvic ascites, recanalized periumbilical veins, and multiple additional portosystemic collaterals.    Similar patchy left hepatic lobe geographic hyperenhancement and hypoenhancing 1.6 cm treatment cavity.   ?  Grossly stable size of segment 7 hypoenhancing  treatment cavity measuring 1.5 cm.  ?  Stable size of segment IVb arterially hyperenhancing observation measuring 2 cm with evidence of washout on the portal venous phase (arterial phase series image 56).   ?  Stable size of hepatic dome observation measuring 1.5 cm with arterial hyperenhancement and probable washout on the current exam (arterial phase series image 78).    Similar faint arterially hyperenhancing observations in segment 5 measuring 1 cm and segment 6 measuring 0.7 cm, again without definite washout.  ?  Scattered hepatic cysts.     ADDITIONAL FINDINGS: Elevated right hemidiaphragm, similar. Small abdominopelvic ascites. Splenomegaly to 19.0 cm (series 3 image 30). Gynecomastia. Trace right pleural effusion. Cholelithiasis. Prominent extrahepatic duct, similar to prior. Also similar   is left hepatic lobe intrahepatic bile duct dilatation. Mild nonspecific gastric wall thickening reidentified, gastritis is possible. Pancreas is somewhat atrophic. Unremarkable adrenal glands. No hydronephrosis. Tiny left renal cyst which requires no   dedicated follow-up. Colonic diverticulosis. No bowel obstruction. No AAA. No lymphadenopathy. Stable compression deformities of L1 and L4. No acute osseous abnormality or suspicious bony lesion.      Impression    IMPRESSION:  1. Similar post-treatment changes of the segment 7 observation, without definite residual viable tumor.  2. Stable size of segment IVb 2 cm observation with arterial phase  hyperenhancement and washout (LR 5).  3. Stable size of arterially hyperenhancing observation at the dome measuring 1.5 cm. Probable washout is present on the current MRI (LR 5).  4. Similar additional LR 3 observations in segments 5 and 6.  5. Similar left hepatic lobe treatment change without definite viable tumor (LR TR nonviable).  6. Small ascites. Trace right pleural effusion.  7. Cholelithiasis.  8. Mild nonspecific gastric wall thickening reidentified, gastritis is  possible.     XR Esophagram    Narrative    EXAM: XR ESOPHAGRAM SINGLE CONTRAST  LOCATION: [facility]  DATE: 1/8/2025    INDICATION: Patient with hx of HCC now with progressive dysphagia to solids and liquids.  COMPARISON: None.  TECHNIQUE: Limited patient mobility. Single contrast barium esophagram performed.    RADIATION DOSE: Total Air Kerma 11.7 mGy    FINDINGS:   ESOPHAGUS: Limited visualization of the upper esophagus due to patient position. Normal caliber esophagus. No strictures. No fixed filling defects. Abnormal esophageal motility with markedly decreased primary and secondary peristalsis. Increased tertiary   peristalsis. This produces significant delayed esophageal barium clearance.      Impression    IMPRESSION:   1.  Limited esophagram secondary to limited patient mobility.  2.  Normal caliber visualized esophagus. Significant abnormal esophageal motility with delayed barium clearance.   XR Surgery ELIAS Fluoro Less Than 5 Min w Stills    Narrative    EXAM: XR SURGERY ELIAS FLUORO LESS THAN 5 MIN W STILLS  LOCATION: [facility]  DATE: 1/8/2025    INDICATION: Upper Endoscopy with Fluoroscopy  COMPARISON: None.    TECHNIQUE: Intraoperative fluoroscopy performed during the patient's procedure.    RADIATION DOSE: Total Air Kerma 1.3413 mGy    FINDINGS: Fluoroscopy used during upper endoscopy. See the operative notes for details.

## 2025-01-08 NOTE — ANESTHESIA PREPROCEDURE EVALUATION
Anesthesia Pre-Procedure Evaluation    Patient: Oskar Wilks   MRN: [id number] : 1945        Procedure : Procedure(s):  ESOPHAGOGASTRODUODENOSCOPY WITH POSSIBLE STENT PLACEMENT          Past Medical History:   Diagnosis Date    Cancer (H)     liver    Cirrhosis of liver (H)     Hip fracture (H)       Past Surgical History:   Procedure Laterality Date    ESOPHAGOSCOPY, GASTROSCOPY, DUODENOSCOPY (EGD), COMBINED N/A 2024    Procedure: Esophagoscopy, gastroscopy, duodenoscopy (EGD), combined;  Surgeon: Moses Bhatt MD;  Location: [facility]    IR LIVER BIOPSY PERCUTANEOUS  2023    IR PARACENTESIS  2022    IR SIRT (SELECTIVE INTERNAL RADIO THERAPY)  2023    IR SIRT (SELECTIVE INTERNAL RADIO THERAPY)  2024    IR VISCERAL ANGIOGRAM  3/14/2023    IR VISCERAL ANGIOGRAM  2024    IR VISCERAL EMBOLIZATION  3/16/2023    IR VISCERAL EMBOLIZATION  2024    ORIF HIP FRACTURE        Allergies   Allergen Reactions    Compazine [Prochlorperazine] Anxiety    Penicillins      PN: LW Reaction: Itching, Pruritis    Adhesive Tape Rash      Social History     Tobacco Use    Smoking status: Former     Current packs/day: 0.00     Types: Cigarettes     Start date: 1977     Quit date: 1985     Years since quittin.0    Smokeless tobacco: Never   Substance Use Topics    Alcohol use: Not Currently     Comment: sober since 2022      Wt Readings from Last 1 Encounters:   25 79.7 kg (175 lb 12.8 oz)        Anesthesia Evaluation            ROS/MED HX  ENT/Pulmonary:     (+)                tobacco use, Past use,                    (-) sleep apnea   Neurologic:       Cardiovascular:     (+)  hypertension- -   -  - -                        dysrhythmias, a-fib,  valvular problems/murmurs type: AS     Previous cardiac testing   Echo: Date: 12/10/24 Results:  Interpretation Summary     There is moderate concentric left ventricular hypertrophy.  Left ventricular systolic function is  normal.  The visual ejection fraction is 60-65%.  The right ventricle is normal in structure, function and size.  There is mild mitral stenosis from mitral annular calcification. Mean gradient  5.4 mmHg at HR of 106 bpm.  Moderate valvular aortic stenosis. Mean gradient 30 mmHg. SHARMIN 1.1 cm2 by  continuity equation. Low normal stroke volume index.  The left atrium is severely dilated.  The inferior vena cava was normal in size with preserved respiratory  variability.  There is no pericardial effusion.  The rhythm was atrial fibrillation.     No prior study for comparison.     ______________________________________________________________________________  Left Ventricle  The left ventricle is normal in structure, function and size. There is  moderate concentric left ventricular hypertrophy. Left ventricular systolic  function is normal. The visual ejection fraction is 60-65%. Left ventricular  diastolic function is indeterminate. Normal left ventricular wall motion.     Right Ventricle  The right ventricle is normal in structure, function and size.     Atria  The left atrium is severely dilated. Right atrial size is normal.     Mitral Valve  There is severe mitral annular calcification. There is mild (1+) mitral  regurgitation. The mean mitral valve gradient is 5.4 mmHg. There is mild  mitral stenosis.     Tricuspid Valve  The tricuspid valve is normal in structure and function. Right ventricular  systolic pressure could not be approximated due to inadequate tricuspid  regurgitation.     Aortic Valve  The aortic valve is trileaflet with aortic valve sclerosis. The peak AoV  pressure gradient is 53.0 mmHg. The mean AoV pressure gradient is 30.5 mmHg.  The calculated aortic valve are is 1.1 cm^2. Moderate valvular aortic  stenosis.     Pulmonic Valve  The pulmonic valve is normal in structure and function.     Vessels  The aortic root is normal size. Normal size ascending aorta. The inferior vena  cava was normal in  "size with preserved respiratory variability.     Pericardium  There is no pericardial effusion.     Rhythm  The rhythm was atrial fibrillation.    Stress Test:  Date: Results:    ECG Reviewed:  Date: 1/2/25 Results:  AFib  Cath:  Date: Results:      METS/Exercise Tolerance:     Hematologic: Comments: Thrombocytopenia       Musculoskeletal:       GI/Hepatic: Comment: Portal HTN, cirrhosis, SBP    (+) GERD,            liver disease,       Renal/Genitourinary:       Endo:       Psychiatric/Substance Use:     (+) psychiatric history anxiety and depression alcohol abuse      Infectious Disease:       Malignancy:   (+) Malignancy, History of Other.Other CA HCC status post.    Other:            Physical Exam    Airway        Mallampati: II       Respiratory Devices and Support         Dental       (+) Minor Abnormalities - some fillings, tiny chips      Cardiovascular          Rhythm and rate: irregular and tachycardia     Pulmonary   pulmonary exam normal                OUTSIDE LABS:  CBC:   Lab Results   Component Value Date    WBC 7.7 01/07/2025    WBC 8.4 01/06/2025    HGB 8.9 (L) 01/07/2025    HGB 9.3 (L) 01/06/2025    HCT 27.8 (L) 01/07/2025    HCT 28.8 (L) 01/06/2025    PLT 55 (L) 01/07/2025    PLT 73 (L) 01/06/2025     BMP:   Lab Results   Component Value Date     (L) 01/07/2025     (L) 01/06/2025    POTASSIUM 4.5 01/07/2025    POTASSIUM 4.6 01/06/2025    CHLORIDE 100 01/07/2025    CHLORIDE 95 (L) 01/06/2025    CO2 23 01/07/2025    CO2 26 01/06/2025    BUN 30.5 (H) 01/07/2025    BUN 33.5 (H) 01/06/2025    CR 0.85 01/07/2025    CR 1.15 01/06/2025     (H) 01/07/2025     (H) 01/06/2025     COAGS:   Lab Results   Component Value Date    INR 2.40 (H) 01/07/2025     POC: No results found for: \"BGM\", \"HCG\", \"HCGS\"  HEPATIC:   Lab Results   Component Value Date    ALBUMIN 2.9 (L) 01/07/2025    PROTTOTAL 5.1 (L) 01/07/2025    ALT 39 01/07/2025    AST 38 01/07/2025    ALKPHOS 84 01/07/2025    " BILITOTAL 11.0 (H) 01/07/2025    LUNA 40 01/07/2025     OTHER:   Lab Results   Component Value Date    LACT 3.3 (H) 12/26/2024    ALIA 8.4 (L) 01/07/2025    PHOS 3.2 12/26/2024    MAG 1.8 12/26/2024    TSH 1.47 01/06/2025       Anesthesia Plan    ASA Status:  3    NPO Status:  NPO Appropriate    Anesthesia Type: General.     - Airway: ETT   Induction: Intravenous, RSI, Propofol.   Maintenance: Inhalation.   Techniques and Equipment:     - Airway: Video-Laryngoscope       Consents    Anesthesia Plan(s) and associated risks, benefits, and realistic alternatives discussed. Questions answered and patient/representative(s) expressed understanding.     - Discussed:     - Discussed with:  Patient, Spouse            Postoperative Care    Pain management: IV analgesics, Oral pain medications.   PONV prophylaxis: Ondansetron (or other 5HT-3)     Comments:               Iban Alvarez MD    I have reviewed the pertinent notes and labs in the chart from the past 30 days and (re)examined the patient.  Any updates or changes from those notes are reflected in this note.     # Hyponatremia: Lowest Na = 133 mmol/L in last 2 days, will monitor as appropriate       # Hypoalbuminemia: Lowest albumin = 2.9 g/dL at 1/7/2025 11:44 AM, will monitor as appropriate  # Coagulation Defect: INR = 2.40 (Ref range: 0.85 - 1.15) and/or PTT = N/A, will monitor for bleeding  # Thrombocytopenia: Lowest platelets = 55 in last 2 days, will monitor for bleeding   # Hypertension: Noted on problem list                # Financial/Environmental Concerns:

## 2025-01-08 NOTE — ANESTHESIA PROCEDURE NOTES
Airway       Patient location during procedure: OR       Procedure Start/Stop Times: 1/8/2025 10:51 AM  Staff -        CRNA: Sherley Moore APRN CRNA       Performed By: CRNA  Consent for Airway        Urgency: elective  Indications and Patient Condition       Indications for airway management: hardik-procedural       Induction type:RSI       Mask difficulty assessment: 0 - not attempted    Final Airway Details       Final airway type: endotracheal airway       Successful airway: ETT - single  Endotracheal Airway Details        ETT size (mm): 8.0       Successful intubation technique: video laryngoscopy       VL Blade Size: Hill 3       Grade View of Cords: 1       Adjucts: stylet       Position: Right       Measured from: gums/teeth       Secured at (cm): 23       Bite block used: None    Post intubation assessment        Placement verified by: capnometry, equal breath sounds and chest rise        Number of attempts at approach: 1       Number of other approaches attempted: 0       Secured with: tape       Ease of procedure: easy       Dentition: Intact and Unchanged    Medication(s) Administered   Medication Administration Time: 1/8/2025 10:51 AM

## 2025-01-08 NOTE — ANESTHESIA CARE TRANSFER NOTE
Patient: Oskar Wilks    Procedure: Procedure(s):  ESOPHAGOGASTRODUODENOSCOPY       Diagnosis: Dysphagia, unspecified type [R13.10]  Diagnosis Additional Information: No value filed.    Anesthesia Type:   General     Note:    Oropharynx: oropharynx clear of all foreign objects and spontaneously breathing  Level of Consciousness: drowsy  Oxygen Supplementation: face mask  Level of Supplemental Oxygen (L/min / FiO2): 6  Independent Airway: airway patency satisfactory and stable  Dentition: dentition unchanged  Vital Signs Stable: post-procedure vital signs reviewed and stable  Report to RN Given: handoff report given  Patient transferred to: PACU    Handoff Report: Identifed the Patient, Identified the Reponsible Provider, Reviewed the pertinent medical history, Discussed the surgical course, Reviewed Intra-OP anesthesia mangement and issues during anesthesia, Set expectations for post-procedure period and Allowed opportunity for questions and acknowledgement of understanding      Vitals:  Vitals Value Taken Time   /80 01/08/25 1127   Temp     Pulse 156 01/08/25 1129   Resp 17 01/08/25 1129   SpO2 100 % 01/08/25 1129   Vitals shown include unfiled device data.    Electronically Signed By: NARESH Trevino CRNA  January 8, 2025  11:31 AM

## 2025-01-08 NOTE — CONSULTS
Palliative Care Consultation Note  [facility]      Patient: Oskar Wilks  Date of Admission:  1/6/2025    Requesting Clinician / Team: Dr. Ellis / Oncology  Reason for consult: Symptom management  Patient and family support       Recommendations & Counseling     GOALS OF CARE:   Improve to baseline from acute illness.  Improve intake, weight and strength. Hope that treatment of esophagitis and care for dysphagia will improve acute symptoms of food sticking, burping and inability to maintain adequate intake.  Parag was not losing weight prior to these symptoms.  Parag and Ericka have a good understanding of life limiting nature of Parag's illness and current no plans for further treatment of hepatocellular carcinoma.  They are encouraged of news that CT liver does not show progression of malignancy.   Parag and Ericka are knowledgeable about hospice and have a daughter who is a SW previously working in hospice.  Hospice will be right for Parag at some point but currently he is willing to accept current burden of treating acute illness while continuing supportive care for a chance of more time.   Parag has an appt to follow-up in outpatient Palliative Care clinic on Feb13 at 745 AM.  Request sent to move up per patient request if possible..      ADVANCE CARE PLANNING:  No health care directive on file. Per system policy, Surrogate Decision-makers for Patients With Diminished Decision-making Capacity offers guidance on possible decision-makers. Ericka has been identified as a surrogate decision maker per report.  There is no POLST form on file, defer to patient and/or next of kin for decisions .  We discussed completing if code status changes.  Code status: Full Code - see discussion below.    MEDICAL MANAGEMENT:   #Protein calorie malnutrition in the context of acute illness and chronic illness as evidenced by unintentional weight loss, poor nutrient intake, subcutaneous fat loss, muscle loss,  and fluid accumulation   #Dysphagia- food sticking, burping  #Distal esophagitis - EGD today.  #Epigastric pain- resolving.  #Anorexia - related to symptoms above  #Cachecia  Appreciate care of dietician, Sherley Lopez RD  Pain and sticking improving with treatment of esophagitis per GI.  If anorexia continues despite resolution of above symptoms, we discussed possibility of appetite stimulant as well as the challenges of cancer related cachexia.        PSYCHOSOCIAL/SPIRITUAL SUPPORT:  Family Spouse Pat.  Daughter coming from [place].   [place] community: No [place]     Palliative Care will continue to follow. Thank you for the consult and allowing us to aid in the care of Oskar Wilks.    Avani Chavez, NARESH CNP  [facility], Palliative Care  Securely message with the [employer] Web Console (learn more here) or  Text page via [facility] Paging/Directory         Assessment      Oskar Wilks is a 79 year old male with a past medical history of hepatocellular carcinoma, alcohol induced liver cirrhosis, esophageal varices, portal hypertension, essential hypertension, paroxysmal atrial fibrillation, chronic normocytic anemia, chronic thrombocytopenia, MDD with anxiety, insomnia, moderate aortic stenosis, psoriasis, GERD, history of compression fractures of L1 and L4 and history of alcohol use disorder in remission who presented on 1/6 from clinic with dysphagia, odynophagia and epigastric pain, unable to eat/take pills, and physical deconditioning with failure to thrive.  He is seen by GI and found to have distal esophagitis, treated with PPI.  He is seen by DR. Ellis from Oncology this admission and no longer a candidate for treatment of hepatocellular CA, however CT liver shows stable disease.     Today, the patient was seen for:  #Palliative care  #Goals of care  #Hepatocellular carcinoma  #Esophagitis  #Dysphagia  #Epigastric pain  #Protein calorie malnutrition  #Anorexia  #Cachexia    History of Present  "Illness   Met with Parag and his spouse Ericka at bedside.   Introduced the role of palliative care as an interdisciplinary team that cares for patients with serious illness to help support symptom management, communication, coping for patients and their families as well as support with medical decision making.  Parag has a referral for palliative care outpatient and a appointment scheduled on February 13 at the request of Dr. Ellis.     Prognosis, Goals, & Planning:   Functional Status just prior to this current hospitalization:  Outpatient Palliative Performance Score (PPS) 50%  Extensive disease. Normal or reduced intake; normal LOC or confusion; little ambulation (mainly sit/lie), ADLs w/much assistance, unable to do any work.      Prognosis, Goals, and/or Advance Care Planning:  Eric Barnett have a good understanding of the life-limiting nature of hepatocellular cancer.  They are measuring time in months.    Parag and Ericka share the story of his acute decline over the past few weeks/months, related to inability to eat with weight loss and weakness.  They are encouraged that there is an acute finding to be treated which is esophagitis and that the CTs liver does not show progression of disease.   Olivia main hope is with current treatment to return to the state he was before he began having difficulty eating.  At that time he was able to be active, travel and enjoy life.   Parag and Ericka are realistic that hospice will be an appropriate service for him at some point.  Parag notes he is not ready yet and we discussed what things may have him consider transition to hospice.  Many people benefit from hospice even if they are not \"ready to die\" but begin to find a burden of medical care in the hospital outweighs the benefit or stops helping them live the way they want to outside of the hospital.  Eric Barnett's daughter is a  who used to work for hospice and a good resource for them.     Code Status was " addressed today:   Yes, We discussed potential risks and rationale of attempting cardiac resuscitation, intubation, and mechanical ventilation.  We also discussed probability of survival as well as quality of life implications.  Based on this discussion, patient or surrogate response/decision: Continue full code while they consider further.      Patient's decision making preferences: shared with support from loved ones        Patient has decision-making capacity today for complex decisions: Intact          Coping, Meaning, & Spirituality:   Mood, coping, and/or meaning in the context of serious illness were addressed today: No    Social:   Living situation:lives with significant other/spouse  Important relationships/caregivers: Adult daughter who lives in [place]    Medications:  Reviewed this patient's medication profile and medications from this hospitalization. Notable medications: None  [place] Board  Pharmacy Data Base Reviewed: Yes:   reviewed - no record of controlled substances prescribed.    ROS:  Comprehensive ROS is reviewed and is negative except as here & per HPI:     Physical Exam   Vital Signs with Ranges  Temp:  [96.8  F (36  C)-98.5  F (36.9  C)] 96.8  F (36  C)  Pulse:  [] 125  Resp:  [1-18] 1  BP: (115-140)/(70-82) 121/75  SpO2:  [95 %-99 %] 98 %  Wt Readings from Last 10 Encounters:   01/06/25 79.7 kg (175 lb 12.8 oz)   01/02/25 84.7 kg (186 lb 12.8 oz)   12/16/24 92.5 kg (204 lb)   12/12/24 88.7 kg (195 lb 8 oz)   12/10/24 93.6 kg (206 lb 6.4 oz)   11/25/24 89.4 kg (197 lb 1.5 oz)   11/25/24 89.4 kg (197 lb)   10/29/24 89.3 kg (196 lb 14.4 oz)   10/22/24 89.4 kg (197 lb)   10/04/24 87.1 kg (192 lb)     0 lbs 0 oz    PHYSICAL EXAM:  GEN:  Alert, oriented x 3, appears comfortable, NAD.  HEENT: Temporal and orbital wasting,  scleral icterus, no nasal discharge, mouth moist.  LUNGS: Nonlabored breathing.  Symmetric chest rise on inhalation noted.  EXT: Sarcopenia and cachexic  appearing  SKIN: Jaundice dry to touch, no exanthems noted in the visualized areas.     Data reviewed:  Recent Labs   Lab 01/07/25  1144 01/06/25  0852   WBC 7.7 8.4   HGB 8.9* 9.3*   MCV 99 98   PLT 55* 73*   INR 2.40*  --    * 132*   POTASSIUM 4.5 4.6   CHLORIDE 100 95*   CO2 23 26   BUN 30.5* 33.5*   CR 0.85 1.15   ANIONGAP 10 11   ALIA 8.4* 8.8   * 104*   ALBUMIN 2.9* 3.1*   PROTTOTAL 5.1* 5.5*   BILITOTAL 11.0* 10.8*   ALKPHOS 84 90   ALT 39 41   AST 38 45     EXAM: MR LIVER W/O and W CONTRAST  LOCATION: [facility]  DATE: 1/7/2025     INDICATION: Patient has liver cirrhosis and HCC. Please evaluate if there is progression of HCC  COMPARISON: Liver protocol CT with and without contrast 01/06/2025, PET scan 11/19/2024, CT chest/abdomen/pelvis with contrast 10/31/2024, MRI liver with and without contrast 09/30/2024.  TECHNIQUE: Routine MRI liver protocol including T1 in/out phase, diffusion, multiplane T2, and dynamic T1 with IV contrast.    CONTRAST: 8 mL Gadavist     FINDINGS:      LIVER: Cirrhotic liver morphology. Similar heterogeneous iron deposition. Portal hypertension as evidenced by splenomegaly, small abdominopelvic ascites, recanalized periumbilical veins, and multiple additional portosystemic collaterals.     Similar patchy left hepatic lobe geographic hyperenhancement and hypoenhancing 1.6 cm treatment cavity.   ?  Grossly stable size of segment 7 hypoenhancing treatment cavity measuring 1.5 cm.  ?  Stable size of segment IVb arterially hyperenhancing observation measuring 2 cm with evidence of washout on the portal venous phase (arterial phase series image 56).   ?  Stable size of hepatic dome observation measuring 1.5 cm with arterial hyperenhancement and probable washout on the current exam (arterial phase series image 78).     Similar faint arterially hyperenhancing observations in segment 5 measuring 1 cm and segment 6 measuring 0.7 cm, again without definite  washout.  ?  Scattered hepatic cysts.     ADDITIONAL FINDINGS: Elevated right hemidiaphragm, similar. Small abdominopelvic ascites. Splenomegaly to 19.0 cm (series 3 image 30). Gynecomastia. Trace right pleural effusion. Cholelithiasis. Prominent extrahepatic duct, similar to prior. Also similar   is left hepatic lobe intrahepatic bile duct dilatation. Mild nonspecific gastric wall thickening reidentified, gastritis is possible. Pancreas is somewhat atrophic. Unremarkable adrenal glands. No hydronephrosis. Tiny left renal cyst which requires no   dedicated follow-up. Colonic diverticulosis. No bowel obstruction. No AAA. No lymphadenopathy. Stable compression deformities of L1 and L4. No acute osseous abnormality or suspicious bony lesion.                                                                      IMPRESSION:  1. Similar post-treatment changes of the segment 7 observation, without definite residual viable tumor.  2. Stable size of segment IVb 2 cm observation with arterial phase  hyperenhancement and washout (LR 5).  3. Stable size of arterially hyperenhancing observation at the dome measuring 1.5 cm. Probable washout is present on the current MRI (LR 5).  4. Similar additional LR 3 observations in segments 5 and 6.  5. Similar left hepatic lobe treatment change without definite viable tumor (LR TR nonviable).  6. Small ascites. Trace right pleural effusion.  7. Cholelithiasis.  8. Mild nonspecific gastric wall thickening reidentified, gastritis is possible.    EXAM: CT CHEST W/O CONTRAST, CT LIVER WO and W CONTRAST  LOCATION: [facility]  DATE: 1/6/2025     INDICATION: Dysphagia  COMPARISON: PET/CT 11/19/2024, CT chest/abdomen/pelvis with contrast 10/31/2024, MRI liver with and without contrast 09/30/2024  TECHNIQUE: CT scan of the chest and abdomen was initially performed without intravenous contrast. Next, CT scan of the abdomen, and pelvis was performed following injection of IV  contrast (liver protocol). Multiplanar reformats were obtained. Dose   reduction techniques were used.   CONTRAST: 107 mL Isovue 370     FINDINGS:      LUNGS AND PLEURA: Dependent atelectasis.     MEDIASTINUM/AXILLAE: Periesophageal collaterals. No lymphadenopathy or pericardial effusion. Atherosclerotic calcifications of the aortic arch.     CORONARY ARTERY CALCIFICATION: Moderate.     HEPATOBILIARY: Cirrhotic liver morphology. Similar heterogeneous iron deposition. Similar patchy left hepatic lobe geographic hyperenhancement and hypoenhancing treatment cavity.   ?  Similar appearance of segment 7 hypoenhancing treatment cavity measuring with patchy surrounding parenchymal enhancement.  ?  Previously noted segment IVb arterially enhancing observation measuring 2 cm with  evidence of washout on the portal venous phase, is less well visualized by CT.   ?  Other small arterially hyperenhancing lesions of the liver better visualized on prior MRI. Scattered hepatic cysts.     Cholelithiasis.     PANCREAS: Somewhat atrophic, otherwise unremarkable.     SPLEEN: Splenomegaly.     ADRENAL GLANDS: Normal.     KIDNEYS/BLADDER: No hydronephrosis. Unremarkable urinary bladder.     BOWEL: Gastric wall thickening with perigastric stranding and fluid, findings which are nonspecific but may be seen with gastritis. Small abdominopelvic ascites.     LYMPH NODES: Normal.     VASCULATURE: Atherosclerotic calcifications of the aortoiliac vessels without evidence of aneurysmal dilatation. Recanalized periumbilical veins. Periesophageal and abdominal portosystemic collaterals.     PELVIC ORGANS: Prominent prostate with central calcifications.     MUSCULOSKELETAL: Small fat-containing right inguinal hernia. Stable compression deformities of L1 and L4. No acute osseous abnormality or suspicious bony lesion.                                                                      IMPRESSION:     1.  Gastric wall thickening with perigastric  stranding and fluid, findings which are nonspecific but may be seen with gastritis.  2.  Cirrhosis with portal hypertension as evidenced by splenomegaly, small abdominopelvic ascites, recanalized periumbilical veins, and periesophageal/abdominal portosystemic collaterals.  3.  Previously noted liver lesions including segment 4 LI-RADS 5 lesion, better evaluated by prior MRI. Recommend attention on followup MRI.  4.  Cholelithiasis.  Medical Decision Making       Please see A&P for additional details of medical decision making.  MANAGEMENT DISCUSSED with the following over the past 24 hours:    NOTE(S)/MEDICAL RECORDS REVIEWED over the past 24 hours: Medicine, GI, oncology, nursing, dietitian  Tests REVIEWED in the past 24 hours:  - See lab/imaging results included in the data section of the note  SUPPLEMENTAL HISTORY, in addition to the patient's history, over the past 24 hours obtained from:   - Spouse or significant other  Medical complexity over the past 24 hours:  -Discussion regarding decision to DE-ESCALATE CARE based on prognosis  95 MINUTES SPENT BY ME on the date of service doing chart review, history, exam, documentation & further activities per the note.

## 2025-01-08 NOTE — CONSULTS
"CLINICAL NUTRITION SERVICES - ASSESSMENT NOTE    RECOMMENDATIONS FOR MDs/PROVIDERS TO ORDER:  None at this time    Malnutrition Status:    Severe malnutrition in the context of chronic illness    Registered Dietitian Interventions:  Diet per MD  Mixed See Ensure Clear w/ breakfast and dinner    Future/Additional Recommendations:  Monitor po tolerance. If unable to intake adequate po, could consider nutrition support if aligns with GOC.      REASON FOR ASSESSMENT  Provider order - \"Patient with HCC with little to no oral intake in last week d/t dysphagia. EGD w/o stricture.\"    Per H&P, patient was requested to be admitted as a direct admit from [facility] in [place] due to the concern for dysphagia, odynophagia and epigastric pain, unable to eat/take pills, and physical deconditioning with failure to thrive.     PMH: hepatocellular carcinoma, alcohol induced liver cirrhosis, esophageal varices, portal hypertension, essential hypertension, paroxysmal atrial fibrillation, chronic normocytic anemia, chronic thrombocytopenia, MDD with anxiety, insomnia, moderate aortic stenosis, psoriasis, GERD, history of compression fractures of L1 and L4 and history of alcohol use disorder in remission     SUBJECTIVE INFORMATION  Assessed patient in room. Spoke with patient's spouse.     NUTRITION HISTORY  Information obtained from chart review and patient's spouse in room.   - 0% intake the past week, gradual decline since Dec. 24  - Report of food getting \"stuck' - spouse indicates patient would be hungry and want to the eat, then end up pushing the plate away without eating much-anything  - Has Ensure at home (prefers strawberry over chocolate) and smoothie ingredients / protein powder  - Would like to try Ensure Clear (mixed berry) - will order BID with meals  - History of GERD and acid reflux   - Previously on Low Na diet (last month). Spouse notes patient does not have a sense of taste - adding sugar and salt is the " only way he tastes anything so when his intakes really declined she gave him the salt shaker as a way to encourage po intakes  - Report of ~20 lb weight loss in the past couple of weeks-months. Usual body weight = 195 lb  - Noticeably thinner / weaker since around October per spouse    CURRENT NUTRITION ORDERS  Diet: Full Liquid    CURRENT INTAKE/TOLERANCE  - Spouse had just ordered lemon ice x 2 and strawberry jello    LABS  Nutrition-relevant labs:  1/7 - Na 133 (L) BUN 30.5 (H) Bilirubin Total 11 (H)     MEDICATIONS  Nutrition-relevant medications:  IV fluids 50 mL/hr; Held - folic acid, spironolactone, demadex    ANTHROPOMETRICS  Height: 0 cm (Data Unavailable) - 6' as of 1/6/25  Most Recent Weight:    IBW: 80.9 kg  % IBW: 99%  BMI (kg/m ): Normal BMI  Weight History: Significant weight loss - 8.5% in 3 months    Wt Readings from Last 14 Encounters:   01/06/25 79.7 kg (175 lb 12.8 oz)   01/02/25 84.7 kg (186 lb 12.8 oz)   12/16/24 92.5 kg (204 lb)   12/12/24 88.7 kg (195 lb 8 oz)   12/10/24 93.6 kg (206 lb 6.4 oz)   11/25/24 89.4 kg (197 lb 1.5 oz)   11/25/24 89.4 kg (197 lb)   10/29/24 89.3 kg (196 lb 14.4 oz)   10/22/24 89.4 kg (197 lb)   10/04/24 87.1 kg (192 lb)   08/30/24 90.7 kg (200 lb)   08/26/24 89.5 kg (197 lb 4.8 oz)   05/01/24 92.2 kg (203 lb 3.2 oz)   12/05/23 89.8 kg (198 lb)     Dosing Weight: 79.7 kg, based on actual wt    ASSESSED NUTRITION NEEDS  Estimated Energy Needs: 5173-1762+ kcals/day (25 - 30+ kcals/kg)  Justification: Maintenance and Repletion  Estimated Protein Needs:  grams protein/day ( 1-1.5 g pro/kg )  Justification:  Preservation of lean body mass and Repletion  Estimated Fluid Needs: 4663-7928 mL/day (1 mL/kcal)  Justification: Per provider pending fluid status    SYSTEM FINDINGS    Skin/wounds: 2+ edema  GI symptoms: Last BM 1/6/25    MALNUTRITION  % Intake: </= 50% for >/= 5 days (severe)  % Weight Loss: > 7.5% in 3 months (severe)  - 8.5% in 3 months  Subcutaneous  Fat Loss: Orbital: Moderate-Severe and Triceps: Moderate  Muscle Loss: Wasting of the temples (temporalis muscle): Severe, Clavicles (pectoralis and deltoids): Severe, and Shoulders (deltoids): Moderate-Severe  Fluid Accumulation/Edema: Moderate, 2+  Malnutrition Diagnosis: Severe malnutrition in the context of chronic illness  Malnutrition Present on Admission: Yes    NUTRITION DIAGNOSIS  Inadequate oral intake related to dysphagia as evidenced by report of no po intakes for 1 week, need for nutritional supplement to help meet protein-energy needs    INTERVENTIONS  Medical food supplement therapy - ordered    Goals  Patient to consume % of nutritionally adequate meal trays TID, or the equivalent with supplements/snacks.  Weight maintenance or gain  Tolerate diet     Monitoring/Evaluation  Progress toward goals will be monitored and evaluated per policy.    Sherley Lopez RD, LD

## 2025-01-09 ENCOUNTER — APPOINTMENT (OUTPATIENT)
Dept: SPEECH THERAPY | Facility: CLINIC | Age: 80
DRG: 391 | End: 2025-01-09
Attending: STUDENT IN AN ORGANIZED HEALTH CARE EDUCATION/TRAINING PROGRAM
Payer: MEDICARE

## 2025-01-09 ENCOUNTER — APPOINTMENT (OUTPATIENT)
Dept: PHYSICAL THERAPY | Facility: CLINIC | Age: 80
DRG: 391 | End: 2025-01-09
Attending: INTERNAL MEDICINE
Payer: MEDICARE

## 2025-01-09 VITALS
HEART RATE: 125 BPM | TEMPERATURE: 97.3 F | OXYGEN SATURATION: 96 % | DIASTOLIC BLOOD PRESSURE: 76 MMHG | RESPIRATION RATE: 18 BRPM | SYSTOLIC BLOOD PRESSURE: 119 MMHG

## 2025-01-09 LAB
ALBUMIN SERPL BCG-MCNC: 2.4 G/DL (ref 3.5–5.2)
ALP SERPL-CCNC: 68 U/L (ref 40–150)
ALT SERPL W P-5'-P-CCNC: 40 U/L (ref 0–70)
AMMONIA PLAS-SCNC: 16 UMOL/L (ref 16–60)
AST SERPL W P-5'-P-CCNC: 52 U/L (ref 0–45)
BILIRUB DIRECT SERPL-MCNC: 7.42 MG/DL (ref 0–0.3)
BILIRUB SERPL-MCNC: 10.4 MG/DL
CV ZIO PRELIM RESULTS: NORMAL
PROT SERPL-MCNC: 4.2 G/DL (ref 6.4–8.3)

## 2025-01-09 PROCEDURE — 36415 COLL VENOUS BLD VENIPUNCTURE: CPT | Performed by: INTERNAL MEDICINE

## 2025-01-09 PROCEDURE — 97116 GAIT TRAINING THERAPY: CPT | Mod: GP

## 2025-01-09 PROCEDURE — G0463 HOSPITAL OUTPT CLINIC VISIT: HCPCS

## 2025-01-09 PROCEDURE — 250N000009 HC RX 250: Performed by: INTERNAL MEDICINE

## 2025-01-09 PROCEDURE — 92526 ORAL FUNCTION THERAPY: CPT | Mod: GN

## 2025-01-09 PROCEDURE — 97161 PT EVAL LOW COMPLEX 20 MIN: CPT | Mod: GP

## 2025-01-09 PROCEDURE — 82248 BILIRUBIN DIRECT: CPT | Performed by: INTERNAL MEDICINE

## 2025-01-09 PROCEDURE — 250N000009 HC RX 250: Performed by: PHYSICIAN ASSISTANT

## 2025-01-09 PROCEDURE — 99233 SBSQ HOSP IP/OBS HIGH 50: CPT | Performed by: INTERNAL MEDICINE

## 2025-01-09 PROCEDURE — 258N000003 HC RX IP 258 OP 636: Performed by: INTERNAL MEDICINE

## 2025-01-09 PROCEDURE — 99232 SBSQ HOSP IP/OBS MODERATE 35: CPT

## 2025-01-09 PROCEDURE — 250N000013 HC RX MED GY IP 250 OP 250 PS 637: Performed by: PHYSICIAN ASSISTANT

## 2025-01-09 PROCEDURE — 99232 SBSQ HOSP IP/OBS MODERATE 35: CPT | Performed by: PHYSICIAN ASSISTANT

## 2025-01-09 PROCEDURE — 120N000001 HC R&B MED SURG/OB

## 2025-01-09 PROCEDURE — 250N000011 HC RX IP 250 OP 636: Performed by: PHYSICIAN ASSISTANT

## 2025-01-09 PROCEDURE — 82140 ASSAY OF AMMONIA: CPT | Performed by: INTERNAL MEDICINE

## 2025-01-09 PROCEDURE — 97530 THERAPEUTIC ACTIVITIES: CPT | Mod: GP

## 2025-01-09 PROCEDURE — 80076 HEPATIC FUNCTION PANEL: CPT | Performed by: INTERNAL MEDICINE

## 2025-01-09 PROCEDURE — 250N000013 HC RX MED GY IP 250 OP 250 PS 637: Performed by: INTERNAL MEDICINE

## 2025-01-09 RX ORDER — SUCRALFATE ORAL 1 G/10ML
1 SUSPENSION ORAL
Status: DISCONTINUED | OUTPATIENT
Start: 2025-01-09 | End: 2025-01-10

## 2025-01-09 RX ADMIN — METOPROLOL TARTRATE 5 MG: 5 INJECTION INTRAVENOUS at 21:33

## 2025-01-09 RX ADMIN — HYDROMORPHONE HYDROCHLORIDE 0.2 MG: 0.2 INJECTION, SOLUTION INTRAMUSCULAR; INTRAVENOUS; SUBCUTANEOUS at 21:34

## 2025-01-09 RX ADMIN — METOPROLOL TARTRATE 5 MG: 5 INJECTION INTRAVENOUS at 16:46

## 2025-01-09 RX ADMIN — CEFTRIAXONE SODIUM 1 G: 1 INJECTION, POWDER, FOR SOLUTION INTRAMUSCULAR; INTRAVENOUS at 16:46

## 2025-01-09 RX ADMIN — PANTOPRAZOLE SODIUM 40 MG: 40 INJECTION, POWDER, FOR SOLUTION INTRAVENOUS at 21:33

## 2025-01-09 RX ADMIN — SUCRALFATE 1 G: 1 SUSPENSION ORAL at 16:47

## 2025-01-09 RX ADMIN — MICONAZOLE NITRATE: 2 POWDER TOPICAL at 21:51

## 2025-01-09 RX ADMIN — SODIUM CHLORIDE: 9 INJECTION, SOLUTION INTRAVENOUS at 08:55

## 2025-01-09 RX ADMIN — FLUTICASONE PROPIONATE 2 SPRAY: 50 SPRAY, METERED NASAL at 08:48

## 2025-01-09 RX ADMIN — METOPROLOL TARTRATE 5 MG: 5 INJECTION INTRAVENOUS at 03:49

## 2025-01-09 RX ADMIN — ONDANSETRON 4 MG: 4 TABLET, ORALLY DISINTEGRATING ORAL at 14:12

## 2025-01-09 RX ADMIN — PANTOPRAZOLE SODIUM 40 MG: 40 INJECTION, POWDER, FOR SOLUTION INTRAVENOUS at 08:48

## 2025-01-09 RX ADMIN — ONDANSETRON 4 MG: 4 TABLET, ORALLY DISINTEGRATING ORAL at 04:17

## 2025-01-09 RX ADMIN — SUCRALFATE 1 G: 1 SUSPENSION ORAL at 11:21

## 2025-01-09 RX ADMIN — METOPROLOL TARTRATE 5 MG: 5 INJECTION INTRAVENOUS at 10:22

## 2025-01-09 ASSESSMENT — ACTIVITIES OF DAILY LIVING (ADL)
ADLS_ACUITY_SCORE: 62

## 2025-01-09 NOTE — PROGRESS NOTES
"    GASTROENTEROLOGY PROGRESS NOTE    Date of Admission: 1/6/2025  Reason for Admission: dysphagia       ASSESSMENT:  79 year old male with a history of alcohol related cirrhosis with hx of esophageal varices, portal hypertension, multifocal HCC recently started on atezolizumab and bevacizumab (11/25/24), hypertension, chronic anemia, chronic thrombocytopenia, MDD with anxiety, psoriasis, GERD, recent hospitalization 12/10-12/13 for decompensated liver failure and SBP, who presented to [facility] on 1/6/2025 from  Cancer Center with dysphagia, substernal chest pain, and failure to thrive. GI consulted for \"abd pain and dysphagia, ? EGD, HCC, sent from Onc clinic\".      # Dysphagia / odynophagia   # LA Grade B esophagitis   # Acid reflux symptoms  # Substernal chest pain  Patient reports progressive dysphagia and odynophagia to solids, liquids, and pills over the last week. He has had worsening acid reflux as well despite taking omeprazole 40mg daily. The dysphagia has caused him to stop eating and drinking water and he has had difficulty keeping his pills down. Last EGD 4/2024 with normal esophagus, portal hypertensive gastropathy, normal duodenum.   CT Chest 1/6 with findings concerning for gastritis. Appreciate SLP evaluation; patient had mild oral and pharyngeal dysphagia at bedside with painful swallowing and globus sensation. Esophagram 1/8 with normal caliber visualized esophagus with significant abnormal esophageal motility. EGD 1/8/2025 showed LA Grade B reflux esophagitis with no bleeding, remainder of exam normal. Dysphagia likely from esophagitis and motility related factors.        - FLD, slowly ADAT   - Carafate QID, if patient does not find this helpful, would stop it   - Continue IV PPI BID, transition to PO 20mg BID when able   - Continue SLP therapy      # Alcohol related liver cirrhosis   # Ascites  # Jaundice, hyperbilirubinemia   # hx of esophageal varices  # Portal hypertension  Patient follows in " hepatology clinic with Dr. Laura Neves, last seen 10/29/24, had appt scheduled for day of admission. He has hx of heavy alcohol drinking, but now is sober. He has hx of ascites requiring paracentesis in 2022 with 4.7L removed and recently was admitted to [facility] for SBP 12/11 treated with IV ceftriaxone inpatient and transitioned to oral cipro daily for SBP ppx. Last paracentesis 12/26 with 1.5L removed, cell count not consistent with SBP and culture with no growth. PTA on torsemide 10mg and spironolactone 50mg daily. CT 1/6 with e/o cirrhosis and portal hypertension (small amount of ascites, splenomegaly, portosystemic collaterals). MRI liver 1/7 shows similar/stable findings when compared to MRI 9/2024 and PET scan 11/2024.      MELD 3.0: 29 at 1/8/2025  3:41 PM  MELD-Na: 29 at 1/8/2025  3:41 PM  Calculated from:  Serum Creatinine: 0.77 mg/dL (Using min of 1 mg/dL) at 1/8/2025  3:41 PM  Serum Sodium: 130 mmol/L at 1/8/2025  3:41 PM  Total Bilirubin: 11.7 mg/dL at 1/8/2025  3:41 PM  Serum Albumin: 2.7 g/dL at 1/8/2025  3:41 PM  INR(ratio): 2.57 at 1/8/2025  3:41 PM  Age at listing (hypothetical): 79 years  Sex: Male at 1/8/2025  3:41 PM       - Monitor MELD labs daily   - Continue IV ceftriaxone 1g for SBP ppx, transition back to oral cipro when able   - Diuretics per primary team       # Multifocal HCC   Found to have 4.6cm liver lesion, s/p liver biopsy 2/23 with HCC moderately differentiated. S/p Y-90 embolization of the mass in 3/2023 with follow up imaging showing increase in size s/p repeat Y-90 embolization in 8/30/24 with repeat MRI showing progression again, started on systemic treatment with atezolizumab and bevacizumab 11/25/24, which was stopped at clinic appt on 1/6/25. Follows with Dr. Ellis, last seen day of admission with recommendation to transition to hospice given overall deterioration. Oncology consulted, per their note 1/7, given Child-Naqvi class C liver disease, no further treatment recommended.       - Appreciate oncology recommendations   - Noted palliative care consult, appreciate assistance      # Chronic anemia  # Chronic thrombocytopenia   Admit hgb 9.3, around baseline 9-10. Admit plts 73, around baseline .   Last EGD 4/2024 for anemia showed PHG. Colonoscopy 4/24 showed 3 1-2mm polyps - tubular adenomas, as well as colonic AVMs s/p APC and congested mucosa from portal hypertensive colopathy.         - Monitor and transfuse as needed      # Severe malnutrition   Patient with no oral intake for roughly 1 week due to dysphagia, concern for malnutrition in the setting of cirrhosis and HCC. RD consulted, appreciate evaluation     - Continue PO trials, if unable to tolerate adequate po intake, may need to consider nutrition support if this aligns with GOC. However, per my discussion with Parag and his wife today, Parag currently would not be interested in any type of feeding tube.       Thank you for involving us in this patient's care. Please do not hesitate to contact the GI service with any questions or concerns.     Pt care plan discussed with Dr. Santos , GI staff physician, and Dr. Pryor, primary team.    Overall time spent on the date of this encounter preparing to see the patient (including chart review of available notes, clinical status events, imaging and labs); obtaining and/or reviewing separately obtained history; ordering medications, tests or procedures; communicating with other health care professionals; and documenting the above clinical information in the electronic medical record was 45 minutes.    Laura Josue PA-C  GI Service  [facility]  Text Page (Monday-Friday, 8am-4pm)    To page the On-Call [facility] GI provider 24 hours a day, please click [facility] and select GASTROENTEROLOGY MEDICINE ADULT / [facility] in the drop-down menu.   _______________________________________________________________      Subjective: Nursing notes and 24hr events  reviewed. RRT called yesterday evening for acute confusion thought to be due to presyncope, orthostasis, and medication effect. Confusion resolved and patient returned to level of consciousness consistent with earlier in the evening. Per overnight RN notes, patient only had small sips of water.   Patient reports that he has been able to chew ice and swallow without worsening pain. He has not tried anything else due to no appetite and he is scared to swallow anything. He denies abdominal pain, nausea, vomiting. Reports he is having bowel movements.     ROS:   4 pt ROS negative unless noted in subjective.     Medications:  Current Facility-Administered Medications   Medication Dose Route Frequency Provider Last Rate Last Admin    [Held by provider] buPROPion (WELLBUTRIN XL) 24 hr tablet 300 mg  300 mg Oral QAM Mary Jo Medeiros PA-C        calcium carbonate (TUMS) chewable tablet 1,000 mg  1,000 mg Oral 4x Daily PRN Mary Jo Medeiros PA-C        cefTRIAXone (ROCEPHIN) 1 g vial to attach to  mL bag for ADULTS or NS 50 mL bag for PEDS  1 g Intravenous Q24H Mary Jo Medeiros PA-C   1 g at 01/08/25 1741    [Held by provider] ciprofloxacin (CIPRO) tablet 500 mg  500 mg Oral Daily Mary Jo Medeiros PA-C        [Held by provider] diltiazem ER COATED BEADS (CARDIZEM CD/CARTIA XT) 24 hr capsule 120 mg  120 mg Oral Daily Mary Jo Medeiros PA-C        fluticasone (FLONASE) 50 MCG/ACT spray 2 spray  2 spray Both Nostrils Daily Mary Jo Medeiros PA-C   2 spray at 01/09/25 0848    [Held by provider] folic acid (FOLVITE) tablet 1 mg  1 mg Oral Daily Mary Jo Medeiros PA-C        hydrALAZINE (APRESOLINE) tablet 10 mg  10 mg Oral Q4H PRN Mary Jo Medeiros PA-C        Or    hydrALAZINE (APRESOLINE) injection 10 mg  10 mg Intravenous Q4H PRN Mary Jo Medeiros PA-C        HYDROmorphone (DILAUDID) injection 0.2 mg  0.2 mg Intravenous Q2H PRN Mary Jo Medeiros  YOSELYN Archuleta   0.2 mg at 01/08/25 0455    HYDROmorphone (DILAUDID) injection 0.4 mg  0.4 mg Intravenous Q2H PRN Mary Jo Medeiros PA-C   0.4 mg at 01/07/25 1747    lidocaine (LMX4) cream   Topical Q1H PRN Mary Jo Medeiros PA-C        lidocaine 1 % 0.1-1 mL  0.1-1 mL Other Q1H PRN Mary Jo Medeiros PA-C        metoprolol (LOPRESSOR) injection 5 mg  5 mg Intravenous Q6H Jackie Pryor MD   5 mg at 01/09/25 0349    [Held by provider] metoprolol succinate ER (TOPROL XL) 24 hr tablet 25 mg  25 mg Oral BID Mary Jo Medeiros PA-C        naloxone (NARCAN) injection 0.2 mg  0.2 mg Intravenous Q2 Min PRN Jose Luis Han MD        Or    naloxone (NARCAN) injection 0.4 mg  0.4 mg Intravenous Q2 Min PRN Jose Luis Han MD        Or    naloxone (NARCAN) injection 0.2 mg  0.2 mg Intramuscular Q2 Min PRN Jose Luis Han MD        Or    naloxone (NARCAN) injection 0.4 mg  0.4 mg Intramuscular Q2 Min PRN Jose Luis Han MD        ondansetron (ZOFRAN ODT) ODT tab 4 mg  4 mg Oral Q6H PRN Mary Jo Medeiros PA-C   4 mg at 01/09/25 0417    Or    ondansetron (ZOFRAN) injection 4 mg  4 mg Intravenous Q6H PRN Mary Jo Medeiros PA-C   4 mg at 01/07/25 1147    oxyCODONE (ROXICODONE) tablet 5 mg  5 mg Oral Q4H PRN Mary Jo Medeiros PA-C   5 mg at 01/06/25 1503    oxyCODONE IR (ROXICODONE) half-tab 2.5 mg  2.5 mg Oral Q4H PRN Mary Jo Medeiros PA-C        pantoprazole (PROTONIX) IV push injection 40 mg  40 mg Intravenous BID Laura Josue PA-C   40 mg at 01/09/25 0848    polyethylene glycol (MIRALAX) Packet 17 g  17 g Oral BID PRN Mary Jo Medeiros PA-C   17 g at 01/08/25 1551    prochlorperazine (COMPAZINE) injection 5 mg  5 mg Intravenous Q6H PRN Mary Jo Medeiros PA-C        Or    prochlorperazine (COMPAZINE) tablet 5 mg  5 mg Oral Q6H PRN Mary Jo Medeiros PA-C        senna-docusate (SENOKOT-S/PERICOLACE) 8.6-50  MG per tablet 1 tablet  1 tablet Oral BID PRN Mary Jo Medeiros PA-C        Or    senna-docusate (SENOKOT-S/PERICOLACE) 8.6-50 MG per tablet 2 tablet  2 tablet Oral BID PRN Mary Jo Medeiros PA-C        sodium chloride (PF) 0.9% PF flush 3 mL  3 mL Intracatheter Q8H Mary Jo Medeiros PA-C   3 mL at 01/08/25 1316    sodium chloride (PF) 0.9% PF flush 3 mL  3 mL Intracatheter q1 min prn Mary Jo Medeiros PA-C        sodium chloride 0.9 % infusion   Intravenous Continuous Jackie Pryor MD 50 mL/hr at 01/09/25 0855 New Bag at 01/09/25 0855    [Held by provider] spironolactone (ALDACTONE) tablet 50 mg  50 mg Oral Daily Mary Jo Medeiros PA-C        [Held by provider] torsemide (DEMADEX) tablet 10 mg  10 mg Oral Daily Mary Jo Medeiros PA-C           Objective:  Blood pressure 106/66, pulse (!) 123, temperature 96.8  F (36  C), temperature source Oral, resp. rate 16, SpO2 99%.  Gen: Sitting in bed. Appears lethargic but comfortable  HEENT: NCAT. Conjunctiva clear. Scleral icterus   CV: Tachycardic   Resp: Non-labored breathing  Abd: Soft, non tender, non distended    Skin: Jaundice  Ext: bilateral lower extremity swelling   Mental status/Psych: A&O. Asks/answers questions appropriately     Date 01/09/25 0700 - 01/10/25 0659   Shift 2425-6137 0122-4081 2729-6325 24 Hour Total   INTAKE   P.O. 0   0   I.V. 3710   3710   Shift Total 3710   3710   OUTPUT   Shift Total       Weight (kg)           PROCEDURES:  EGD on 1/8/2025 with Dr. Martins for dysphagia  Impression:               - LA Grade B reflux esophagitis with no bleeding.                             - Foregut endsocopy otherwise normal. Suspect                             dysphagia from inflammed distal esophagus and                             motility related factors.    LABS:  BMP  Recent Labs   Lab 01/08/25  1708 01/08/25  1541 01/07/25  1144 01/06/25  0852   NA  --  130* 133* 132*   POTASSIUM  --  4.5 4.5 4.6    CHLORIDE  --  99 100 95*   ALIA  --  8.1* 8.4* 8.8   CO2  --  21* 23 26   BUN  --  29.1* 30.5* 33.5*   CR  --  0.77 0.85 1.15   GLC 88 127* 101* 104*     CBC  Recent Labs   Lab 01/08/25  1541 01/07/25  1144 01/06/25  0852   WBC 8.1 7.7 8.4   RBC 2.82* 2.82* 2.94*   HGB 9.0* 8.9* 9.3*   HCT 28.0* 27.8* 28.8*   MCV 99 99 98   MCH 31.9 31.6 31.6   MCHC 32.1 32.0 32.3   RDW 22.6* 22.8* 22.7*   PLT 60* 55* 73*     INR  Recent Labs   Lab 01/08/25  1541 01/07/25  1144   INR 2.57* 2.40*     LFTs  Recent Labs   Lab 01/08/25  1541 01/07/25  1144 01/06/25  0852   ALKPHOS 82 84 90   AST 46* 38 45   ALT 42 39 41   BILITOTAL 11.7* 11.0* 10.8*   PROTTOTAL 5.0* 5.1* 5.5*   ALBUMIN 2.7* 2.9* 3.1*      PANCNo lab results found in last 7 days.  CULTURES:   7-Day Micro Results       No results found for the last 168 hours.          IMAGING:  Reviewed

## 2025-01-09 NOTE — CONSULTS
"[facility] Nurse Inpatient Assessment     Consulted for: \"perineum\"    Summary: Petechiae noted to bilateral groin. No rash, but moisture present with a slight fungal odor.    Patient History (according to provider note(s):      Patient is a 79-year-old male with past medical history significant for hepatocellular carcinoma, alcohol induced liver cirrhosis, esophageal varices, portal hypertension, essential hypertension, paroxysmal atrial fibrillation, chronic normocytic anemia, chronic thrombocytopenia, MDD with anxiety, insomnia, moderate aortic stenosis, psoriasis, GERD, history of compression fractures of L1 and L4 and history of alcohol use disorder in remission who was requested to be admitted as a direct admit from [facility] in [place] due to the concern for dysphagia, odynophagia and epigastric pain, unable to eat/take pills, and physical deconditioning with failure to thrive. Of note, recent hospitalization last month for acute on chronic decompensated liver failure with elevated transaminases secondary to infection.     Assessment:      Areas visualized during today's visit: Focused:    Skin Injury Location: Bilateral groin    Left groin fold      Right groin fold    Last photo: 1/9  Skin injury due to:  possible mild fungal infection  Skin history and plan of care:   Skin intact with mild eccchymosis and no rash currently, but groin folds are moist and have a slight odor that is likely fungal  Affected area:      Skin assessment: Intact and Ecchymosis     Measurements (length x width x depth, in cm) none      Color: normal and consistent with surrounding tissue and marroon     Temperature  warm     Drainage: none .      Color: none      Odor: mild and offensive  Pain: mild  Pain interventions prior to dressing change: patient tolerated well and slow and gentle cares   Treatment goal: Infection control/prevention and Protection  STATUS: initial assessment  Supplies " ordered: discussed with RN, discussed with patient , and special order needed       Treatment Plan:     Bilateral groin folds: BID and after any episodes of incontinence  Cleanse skin with Suraj spray and soft white Chong cloths   Apply Miconazole powder (per MAR) and dust off excess     Orders: Written    RECOMMEND PRIMARY TEAM ORDER: Antifungal Miconazole powder  Education provided: plan of care, Moisture management, and Hygiene  Discussed plan of care with: Patient, Family, and Nurse  WO nurse follow-up plan: signing off  Notify WOC if wound(s) deteriorate.  Nursing to notify the Provider(s) and re-consult the WOC Nurse if new skin concern.    DATA:     Current support surface: Standard  Standard gel mattress (Isoflex)  Containment of urine/stool: Continent of bladder and Continent of bowel  BMI: There is no height or weight on file to calculate BMI.   Active diet order: Orders Placed This Encounter      Full Liquid Diet Thin Liquids (level 0) (only when awake/alert, direct assist as needed)       Output: I/O last 3 completed shifts:  In: 1000 [P.O.:300; I.V.:700]  Out: 0      Labs:   Recent Labs   Lab 01/09/25  1105 01/08/25  1541   ALBUMIN 2.4* 2.7*   HGB  --  9.0*   INR  --  2.57*   WBC  --  8.1     Pressure injury risk assessment:   Sensory Perception: 4-->no impairment  Moisture: 4-->rarely moist  Activity: 3-->walks occasionally  Mobility: 3-->slightly limited  Nutrition: 1-->very poor  Friction and Shear: 3-->no apparent problem  Francisco Score: 18    Sherley Phelps RN CN  Pager no longer is use, please contact through [employer] group: [facility] Nurse [facility]

## 2025-01-09 NOTE — DISCHARGE INSTRUCTIONS
Palliative Care Follow-up:  You have an appointment to follow-up in the Palliative Care Clinic with Dr. Betsey Zhang on January 30th at 8AM (Please arrive at 745)  How to get a hold of us:  For non-urgent matters, MyChart works best.  For more urgent matters, or if you prefer not to use MyChart, call our clinic nurse coordinator at [phone].  We have an on-call number for evenings and weekends. Please call this only if you are having uncontrolled symptoms or serious side effects from your medicines: [phone].   Avani Chavez APRN, CNS, CNP  [facility], Palliative Care

## 2025-01-09 NOTE — PROGRESS NOTES
[facility]    Hospitalist Progress Note    Assessment & Plan   Patient is a 79-year-old male with past medical history significant for hepatocellular carcinoma, alcohol induced liver cirrhosis, esophageal varices, portal hypertension, essential hypertension, paroxysmal atrial fibrillation, chronic normocytic anemia, chronic thrombocytopenia, MDD with anxiety, insomnia, moderate aortic stenosis, psoriasis, GERD, history of compression fractures of L1 and L4 and history of alcohol use disorder in remission who was requested to be admitted as a direct admit from [facility] in [place] due to the concern for dysphagia, odynophagia and epigastric pain, unable to eat/take pills, and physical deconditioning with failure to thrive. Of note, recent hospitalization last month for acute on chronic decompensated liver failure with elevated transaminases secondary to infection.     Dysphagia, Odynophagia with substernal chest pain  Dysphagia, odynophagia x1 week, with sensation food is stuck after he swallows.  He states he can chew and swallow just fine however it feels stuck substernally and he develops pain and discomfort.  This only happens when he attempts to swallow.  No nausea or vomiting.  No significant swelling to his abdomen.  Dr. Ellis discussed with him that he is been weaker and more jaundiced, recommending stopping cancer treatment and transitioning to hospice however given his severe pain dysphagia is unable to take anything by mouth currently.  Oncology recommended tract admission for GI consultation for possible endoscopy and a CT scan for further evaluation.  At the time of admission after long discussion with patient and wife, they do not want to transition to hospice quite yet but would like evaluation for the above pain and to go from there.  Patient would like to be full code after discussion on admission.  Also discussed case with GI recommend CT scan, hopefully able to  drink CT contrast to further evaluate esophagus and hopefully avoid endoscopy.  Patient is on Cipro for SBP prophylaxis, no signs of spontaneous bacterial peritonitis noted, continue ceftriaxone 1 g while patient is having trouble swallowing prophylactic medications.  -Currently Aldactone and torsemide is on hold, continue IV PPI.  1/8 patient had esophagogram, GI team noted possibility of stenosis, patient underwent EGD following that, EGD did not indicate any stenosis, mostly esophagitis, they increase the PPI frequency.  Added Carafate on 1/9  -CT abdomen results noted, possibility of gastritis seen.  -Patient is currently tolerating full liquid diet, nutrition, palliative care to assist patient if patient can tolerate full liquid diet and meets his nutritional requirements he can be discharged.  -- We had a lengthy discussion today about further plan of care, patient's daughter is going to visit the patient tonight, she is traveling from [place] and spouse and patient's daughter will discuss further plans, patient is more weaker today, they are thinking about hospice care, today they have decided against feeding tube as per my discussion with GI team.  If there is no plan for G-tube then will continue with current plans of nutrition hoping his symptoms would improve, if patient and family decided on hospice he might need long-term care with hospice on discharge.  Discussed with social work and other consulting services.  Multifocal unresectable hepatocellular carcinoma:  Follows up with [place] oncology Dr. Ellis. S/p Atezolizumab and Bevacizumab, which are now discontinued.  Patient is weaker not a current candidate for more therapy, per visit day of admission was considering transitioning to hospice however now patient and wife would like to hold off on initiating hospice/palliative care.  Patient is clear he wants to be full code on admission.  -Discussed with [place] oncology, and they suggested that  patient possibly should transition to hospice in case he does not feel well after current management.  -mri liver ordered by Gastroenterology, findings seems to be stable, family is thinking to look for a second opinion.  -His bilirubin is still elevated, total protein is very low, AST mildly elevated, jaundice has worsened, poor appetite noted, family thinking about restoration versus hospice.     Paroxysmal atrial fibrillation with RVR  S/p ETHEL guided cardioversion in 2021  Moderate aortic stenosis  Essential hypertension  Recent ECHO showing moderate concentric LVH, normal LV systolic function, EF of 60 to 65%, there is mild mitral stenosis and moderate aortic stenosis.  Left atrium is severely dilated no pericardial effusion.  Normal left ventricular wall motion  -HOLD PTA diltiazem and Toprol XL given unable to swallow pills  -Start low dose scheduled IV metoprolol, IV PRN available, and titrate  -Consider starting diltiazem gtt if goes into Afib with RVR  -Anticoagulation is contraindicated given his coagulopathy     Chronic anemia  Chronic thrombocytopenia  Most likely related to cirrhosis. Platelets on admission 73, Hgb 9.3 which are at recent baseline.  -Continue to monitor intermittently  -Hematology/oncology following      Mild hyponatremia, chronic  Recent  Na+ 130-134.  -Holding PTA diuretics in favor of IV fluids for now     Chronic stable diagnoses and other pertinent medical history: Appropriate PTA medications will be resumed.   MDD with anxiety  Insomnia  Psoriasis  GERD  History of compression fractures (L1 and L4)  Diet :Snacks/Supplements Adult: Ensure Clear; With Meals  Full Liquid Diet Thin Liquids (level 0) (only when awake/alert, direct assist as needed)  DVT Prophylaxis: Pneumatic Compression Devices  Code Status: No CPR- Do NOT Intubate     52 MINUTES SPENT BY ME on the date of service doing chart review, history, exam, documentation & further activities per the note.  Medically Ready for  Discharge: Anticipated Tomorrow    Clinically Significant Risk Factors         # Hyponatremia: Lowest Na = 130 mmol/L in last 2 days, will monitor as appropriate       # Hypoalbuminemia: Lowest albumin = 2.4 g/dL at 1/9/2025 11:05 AM, will monitor as appropriate    # Coagulation Defect: INR = 2.57 (Ref range: 0.85 - 1.15) and/or PTT = N/A, will monitor for bleeding  # Thrombocytopenia: Lowest platelets = 60 in last 2 days, will monitor for bleeding   # Hypertension: Noted on problem list             # Severe Malnutrition: based on nutrition assessment, PRESENT ON ADMISSION   # Financial/Environmental Concerns: none         Jackie Pryor MD  Text Page   (7am to 6pm)    Interval History   Patient was much weaker today, jaundice has worsened, wife near bedside, we had extensive discussion about plans of care, oncology was present, discussed with the GI team, palliative care is following, his daughter is visiting from [place] tonight and they are have planning to have family meeting to decide on further plans of care.  Patient is quite debilitated, overnight events noted including the RRT.    -Data reviewed today: I reviewed all new labs and imaging results over the last 24 hours. .    Physical Exam     Vital Signs with Ranges  Temp:  [96.8  F (36  C)-98.4  F (36.9  C)] 96.8  F (36  C)  Pulse:  [116-145] 124  Resp:  [14-16] 16  BP: (106-126)/(61-78) 124/77  SpO2:  [95 %-99 %] 99 %  I/O last 3 completed shifts:  In: 1000 [P.O.:300; I.V.:700]  Out: 0     Constitutional: Sleepy jaundice present  Respiratory: Clear to auscultation bilaterally, no crackles or wheezing  Cardiovascular: Regular rate and rhythm, normal S1 and S2, and no murmur noted  GI: Normal bowel sounds, soft, non-distended, non-tender  Skin/Integumen: 2+ edema noted lower extremity  Neuro : moving all 4 extremities, appears weak and confused.    Medications   Current Facility-Administered Medications   Medication Dose Route Frequency Provider Last Rate  Last Admin    sodium chloride 0.9 % infusion   Intravenous Continuous Jackie Pryor MD 50 mL/hr at 01/09/25 0855 New Bag at 01/09/25 0855     Current Facility-Administered Medications   Medication Dose Route Frequency Provider Last Rate Last Admin    [Held by provider] buPROPion (WELLBUTRIN XL) 24 hr tablet 300 mg  300 mg Oral QAM Mary Jo Medeiros PA-C        cefTRIAXone (ROCEPHIN) 1 g vial to attach to  mL bag for ADULTS or NS 50 mL bag for PEDS  1 g Intravenous Q24H Mary Jo Medeiros PA-C   1 g at 01/08/25 1741    [Held by provider] ciprofloxacin (CIPRO) tablet 500 mg  500 mg Oral Daily Mary Jo Medeiros PA-C        [Held by provider] diltiazem ER COATED BEADS (CARDIZEM CD/CARTIA XT) 24 hr capsule 120 mg  120 mg Oral Daily Mary Jo Medeiros PA-C        fluticasone (FLONASE) 50 MCG/ACT spray 2 spray  2 spray Both Nostrils Daily Mary Jo Medeiros PA-C   2 spray at 01/09/25 0848    [Held by provider] folic acid (FOLVITE) tablet 1 mg  1 mg Oral Daily Mary Jo Medeiros PA-C        metoprolol (LOPRESSOR) injection 5 mg  5 mg Intravenous Q6H Jackie Pryor MD   5 mg at 01/09/25 1022    [Held by provider] metoprolol succinate ER (TOPROL XL) 24 hr tablet 25 mg  25 mg Oral BID Mary Jo Medeiros PA-C        pantoprazole (PROTONIX) IV push injection 40 mg  40 mg Intravenous BID Laura Josue PA-C   40 mg at 01/09/25 0848    sodium chloride (PF) 0.9% PF flush 3 mL  3 mL Intracatheter Q8H Mary Jo Medeiros PA-C   3 mL at 01/08/25 1316    [Held by provider] spironolactone (ALDACTONE) tablet 50 mg  50 mg Oral Daily Mary Jo Medeiros PA-C        sucralfate (CARAFATE) suspension 1 g  1 g Oral 4x Daily AC & HS Laura Josue PA-C   1 g at 01/09/25 1121    [Held by provider] torsemide (DEMADEX) tablet 10 mg  10 mg Oral Daily Mary Jo Medeiros PA-C           Data   Recent Labs   Lab 01/09/25  1105 01/08/25  1708 01/08/25  3222  01/07/25  1144 01/06/25  0852   WBC  --   --  8.1 7.7 8.4   HGB  --   --  9.0* 8.9* 9.3*   MCV  --   --  99 99 98   PLT  --   --  60* 55* 73*   INR  --   --  2.57* 2.40*  --    NA  --   --  130* 133* 132*   POTASSIUM  --   --  4.5 4.5 4.6   CHLORIDE  --   --  99 100 95*   CO2  --   --  21* 23 26   BUN  --   --  29.1* 30.5* 33.5*   CR  --   --  0.77 0.85 1.15   ANIONGAP  --   --  10 10 11   ALIA  --   --  8.1* 8.4* 8.8   GLC  --  88 127* 101* 104*   ALBUMIN 2.4*  --  2.7* 2.9* 3.1*   PROTTOTAL 4.2*  --  5.0* 5.1* 5.5*   BILITOTAL 10.4*  --  11.7* 11.0* 10.8*   ALKPHOS 68  --  82 84 90   ALT 40  --  42 39 41   AST 52*  --  46* 38 45     Recent Labs   Lab 01/08/25  1708 01/08/25  1541 01/07/25  1144 01/06/25  0852   GLC 88 127* 101* 104*       Imaging:   No results found for this or any previous visit (from the past 24 hours).

## 2025-01-09 NOTE — PROGRESS NOTES
Hematology/Oncology Follow-up Note  [facility]    Date of Admission:  12/10/2024   Reason for Consult: failure to thrive, HCC  Primary Oncologist: Dr. Ellis    ASSESSMENT/ PLAN : Oskar Wilks is a 79 year old year old male who presented this hospitalization as a direct admit from the oncology clinic for concerns with failure to thrive.  Dysphagia  Weakness  Pain, epigastric and back   HCC  Hyperbilirubinemia   Thrombocytopenia      PLAN:  Patient has declined since yesterday, more lethargic/weaker. Concern for progression of liver failure   Plan to have daughter fly in today and will have discussion with family today to decide next steps   Will continue to follow     Ascites/ Hyperbilirubinemia   Admission Bilirubin: 10.8  Jaundiced   01/06/2025 CT Liver showing Gastric wall thickening with perigastric stranding and fluid, findings which are nonspecific but may be seen with gastritis. Cirrhosis with portal hypertension as evidenced by splenomegaly, small abdominopelvic ascites, recanalized periumbilical veins, and periesophageal/abdominal portosystemic collaterals. Previously noted liver lesions including segment 4 LI-RADS 5 lesion. Cholelithiasis.      Dysphagia   GI and SLP following   01/06/2025 CT Chest and Liver showing gastritis   01/08/2025 XR esophagram showing normal caliber visualized esophagus with significant abnormal esophageal motility.   01/08/2024 EGD showed LA Grade B reflux esophagitis with no bleeding, remainder of exam normal.     Thrombocytopenia   Secondary to acute on chronic liver disease  Admission PLT 73K  Baseline 70-100K  Not on anticoagulation     Hepatocellular Carcinoma   Diagnosed via liver biopsy on 02/02/2023 Hepatocellular carcinoma, moderately differentiated.   Not a transplant candidate  03/16/2023 Y-90 emobolization of left hepatic lesion  07/31/2024 MRI of the liver revealed  interval increased in size segment VII observation (1.9 cm, previously 1 cm) now with  definite arterial phase hyperenhancement and threshold growth (LR-5).   08/30/2024 Y-90 emobolization   09/30/2024 MRI of the liver showed progression  Case discussed at tumor board, no further liver directed therapies recommended   10/31/2024 CT CAP did not show disease outside of the liver   10/31/2024 Bone scan showing diffuse radiotracer uptake throughout osseous structures   11/19/2024 PET CT showing no bone metastasis   01/07/2025 MR liver showing stable disease   Treatment history   11/25/2024 C1D1 OP atezolimab and bevacizumab       DISCUSSION:  Wife , patient, hospitalist at bedside. Patient appears more ill and frail today. Having trouble staying awake. Wife is appropriately emotional. Discussed the decide to wait on more decisions until his daughter has flown in and seen him.      PHYSICAL EXAM:  Vital signs:  Temp: 96.8  F (36  C) Temp src: Oral BP: 124/77 Pulse: (!) 124   Resp: 16 SpO2: 99 % O2 Device: None (Room air) Oxygen Delivery: 6 LPM        GENERAL/CONSTITUTIONAL: No acute distress.  NEUROLOGIC: Lethargic   INTEGUMENTARY: Jaundice, ecchymosis       Labs Reviewed: CBC, CMP, labs as above         40 minutes spent on the date of the encounter doing review of outside records, review of test results, interpretation of tests, patient visit, coordinating care, and documentation     Sulma INFANTE CNP  Hematology/Oncology  [facility]     Securely message with [employer]   Pager #[phone]

## 2025-01-09 NOTE — PROGRESS NOTES
01/09/25 0904   Appointment Info   Signing Clinician's Name / Credentials (PT) Ravin Hitchcock, PT, DPT   Living Environment   People in Home spouse   Current Living Arrangements house  (townhouse)   Home Accessibility stairs to enter home   Number of Stairs, Main Entrance 4   Stair Railings, Main Entrance railings safe and in good condition;railings on both sides of stairs   Transportation Anticipated car, drives self;family or friend will provide   Living Environment Comments Patient and spouse live in [facility], all needs on main level. 4 steps to enter with handrails. Has walk-in shower with grab bars.   Self-Care   Usual Activity Tolerance moderate   Current Activity Tolerance fair   Equipment Currently Used at Home grab bar, tub/shower;walker, rolling;cane, straight   Fall history within last six months yes  (patient reported no falls, but previous report shows 1 fall.)   Number of times patient has fallen within last six months 1   Activity/Exercise/Self-Care Comment Patient reported receiving more assist from spouse since  discharging back home from hospital in middle of December 2024. Spouse assisting with getting in/out of bed, dressing, and supervision while walking. Patient was mod I prior to initial hospital visit. Per previous report, spouse reporting that she works outside of the home ~15 hours per week.   General Information   Onset of Illness/Injury or Date of Surgery 01/06/25   Referring Physician Jackie Pryor MD   Patient/Family Therapy Goals Statement (PT) Patient would like to go home   Pertinent History of Current Problem (include personal factors and/or comorbidities that impact the POC) 79-year-old male with past medical history significant for hepatocellular carcinoma, alcohol induced liver cirrhosis, esophageal varices, portal hypertension, essential hypertension, paroxysmal atrial fibrillation, chronic normocytic anemia, chronic thrombocytopenia, MDD with anxiety, insomnia, moderate  aortic stenosis, psoriasis, GERD, history of compression fractures of L1 and L4 and history of alcohol use disorder in remission who was requested to be admitted as a direct admit from [facility] in [place] due to the concern for dysphagia, odynophagia and epigastric pain, unable to eat/take pills, and physical deconditioning with failure to thrive. Of note, recent hospitalization last month for acute on chronic decompensated liver failure with elevated transaminases secondary to infection.   Existing Precautions/Restrictions fall   Cognition   Affect/Mental Status (Cognition) WFL;low arousal/lethargic   Orientation Status (Cognition) disoriented to;place;time   Follows Commands (Cognition) WFL   Pain Assessment   Patient Currently in Pain   (not assessed)   Integumentary/Edema   Integumentary/Edema Comments 1+ to 2+ pitting edema in dorsum of feet and ankles bilaterally.   Strength (Manual Muscle Testing)   Strength (Manual Muscle Testing) Deficits observed during functional mobility   Strength Comments able to perform slight SLR in supine   Bed Mobility   Comment, (Bed Mobility) supine<>sit transfer with CGA   Transfers   Comment, (Transfers) sit<>stand with FWW and CGA   Gait/Stairs (Locomotion)   Comment, (Gait/Stairs) ambulation with FWW and CGA   Balance   Balance Comments requires BUE support on FWW while ambulating   Sensory Examination   Sensory Perception patient reports no sensory changes   Clinical Impression   Criteria for Skilled Therapeutic Intervention Yes, treatment indicated   PT Diagnosis (PT) impaired mobility   Influenced by the following impairments weakness, reduced activity tolerance, balance impairments   Functional limitations due to impairments impaired functional mobility, impaired gait, transfers, bed mobility, stair navigation   Clinical Presentation (PT Evaluation Complexity) stable   Clinical Presentation Rationale clinical judgment   Clinical Decision Making (Complexity)  low complexity   Planned Therapy Interventions (PT) balance training;bed mobility training;gait training;home exercise program;neuromuscular re-education;patient/family education;ROM (range of motion);stair training;strengthening;transfer training;progressive activity/exercise;risk factor education;home program guidelines   Risk & Benefits of therapy have been explained evaluation/treatment results reviewed;care plan/treatment goals reviewed;risks/benefits reviewed;current/potential barriers reviewed;participants voiced agreement with care plan;participants included;patient   PT Total Evaluation Time   PT Eval, Low Complexity Minutes (71604) 7   Physical Therapy Goals   PT Frequency Daily   PT Predicted Duration/Target Date for Goal Attainment 01/19/25   PT Goals Bed Mobility;Transfers;Gait;Stairs   PT: Bed Mobility Independent;Supine to/from sit   PT: Transfers Modified independent;Sit to/from stand;Bed to/from chair;Assistive device   PT: Gait Modified independent;Assistive device;Greater than 200 feet   PT: Stairs Modified independent;Rail on both sides;4 stairs   Therapeutic Activity   Therapeutic Activities: dynamic activities to improve functional performance Minutes (80305) 14   Symptoms Noted During/After Treatment Fatigue   Treatment Detail/Skilled Intervention Patient sidelying in bed at beginning of session, agreeable to participate in PT. Patient rolling to R with mod I. Performing supine to sit transfer with CGA and HOB elevated. Patient reaching for PT's arm for assistance with transfer, cued patient to perform transfers as IND as possible. Patient performing sit<>stands from EOB with CGA and FWW, cues for hand placement. Patient requesting use of restroom. Performing sit<>stands from over the toilet commode with CGA and grab bars. Patient performing sit to supine at end of session with CGA, again asking PT for assist but able to complete without assistance. Supine in bed at end of session with call  light next to him and bed alarm on. Educated on conservative management of LE edema, discussed ankle pumps, ambulation, and elevation of BLE's with patient.   Gait Training   Gait Training Minutes (10860) 9   Symptoms Noted During/After Treatment (Gait Training) fatigue   Treatment Detail/Skilled Intervention Patient completed bout of ambulation for 40' with FWW and CGA. Ambulating with decreased step length and gait velocity. Cues for upright posture and walker in closer proximity to body. Patient leaving walker outside of bathroom and sidestepping into bathroom with BUE's reaching for items to stabilize on. Recommended keeping walker with while going into bathroom at home, patient declining doing this while at hospital. Patient fatigued and seated rest break given.   Distance in Feet 40'   PT Discharge Planning   PT Plan progress gait distance, initiate stair training, standing balance, repeated sit<>stands, LE exercises   PT Discharge Recommendation (DC Rec) home with assist;home with home care physical therapy   PT Rationale for DC Rec Patient below baseline functional mobiity. Presents with weakness and reduced activity tolerance resulting in the need for assist x1 and FWW to safely mobilize. Lives with spouse in house, has 4 steps to enter with handrails. Patient declining discussion regarding TCU. If patient able to progress to mod I for mobility with FWW while participate in IP therapies, recommend discharge home with supervision and assist as needed for IADL's, recommend walker for mobility. Recommend HH PT for improvement of strength, activity tolerance, balance, and independence with functional mobility.   PT Brief overview of current status CGA for mobility with FWW. Goals of therapy will be to address safe mobility and make recs for d/c to next level of care. Pt and RN will continue to follow all falls risk precautions as documented by RN staff while hospitalized.   Physical Therapy Time and Intention    Timed Code Treatment Minutes 23   Total Session Time (sum of timed and untimed services) 30

## 2025-01-09 NOTE — PROVIDER NOTIFICATION
MD Notification    Notified Person: MD    Notified Person Name: Dr. Pryor    Notification Date/Time: 1/8 1625    Notification Interaction: Vocera    Purpose of Notification: FYI, Na is 130     Orders Received: MD aware, no new orders    Comments:

## 2025-01-10 ENCOUNTER — APPOINTMENT (OUTPATIENT)
Dept: PHYSICAL THERAPY | Facility: CLINIC | Age: 80
DRG: 391 | End: 2025-01-10
Attending: STUDENT IN AN ORGANIZED HEALTH CARE EDUCATION/TRAINING PROGRAM
Payer: MEDICARE

## 2025-01-10 VITALS
RESPIRATION RATE: 16 BRPM | TEMPERATURE: 97.6 F | HEART RATE: 137 BPM | OXYGEN SATURATION: 98 % | DIASTOLIC BLOOD PRESSURE: 72 MMHG | SYSTOLIC BLOOD PRESSURE: 137 MMHG

## 2025-01-10 PROCEDURE — 250N000013 HC RX MED GY IP 250 OP 250 PS 637: Performed by: INTERNAL MEDICINE

## 2025-01-10 PROCEDURE — 99232 SBSQ HOSP IP/OBS MODERATE 35: CPT | Performed by: PHYSICIAN ASSISTANT

## 2025-01-10 PROCEDURE — 99207 PR NO BILLABLE SERVICE THIS VISIT: CPT | Performed by: INTERNAL MEDICINE

## 2025-01-10 PROCEDURE — 250N000009 HC RX 250: Performed by: PHYSICIAN ASSISTANT

## 2025-01-10 PROCEDURE — 99239 HOSP IP/OBS DSCHRG MGMT >30: CPT | Performed by: INTERNAL MEDICINE

## 2025-01-10 PROCEDURE — 250N000009 HC RX 250: Performed by: INTERNAL MEDICINE

## 2025-01-10 PROCEDURE — 97530 THERAPEUTIC ACTIVITIES: CPT | Mod: GP

## 2025-01-10 PROCEDURE — 258N000003 HC RX IP 258 OP 636: Performed by: INTERNAL MEDICINE

## 2025-01-10 PROCEDURE — 250N000011 HC RX IP 250 OP 636: Performed by: PHYSICIAN ASSISTANT

## 2025-01-10 RX ORDER — POLYETHYLENE GLYCOL 3350 17 G/17G
17 POWDER, FOR SOLUTION ORAL DAILY
Qty: 510 G | Refills: 0 | Status: SHIPPED | OUTPATIENT
Start: 2025-01-10

## 2025-01-10 RX ORDER — ATROPINE SULFATE 10 MG/ML
1 SOLUTION/ DROPS OPHTHALMIC EVERY 4 HOURS PRN
Qty: 5 ML | Refills: 0 | Status: SHIPPED | OUTPATIENT
Start: 2025-01-10

## 2025-01-10 RX ORDER — ONDANSETRON 4 MG/1
4 TABLET, ORALLY DISINTEGRATING ORAL EVERY 6 HOURS PRN
Qty: 20 TABLET | Refills: 0 | Status: SHIPPED | OUTPATIENT
Start: 2025-01-10

## 2025-01-10 RX ORDER — MORPHINE SULFATE 100 MG/5ML
2.5 SOLUTION ORAL
Qty: 30 ML | Refills: 0 | Status: SHIPPED | OUTPATIENT
Start: 2025-01-10

## 2025-01-10 RX ORDER — HALOPERIDOL 2 MG/ML
0.5 SOLUTION ORAL EVERY 6 HOURS PRN
Qty: 10 ML | Refills: 0 | Status: SHIPPED | OUTPATIENT
Start: 2025-01-10

## 2025-01-10 RX ORDER — ACETAMINOPHEN 650 MG/1
650 SUPPOSITORY RECTAL EVERY 4 HOURS PRN
Qty: 4 SUPPOSITORY | Refills: 0 | Status: SHIPPED | OUTPATIENT
Start: 2025-01-10

## 2025-01-10 RX ORDER — PANTOPRAZOLE SODIUM 40 MG/1
40 TABLET, DELAYED RELEASE ORAL 2 TIMES DAILY
Qty: 60 TABLET | Refills: 0 | Status: SHIPPED | OUTPATIENT
Start: 2025-01-10 | End: 2025-02-09

## 2025-01-10 RX ORDER — MORPHINE SULFATE 20 MG/ML
10 SOLUTION ORAL
Status: DISCONTINUED | OUTPATIENT
Start: 2025-01-10 | End: 2025-01-10 | Stop reason: HOSPADM

## 2025-01-10 RX ORDER — LORAZEPAM 2 MG/ML
0.25 CONCENTRATE ORAL EVERY 4 HOURS PRN
Qty: 30 ML | Refills: 0 | Status: SHIPPED | OUTPATIENT
Start: 2025-01-10

## 2025-01-10 RX ADMIN — MORPHINE SULFATE 10 MG: 100 SOLUTION ORAL at 14:41

## 2025-01-10 RX ADMIN — FLUTICASONE PROPIONATE 2 SPRAY: 50 SPRAY, METERED NASAL at 08:31

## 2025-01-10 RX ADMIN — SODIUM CHLORIDE: 9 INJECTION, SOLUTION INTRAVENOUS at 05:46

## 2025-01-10 RX ADMIN — PANTOPRAZOLE SODIUM 40 MG: 40 INJECTION, POWDER, FOR SOLUTION INTRAVENOUS at 08:29

## 2025-01-10 RX ADMIN — METOPROLOL TARTRATE 5 MG: 5 INJECTION INTRAVENOUS at 10:20

## 2025-01-10 RX ADMIN — METOPROLOL TARTRATE 5 MG: 5 INJECTION INTRAVENOUS at 04:21

## 2025-01-10 RX ADMIN — HYDROMORPHONE HYDROCHLORIDE 0.2 MG: 0.2 INJECTION, SOLUTION INTRAMUSCULAR; INTRAVENOUS; SUBCUTANEOUS at 10:45

## 2025-01-10 RX ADMIN — MICONAZOLE NITRATE: 2 POWDER TOPICAL at 08:29

## 2025-01-10 ASSESSMENT — ACTIVITIES OF DAILY LIVING (ADL)
ADLS_ACUITY_SCORE: 62

## 2025-01-10 NOTE — PROGRESS NOTES
"CLINICAL NUTRITION SERVICES - REASSESSMENT NOTE       Malnutrition Status:  Severe malnutrition in the context of chronic illness  Malnutrition Present on Admission: Yes    Registered Dietitian Interventions:  Weigh patient if not discharging      SUBJECTIVE INFORMATION   - Chart Reviewed     CURRENT NUTRITION ORDERS  Diet: Full Liquid Mixed Berry Ensure Clear w/ breakfast and dinner   Nutrition Support: n/a    Dosing Weight: 79.7 kg, based on actual wt    ASSESSED NUTRITION NEEDS:   Estimated Energy Needs: 1850-0753+ kcals/day (25 - 30+ kcals/kg)  Justification: Maintenance and Repletion  Estimated Protein Needs:  grams protein/day ( 1-1.5 g pro/kg )  Justification:  Preservation of lean body mass and Repletion  Estimated Fluid Needs: 4050-6859 mL/day (1 mL/kcal)  Justification: Per provider pending fluid status    CURRENT INTAKE/TOLERANCE  - Flowsheets show a poor appetite and 1-2 intakes per day of  25% or 75%. Per RN note in FS, \"pt able to try some popsicles & lemon ice\"     NEW FINDINGS  Weight: None during admission  Skin/wounds:1/9 WOCN Summary: Petechiae noted to bilateral groin. No rash, but moisture present with a slight fungal odor.  GI symptoms: Last BM 1/6/25   Nutrition-relevant labs: Reviewed   Nutrition-relevant medications: Reviewed     EVALUATION OF THE PROGRESS TOWARD GOALS   Previous Goals  Patient to consume % of nutritionally adequate meal trays TID, or the equivalent with supplements/snacks.   Evaluation: Not progressing  Weight maintenance or gain  Evaluation: Unable to assess - no current wt  Tolerate diet  Evaluation: Not progressing - per RN note \"Clears-unable to swallow any liquids safely.\"    Previous Nutrition Diagnosis  Inadequate oral intake related to dysphagia as evidenced by report of no po intakes for 1 week, need for nutritional supplement to help meet protein-energy needs  Evaluation: No change    NUTRITION DIAGNOSIS  Inadequate oral intake related to dysphagia as " evidenced by report of no po intakes for 1 week, need for nutritional supplement to help meet protein-energy needs    INTERVENTIONS  Weigh patient    Goals  Diet adv and increase PO to consume >50% meals TID to show improvement vs nutrition support within 2-3 days.     Monitoring/Evaluation      Progress toward goals will be monitored and evaluated per policy.    Ferny Kelly MS, RD, LD

## 2025-01-10 NOTE — PROGRESS NOTES
"    GASTROENTEROLOGY PROGRESS NOTE    Date of Admission: 1/6/2025  Reason for Admission: dysphagia       ASSESSMENT:  79 year old male with a history of alcohol related cirrhosis with hx of esophageal varices, portal hypertension, multifocal HCC recently started on atezolizumab and bevacizumab (11/25/24), hypertension, chronic anemia, chronic thrombocytopenia, MDD with anxiety, psoriasis, GERD, recent hospitalization 12/10-12/13 for decompensated liver failure and SBP, who presented to [facility] on 1/6/2025 from  [facility] with dysphagia, substernal chest pain, and failure to thrive. GI consulted for \"abd pain and dysphagia, ? EGD, HCC, sent from Onc clinic\".      # Dysphagia / odynophagia   # LA Grade B esophagitis   # Acid reflux symptoms  # Substernal chest pain  Patient reports progressive dysphagia and odynophagia to solids, liquids, and pills over the last week. He has had worsening acid reflux as well despite taking omeprazole 40mg daily. The dysphagia has caused him to stop eating and drinking water and he has had difficulty keeping his pills down. Last EGD 4/2024 with normal esophagus, portal hypertensive gastropathy, normal duodenum.   CT Chest 1/6 with findings concerning for gastritis. Appreciate SLP evaluation; patient had mild oral and pharyngeal dysphagia at bedside with painful swallowing and globus sensation. Esophagram 1/8 with normal caliber visualized esophagus with significant abnormal esophageal motility. EGD 1/8/2025 showed LA Grade B reflux esophagitis with no bleeding, remainder of exam normal. Dysphagia likely from esophagitis and motility related factors.        - FLD, slowly ADAT     - Stop Carafate   - Start liquid PPI BID   - Continue SLP therapy      # Alcohol related liver cirrhosis   # Ascites  # Jaundice, hyperbilirubinemia   # hx of esophageal varices  # Portal hypertension  Patient follows in hepatology clinic with Dr. Laura Neves, last seen 10/29/24, had appt scheduled for day " of admission. He has hx of heavy alcohol drinking, but now is sober. He has hx of ascites requiring paracentesis in 2022 with 4.7L removed and recently was admitted to [facility] for SBP 12/11 treated with IV ceftriaxone inpatient and transitioned to oral cipro daily for SBP ppx. Last paracentesis 12/26 with 1.5L removed, cell count not consistent with SBP and culture with no growth. PTA on torsemide 10mg and spironolactone 50mg daily. CT 1/6 with e/o cirrhosis and portal hypertension (small amount of ascites, splenomegaly, portosystemic collaterals). MRI liver 1/7 shows similar/stable findings when compared to MRI 9/2024 and PET scan 11/2024.      MELD 3.0: 29 at 1/9/2025 11:05 AM  MELD-Na: 29 at 1/9/2025 11:05 AM  Calculated from:  Serum Creatinine: 0.77 mg/dL (Using min of 1 mg/dL) at 1/8/2025  3:41 PM  Serum Sodium: 130 mmol/L at 1/8/2025  3:41 PM  Total Bilirubin: 10.4 mg/dL at 1/9/2025 11:05 AM  Serum Albumin: 2.4 g/dL at 1/9/2025 11:05 AM  INR(ratio): 2.57 at 1/8/2025  3:41 PM  Age at listing (hypothetical): 79 years  Sex: Male at 1/9/2025 11:05 AM      - Monitor MELD labs daily   - Continue IV ceftriaxone 1g for SBP ppx, transition back to oral cipro when able   - Diuretics per primary team       # Multifocal HCC   Found to have 4.6cm liver lesion, s/p liver biopsy 2/23 with HCC moderately differentiated. S/p Y-90 embolization of the mass in 3/2023 with follow up imaging showing increase in size s/p repeat Y-90 embolization in 8/30/24 with repeat MRI showing progression again, started on systemic treatment with atezolizumab and bevacizumab 11/25/24, which was stopped at clinic appt on 1/6/25. Follows with Dr. Ellis, last seen day of admission with recommendation to transition to hospice given overall deterioration. Oncology consulted, per their note 1/7, given Child-Naqvi class C liver disease, no further treatment recommended.      - Appreciate oncology recommendations   - Appreciate palliative care       #  Chronic anemia  # Chronic thrombocytopenia   Admit hgb 9.3, around baseline 9-10. Admit plts 73, around baseline .   Last EGD 4/2024 for anemia showed PHG. Colonoscopy 4/24 showed 3 1-2mm polyps - tubular adenomas, as well as colonic AVMs s/p APC and congested mucosa from portal hypertensive colopathy.         - Monitor and transfuse as needed      # Severe malnutrition   Patient with no oral intake for roughly 1 week due to dysphagia, concern for malnutrition in the setting of cirrhosis and HCC. RD consulted, appreciate evaluation     - Continue PO trials, if unable to tolerate adequate po intake, may need to consider nutrition support if this aligns with GOC. However, per my discussion with Parag and his wife on 1/9, Parag currently would not be interested in any type of feeding tube.      GI will sign off.     Thank you for involving us in this patient's care. Please do not hesitate to contact the GI service with any questions or concerns.     Pt care plan discussed with Dr. Pryor, primary team.    Overall time spent on the date of this encounter preparing to see the patient (including chart review of available notes, clinical status events, imaging and labs); obtaining and/or reviewing separately obtained history; ordering medications, tests or procedures; communicating with other health care professionals; and documenting the above clinical information in the electronic medical record was 40 minutes.    Laura Josue PA-C  GI Service  [facility]  Text Page (Monday-Friday, 8am-4pm)    To page the On-Call [facility] GI provider 24 hours a day, please click [facility] and select GASTROENTEROLOGY MEDICINE ADULT / [facility] in the drop-down menu.   _______________________________________________________________      Subjective: Nursing notes and 24hr events reviewed. Patient reports that he feels about the same. His wife states he was able to eat a couple of spoonfuls of tomato soup,  ice cream, several popsicles and some water. He reports that he did not have pain with swallowing but continued to feel as though things got stuck in his mid chest. No abdominal pain. He endorses significant back pain.     ROS:   4 pt ROS negative unless noted in subjective.     Medications:  Current Facility-Administered Medications   Medication Dose Route Frequency Provider Last Rate Last Admin    [Held by provider] buPROPion (WELLBUTRIN XL) 24 hr tablet 300 mg  300 mg Oral QAM Mary Jo Medeiros PA-C        calcium carbonate (TUMS) chewable tablet 1,000 mg  1,000 mg Oral 4x Daily PRN Mary Jo Medeiros PA-C        cefTRIAXone (ROCEPHIN) 1 g vial to attach to  mL bag for ADULTS or NS 50 mL bag for PEDS  1 g Intravenous Q24H Mary Jo Medeiros PA-C   1 g at 01/09/25 1646    [Held by provider] ciprofloxacin (CIPRO) tablet 500 mg  500 mg Oral Daily Mary Jo Medeiros PA-C        [Held by provider] diltiazem ER COATED BEADS (CARDIZEM CD/CARTIA XT) 24 hr capsule 120 mg  120 mg Oral Daily Mary Jo Medeiros PA-C        fluticasone (FLONASE) 50 MCG/ACT spray 2 spray  2 spray Both Nostrils Daily Mary Jo Medeiros PA-C   2 spray at 01/10/25 0831    [Held by provider] folic acid (FOLVITE) tablet 1 mg  1 mg Oral Daily Mary Jo Medeiros PA-C        hydrALAZINE (APRESOLINE) tablet 10 mg  10 mg Oral Q4H PRN Mary Jo Medeiros PA-C        Or    hydrALAZINE (APRESOLINE) injection 10 mg  10 mg Intravenous Q4H PRN Mary Jo Medeiros PA-C        HYDROmorphone (DILAUDID) injection 0.2 mg  0.2 mg Intravenous Q2H PRN Mary Jo Medeiros PA-C   0.2 mg at 01/09/25 2134    HYDROmorphone (DILAUDID) injection 0.4 mg  0.4 mg Intravenous Q2H PRN Mary Jo Medeiros PA-C   0.4 mg at 01/07/25 1747    lidocaine (LMX4) cream   Topical Q1H PRN Mary Jo Medeiros PA-C        lidocaine 1 % 0.1-1 mL  0.1-1 mL Other Q1H PRN Mary Jo Medeiros PA-C         metoprolol (LOPRESSOR) injection 5 mg  5 mg Intravenous Q6H Jackie Pryor MD   5 mg at 01/10/25 0421    [Held by provider] metoprolol succinate ER (TOPROL XL) 24 hr tablet 25 mg  25 mg Oral BID Mary Jo Medeiros PA-C        miconazole (MICATIN) 2 % powder   Topical BID Jackie Pryor MD   Given at 01/10/25 0829    naloxone (NARCAN) injection 0.2 mg  0.2 mg Intravenous Q2 Min PRN Jose Luis Han MD        Or    naloxone (NARCAN) injection 0.4 mg  0.4 mg Intravenous Q2 Min PRN Jose Luis Han MD        Or    naloxone (NARCAN) injection 0.2 mg  0.2 mg Intramuscular Q2 Min PRN Jose Luis Han MD        Or    naloxone (NARCAN) injection 0.4 mg  0.4 mg Intramuscular Q2 Min PRN Jose Luis Han MD        ondansetron (ZOFRAN ODT) ODT tab 4 mg  4 mg Oral Q6H PRN Mary Jo Medeiros PA-C   4 mg at 01/09/25 1412    Or    ondansetron (ZOFRAN) injection 4 mg  4 mg Intravenous Q6H PRN Mary Jo Medeiros PA-C   4 mg at 01/07/25 1147    oxyCODONE (ROXICODONE) tablet 5 mg  5 mg Oral Q4H PRN Mary Jo Medeiros PA-C   5 mg at 01/06/25 1503    oxyCODONE IR (ROXICODONE) half-tab 2.5 mg  2.5 mg Oral Q4H PRN Mary Jo Medeiros PA-C        pantoprazole (PROTONIX) IV push injection 40 mg  40 mg Intravenous BID Laura Josue PA-C   40 mg at 01/10/25 0829    polyethylene glycol (MIRALAX) Packet 17 g  17 g Oral BID PRN Mary Jo Medeiros PA-C   17 g at 01/08/25 1551    prochlorperazine (COMPAZINE) injection 5 mg  5 mg Intravenous Q6H PRN Mary Jo Medeiros PA-C        Or    prochlorperazine (COMPAZINE) tablet 5 mg  5 mg Oral Q6H PRN Mary Jo Medeiros PA-C        senna-docusate (SENOKOT-S/PERICOLACE) 8.6-50 MG per tablet 1 tablet  1 tablet Oral BID PRN Mary Jo Medeiros PA-C        Or    senna-docusate (SENOKOT-S/PERICOLACE) 8.6-50 MG per tablet 2 tablet  2 tablet Oral BID PRN Mary Jo Medeiros PA-C        sodium chloride (PF)  0.9% PF flush 3 mL  3 mL Intracatheter Q8H Mary Jo Medeiros PA-C   3 mL at 01/10/25 0420    sodium chloride (PF) 0.9% PF flush 3 mL  3 mL Intracatheter q1 min prn Mary Jo Medeiros PA-C        sodium chloride 0.9 % infusion   Intravenous Continuous Jackie Pryor MD 50 mL/hr at 01/10/25 0546 New Bag at 01/10/25 0546    [Held by provider] spironolactone (ALDACTONE) tablet 50 mg  50 mg Oral Daily Mary Jo Medeiros PA-C        sucralfate (CARAFATE) suspension 1 g  1 g Oral 4x Daily AC & HS Laura Josue PA-C   1 g at 01/09/25 1647    [Held by provider] torsemide (DEMADEX) tablet 10 mg  10 mg Oral Daily Mary Jo Medeiros PA-C           Objective:  Blood pressure 125/74, pulse 121, temperature 96.5  F (35.8  C), temperature source Oral, resp. rate 16, SpO2 97%.  Gen: Lying in bed. Appears lethargic but able to participate in conversation   HEENT: NCAT. Conjunctiva clear. Scleral icterus   CV: Tachycardia   Resp: Non-labored breathing  Abd: Soft, non tender   Skin:  Jaundice  Ext: significant lower extremity edema   Mental status/Psych: A&O. Asks/answers questions appropriately     PROCEDURES:  EGD on 1/8/2025 with Dr. Martins for dysphagia  Impression:               - LA Grade B reflux esophagitis with no bleeding.                             - Foregut endsocopy otherwise normal. Suspect                             dysphagia from inflammed distal esophagus and                             motility related factors.    LABS:  BMP  Recent Labs   Lab 01/08/25  1708 01/08/25  1541 01/07/25  1144 01/06/25  0852   NA  --  130* 133* 132*   POTASSIUM  --  4.5 4.5 4.6   CHLORIDE  --  99 100 95*   ALIA  --  8.1* 8.4* 8.8   CO2  --  21* 23 26   BUN  --  29.1* 30.5* 33.5*   CR  --  0.77 0.85 1.15   GLC 88 127* 101* 104*     CBC  Recent Labs   Lab 01/08/25  1541 01/07/25  1144 01/06/25  0852   WBC 8.1 7.7 8.4   RBC 2.82* 2.82* 2.94*   HGB 9.0* 8.9* 9.3*   HCT 28.0* 27.8* 28.8*   MCV 99 99 98   MCH  31.9 31.6 31.6   MCHC 32.1 32.0 32.3   RDW 22.6* 22.8* 22.7*   PLT 60* 55* 73*     INR  Recent Labs   Lab 01/08/25  1541 01/07/25  1144   INR 2.57* 2.40*     LFTs  Recent Labs   Lab 01/09/25  1105 01/08/25  1541 01/07/25  1144 01/06/25  0852   ALKPHOS 68 82 84 90   AST 52* 46* 38 45   ALT 40 42 39 41   BILITOTAL 10.4* 11.7* 11.0* 10.8*   PROTTOTAL 4.2* 5.0* 5.1* 5.5*   ALBUMIN 2.4* 2.7* 2.9* 3.1*      PANCNo lab results found in last 7 days.  CULTURES:   7-Day Micro Results       No results found for the last 168 hours.          IMAGING:  Reviewed

## 2025-01-10 NOTE — PROGRESS NOTES
Hematology/Oncology Follow-up Note  [facility]    Date of Admission:  12/10/2024   Reason for Consult: failure to thrive, HCC  Primary Oncologist: Dr. Ellis    ASSESSMENT/ PLAN : Oskar Wilks is a 79 year old year old male who presented this hospitalization as a direct admit from the oncology clinic for concerns with failure to thrive.  Dysphagia  Weakness  Pain, epigastric and back   HCC  Hyperbilirubinemia   Thrombocytopenia      PLAN:  Family to decide on desires for restorative cares vs. Hospice  Social work to give hospice information today  Will continue to follow     Ascites/ Hyperbilirubinemia   Admission Bilirubin: 10.8  Jaundiced   01/06/2025 CT Liver showing Gastric wall thickening with perigastric stranding and fluid, findings which are nonspecific but may be seen with gastritis. Cirrhosis with portal hypertension as evidenced by splenomegaly, small abdominopelvic ascites, recanalized periumbilical veins, and periesophageal/abdominal portosystemic collaterals. Previously noted liver lesions including segment 4 LI-RADS 5 lesion. Cholelithiasis.    Dysphagia   GI and SLP following   01/06/2025 CT Chest and Liver showing gastritis   01/08/2025 XR esophagram showing normal caliber visualized esophagus with significant abnormal esophageal motility.   01/08/2024 EGD showed LA Grade B reflux esophagitis with no bleeding, remainder of exam normal.     Thrombocytopenia   Secondary to acute on chronic liver disease  Admission PLT 73K  Baseline 70-100K  Not on anticoagulation     Hepatocellular Carcinoma   Diagnosed via liver biopsy on 02/02/2023 Hepatocellular carcinoma, moderately differentiated.   Not a transplant candidate  03/16/2023 Y-90 emobolization of left hepatic lesion  07/31/2024 MRI of the liver revealed  interval increased in size segment VII observation (1.9 cm, previously 1 cm) now with definite arterial phase hyperenhancement and threshold growth (LR-5).   08/30/2024 Y-90 emobolization    09/30/2024 MRI of the liver showed progression  Case discussed at tumor board, no further liver directed therapies recommended   10/31/2024 CT CAP did not show disease outside of the liver   10/31/2024 Bone scan showing diffuse radiotracer uptake throughout osseous structures   11/19/2024 PET CT showing no bone metastasis   01/07/2025 MR liver showing stable disease   Treatment history   11/25/2024 C1D1 OP atezolimab and bevacizumab       DISCUSSION:  I was able to meet with daughter, spouse, and patient at the bedside. Patient does not feel good today. He endorses pain when he swallows and that food still feels that it is getting stuck. Family is planning to talk to the GI specialist at [facility]  later today per spouse report.     There were few questions that were answered. The family is requesting to talk to social work to receive hospice information.       PHYSICAL EXAM:  Vital signs:  Temp: 96.5  F (35.8  C) Temp src: Oral BP: 128/75 Pulse: 113   Resp: 16 SpO2: 97 % O2 Device: None (Room air) Oxygen Delivery: 6 LPM        GENERAL/CONSTITUTIONAL: No acute distress.  NEUROLOGIC: Alert  INTEGUMENTARY: Jaundice, ecchymosis       Labs Reviewed: CBC, CMP, labs as above         35 minutes spent on the date of the encounter doing review of outside records, review of test results, interpretation of tests, patient visit, coordinating care, and documentation     Sulma INFANTE CNP  Hematology/Oncology  [facility]     Securely message with [employer]   Pager #[phone]

## 2025-01-10 NOTE — PROGRESS NOTES
[facility]    Hospitalist Progress Note    Assessment & Plan   Patient is a 79-year-old male with past medical history significant for hepatocellular carcinoma, alcohol induced liver cirrhosis, esophageal varices, portal hypertension, essential hypertension, paroxysmal atrial fibrillation, chronic normocytic anemia, chronic thrombocytopenia, MDD with anxiety, insomnia, moderate aortic stenosis, psoriasis, GERD, history of compression fractures of L1 and L4 and history of alcohol use disorder in remission who was requested to be admitted as a direct admit from [facility] in [place] due to the concern for dysphagia, odynophagia and epigastric pain, unable to eat/take pills, and physical deconditioning with failure to thrive. Of note, recent hospitalization last month for acute on chronic decompensated liver failure with elevated transaminases secondary to infection.     Dysphagia, Odynophagia with substernal chest pain  Dysphagia, odynophagia x1 week, with sensation food is stuck after he swallows.  He states he can chew and swallow just fine however it feels stuck substernally and he develops pain and discomfort.  This only happens when he attempts to swallow.  No nausea or vomiting.  No significant swelling to his abdomen.  Dr. Ellis discussed with him that he is been weaker and more jaundiced, recommending stopping cancer treatment and transitioning to hospice however given his severe pain dysphagia is unable to take anything by mouth currently.  Oncology recommended tract admission for GI consultation for possible endoscopy and a CT scan for further evaluation.  At the time of admission after long discussion with patient and wife, they do not want to transition to hospice quite yet but would like evaluation for the above pain and to go from there.  Patient would like to be full code after discussion on admission.  Also discussed case with GI recommend CT scan, hopefully able to  drink CT contrast to further evaluate esophagus and hopefully avoid endoscopy.  Patient was on Cipro for SBP prophylaxis prior to admission, was continued on ceftriaxone during the stay, will go home on Cipro on discharge.  His Aldactone and torsemide was on hold, oral intake is very poor, on 1/8 patient had esophagogram, GI team noted possibility of stenosis, patient underwent EGD following that EGD did not indicate any stenosis mostly esophagitis, they increase the PPI frequency to twice daily.  Carafate was added on 1/9, repeat the liver MRI was obtained and the case was supposed to be discussed at the tumor conference from [place].  Meantime his oral intake continued to be very poor, we discussed about tube feeding which patient and family did not want to pursue, nutrition visited with the patient and family, advised on adding Ensure, later on 1/10 patient's daughter who is a hospice nurse visited with the patient, wife and daughter along with the patient decided to transition to hospice, they discussed case with [facility] and patient will be enrolled in there as early as 1/11, there was no transportation that could be arranged on 1/10 so the decision is to discharge the patient on 1/11 to enroll in hospice on discharge.  He will be going home at that point.  Multifocal unresectable hepatocellular carcinoma:  Follows up with [place] oncology Dr. Ellis. S/p Atezolizumab and Bevacizumab, which are now discontinued.  Patient is weaker not a current candidate for more therapy, per visit day of admission was considering transitioning to hospice however now patient and wife would like to hold off on initiating hospice/palliative care.  Patient is clear he wants to be full code on admission.  -Discussed with [place] oncology, and they suggested that patient possibly should transition to hospice in case he does not feel well after current management.  -mri liver ordered by Gastroenterology, findings seems to  be stable, family is thinking to look for a second opinion.  -His bilirubin is still elevated, total protein is very low, AST mildly elevated, jaundice has worsened, poor appetite noted, family decided on hospice on 1/10.     Paroxysmal atrial fibrillation with RVR  S/p ETHEL guided cardioversion in 2021  Moderate aortic stenosis  Essential hypertension  Recent ECHO showing moderate concentric LVH, normal LV systolic function, EF of 60 to 65%, there is mild mitral stenosis and moderate aortic stenosis.  Left atrium is severely dilated no pericardial effusion.  Normal left ventricular wall motion  -HOLD PTA diltiazem and Toprol XL given unable to swallow pills  -Start low dose scheduled IV metoprolol, IV PRN available, and titrate  -Consider starting diltiazem gtt if goes into Afib with RVR  -Anticoagulation is contraindicated given his coagulopathy     Chronic anemia  Chronic thrombocytopenia  Most likely related to cirrhosis. Platelets on admission 73, Hgb 9.3 which are at recent baseline.  -Continue to monitor intermittently  -Hematology/oncology following      Mild hyponatremia, chronic  Recent  Na+ 130-134.  -Holding PTA diuretics in favor of IV fluids for now     Chronic stable diagnoses and other pertinent medical history: Appropriate PTA medications will be resumed.   MDD with anxiety  Insomnia  Psoriasis  GERD  History of compression fractures (L1 and L4)  Diet :Snacks/Supplements Adult: Ensure Clear; With Meals  Full Liquid Diet Thin Liquids (level 0) (only when awake/alert, direct assist as needed)  DVT Prophylaxis: Pneumatic Compression Devices  Code Status: No CPR- Do NOT Intubate     51 MINUTES SPENT BY ME on the date of service doing chart review, history, exam, documentation & further activities per the note.  Medically Ready for Discharge: Ready Now    Clinically Significant Risk Factors         # Hyponatremia: Lowest Na = 130 mmol/L in last 2 days, will monitor as appropriate       # Hypoalbuminemia:  Lowest albumin = 2.4 g/dL at 1/9/2025 11:05 AM, will monitor as appropriate    # Coagulation Defect: INR = 2.57 (Ref range: 0.85 - 1.15) and/or PTT = N/A, will monitor for bleeding  # Thrombocytopenia: Lowest platelets = 60 in last 2 days, will monitor for bleeding   # Hypertension: Noted on problem list             # Severe Malnutrition: based on nutrition assessment    # Financial/Environmental Concerns: none         Jackie Pryor MD  Text Page   (7am to 6pm)    Interval History   Since daughter visited today family has decided to transition to hospice, they met with [facility], plan was to go home with home care.  Due to lack of transportation and will remain in the hospital overnight, with tentative plans for discharge on 1/11.    -Data reviewed today: I reviewed all new labs and imaging results over the last 24 hours. .    Physical Exam     Vital Signs with Ranges  Temp:  [96.5  F (35.8  C)-97.7  F (36.5  C)] 96.5  F (35.8  C)  Pulse:  [] 113  Resp:  [14-18] 16  BP: (119-142)/(70-93) 128/75  SpO2:  [94 %-100 %] 97 %  I/O last 3 completed shifts:  In: 3710 [I.V.:3710]  Out: 250 [Urine:250]    Constitutional: Sleepy jaundice present  Respiratory: Clear to auscultation bilaterally, no crackles or wheezing  Cardiovascular: Regular rate and rhythm, normal S1 and S2, and no murmur noted  GI: Normal bowel sounds, soft, non-distended, non-tender  Skin/Integumen: 2+ edema noted lower extremity  Neuro : moving all 4 extremities, appears weak and confused.    Medications   Current Facility-Administered Medications   Medication Dose Route Frequency Provider Last Rate Last Admin    sodium chloride 0.9 % infusion   Intravenous Continuous Jackie Pryor MD 50 mL/hr at 01/10/25 0546 New Bag at 01/10/25 0546     Current Facility-Administered Medications   Medication Dose Route Frequency Provider Last Rate Last Admin    [Held by provider] buPROPion (WELLBUTRIN XL) 24 hr tablet 300 mg  300 mg Oral AKOSUA Medeiros  Mary Jo Archuleta PA-C        cefTRIAXone (ROCEPHIN) 1 g vial to attach to  mL bag for ADULTS or NS 50 mL bag for PEDS  1 g Intravenous Q24H Mary Jo Medeiros PA-C   1 g at 01/09/25 1646    [Held by provider] ciprofloxacin (CIPRO) tablet 500 mg  500 mg Oral Daily Mary Jo Medeiros PA-C        [Held by provider] diltiazem ER COATED BEADS (CARDIZEM CD/CARTIA XT) 24 hr capsule 120 mg  120 mg Oral Daily Mary Jo Medeiros PA-C        fluticasone (FLONASE) 50 MCG/ACT spray 2 spray  2 spray Both Nostrils Daily Mary Jo Medeiros PA-C   2 spray at 01/10/25 0831    [Held by provider] folic acid (FOLVITE) tablet 1 mg  1 mg Oral Daily Mary Jo Medeiros PA-C        metoprolol (LOPRESSOR) injection 5 mg  5 mg Intravenous Q6H Jackie Pryor MD   5 mg at 01/10/25 1020    [Held by provider] metoprolol succinate ER (TOPROL XL) 24 hr tablet 25 mg  25 mg Oral BID Mary Jo Medeiros PA-C        miconazole (MICATIN) 2 % powder   Topical BID Jackie Pryor MD   Given at 01/10/25 0829    pantoprazole (PROTONIX) 2 mg/mL suspension 40 mg  40 mg Oral BID AC Laura Josue PA-C        sodium chloride (PF) 0.9% PF flush 3 mL  3 mL Intracatheter Q8H Mary Jo Medeiros PA-C   3 mL at 01/10/25 0420    [Held by provider] spironolactone (ALDACTONE) tablet 50 mg  50 mg Oral Daily Mary Jo Medeiros PA-C        [Held by provider] torsemide (DEMADEX) tablet 10 mg  10 mg Oral Daily Mary Jo Medeiros PA-C           Data   Recent Labs   Lab 01/09/25  1105 01/08/25  1708 01/08/25  1541 01/07/25  1144 01/06/25  0852   WBC  --   --  8.1 7.7 8.4   HGB  --   --  9.0* 8.9* 9.3*   MCV  --   --  99 99 98   PLT  --   --  60* 55* 73*   INR  --   --  2.57* 2.40*  --    NA  --   --  130* 133* 132*   POTASSIUM  --   --  4.5 4.5 4.6   CHLORIDE  --   --  99 100 95*   CO2  --   --  21* 23 26   BUN  --   --  29.1* 30.5* 33.5*   CR  --   --  0.77 0.85 1.15   ANIONGAP  --   --  10 10 11   ALIA   --   --  8.1* 8.4* 8.8   GLC  --  88 127* 101* 104*   ALBUMIN 2.4*  --  2.7* 2.9* 3.1*   PROTTOTAL 4.2*  --  5.0* 5.1* 5.5*   BILITOTAL 10.4*  --  11.7* 11.0* 10.8*   ALKPHOS 68  --  82 84 90   ALT 40  --  42 39 41   AST 52*  --  46* 38 45     Recent Labs   Lab 01/08/25  1708 01/08/25  1541 01/07/25  1144 01/06/25  0852   GLC 88 127* 101* 104*       Imaging:   No results found for this or any previous visit (from the past 24 hours).

## 2025-01-10 NOTE — DISCHARGE SUMMARY
[facility]    Discharge Summary  Hospitalist    Date of Admission:  1/6/2025  Date of Discharge:  1/11/2025  Discharging Provider: Jackie Pryor MD    Discharge Diagnoses   Comfort cares    History of Present Illness   Please review admission history and physical.    Hospital Course   Oskar Wilks was admitted on 1/6/2025.  The following problems were addressed during his hospitalization:    Active Problems:    Abdominal pain  Patient is a 79-year-old male with past medical history significant for hepatocellular carcinoma, alcohol induced liver cirrhosis, esophageal varices, portal hypertension, essential hypertension, paroxysmal atrial fibrillation, chronic normocytic anemia, chronic thrombocytopenia, MDD with anxiety, insomnia, moderate aortic stenosis, psoriasis, GERD, history of compression fractures of L1 and L4 and history of alcohol use disorder in remission who was requested to be admitted as a direct admit from [facility] in [place] due to the concern for dysphagia, odynophagia and epigastric pain, unable to eat/take pills, and physical deconditioning with failure to thrive. Of note, recent hospitalization last month for acute on chronic decompensated liver failure with elevated transaminases secondary to infection.     Dysphagia, Odynophagia with substernal chest pain  Dysphagia, odynophagia x1 week, with sensation food is stuck after he swallows.  He states he can chew and swallow just fine however it feels stuck substernally and he develops pain and discomfort.  This only happens when he attempts to swallow.  No nausea or vomiting.  No significant swelling to his abdomen.  Dr. Ellis discussed with him that he is been weaker and more jaundiced, recommending stopping cancer treatment and transitioning to hospice however given his severe pain dysphagia is unable to take anything by mouth currently.  Oncology recommended tract admission for GI consultation for  possible endoscopy and a CT scan for further evaluation.  At the time of admission after long discussion with patient and wife, they do not want to transition to hospice quite yet but would like evaluation for the above pain and to go from there.  Patient would like to be full code after discussion on admission.  Also discussed case with GI recommend CT scan, hopefully able to drink CT contrast to further evaluate esophagus and hopefully avoid endoscopy.  Patient was on Cipro for SBP prophylaxis prior to admission, was continued on ceftriaxone during the stay, will go home on Cipro on discharge.  His Aldactone and torsemide was on hold, oral intake is very poor, on 1/8 patient had esophagogram, GI team noted possibility of stenosis, patient underwent EGD following that EGD did not indicate any stenosis mostly esophagitis, they increase the PPI frequency to twice daily.  Carafate was added on 1/9, repeat the liver MRI was obtained and the case was supposed to be discussed at the tumor conference from [place].  Meantime his oral intake continued to be very poor, we discussed about tube feeding which patient and family did not want to pursue, nutrition visited with the patient and family, advised on adding Ensure, later on 1/10 patient's daughter who is a hospice nurse visited with the patient, wife and daughter along with the patient decided to transition to hospice, they discussed case with [facility] and patient will be enrolled in there as early as 1/11, there was no transportation that could be arranged on 1/10 so the decision is to discharge the patient on 1/11 to enroll in hospice on discharge.  He will be going home at that point.  Multifocal unresectable hepatocellular carcinoma:  Follows up with [facility] oncology Dr. Ellis. S/p Atezolizumab and Bevacizumab, which are now discontinued.  Patient is weaker not a current candidate for more therapy, per visit day of admission was considering transitioning  to hospice however now patient and wife would like to hold off on initiating hospice/palliative care.  Patient is clear he wants to be full code on admission.  -Discussed with [facility] oncology, and they suggested that patient possibly should transition to hospice in case he does not feel well after current management.  -mri liver ordered by Gastroenterology, findings seems to be stable, family is thinking to look for a second opinion.  -His bilirubin is still elevated, total protein is very low, AST mildly elevated, jaundice has worsened, poor appetite noted, family decided on hospice on 1/10.     Paroxysmal atrial fibrillation with RVR  S/p ETHEL guided cardioversion in 2021  Moderate aortic stenosis  Essential hypertension  Recent ECHO showing moderate concentric LVH, normal LV systolic function, EF of 60 to 65%, there is mild mitral stenosis and moderate aortic stenosis.  Left atrium is severely dilated no pericardial effusion.  Normal left ventricular wall motion meds were on hold during his stay here, patient eventually decided on switching to comfort cares.     Chronic anemia  Chronic thrombocytopenia  Most likely related to cirrhosis. Platelets on admission 73, Hgb 9.3 which are at recent baseline.  -Continue to monitor intermittently  -Hematology/oncology following      Mild hyponatremia, chronic  Recent  Na+ 130-134.  -Holding PTA diuretics in favor of IV fluids for now     Chronic stable diagnoses and other pertinent medical history: Appropriate PTA medications will be resumed.   MDD with anxiety  Insomnia  Psoriasis  GERD  History of compression fractures (L1 and L4)    Jackie Pryor MD    Significant Results and Procedures       Pending Results     Unresulted Labs Ordered in the Past 30 Days of this Admission       No orders found from 12/7/2024 to 1/7/2025.            Code Status   Comfort Care       Primary Care Physician   Viet López    Physical Exam                      There were no vitals  filed for this visit.  Vital Signs with Ranges     I/O last 3 completed shifts:  In: 3710 [I.V.:3710]  Out: 400 [Urine:400]    The patient was examined on the day of discharge.    Discharge Disposition   Discharged to home to enroll in hospice  Condition at discharge: Stable    Consultations This Hospital Stay   GI LUMINAL ADULT IP CONSULT  HEMATOLOGY & ONCOLOGY IP CONSULT  SPEECH LANGUAGE PATH ADULT IP CONSULT  CARE MANAGEMENT / SOCIAL WORK IP CONSULT  PALLIATIVE CARE ADULT IP CONSULT  PHYSICAL THERAPY ADULT IP CONSULT  NUTRITION SERVICES ADULT IP CONSULT  WOUND OSTOMY CONTINENCE NURSE  IP CONSULT    Time Spent on this Encounter   I, Jackie Pryor MD, personally saw the patient today and spent greater than 30 minutes discharging this patient.    Discharge Orders      Reason for your hospital stay    Failure to thrive     Activity    Your activity upon discharge: activity as tolerated     NO Follow-up Care Needed    NO follow-up care needed for this patient.  Enroll in hospice on arrival at home.     Diet    Follow this diet upon discharge: Current Diet:Orders Placed This Encounter      Snacks/Supplements Adult: Ensure Clear; With Meals      Full Liquid Diet Thin Liquids (level 0) (only when awake/alert, direct assist as needed)     Discharge Medications   Discharge Medication List as of 1/10/2025  3:23 PM        START taking these medications    Details   acetaminophen (TYLENOL) 650 MG suppository Place 1 suppository (650 mg) rectally every 4 hours as needed for fever., Disp-4 suppository, R-0, E-Prescribe      atropine 1 % ophthalmic solution Take 1 drop by mouth, place under tongue or place inside cheek every 4 hours as needed for secretions., Disp-5 mL, R-0, E-Prescribe      haloperidol (HALDOL) 2 MG/ML (HIGH CONC) solution Take 0.25 mLs (0.5 mg) by mouth or place under tongue every 6 hours as needed for agitation or other (nausea)., Disp-10 mL, R-0, E-Prescribe      LORazepam (ATIVAN) 2 MG/ML (HIGH CONC) oral  solution Take 0.125 mLs (0.25 mg) by mouth or place under tongue every 4 hours as needed for anxiety (restlessness)., Disp-30 mL, R-0, E-Prescribe      miconazole (MICATIN) 2 % external powder Apply topically 2 times daily.Disp-71 g, [id number]      morphine sulfate, high concentrate, (ROXANOL-CONCENTRATED) 20 MG/ML concentrated solution Take 0.125 mLs (2.5 mg) by mouth or place under tongue every 2 hours as needed for shortness of breath or pain., Disp-30 mL, R-0, E-PrescribeHospice patient. Dispense in quantities of 30 mL.      pantoprazole (PROTONIX) 40 MG EC tablet Take 1 tablet (40 mg) by mouth 2 times daily., Disp-60 tablet, R-0, E-Prescribe      polyethylene glycol (MIRALAX) 17 GM/Dose powder Take 17 g by mouth daily., Disp-510 g, R-0, E-Prescribe           CONTINUE these medications which have CHANGED    Details   ondansetron (ZOFRAN ODT) 4 MG ODT tab Take 1 tablet (4 mg) by mouth every 6 hours as needed for nausea or vomiting., Disp-20 tablet, R-0, E-Prescribe           CONTINUE these medications which have NOT CHANGED    Details   ammonium lactate (AMLACTIN) 12 % external cream Apply topically daily as needed.Historical      buPROPion (WELLBUTRIN XL) 300 MG 24 hr tablet Take 300 mg by mouth every morning, Historical      ciprofloxacin (CIPRO) 500 MG tablet Take 1 tablet (500 mg) by mouth daily., Disp-90 tablet, R-1, E-Prescribe      diltiazem ER (DILT-XR) 120 MG 24 hr capsule Take 1 capsule (120 mg) by mouth daily., Disp-30 capsule, R-2, E-Prescribe      folic acid (FOLVITE) 1 MG tablet Take 1 mg by mouth daily, Historical      metoprolol succinate ER (TOPROL XL) 25 MG 24 hr tablet Take 1 tablet (25 mg) by mouth 2 times daily., Disp-60 tablet, R-3, E-Prescribe           STOP taking these medications       cholecalciferol 25 MCG (1000 UT) TABS Comments:   Reason for Stopping:         fluticasone (FLONASE) 50 MCG/ACT nasal spray Comments:   Reason for Stopping:         ketoconazole (NIZORAL) 2 % external  cream Comments:   Reason for Stopping:         multivitamin w/minerals (THERA-VIT-M) tablet Comments:   Reason for Stopping:         omeprazole (PRILOSEC) 40 MG DR capsule Comments:   Reason for Stopping:         spironolactone (ALDACTONE) 50 MG tablet Comments:   Reason for Stopping:         torsemide (DEMADEX) 10 MG tablet Comments:   Reason for Stopping:             Allergies   Allergies   Allergen Reactions    Compazine [Prochlorperazine] Anxiety    Penicillins      PN: LW Reaction: Itching, Pruritis    Adhesive Tape Rash     Data   Most Recent 3 CBC's:  Recent Labs   Lab Test 01/08/25  1541 01/07/25  1144 01/06/25  0852   WBC 8.1 7.7 8.4   HGB 9.0* 8.9* 9.3*   MCV 99 99 98   PLT 60* 55* 73*      Most Recent 3 BMP's:  Recent Labs   Lab Test 01/08/25  1708 01/08/25  1541 01/07/25  1144 01/06/25  0852   NA  --  130* 133* 132*   POTASSIUM  --  4.5 4.5 4.6   CHLORIDE  --  99 100 95*   CO2  --  21* 23 26   BUN  --  29.1* 30.5* 33.5*   CR  --  0.77 0.85 1.15   ANIONGAP  --  10 10 11   ALIA  --  8.1* 8.4* 8.8   GLC 88 127* 101* 104*     Most Recent 2 LFT's:  Recent Labs   Lab Test 01/09/25  1105 01/08/25  1541   AST 52* 46*   ALT 40 42   ALKPHOS 68 82   BILITOTAL 10.4* 11.7*     Most Recent INR's and Anticoagulation Dosing History:  Anticoagulation Dose History  More data exists         Latest Ref Rng & Units 8/26/2024 9/30/2024 12/10/2024 12/12/2024 12/23/2024 1/7/2025 1/8/2025   Recent Dosing and Labs   INR 0.85 - 1.15 1.66  1.67  2.29  2.23  2.05  2.40  2.57      Most Recent 3 Troponin's:No lab results found.  Most Recent Cholesterol Panel:No lab results found.  Most Recent 6 Bacteria Isolates From Any Culture (See EPIC Reports for Culture Details):No lab results found.  Most Recent TSH, T4 and A1c Labs:  Recent Labs   Lab Test 01/06/25  0852   TSH 1.47     Results for orders placed or performed during the hospital encounter of 01/06/25   CT Liver wo & w Contrast    Narrative    EXAM: CT CHEST W/O CONTRAST, CT LIVER  WO and W CONTRAST  LOCATION: [facility]  DATE: 1/6/2025    INDICATION: Dysphagia  COMPARISON: PET/CT 11/19/2024, CT chest/abdomen/pelvis with contrast 10/31/2024, MRI liver with and without contrast 09/30/2024  TECHNIQUE: CT scan of the chest and abdomen was initially performed without intravenous contrast. Next, CT scan of the abdomen, and pelvis was performed following injection of IV contrast (liver protocol). Multiplanar reformats were obtained. Dose   reduction techniques were used.   CONTRAST: 107 mL Isovue 370    FINDINGS:     LUNGS AND PLEURA: Dependent atelectasis.    MEDIASTINUM/AXILLAE: Periesophageal collaterals. No lymphadenopathy or pericardial effusion. Atherosclerotic calcifications of the aortic arch.    CORONARY ARTERY CALCIFICATION: Moderate.    HEPATOBILIARY: Cirrhotic liver morphology. Similar heterogeneous iron deposition. Similar patchy left hepatic lobe geographic hyperenhancement and hypoenhancing treatment cavity.   ?  Similar appearance of segment 7 hypoenhancing treatment cavity measuring with patchy surrounding parenchymal enhancement.  ?  Previously noted segment IVb arterially enhancing observation measuring 2 cm with  evidence of washout on the portal venous phase, is less well visualized by CT.   ?  Other small arterially hyperenhancing lesions of the liver better visualized on prior MRI. Scattered hepatic cysts.    Cholelithiasis.    PANCREAS: Somewhat atrophic, otherwise unremarkable.    SPLEEN: Splenomegaly.    ADRENAL GLANDS: Normal.    KIDNEYS/BLADDER: No hydronephrosis. Unremarkable urinary bladder.    BOWEL: Gastric wall thickening with perigastric stranding and fluid, findings which are nonspecific but may be seen with gastritis. Small abdominopelvic ascites.    LYMPH NODES: Normal.    VASCULATURE: Atherosclerotic calcifications of the aortoiliac vessels without evidence of aneurysmal dilatation. Recanalized periumbilical veins. Periesophageal and  abdominal portosystemic collaterals.    PELVIC ORGANS: Prominent prostate with central calcifications.    MUSCULOSKELETAL: Small fat-containing right inguinal hernia. Stable compression deformities of L1 and L4. No acute osseous abnormality or suspicious bony lesion.      Impression    IMPRESSION:    1.  Gastric wall thickening with perigastric stranding and fluid, findings which are nonspecific but may be seen with gastritis.  2.  Cirrhosis with portal hypertension as evidenced by splenomegaly, small abdominopelvic ascites, recanalized periumbilical veins, and periesophageal/abdominal portosystemic collaterals.  3.  Previously noted liver lesions including segment 4 LI-RADS 5 lesion, better evaluated by prior MRI. Recommend attention on followup MRI.  4.  Cholelithiasis.       CT Chest w/o Contrast    Narrative    EXAM: CT CHEST W/O CONTRAST, CT LIVER WO and W CONTRAST  LOCATION: [facility]  DATE: 1/6/2025    INDICATION: Dysphagia  COMPARISON: PET/CT 11/19/2024, CT chest/abdomen/pelvis with contrast 10/31/2024, MRI liver with and without contrast 09/30/2024  TECHNIQUE: CT scan of the chest and abdomen was initially performed without intravenous contrast. Next, CT scan of the abdomen, and pelvis was performed following injection of IV contrast (liver protocol). Multiplanar reformats were obtained. Dose   reduction techniques were used.   CONTRAST: 107 mL Isovue 370    FINDINGS:     LUNGS AND PLEURA: Dependent atelectasis.    MEDIASTINUM/AXILLAE: Periesophageal collaterals. No lymphadenopathy or pericardial effusion. Atherosclerotic calcifications of the aortic arch.    CORONARY ARTERY CALCIFICATION: Moderate.    HEPATOBILIARY: Cirrhotic liver morphology. Similar heterogeneous iron deposition. Similar patchy left hepatic lobe geographic hyperenhancement and hypoenhancing treatment cavity.   ?  Similar appearance of segment 7 hypoenhancing treatment cavity measuring with patchy surrounding  parenchymal enhancement.  ?  Previously noted segment IVb arterially enhancing observation measuring 2 cm with  evidence of washout on the portal venous phase, is less well visualized by CT.   ?  Other small arterially hyperenhancing lesions of the liver better visualized on prior MRI. Scattered hepatic cysts.    Cholelithiasis.    PANCREAS: Somewhat atrophic, otherwise unremarkable.    SPLEEN: Splenomegaly.    ADRENAL GLANDS: Normal.    KIDNEYS/BLADDER: No hydronephrosis. Unremarkable urinary bladder.    BOWEL: Gastric wall thickening with perigastric stranding and fluid, findings which are nonspecific but may be seen with gastritis. Small abdominopelvic ascites.    LYMPH NODES: Normal.    VASCULATURE: Atherosclerotic calcifications of the aortoiliac vessels without evidence of aneurysmal dilatation. Recanalized periumbilical veins. Periesophageal and abdominal portosystemic collaterals.    PELVIC ORGANS: Prominent prostate with central calcifications.    MUSCULOSKELETAL: Small fat-containing right inguinal hernia. Stable compression deformities of L1 and L4. No acute osseous abnormality or suspicious bony lesion.      Impression    IMPRESSION:    1.  Gastric wall thickening with perigastric stranding and fluid, findings which are nonspecific but may be seen with gastritis.  2.  Cirrhosis with portal hypertension as evidenced by splenomegaly, small abdominopelvic ascites, recanalized periumbilical veins, and periesophageal/abdominal portosystemic collaterals.  3.  Previously noted liver lesions including segment 4 LI-RADS 5 lesion, better evaluated by prior MRI. Recommend attention on followup MRI.  4.  Cholelithiasis.       MR Liver wo & w Contrast    Narrative    EXAM: MR LIVER W/O and W CONTRAST  LOCATION: [facility]  DATE: 1/7/2025    INDICATION: Patient has liver cirrhosis and HCC. Please evaluate if there is progression of HCC  COMPARISON: Liver protocol CT with and without contrast  01/06/2025, PET scan 11/19/2024, CT chest/abdomen/pelvis with contrast 10/31/2024, MRI liver with and without contrast 09/30/2024.  TECHNIQUE: Routine MRI liver protocol including T1 in/out phase, diffusion, multiplane T2, and dynamic T1 with IV contrast.    CONTRAST: 8 mL Gadavist    FINDINGS:     LIVER: Cirrhotic liver morphology. Similar heterogeneous iron deposition. Portal hypertension as evidenced by splenomegaly, small abdominopelvic ascites, recanalized periumbilical veins, and multiple additional portosystemic collaterals.    Similar patchy left hepatic lobe geographic hyperenhancement and hypoenhancing 1.6 cm treatment cavity.   ?  Grossly stable size of segment 7 hypoenhancing treatment cavity measuring 1.5 cm.  ?  Stable size of segment IVb arterially hyperenhancing observation measuring 2 cm with evidence of washout on the portal venous phase (arterial phase series image 56).   ?  Stable size of hepatic dome observation measuring 1.5 cm with arterial hyperenhancement and probable washout on the current exam (arterial phase series image 78).    Similar faint arterially hyperenhancing observations in segment 5 measuring 1 cm and segment 6 measuring 0.7 cm, again without definite washout.  ?  Scattered hepatic cysts.     ADDITIONAL FINDINGS: Elevated right hemidiaphragm, similar. Small abdominopelvic ascites. Splenomegaly to 19.0 cm (series 3 image 30). Gynecomastia. Trace right pleural effusion. Cholelithiasis. Prominent extrahepatic duct, similar to prior. Also similar   is left hepatic lobe intrahepatic bile duct dilatation. Mild nonspecific gastric wall thickening reidentified, gastritis is possible. Pancreas is somewhat atrophic. Unremarkable adrenal glands. No hydronephrosis. Tiny left renal cyst which requires no   dedicated follow-up. Colonic diverticulosis. No bowel obstruction. No AAA. No lymphadenopathy. Stable compression deformities of L1 and L4. No acute osseous abnormality or suspicious bony  lesion.      Impression    IMPRESSION:  1. Similar post-treatment changes of the segment 7 observation, without definite residual viable tumor.  2. Stable size of segment IVb 2 cm observation with arterial phase  hyperenhancement and washout (LR 5).  3. Stable size of arterially hyperenhancing observation at the dome measuring 1.5 cm. Probable washout is present on the current MRI (LR 5).  4. Similar additional LR 3 observations in segments 5 and 6.  5. Similar left hepatic lobe treatment change without definite viable tumor (LR TR nonviable).  6. Small ascites. Trace right pleural effusion.  7. Cholelithiasis.  8. Mild nonspecific gastric wall thickening reidentified, gastritis is possible.     XR Esophagram    Narrative    EXAM: XR ESOPHAGRAM SINGLE CONTRAST  LOCATION: [facility]  DATE: 1/8/2025    INDICATION: Patient with hx of HCC now with progressive dysphagia to solids and liquids.  COMPARISON: None.  TECHNIQUE: Limited patient mobility. Single contrast barium esophagram performed.    RADIATION DOSE: Total Air Kerma 11.7 mGy    FINDINGS:   ESOPHAGUS: Limited visualization of the upper esophagus due to patient position. Normal caliber esophagus. No strictures. No fixed filling defects. Abnormal esophageal motility with markedly decreased primary and secondary peristalsis. Increased tertiary   peristalsis. This produces significant delayed esophageal barium clearance.      Impression    IMPRESSION:   1.  Limited esophagram secondary to limited patient mobility.  2.  Normal caliber visualized esophagus. Significant abnormal esophageal motility with delayed barium clearance.   XR Surgery ELIAS Fluoro Less Than 5 Min w Stills    Narrative    EXAM: XR SURGERY ELIAS FLUORO LESS THAN 5 MIN W STILLS  LOCATION: [facility]  DATE: 1/8/2025    INDICATION: Upper Endoscopy with Fluoroscopy  COMPARISON: None.    TECHNIQUE: Intraoperative fluoroscopy performed during the  patient's procedure.    RADIATION DOSE: Total Air Kerma 1.3413 mGy    FINDINGS: Fluoroscopy used during upper endoscopy. See the operative notes for details.

## 2025-01-10 NOTE — PROGRESS NOTES
Care Management Follow Up    Length of Stay (days): 4    Expected Discharge Date: 01/11/2025     Concerns to be Addressed:       Patient plan of care discussed at interdisciplinary rounds: Yes    Anticipated Discharge Disposition:                Anticipated Discharge Services:    Anticipated Discharge DME:      Patient/family educated on Medicare website which has current facility and service quality ratings:    Education Provided on the Discharge Plan:    Patient/Family in Agreement with the Plan:      Referrals Placed by CM/SW:    Private pay costs discussed: Not applicable    Discussed  Partnership in Safe Discharge Planning  document with patient/family: No     Handoff Completed: No, handoff not indicated or clinically appropriate    Additional Information:  BROOKS met with patient and his wife and daughter. They are hoping for patient to discharge home with hospice as soon as possible. SW provided patient and family with a hospice agency list and a hospice facility list for their review. Daughter states that they are familiar with [facility] and would like a referral sent to them. BROOKS spoke with Jonas with [facility] and he states they do have staffing for a SOC for today. He is planning to be at the hospital in about half an hour and can also meet with the patient and family in person. We will then assist with coordinating discharge home.     Next Steps: update physician, assist with coordinating transport     Addendum:  Family is planning for discharge tomorrow as transport is booked out into the evening for today. BROOKS did schedule an [facility] stretcher ride for between 1815-6115 for tomorrow 1/11. BROOKS updated [facility] on this timeframe and he will update their intake team. The intake team will then reach out to family to coordinate time of SOC following patient returning home. Jonas discussed equipment with family and patient does not require any equipment at this time. Will need 3 days supply of  comfort meds and an order to eval and treat on orders. SW to complete PCS.     Addendum:  Patient would now like to discharge today to home and family is in agreement. The latest SOC time [facility] can accommodate is 1630. SW discussed this with family and they would like to confirm this and move forward with getting patient home. Bedside nurse paged doctor for discharge orders and called pharmacy about getting the 3 day supply of comfort meds.     Suzi Dailey LGSW

## 2025-01-12 NOTE — PLAN OF CARE
"Speech Language Therapy Discharge Summary    Reason for therapy discharge:    Discharged to home.    Progress towards therapy goal(s). See goals on Care Plan in [facility] electronic health record for goal details.  Goals partially met.  Barriers to achieving goals:   discharge from facility.    Therapy recommendation(s):    No further therapy is recommended.SLP note from 1/9/25: \"Significant fatigue impacting swallow safety. Mild oral and pharyngeal dysphagia at bedside with esophageal component likely causing his painful swallowing with globus sensation in the chest. Full liquid diet. \"                            "
[id number]  Pt is jaundiced, weak. Difficulty with swallowing, mostly swishes water in mouth, then spits it out. GI following. CT scan completed on evenings, see results, could not tolerate oral contrast. Up with A1/W. Forgetful, does not always call before getting up.          
Discharge Note    Patient discharged to home via private vehicle  accompanied by significant other  and daughter.  IV: Discontinued  Prescriptions filled, family admit they had to leave so picking up from pharmacy, they did not want to wait for it to arrive up to the unit.   Belongings reviewed and sent with patient.   Home medications returned to patient: Yes  Equipment sent with: patient.   patient verbalizes understanding of discharge instructions. AVS given to patient.       
Goal Outcome Evaluation:                 Outcome Evaluation: continue to follow for discharge planning - watch for therapy recs      
Goal Outcome Evaluation:       Patient remains stable with vitals notable tachycardia in irregular rhythm. Heart rate up in the 150's. 2.5 IV metoprolol was given. Also metoprolol was given at procedure during EGD. Heart rate now at 118. Will resume with metoprolol. Alert and oriented. Can be slow to respond. Diet advanced to full liquid. Tolerating well. Nausea is relieved with ODT ondansetron. Up in the room with assist of one.                  
Goal Outcome Evaluation:  [id number]   Dysphagia, Odynophagia with substernal chest pain, FTT, Physical deconditioning, Generalized weakness  Orientation: A&Oxs 4  Aggression Stop Light: green  Activity: A1GB  Diet/BS Checks: Strict NPO-unable to swallow any liquids safely  Tele:  N/A  IV Access/Drains: PIV x3. 20g placed for CT   Pain Management: none this shift   Abnormal VS/Results: Tachycardic  Bowel/Bladder: continent  Skin/Wounds:   Consults: Hem/Onc, SLP  D/C Disposition: TBD  Other Info: Admit to hospital for further management and hospice transition. Down in CT currently.  
Goal Outcome Evaluation:  Orientation: A&Ox 4. Forgetful   Aggression Stop Light: green  Activity: A1 GBW. Tolerates well but weak.   Diet/BS Checks: Full liquid diet. Pt has had small sips of water this shift.  Tele:  N/A  IV Access/Drains: L PIV CDI running NS at 50mL/hr   Pain Management: Denies pain this shift.   Abnormal VS/Results: VSS on RA. Can be tachycardic at times. Scheduled metoprolol.   Bowel/Bladder: continent. 1 incontinent episode but related more to urgency. No BM   Skin/Wounds: dry skin with scabs and scattered bruising  Consults: Hem/Onc, SLP  D/C Disposition: TBD  Other Info: Admit to hospital for further management and hospice transition.    - No acute issues through the night.                       
Goal Outcome Evaluation:  Orientation: A&Ox 4. Forgetful   Aggression Stop Light: green  Activity: A1 GBW. Tolerates well.   Diet/BS Checks: Full liquid diet. Pt has had nothing by mouth due to oral intake being unsafe. Pt is chewing a swishing ice cubes and spitting them out.   Tele:  N/A  IV Access/Drains: L PIV CDI running NS at 50mL/hr   Pain Management: Denies pain this shift.   Abnormal VS/Results: Tachycardic pt was in the high 150's PRN metoprolol given x2   Bowel/Bladder: continent  Skin/Wounds: dry skin with scabs and scattered bruising  Consults: Hem/Onc, SLP  D/C Disposition: TBD  Other Info: Admit to hospital for further management and hospice transition.    - MRI completed this shift.                       
Goal Outcome Evaluation:  Orientation: A&Ox2-3. Orientation fluctuates. Knows he is in the hospital does not know where. Disoriented to time consistently.    Aggression Stop Light: green  Activity: A1GBW becoming more weak and unsteady on his feet. Stood at bedside a couple times during shift. Tolerated okay. Does not always call when wanting to get up.   Diet/BS Checks: Clears-unable to swallow any liquids safely. Mostly chews on ice cubes and swishes water and spits it out.   Tele:  N/A  IV Access/Drains: L  PIV CDI SL. NS @ 50 mL/hr  Pain Management: IV dilaudid X1 had pain in back. Good effects.  Abnormal VS/Results: Tachycardic, scheduled metoprolol.    Bowel/Bladder: continent. Using urinal at bedside. No BM this shift.   Skin/Wounds:  dry skin with scabs and scattered bruising  Consults: Hem/Onc, SLP, palliative care  D/C Disposition: TBD  Other Info:                       
Orientation: A&Ox3, disoriented to time. Confusion fluctuates   Aggression Stop Light: Green  Activity: A1 GBW but pt needed flower steady to get off toilet   Diet/BS Checks: Full liquid, pt unable to swallow due to pain.  Tele:  n/a  IV Access/Drains: LPIV CDI infusing NS at 50 mL/hr. LUE +1 edema and MD alcira aware   Pain Management: Denied   Abnormal VS/Results: VSS ex tachycardic, pt gets scheduled IV 5 mg metoprolol. Pt on RA.   Bowel/Bladder: Continent of bowel and bladder  Skin/Wounds: Raw rash to groin/perineum and coccyx. Pillow case used to sling scrotum. WOC consult ordered    Consults: SW, GI, Palliative   D/C Disposition: pending   Other Info: Pt had RRT today for increased confusion, see note   
Orientation: A/O to self & situation. Fluctuates throughout day. Pt noticeably more lethargic this shift, sleeping between cares with moments of being more alert & conversing.   Aggression Stop Light: Green  Activity: A1 GBW, using urinal at bedside.   Diet/BS Checks: Full liquid diet. Pt tried popsicles & lemon ice & tolerated okay. Attempts to takes sips of liquids but spits it back up. Has ice chips throughout day.   IV Access/Drains: L PIV CDI running NS at 50mL/hr w/ int abx. New dressing placed x2 d/t leaking when pt gets up.   Pain Management: Denies pain this shift.   Abnormal VS/Results: VSS on RA. Tachycardic, scheduled IV metoprolol given Q6.   Bowel/Bladder: continent B/B. No BM. Uses urinal at bedside. Will set off alarm d/t urgency  Skin/Wounds: dry skin with scabs and scattered bruising. Moisture to groin/scrotum. Powder applied.   Consults: Hem/Onc, SLP  D/C Disposition: TBD    Other Info:   - zofran given x1 for nausea  - ongoing GOC discussions    
Physical Therapy Discharge Summary    Reason for therapy discharge:    Discharged to home with assist.    Progress towards therapy goal(s). See goals on Care Plan in [facility] electronic health record for goal details.  Goals partially met.  Barriers to achieving goals:   discharge from facility.    Therapy recommendation(s):    No further therapy is recommended.  Recommend supervision and assist for mobility and cares.      
Summary: direct admit from [facility] 1/6. Concern for dysphagia, odynophagia and epigastric pain, unable to eat/take pills, and physical deconditioning with failure to thrive. Dysphagia, odynophagia x1 week, sensation food is stuck after he swallows. States he can chew and swallow just fine however it feels stuck substernally and he develops pain and discomfort only when he attempts to swallow.     PMH: hepatocellular carcinoma, alcohol induced liver cirrhosis, esophageal varices, portal hypertension, essential hypertension, paroxysmal atrial fibrillation, chronic normocytic anemia, chronic thrombocytopenia, MDD with anxiety, insomnia, moderate aortic stenosis, psoriasis, GERD, history of compression fractures of L1 and L4 and history of alcohol use disorder.     DX: Dysphagia, Odynophagia with substernal chest pain, FTT, Physical deconditioning, Generalized weakness  Orientation: A&Oxs 4  Aggression Stop Light: green  Activity: A1GB  Diet/BS Checks: Strict NPO-unable to swallow any liquids safely  Tele:  N/A  IV Access/Drains: L distal posterior forearm 22 PIV placed at clinic today. NS @ 75 mL/hr  Pain Management: IV dilaudid x's 2  Abnormal VS/Results: Tachycardic  Bowel/Bladder: continent  Skin/Wounds: refused skin assessment- has dry skin with scabs and scattered bruising  Consults: Hem/Onc, SLP  D/C Disposition: TBD  Other Info: Admit to hospital for further management and hospice transition.    
Summary: direct admit from [facility] 1/6. Concern for dysphagia, odynophagia and epigastric pain, unable to eat/take pills, and physical deconditioning with failure to thrive. Dysphagia, odynophagia x1 week, sensation food is stuck after he swallows. States he can chew and swallow just fine however it feels stuck substernally and he develops pain and discomfort only when he attempts to swallow.    *full code*  PMH: hepatocellular carcinoma, alcohol induced liver cirrhosis, esophageal varices, portal hypertension, essential hypertension, paroxysmal atrial fibrillation, chronic normocytic anemia, chronic thrombocytopenia, MDD with anxiety, insomnia, moderate aortic stenosis, psoriasis, GERD, history of compression fractures of L1 and L4 and history of alcohol use disorder.      DX: Dysphagia, Odynophagia with substernal chest pain, FTT, Physical deconditioning, Generalized weakness  Orientation: A&Oxs 4  Aggression Stop Light: green  Activity: A1GB  Diet/BS Checks: Clears-unable to swallow any liquids safely  Tele:  N/A  IV Access/Drains: L distal posterior forearm 22 PIV placed at clinic today. NS @ 50 mL/hr  Pain Management: IV dilaudid x's 2  Abnormal VS/Results: Tachycardic  Bowel/Bladder: continent  Skin/Wounds: refused skin assessment- has dry skin with scabs and scattered bruising  Consults: Hem/Onc, SLP, palliative care  D/C Disposition: TBD  Other Info: Admit to hospital for further management and hospice transition.    
Detail Level: Simple
Additional Notes: Patient advised to was with dove body soap and use a cream moisturizer

## 2025-01-14 ENCOUNTER — DOCUMENTATION ONLY (OUTPATIENT)
Dept: ONCOLOGY | Facility: CLINIC | Age: 80
End: 2025-01-14
Payer: MEDICARE

## 2025-01-14 NOTE — PROGRESS NOTES
Received positive distress screen regarding patient's concern for ability to eat. Note recent hospitalization and hospice planned. No nutrition intervention at this time unless ordered by MD.     Lauren Crowe, RD, LD  Clinical Dietitian  [facility]  [phone] (direct)

## 2025-01-23 ENCOUNTER — PATIENT OUTREACH (OUTPATIENT)
Dept: ONCOLOGY | Facility: CLINIC | Age: 80
End: 2025-01-23
Payer: MEDICARE

## 2025-01-23 NOTE — TELEPHONE ENCOUNTER
"Date of death State file number  [id number]    Verified on [email or web address]    \"Notification of \" form completed [email or web address] emailed to \"[email or web address]\" [email or web address]  on 2025  "

## (undated) DEVICE — SOL WATER IRRIG 1000ML BOTTLE 2F7114

## (undated) DEVICE — BAG DECANTER STERILE WHITE DYNJDEC09

## (undated) RX ORDER — ONDANSETRON 2 MG/ML
INJECTION INTRAMUSCULAR; INTRAVENOUS
Status: DISPENSED
Start: 2024-08-30

## (undated) RX ORDER — FENTANYL CITRATE 50 UG/ML
INJECTION, SOLUTION INTRAMUSCULAR; INTRAVENOUS
Status: DISPENSED
Start: 2023-03-14

## (undated) RX ORDER — LIDOCAINE HYDROCHLORIDE 10 MG/ML
INJECTION, SOLUTION EPIDURAL; INFILTRATION; INTRACAUDAL; PERINEURAL
Status: DISPENSED
Start: 2024-08-30

## (undated) RX ORDER — FENTANYL CITRATE 50 UG/ML
INJECTION, SOLUTION INTRAMUSCULAR; INTRAVENOUS
Status: DISPENSED
Start: 2023-03-16

## (undated) RX ORDER — SODIUM CHLORIDE 9 MG/ML
INJECTION, SOLUTION INTRAVENOUS
Status: DISPENSED
Start: 2023-03-16

## (undated) RX ORDER — NITROGLYCERIN 5 MG/ML
VIAL (ML) INTRAVENOUS
Status: DISPENSED
Start: 2024-08-26

## (undated) RX ORDER — PANTOPRAZOLE SODIUM 40 MG/10ML
INJECTION, POWDER, LYOPHILIZED, FOR SOLUTION INTRAVENOUS
Status: DISPENSED
Start: 2023-03-16

## (undated) RX ORDER — SODIUM CHLORIDE 9 MG/ML
INJECTION, SOLUTION INTRAVENOUS
Status: DISPENSED
Start: 2024-08-30

## (undated) RX ORDER — CLINDAMYCIN PHOSPHATE 900 MG/50ML
INJECTION, SOLUTION INTRAVENOUS
Status: DISPENSED
Start: 2024-08-30

## (undated) RX ORDER — ONDANSETRON 2 MG/ML
INJECTION INTRAMUSCULAR; INTRAVENOUS
Status: DISPENSED
Start: 2023-03-16

## (undated) RX ORDER — LIDOCAINE HYDROCHLORIDE 10 MG/ML
INJECTION, SOLUTION EPIDURAL; INFILTRATION; INTRACAUDAL; PERINEURAL
Status: DISPENSED
Start: 2023-03-14

## (undated) RX ORDER — METOPROLOL TARTRATE 1 MG/ML
INJECTION, SOLUTION INTRAVENOUS
Status: DISPENSED
Start: 2025-01-08

## (undated) RX ORDER — PROPOFOL 10 MG/ML
INJECTION, EMULSION INTRAVENOUS
Status: DISPENSED
Start: 2024-04-25

## (undated) RX ORDER — FENTANYL CITRATE 50 UG/ML
INJECTION, SOLUTION INTRAMUSCULAR; INTRAVENOUS
Status: DISPENSED
Start: 2024-08-30

## (undated) RX ORDER — FENTANYL CITRATE 50 UG/ML
INJECTION, SOLUTION INTRAMUSCULAR; INTRAVENOUS
Status: DISPENSED
Start: 2025-01-08

## (undated) RX ORDER — SODIUM CHLORIDE 9 MG/ML
INJECTION, SOLUTION INTRAVENOUS
Status: DISPENSED
Start: 2024-08-26

## (undated) RX ORDER — SODIUM CHLORIDE 9 MG/ML
INJECTION, SOLUTION INTRAVENOUS
Status: DISPENSED
Start: 2023-03-14

## (undated) RX ORDER — SODIUM CHLORIDE 9 MG/ML
INJECTION, SOLUTION INTRAVENOUS
Status: DISPENSED
Start: 2023-02-02

## (undated) RX ORDER — LIDOCAINE HYDROCHLORIDE 10 MG/ML
INJECTION, SOLUTION EPIDURAL; INFILTRATION; INTRACAUDAL; PERINEURAL
Status: DISPENSED
Start: 2024-08-26

## (undated) RX ORDER — LIDOCAINE HYDROCHLORIDE 10 MG/ML
INJECTION, SOLUTION EPIDURAL; INFILTRATION; INTRACAUDAL; PERINEURAL
Status: DISPENSED
Start: 2023-03-16

## (undated) RX ORDER — CLINDAMYCIN PHOSPHATE 900 MG/50ML
INJECTION, SOLUTION INTRAVENOUS
Status: DISPENSED
Start: 2023-03-16

## (undated) RX ORDER — FENTANYL CITRATE 50 UG/ML
INJECTION, SOLUTION INTRAMUSCULAR; INTRAVENOUS
Status: DISPENSED
Start: 2024-08-26

## (undated) RX ORDER — LIDOCAINE HYDROCHLORIDE 10 MG/ML
INJECTION, SOLUTION EPIDURAL; INFILTRATION; INTRACAUDAL; PERINEURAL
Status: DISPENSED
Start: 2023-02-02

## (undated) RX ORDER — FENTANYL CITRATE 50 UG/ML
INJECTION, SOLUTION INTRAMUSCULAR; INTRAVENOUS
Status: DISPENSED
Start: 2023-02-02